# Patient Record
Sex: MALE | Race: WHITE | NOT HISPANIC OR LATINO | Employment: PART TIME | URBAN - METROPOLITAN AREA
[De-identification: names, ages, dates, MRNs, and addresses within clinical notes are randomized per-mention and may not be internally consistent; named-entity substitution may affect disease eponyms.]

---

## 2020-11-05 ENCOUNTER — HOSPITAL ENCOUNTER (OUTPATIENT)
Dept: LAB | Facility: MEDICAL CENTER | Age: 70
End: 2020-11-05
Attending: PSYCHIATRY & NEUROLOGY
Payer: MEDICARE

## 2020-11-05 PROCEDURE — C9803 HOPD COVID-19 SPEC COLLECT: HCPCS

## 2020-11-05 PROCEDURE — U0003 INFECTIOUS AGENT DETECTION BY NUCLEIC ACID (DNA OR RNA); SEVERE ACUTE RESPIRATORY SYNDROME CORONAVIRUS 2 (SARS-COV-2) (CORONAVIRUS DISEASE [COVID-19]), AMPLIFIED PROBE TECHNIQUE, MAKING USE OF HIGH THROUGHPUT TECHNOLOGIES AS DESCRIBED BY CMS-2020-01-R: HCPCS

## 2020-11-06 LAB — COVID ORDER STATUS COVID19: NORMAL

## 2020-11-07 LAB
SARS-COV-2 RNA RESP QL NAA+PROBE: NOTDETECTED
SPECIMEN SOURCE: NORMAL

## 2020-12-23 ENCOUNTER — HOSPITAL ENCOUNTER (OUTPATIENT)
Dept: LAB | Facility: MEDICAL CENTER | Age: 70
End: 2020-12-23
Attending: PSYCHIATRY & NEUROLOGY
Payer: MEDICARE

## 2020-12-23 PROCEDURE — C9803 HOPD COVID-19 SPEC COLLECT: HCPCS

## 2020-12-23 PROCEDURE — U0003 INFECTIOUS AGENT DETECTION BY NUCLEIC ACID (DNA OR RNA); SEVERE ACUTE RESPIRATORY SYNDROME CORONAVIRUS 2 (SARS-COV-2) (CORONAVIRUS DISEASE [COVID-19]), AMPLIFIED PROBE TECHNIQUE, MAKING USE OF HIGH THROUGHPUT TECHNOLOGIES AS DESCRIBED BY CMS-2020-01-R: HCPCS

## 2020-12-24 LAB — COVID ORDER STATUS COVID19: NORMAL

## 2020-12-25 LAB
SARS-COV-2 RNA RESP QL NAA+PROBE: NOTDETECTED
SPECIMEN SOURCE: NORMAL

## 2021-03-03 DIAGNOSIS — Z23 NEED FOR VACCINATION: ICD-10-CM

## 2021-04-01 ENCOUNTER — OFFICE VISIT (OUTPATIENT)
Dept: MEDICAL GROUP | Facility: MEDICAL CENTER | Age: 71
End: 2021-04-01
Payer: MEDICARE

## 2021-04-01 VITALS
RESPIRATION RATE: 16 BRPM | DIASTOLIC BLOOD PRESSURE: 78 MMHG | OXYGEN SATURATION: 95 % | WEIGHT: 180.78 LBS | HEART RATE: 84 BPM | HEIGHT: 68 IN | SYSTOLIC BLOOD PRESSURE: 138 MMHG | BODY MASS INDEX: 27.4 KG/M2 | TEMPERATURE: 97.7 F

## 2021-04-01 DIAGNOSIS — F33.42 RECURRENT MAJOR DEPRESSIVE DISORDER, IN FULL REMISSION (HCC): ICD-10-CM

## 2021-04-01 DIAGNOSIS — E03.9 HYPOTHYROIDISM, UNSPECIFIED TYPE: ICD-10-CM

## 2021-04-01 DIAGNOSIS — Z00.00 ENCOUNTER FOR PREVENTIVE CARE: ICD-10-CM

## 2021-04-01 DIAGNOSIS — J30.81 ALLERGIC RHINITIS DUE TO ANIMAL HAIR AND DANDER: ICD-10-CM

## 2021-04-01 DIAGNOSIS — T78.40XA ALLERGY, INITIAL ENCOUNTER: ICD-10-CM

## 2021-04-01 DIAGNOSIS — R22.1 LUMP IN NECK: ICD-10-CM

## 2021-04-01 DIAGNOSIS — E78.5 DYSLIPIDEMIA: ICD-10-CM

## 2021-04-01 DIAGNOSIS — E29.1 HYPOGONADISM IN MALE: ICD-10-CM

## 2021-04-01 DIAGNOSIS — A60.00 GENITAL HERPES SIMPLEX, UNSPECIFIED SITE: ICD-10-CM

## 2021-04-01 DIAGNOSIS — Z12.5 PROSTATE CANCER SCREENING: ICD-10-CM

## 2021-04-01 PROCEDURE — 99204 OFFICE O/P NEW MOD 45 MIN: CPT | Performed by: INTERNAL MEDICINE

## 2021-04-01 RX ORDER — DIMENHYDRINATE 50 MG
1 TABLET ORAL DAILY
COMMUNITY
End: 2021-08-09

## 2021-04-01 RX ORDER — MONTELUKAST SODIUM 10 MG/1
10 TABLET ORAL DAILY
Qty: 90 TABLET | Refills: 1 | Status: SHIPPED | OUTPATIENT
Start: 2021-04-01 | End: 2021-05-14 | Stop reason: SDUPTHER

## 2021-04-01 RX ORDER — VALACYCLOVIR HYDROCHLORIDE 500 MG/1
500 TABLET, FILM COATED ORAL 2 TIMES DAILY
Qty: 200 TABLET | Refills: 3 | Status: SHIPPED | OUTPATIENT
Start: 2021-04-01 | End: 2021-07-10

## 2021-04-01 RX ORDER — BUPROPION HYDROCHLORIDE 300 MG/1
300 TABLET ORAL EVERY MORNING
COMMUNITY
End: 2021-04-01

## 2021-04-01 RX ORDER — LEVOTHYROXINE SODIUM 0.1 MG/1
100 TABLET ORAL
COMMUNITY
End: 2021-04-01 | Stop reason: SDUPTHER

## 2021-04-01 RX ORDER — FLUOXETINE HYDROCHLORIDE 20 MG/1
20 CAPSULE ORAL DAILY
Qty: 90 CAPSULE | Refills: 1 | Status: SHIPPED | OUTPATIENT
Start: 2021-04-01 | End: 2021-05-14 | Stop reason: SDUPTHER

## 2021-04-01 RX ORDER — CHLORAL HYDRATE 500 MG
1000 CAPSULE ORAL 2 TIMES DAILY
COMMUNITY
End: 2023-03-02

## 2021-04-01 RX ORDER — LEVOTHYROXINE SODIUM 0.1 MG/1
100 TABLET ORAL
Qty: 100 TABLET | Refills: 3 | Status: SHIPPED | OUTPATIENT
Start: 2021-04-01 | End: 2022-06-14 | Stop reason: SDUPTHER

## 2021-04-01 RX ORDER — VITAMIN B COMPLEX
8000 TABLET ORAL DAILY
COMMUNITY
End: 2021-08-09

## 2021-04-01 RX ORDER — SIMVASTATIN 20 MG
20 TABLET ORAL NIGHTLY
COMMUNITY
End: 2021-04-01 | Stop reason: SDUPTHER

## 2021-04-01 RX ORDER — VALACYCLOVIR HYDROCHLORIDE 500 MG/1
500 TABLET, FILM COATED ORAL 2 TIMES DAILY
COMMUNITY
End: 2021-04-01 | Stop reason: SDUPTHER

## 2021-04-01 RX ORDER — SIMVASTATIN 20 MG
20 TABLET ORAL EVERY EVENING
Qty: 100 TABLET | Refills: 3 | Status: SHIPPED | OUTPATIENT
Start: 2021-04-01 | End: 2022-05-13 | Stop reason: SDUPTHER

## 2021-04-01 ASSESSMENT — PATIENT HEALTH QUESTIONNAIRE - PHQ9: CLINICAL INTERPRETATION OF PHQ2 SCORE: 0

## 2021-04-01 NOTE — ASSESSMENT & PLAN NOTE
He would like to try prozac  Benefits, risks, and adverse reactions of medication discussed. Patient is agreeable with initiating the medication.    wellbutrin XL did not help much

## 2021-04-01 NOTE — PROGRESS NOTES
New Patient to Establish    Reason to establish: New patient to establish    Gary Hedrick is a 70 y.o. male who presents today with the following:    CC:   Chief Complaint   Patient presents with   • Establish Care   • Seasonal Allergies       HPI:     Allergies  Allergic to mainly Cats  Allergy test positive for eggs, almonds, mati.     He has cats at home        Hypogonadism in male  Hx of hypogonadism  Using testosterone injection       Allergic rhinitis due to animal hair and dander  Chronic allergic rhinitis  He does sinus washes, rotates antihistamines, flonase  Used to see ENT  Trial of montelukast  Benefits, risks, and adverse reactions of medication discussed. Patient is agreeable with initiating the medication.        Recurrent major depressive disorder, in full remission (HCC)  He would like to try prozac  Benefits, risks, and adverse reactions of medication discussed. Patient is agreeable with initiating the medication.    wellbutrin XL did not help much      Lump in neck  Small hard anterior neck lump, non-tender      Hypothyroidism  Hx of hypothyroidism   On levothyroxine          Current Outpatient Medications:   •  vitamin D (CHOLECALCIFEROL) 1000 Unit (25 mcg) Tab, Take 8,000 Units by mouth every day., Disp: , Rfl:   •  Omega-3 Fatty Acids (FISH OIL) 1000 MG Cap capsule, Take 200 mg by mouth 2 Times a Day., Disp: , Rfl:   •  Coenzyme Q10 (CO Q-10) 100 MG Cap, Take 1 capsule by mouth every day., Disp: , Rfl:   •  montelukast (SINGULAIR) 10 MG Tab, Take 1 tablet by mouth every day., Disp: 90 tablet, Rfl: 1  •  FLUoxetine (PROZAC) 20 MG Cap, Take 1 capsule by mouth every day., Disp: 90 capsule, Rfl: 1  •  levothyroxine (SYNTHROID) 100 MCG Tab, Take 1 tablet by mouth Every morning on an empty stomach., Disp: 100 tablet, Rfl: 3  •  simvastatin (ZOCOR) 20 MG Tab, Take 1 tablet by mouth every evening., Disp: 100 tablet, Rfl: 3  •  valACYclovir (VALTREX) 500 MG Tab, Take 1 tablet by mouth 2 times a day for  "100 days., Disp: 200 tablet, Rfl: 3    Allergies, past medical history, past surgical history, medications, family history, social history reviewed and updated.    ROS     Constitutional: Denies fevers or chills  Eyes: Denies changes in vision  Ears/Nose/Throat/Mouth: Denies nasal congestion or sore throat   Cardiovascular: Denies chest pain or palpitations   Respiratory: Denies shortness of breath , Denies cough  Gastrointestinal/Hepatic: Denies abd pain, nausea, vomiting   Genitourinary: Denies dysuria or frequency  Musculoskeletal/Rheum: Denies joint pain and swelling   Neurological: Denies headache  Psychiatric: mood stable  Endocrine: hx of hypogonadism, hypothyroidism Denies hx of diabetes   Heme/Oncology/Lymph Nodes: Denies weight changes or enlarged LNs.    Physical Exam  /78 (BP Location: Left arm, Patient Position: Sitting, BP Cuff Size: Adult)   Pulse 84   Temp 36.5 °C (97.7 °F) (Temporal)   Resp 16   Ht 1.727 m (5' 8\")   Wt 82 kg (180 lb 12.4 oz)   SpO2 95%   BMI 27.49 kg/m²   General: Normal appearance.  Well developed, well nourished, no acute distress.  HEENT: Normocephalic.  Extraocular motion intact. Pupils are equally round, reactive to light and accommodation, conjunctiva clear, no scleral icterus.  Ears: normal shape and contour, ear canals clear, tympanic membranes intact. Hearing intact. Wearing a mask. Oropharynx clear, no erythema, edema or exudate noted.  NECK: Thyroid is not enlarged. No JVD.  No carotid bruits. Probable anterior small lump, hard, non-tender  Cardiovascular: Regular rhythm and rate. No murmur/rubs/gallops.   Respiratory: Normal respiratory effort, clear to auscultation bilaterally. No wheezing/rales/rhonchi.    Abdomen: Bowel sounds present, soft, nontender, nondistended, no rebound, no guarding.   : No suprapubic tenderness. No CVA tenderness.   EXT: no LE edema b/l. No cyanosis.  No clubbing.  Lymph: No cervical, supraclavicular or axillary lymph nodes are " palpable  Skin: Warm and dry.  .   Psych: AAOx3,  Normal mood and affect, normal judgment and insight, memory within normal limits      Assessment and Plan    1. Allergy, initial encounter  - REFERRAL TO ENT  - CBC WITH DIFFERENTIAL; Future    2. Allergic rhinitis due to animal hair and dander  - montelukast (SINGULAIR) 10 MG Tab; Take 1 tablet by mouth every day.  Dispense: 90 tablet; Refill: 1  Sinus wash, flonase prn, OTC Zyrtec or claritin    3. Hypogonadism in male  - TESTOSTERONE SERUM; Future  - REFERRAL TO ENDOCRINOLOGY    4. Recurrent major depressive disorder, in full remission (HCC)  - FLUoxetine (PROZAC) 20 MG Cap; Take 1 capsule by mouth every day.  Dispense: 90 capsule; Refill: 1  - TSH WITH REFLEX TO FT4; Future    5. Hypothyroidism, unspecified type  - CBC WITH DIFFERENTIAL; Future  - Comp Metabolic Panel; Future  - TSH WITH REFLEX TO FT4; Future  - levothyroxine (SYNTHROID) 100 MCG Tab; Take 1 tablet by mouth Every morning on an empty stomach.  Dispense: 100 tablet; Refill: 3  - US-THYROID; Future    6. Dyslipidemia  - Lipid Profile; Future  - TSH WITH REFLEX TO FT4; Future  - simvastatin (ZOCOR) 20 MG Tab; Take 1 tablet by mouth every evening.  Dispense: 100 tablet; Refill: 3    7. Encounter for preventive care  - VITAMIN D,25 HYDROXY; Future    8. Genital herpes simplex, unspecified site  - valACYclovir (VALTREX) 500 MG Tab; Take 1 tablet by mouth 2 times a day for 100 days.  Dispense: 200 tablet; Refill: 3    9. Prostate cancer screening  - PROSTATE SPECIFIC AG SCREENING; Future    10. Lump in neck  - US-SOFT TISSUES OF HEAD - NECK; Future        Follow-up:Return in about 1 month (around 5/1/2021), or if symptoms worsen or fail to improve.    This note was created using voice recognition software. There may be unintended errors in spelling, grammar or content.

## 2021-04-01 NOTE — ASSESSMENT & PLAN NOTE
Chronic allergic rhinitis  He does sinus washes, rotates antihistamines, flonase  Used to see ENT  Trial of montelukast  Benefits, risks, and adverse reactions of medication discussed. Patient is agreeable with initiating the medication.

## 2021-04-01 NOTE — ASSESSMENT & PLAN NOTE
Allergic to mainly Cats  Allergy test positive for eggs, almonds, mati.     He has cats at home

## 2021-04-12 ENCOUNTER — HOSPITAL ENCOUNTER (OUTPATIENT)
Dept: LAB | Facility: MEDICAL CENTER | Age: 71
End: 2021-04-12
Attending: FAMILY MEDICINE
Payer: MEDICARE

## 2021-04-12 ENCOUNTER — HOSPITAL ENCOUNTER (OUTPATIENT)
Facility: MEDICAL CENTER | Age: 71
End: 2021-04-12
Attending: INTERNAL MEDICINE
Payer: MEDICARE

## 2021-04-12 ENCOUNTER — HOSPITAL ENCOUNTER (OUTPATIENT)
Dept: LAB | Facility: MEDICAL CENTER | Age: 71
End: 2021-04-12
Attending: INTERNAL MEDICINE
Payer: MEDICARE

## 2021-04-12 DIAGNOSIS — Z00.00 ENCOUNTER FOR PREVENTIVE CARE: ICD-10-CM

## 2021-04-12 DIAGNOSIS — D75.89 MACROCYTOSIS WITHOUT ANEMIA: ICD-10-CM

## 2021-04-12 DIAGNOSIS — E29.1 HYPOGONADISM IN MALE: ICD-10-CM

## 2021-04-12 DIAGNOSIS — R97.20 ELEVATED PSA: ICD-10-CM

## 2021-04-12 DIAGNOSIS — E78.5 DYSLIPIDEMIA: ICD-10-CM

## 2021-04-12 DIAGNOSIS — F33.42 RECURRENT MAJOR DEPRESSIVE DISORDER, IN FULL REMISSION (HCC): ICD-10-CM

## 2021-04-12 DIAGNOSIS — E03.9 HYPOTHYROIDISM, UNSPECIFIED TYPE: ICD-10-CM

## 2021-04-12 DIAGNOSIS — T78.40XA ALLERGY, INITIAL ENCOUNTER: ICD-10-CM

## 2021-04-12 DIAGNOSIS — Z12.5 PROSTATE CANCER SCREENING: ICD-10-CM

## 2021-04-12 LAB
ALBUMIN SERPL BCP-MCNC: 4.4 G/DL (ref 3.2–4.9)
ALBUMIN/GLOB SERPL: 2.3 G/DL
ALP SERPL-CCNC: 63 U/L (ref 30–99)
ALT SERPL-CCNC: 34 U/L (ref 2–50)
ANION GAP SERPL CALC-SCNC: 11 MMOL/L (ref 7–16)
AST SERPL-CCNC: 26 U/L (ref 12–45)
BASOPHILS # BLD AUTO: 0.6 % (ref 0–1.8)
BASOPHILS # BLD: 0.03 K/UL (ref 0–0.12)
BILIRUB SERPL-MCNC: 0.4 MG/DL (ref 0.1–1.5)
BUN SERPL-MCNC: 15 MG/DL (ref 8–22)
CALCIUM SERPL-MCNC: 8.7 MG/DL (ref 8.5–10.5)
CHLORIDE SERPL-SCNC: 103 MMOL/L (ref 96–112)
CHOLEST SERPL-MCNC: 144 MG/DL (ref 100–199)
CO2 SERPL-SCNC: 27 MMOL/L (ref 20–33)
CREAT SERPL-MCNC: 1.1 MG/DL (ref 0.5–1.4)
EOSINOPHIL # BLD AUTO: 0.05 K/UL (ref 0–0.51)
EOSINOPHIL NFR BLD: 0.9 % (ref 0–6.9)
ERYTHROCYTE [DISTWIDTH] IN BLOOD BY AUTOMATED COUNT: 48.1 FL (ref 35.9–50)
FASTING STATUS PATIENT QL REPORTED: NORMAL
FOLATE SERPL-MCNC: 16 NG/ML
GLOBULIN SER CALC-MCNC: 1.9 G/DL (ref 1.9–3.5)
GLUCOSE SERPL-MCNC: 101 MG/DL (ref 65–99)
HCT VFR BLD AUTO: 41.5 % (ref 42–52)
HDLC SERPL-MCNC: 38 MG/DL
HGB BLD-MCNC: 14.4 G/DL (ref 14–18)
IMM GRANULOCYTES # BLD AUTO: 0.01 K/UL (ref 0–0.11)
IMM GRANULOCYTES NFR BLD AUTO: 0.2 % (ref 0–0.9)
LDLC SERPL CALC-MCNC: 74 MG/DL
LH SERPL-ACNC: 0.6 IU/L (ref 1.7–8.6)
LYMPHOCYTES # BLD AUTO: 1.54 K/UL (ref 1–4.8)
LYMPHOCYTES NFR BLD: 28.8 % (ref 22–41)
MCH RBC QN AUTO: 36.3 PG (ref 27–33)
MCHC RBC AUTO-ENTMCNC: 34.7 G/DL (ref 33.7–35.3)
MCV RBC AUTO: 104.5 FL (ref 81.4–97.8)
MONOCYTES # BLD AUTO: 0.63 K/UL (ref 0–0.85)
MONOCYTES NFR BLD AUTO: 11.8 % (ref 0–13.4)
NEUTROPHILS # BLD AUTO: 3.08 K/UL (ref 1.82–7.42)
NEUTROPHILS NFR BLD: 57.7 % (ref 44–72)
NRBC # BLD AUTO: 0 K/UL
NRBC BLD-RTO: 0 /100 WBC
PLATELET # BLD AUTO: 248 K/UL (ref 164–446)
PMV BLD AUTO: 9.8 FL (ref 9–12.9)
POTASSIUM SERPL-SCNC: 4.2 MMOL/L (ref 3.6–5.5)
PROGEST SERPL-MCNC: <0.05 NG/ML
PROT SERPL-MCNC: 6.3 G/DL (ref 6–8.2)
PSA SERPL-MCNC: 4.1 NG/ML (ref 0–4)
RBC # BLD AUTO: 3.97 M/UL (ref 4.7–6.1)
SODIUM SERPL-SCNC: 141 MMOL/L (ref 135–145)
TRIGL SERPL-MCNC: 162 MG/DL (ref 0–149)
TSH SERPL DL<=0.005 MIU/L-ACNC: 2.54 UIU/ML (ref 0.38–5.33)
VIT B12 SERPL-MCNC: 669 PG/ML (ref 211–911)
WBC # BLD AUTO: 5.3 K/UL (ref 4.8–10.8)

## 2021-04-12 PROCEDURE — 80061 LIPID PANEL: CPT

## 2021-04-12 PROCEDURE — 84403 ASSAY OF TOTAL TESTOSTERONE: CPT | Mod: 91

## 2021-04-12 PROCEDURE — 84153 ASSAY OF PSA TOTAL: CPT

## 2021-04-12 PROCEDURE — 80053 COMPREHEN METABOLIC PANEL: CPT

## 2021-04-12 PROCEDURE — 82306 VITAMIN D 25 HYDROXY: CPT

## 2021-04-12 PROCEDURE — 82746 ASSAY OF FOLIC ACID SERUM: CPT

## 2021-04-12 PROCEDURE — 82607 VITAMIN B-12: CPT

## 2021-04-12 PROCEDURE — 85025 COMPLETE CBC W/AUTO DIFF WBC: CPT

## 2021-04-12 PROCEDURE — 36415 COLL VENOUS BLD VENIPUNCTURE: CPT

## 2021-04-12 PROCEDURE — 84144 ASSAY OF PROGESTERONE: CPT

## 2021-04-12 PROCEDURE — 82670 ASSAY OF TOTAL ESTRADIOL: CPT

## 2021-04-12 PROCEDURE — 84153 ASSAY OF PSA TOTAL: CPT | Mod: 91

## 2021-04-12 PROCEDURE — 84443 ASSAY THYROID STIM HORMONE: CPT

## 2021-04-12 PROCEDURE — 84403 ASSAY OF TOTAL TESTOSTERONE: CPT

## 2021-04-12 PROCEDURE — 83002 ASSAY OF GONADOTROPIN (LH): CPT

## 2021-04-13 LAB
PSA SERPL-MCNC: 4.1 NG/ML (ref 0–4)
TESTOST SERPL-MCNC: 608 NG/DL (ref 300–720)
TESTOST SERPL-MCNC: 636 NG/DL (ref 300–720)

## 2021-04-13 NOTE — PROGRESS NOTES
Macrocytosis without anemia  -     VITAMIN B12; Future  -     FOLATE; Future  -     METHYLMALONIC ACID, SERUM; Future

## 2021-04-13 NOTE — PROGRESS NOTES
Ref. Range 4/12/2021 08:01   Prostatic Specific Antigen Tot Latest Ref Range: 0.00 - 4.00 ng/mL 4.10 (H)   Close monitor PSA    Referral to urology

## 2021-04-14 LAB — 25(OH)D3 SERPL-MCNC: 50 NG/ML (ref 30–80)

## 2021-04-17 ENCOUNTER — HOSPITAL ENCOUNTER (OUTPATIENT)
Dept: RADIOLOGY | Facility: MEDICAL CENTER | Age: 71
End: 2021-04-17
Attending: INTERNAL MEDICINE
Payer: MEDICARE

## 2021-04-17 DIAGNOSIS — E03.9 HYPOTHYROIDISM, UNSPECIFIED TYPE: ICD-10-CM

## 2021-04-17 PROCEDURE — 76536 US EXAM OF HEAD AND NECK: CPT

## 2021-04-24 LAB — ESTRADIOL SERPL HS-MCNC: 29.3 PG/ML (ref 10–42)

## 2021-05-12 ENCOUNTER — PATIENT MESSAGE (OUTPATIENT)
Dept: HEALTH INFORMATION MANAGEMENT | Facility: OTHER | Age: 71
End: 2021-05-12

## 2021-05-13 ENCOUNTER — HOSPITAL ENCOUNTER (OUTPATIENT)
Facility: MEDICAL CENTER | Age: 71
End: 2021-05-13
Attending: UROLOGY
Payer: MEDICARE

## 2021-05-13 LAB
BUN SERPL-MCNC: 20 MG/DL (ref 8–22)
CREAT SERPL-MCNC: 1.09 MG/DL (ref 0.5–1.4)

## 2021-05-13 PROCEDURE — 84520 ASSAY OF UREA NITROGEN: CPT

## 2021-05-13 PROCEDURE — 82565 ASSAY OF CREATININE: CPT

## 2021-05-14 ENCOUNTER — OFFICE VISIT (OUTPATIENT)
Dept: MEDICAL GROUP | Facility: MEDICAL CENTER | Age: 71
End: 2021-05-14
Payer: MEDICARE

## 2021-05-14 VITALS
WEIGHT: 174.16 LBS | DIASTOLIC BLOOD PRESSURE: 68 MMHG | SYSTOLIC BLOOD PRESSURE: 104 MMHG | TEMPERATURE: 98.1 F | RESPIRATION RATE: 16 BRPM | BODY MASS INDEX: 26.4 KG/M2 | HEART RATE: 85 BPM | OXYGEN SATURATION: 95 % | HEIGHT: 68 IN

## 2021-05-14 DIAGNOSIS — E78.5 DYSLIPIDEMIA: ICD-10-CM

## 2021-05-14 DIAGNOSIS — D75.89 MACROCYTOSIS WITHOUT ANEMIA: ICD-10-CM

## 2021-05-14 DIAGNOSIS — R73.01 IMPAIRED FASTING GLUCOSE: ICD-10-CM

## 2021-05-14 DIAGNOSIS — E03.9 HYPOTHYROIDISM, UNSPECIFIED TYPE: ICD-10-CM

## 2021-05-14 DIAGNOSIS — J32.4 CHRONIC PANSINUSITIS: ICD-10-CM

## 2021-05-14 DIAGNOSIS — R97.20 ELEVATED PSA: ICD-10-CM

## 2021-05-14 DIAGNOSIS — F33.42 RECURRENT MAJOR DEPRESSIVE DISORDER, IN FULL REMISSION (HCC): ICD-10-CM

## 2021-05-14 DIAGNOSIS — Z12.11 SCREENING FOR COLON CANCER: ICD-10-CM

## 2021-05-14 DIAGNOSIS — Z86.010 HX OF COLONIC POLYPS: ICD-10-CM

## 2021-05-14 DIAGNOSIS — J30.81 ALLERGIC RHINITIS DUE TO ANIMAL HAIR AND DANDER: ICD-10-CM

## 2021-05-14 PROBLEM — R22.1 LUMP IN NECK: Status: RESOLVED | Noted: 2021-04-01 | Resolved: 2021-05-14

## 2021-05-14 PROCEDURE — 99214 OFFICE O/P EST MOD 30 MIN: CPT | Performed by: INTERNAL MEDICINE

## 2021-05-14 RX ORDER — MONTELUKAST SODIUM 10 MG/1
10 TABLET ORAL DAILY
Qty: 90 TABLET | Refills: 3 | Status: SHIPPED | OUTPATIENT
Start: 2021-05-14 | End: 2021-08-09

## 2021-05-14 RX ORDER — FLUOXETINE HYDROCHLORIDE 20 MG/1
20 CAPSULE ORAL DAILY
Qty: 90 CAPSULE | Refills: 4 | Status: SHIPPED | OUTPATIENT
Start: 2021-05-14 | End: 2022-05-13 | Stop reason: SDUPTHER

## 2021-05-14 ASSESSMENT — FIBROSIS 4 INDEX: FIB4 SCORE: 1.22

## 2021-05-14 NOTE — PROGRESS NOTES
Established Patient    Gary Hedrick is a 70 y.o. male who presents today with the following:    CC:   Chief Complaint   Patient presents with   • Follow-Up   • Lab Results       HPI:     Chronic pansinusitis  On Cefdinir following with ENT Dr. Martinez      Macrocytosis without anemia  B12, folate, TSH normal  Not drinking ETOH   Ref. Range 4/12/2021 08:01   WBC Latest Ref Range: 4.8 - 10.8 K/uL 5.3   RBC Latest Ref Range: 4.70 - 6.10 M/uL 3.97 (L)   Hemoglobin Latest Ref Range: 14.0 - 18.0 g/dL 14.4   Hematocrit Latest Ref Range: 42.0 - 52.0 % 41.5 (L)   MCV Latest Ref Range: 81.4 - 97.8 fL 104.5 (H)   MCH Latest Ref Range: 27.0 - 33.0 pg 36.3 (H)   MCHC Latest Ref Range: 33.7 - 35.3 g/dL 34.7   RDW Latest Ref Range: 35.9 - 50.0 fL 48.1   Platelet Count Latest Ref Range: 164 - 446 K/uL 248   MPV Latest Ref Range: 9.0 - 12.9 fL 9.8   Neutrophils-Polys Latest Ref Range: 44.00 - 72.00 % 57.70   Neutrophils (Absolute) Latest Ref Range: 1.82 - 7.42 K/uL 3.08   Lymphocytes Latest Ref Range: 22.00 - 41.00 % 28.80   Lymphs (Absolute) Latest Ref Range: 1.00 - 4.80 K/uL 1.54   Monocytes Latest Ref Range: 0.00 - 13.40 % 11.80   Monos (Absolute) Latest Ref Range: 0.00 - 0.85 K/uL 0.63   Eosinophils Latest Ref Range: 0.00 - 6.90 % 0.90   Eos (Absolute) Latest Ref Range: 0.00 - 0.51 K/uL 0.05   Basophils Latest Ref Range: 0.00 - 1.80 % 0.60   Baso (Absolute) Latest Ref Range: 0.00 - 0.12 K/uL 0.03   Immature Granulocytes Latest Ref Range: 0.00 - 0.90 % 0.20   Immature Granulocytes (abs) Latest Ref Range: 0.00 - 0.11 K/uL 0.01   Nucleated RBC Latest Units: /100 WBC 0.00   NRBC (Absolute) Latest Units: K/uL 0.00         Hypothyroidism  Hx of hypothyroidism   On levothyroxine  US thyroid normal      Recurrent major depressive disorder, in full remission (HCC)  Stable on Prozac  Denies SI/HI  wellbutrin XL did not help much      Elevated PSA     Ref. Range 4/12/2021 08:01   Prostatic Specific Antigen Tot Latest Ref Range: 0.00 - 4.00  "ng/mL 4.10 (H)     Following with urology [facility]      Dyslipidemia  Stable on statin        Current Outpatient Medications   Medication Sig Dispense Refill   • montelukast (SINGULAIR) 10 MG Tab Take 1 tablet by mouth every day. 90 tablet 3   • FLUoxetine (PROZAC) 20 MG Cap Take 1 capsule by mouth every day. 90 capsule 4   • vitamin D (CHOLECALCIFEROL) 1000 Unit (25 mcg) Tab Take 8,000 Units by mouth every day.     • Omega-3 Fatty Acids (FISH OIL) 1000 MG Cap capsule Take 200 mg by mouth 2 Times a Day.     • Coenzyme Q10 (CO Q-10) 100 MG Cap Take 1 capsule by mouth every day.     • levothyroxine (SYNTHROID) 100 MCG Tab Take 1 tablet by mouth Every morning on an empty stomach. 100 tablet 3   • simvastatin (ZOCOR) 20 MG Tab Take 1 tablet by mouth every evening. 100 tablet 3   • valACYclovir (VALTREX) 500 MG Tab Take 1 tablet by mouth 2 times a day for 100 days. 200 tablet 3     No current facility-administered medications for this visit.       Allergies, past medical history, past surgical history, medications, family history, social history reviewed and updated.    ROS   Constitutional: Denies fevers or chills  Eyes: Denies changes in vision  Ears/Nose/Throat/Mouth: Denies nasal congestion or sore throat   Cardiovascular: Denies chest pain or palpitations   Respiratory: Denies shortness of breath , Denies cough  Gastrointestinal/Hepatic: Denies abd pain, nausea, vomiting   Genitourinary: Denies dysuria or frequency  Musculoskeletal/Rheum: Denies joint pain and swelling   Neurological: Denies headache  Psychiatric: mood stable  Endocrine: hypothyroidism Denies hx of diabetes   Heme/Oncology/Lymph Nodes: Denies weight changes or enlarged LNs.    Physical Exam  Vitals: /68 (BP Location: Left arm, Patient Position: Sitting, BP Cuff Size: Adult)   Pulse 85   Temp 36.7 °C (98.1 °F) (Temporal)   Resp 16   Ht 1.727 m (5' 8\")   Wt 79 kg (174 lb 2.6 oz)   SpO2 95%   BMI 26.48 kg/m²   General: Alert, pleasant, " NAD  HEENT: Normocephalic.  EOMI, no icterus or pallor.  Conjunctivae and lids normal. External ears normal. Wearing a mask. Oropharynx non-erythematous, mucous membranes moist.  Neck supple.  No thyromegaly or masses palpated.   Lymph: No cervical or supraclavicular lymphadenopathy.  Cardiovascular: Regular rate and rhythm.    Respiratory: Normal respiratory effort.  Clear to auscultation bilaterally.  Abdomen: Non-distended, soft, non-tender  Skin: Warm, dry  Musculoskeletal: Gait is normal.  Moves all extremities well.  Extremities: No leg edema.    Psych:  Affect/mood is normal, judgement is good, memory is intact, grooming is appropriate.      Labs (4/12/21) were reviewed and discussed with patients.  All questions were answered.      Assessment and Plan    1. Macrocytosis without anemia  - REFERRAL TO HEMATOLOGY ONCOLOGY    2. Impaired fasting glucose  Healthful lifestyle measures    3. Chronic pansinusitis  On Cefdinir  Following with ENT    4. Screening for colon cancer  - REFERRAL TO GI FOR COLONOSCOPY    5. Hx of colonic polyps  - REFERRAL TO GI FOR COLONOSCOPY    6. Hypothyroidism, unspecified type  Levothyroxine    7. Allergic rhinitis due to animal hair and dander  - montelukast (SINGULAIR) 10 MG Tab; Take 1 tablet by mouth every day.  Dispense: 90 tablet; Refill: 3    8. Recurrent major depressive disorder, in full remission (HCC)  Stable on prozac  -     FLUoxetine (PROZAC) 20 MG Cap; Take 1 capsule by mouth every day.    9. Elevated PSA  Follow with urology    10. Dyslipidemia  Stable on simvastatin        Follow-up:Return in about 1 year (around 5/14/2022), or if symptoms worsen or fail to improve.    This note was created using voice recognition software. There may be unintended errors in spelling, grammar or content.

## 2021-05-14 NOTE — LETTER
[facility]  Elda Box M.D.  [address]  [place] [address]  Fax: [phone]   Authorization for Release/Disclosure of   Protected Health Information   Name: JAMES GRANT : 1950 SSN: [id number]   Address: [facility] [place] [address] Phone:    [phone] (home)    I authorize the entity listed below to release/disclose the PHI below to:   [facility]/Elda Box M.D. and Elda Box M.D.   Provider or Entity Name:     Address   City, State, Zip   Phone:      Fax:     Reason for request: continuity of care   Information to be released:    [  ] LAST COLONOSCOPY,  including any PATH REPORT and follow-up  [  ] LAST FIT/COLOGUARD RESULT [  ] LAST DEXA  [  ] LAST MAMMOGRAM  [  ] LAST PAP  [  ] LAST LABS [  ] RETINA EXAM REPORT  [  ] IMMUNIZATION RECORDS  [  ] Release all info      [  ] Check here and initial the line next to each item to release ALL health information INCLUDING  _____ Care and treatment for drug and / or alcohol abuse  _____ HIV testing, infection status, or AIDS  _____ Genetic Testing    DATES OF SERVICE OR TIME PERIOD TO BE DISCLOSED: _____________  I understand and acknowledge that:  * This Authorization may be revoked at any time by you in writing, except if your health information has already been used or disclosed.  * Your health information that will be used or disclosed as a result of you signing this authorization could be re-disclosed by the recipient. If this occurs, your re-disclosed health information may no longer be protected by State or Federal laws.  * You may refuse to sign this Authorization. Your refusal will not affect your ability to obtain treatment.  * This Authorization becomes effective upon signing and will  on (date) __________.      If no date is indicated, this Authorization will  one (1) year from the signature date.    Name: James Grant    Signature:   Date:     2021       PLEASE  FAX REQUESTED RECORDS BACK TO: [phone]

## 2021-05-14 NOTE — ASSESSMENT & PLAN NOTE
B12, folate, TSH normal  Not drinking ETOH   Ref. Range 4/12/2021 08:01   WBC Latest Ref Range: 4.8 - 10.8 K/uL 5.3   RBC Latest Ref Range: 4.70 - 6.10 M/uL 3.97 (L)   Hemoglobin Latest Ref Range: 14.0 - 18.0 g/dL 14.4   Hematocrit Latest Ref Range: 42.0 - 52.0 % 41.5 (L)   MCV Latest Ref Range: 81.4 - 97.8 fL 104.5 (H)   MCH Latest Ref Range: 27.0 - 33.0 pg 36.3 (H)   MCHC Latest Ref Range: 33.7 - 35.3 g/dL 34.7   RDW Latest Ref Range: 35.9 - 50.0 fL 48.1   Platelet Count Latest Ref Range: 164 - 446 K/uL 248   MPV Latest Ref Range: 9.0 - 12.9 fL 9.8   Neutrophils-Polys Latest Ref Range: 44.00 - 72.00 % 57.70   Neutrophils (Absolute) Latest Ref Range: 1.82 - 7.42 K/uL 3.08   Lymphocytes Latest Ref Range: 22.00 - 41.00 % 28.80   Lymphs (Absolute) Latest Ref Range: 1.00 - 4.80 K/uL 1.54   Monocytes Latest Ref Range: 0.00 - 13.40 % 11.80   Monos (Absolute) Latest Ref Range: 0.00 - 0.85 K/uL 0.63   Eosinophils Latest Ref Range: 0.00 - 6.90 % 0.90   Eos (Absolute) Latest Ref Range: 0.00 - 0.51 K/uL 0.05   Basophils Latest Ref Range: 0.00 - 1.80 % 0.60   Baso (Absolute) Latest Ref Range: 0.00 - 0.12 K/uL 0.03   Immature Granulocytes Latest Ref Range: 0.00 - 0.90 % 0.20   Immature Granulocytes (abs) Latest Ref Range: 0.00 - 0.11 K/uL 0.01   Nucleated RBC Latest Units: /100 WBC 0.00   NRBC (Absolute) Latest Units: K/uL 0.00

## 2021-05-14 NOTE — ASSESSMENT & PLAN NOTE
Ref. Range 4/12/2021 08:01   Prostatic Specific Antigen Tot Latest Ref Range: 0.00 - 4.00 ng/mL 4.10 (H)     Following with urology [place]

## 2021-06-02 NOTE — PROGRESS NOTES
06/04/21    Subjective    Chief Complaint:  Macrocytosis    HPI:  71 male with macrocytosis w/o anemia. B12, folate, TSH normal. Mental health therapist. Non smoker. Non drinker. Prior labs have been at [facility] and he has requested they be sent to Dr. Box. Feels well. Exercises. No c/o's other than sinus issues and enlarged prostate being w/u'd by Dr. Lazar, urologist.    ROS:    Constitutional: No weight loss  Skin: No rash or jaundice  HENT: No change in eyesight or hearing  Cardiovascular:No chest pain or arrythmia  Respiratory:No cough or SOB  GI:No nausea, vomiting, diarrhea, constipation  :No dysuria or frequency. BPH sx's. Nocturia x 1.   Musculoskeletal:No bone or joint pain  Neuro:No sx's of neuropathy  Psych: No complaints    PMH:      Allergies   Allergen Reactions   • Grass Pollen(K-O-R-T-Swt Rodrigo) Shortness of Breath   • Pet Dander [Cat Hair Extract] Shortness of Breath and Unspecified   • Chicken-Derived Products Unspecified   • Milk [Dairy Food Allergy] Unspecified   • Sagebrush Unspecified   • Delta Oil Shortness of Breath       History reviewed. No pertinent past medical history.     History reviewed. No pertinent surgical history.     Medications:    Current Outpatient Medications on File Prior to Visit   Medication Sig Dispense Refill   • VITAMIN K PO Take  by mouth.     • montelukast (SINGULAIR) 10 MG Tab Take 1 tablet by mouth every day. 90 tablet 3   • FLUoxetine (PROZAC) 20 MG Cap Take 1 capsule by mouth every day. 90 capsule 4   • vitamin D (CHOLECALCIFEROL) 1000 Unit (25 mcg) Tab Take 8,000 Units by mouth every day.     • Omega-3 Fatty Acids (FISH OIL) 1000 MG Cap capsule Take 200 mg by mouth 2 Times a Day.     • Coenzyme Q10 (CO Q-10) 100 MG Cap Take 1 capsule by mouth every day.     • levothyroxine (SYNTHROID) 100 MCG Tab Take 1 tablet by mouth Every morning on an empty stomach. 100 tablet 3   • simvastatin (ZOCOR) 20 MG Tab Take 1 tablet by mouth every evening. 100 tablet 3   •  "valACYclovir (VALTREX) 500 MG Tab Take 1 tablet by mouth 2 times a day for 100 days. 200 tablet 3     No current facility-administered medications on file prior to visit.       Social History     Tobacco Use   • Smoking status: Never Smoker   • Smokeless tobacco: Never Used   Substance Use Topics   • Alcohol use: Never        History reviewed. No pertinent family history.     Objective    Vitals:    /70   Pulse 90   Temp 36.8 °C (98.2 °F)   Resp 16   Ht 1.74 m (5' 8.5\")   Wt 80.3 kg (176 lb 14.7 oz)   SpO2 94%   BMI 26.51 kg/m²     Physical Exam:    Appears well-developed and well-nourished. No distress.    Head -  Normocephalic .   Eyes - Pupils are equal.. Conjunctivae and normal. No scleral icterus.   Ears - normal hearing  Neck - Normal range of motion. Neck supple.   Cardiovascular - Normal rate, regular rhythm, normal heart sounds and intact distal pulses. No  gallop, murmur or rub  Pulmonary - Normal breath sounds.  No wheeze, rales or rhonci  Abdominal -Soft. No distension, tenderness, organomegaly or mass  Extremities-  No edema or tenderness.    Nodes - No submental, submandibular, preauricular, cervical, axillary or inguinal adenopathy.    Neurological -   Alert and oriented  Skin - Skin is warm and dry. No rash noted. Not diaphoretic. No erythema. No pallor. No jaundice   Psychiatric -  Normal mood and affect.    Labs:      AssessmentResults for JAMES GRANT (MRN [id number])    Ref. Range 4/12/2021 08:01   WBC Latest Ref Range: 4.8 - 10.8 K/uL 5.3   RBC Latest Ref Range: 4.70 - 6.10 M/uL 3.97 (L)   Hemoglobin Latest Ref Range: 14.0 - 18.0 g/dL 14.4   Hematocrit Latest Ref Range: 42.0 - 52.0 % 41.5 (L)   MCV Latest Ref Range: 81.4 - 97.8 fL 104.5 (H)   MCH Latest Ref Range: 27.0 - 33.0 pg 36.3 (H)   MCHC Latest Ref Range: 33.7 - 35.3 g/dL 34.7   RDW Latest Ref Range: 35.9 - 50.0 fL 48.1   Platelet Count Latest Ref Range: 164 - 446 K/uL 248   MPV Latest Ref Range: 9.0 - 12.9 fL 9.8 "   Neutrophils-Polys Latest Ref Range: 44.00 - 72.00 % 57.70   Neutrophils (Absolute) Latest Ref Range: 1.82 - 7.42 K/uL 3.08   Lymphocytes Latest Ref Range: 22.00 - 41.00 % 28.80   Lymphs (Absolute) Latest Ref Range: 1.00 - 4.80 K/uL 1.54   Monocytes Latest Ref Range: 0.00 - 13.40 % 11.80   Monos (Absolute) Latest Ref Range: 0.00 - 0.85 K/uL 0.63   Results for JAMES GRANT (MRN [id number])    Ref. Range 4/12/2021 08:01   Sodium Latest Ref Range: 135 - 145 mmol/L 141   Potassium Latest Ref Range: 3.6 - 5.5 mmol/L 4.2   Chloride Latest Ref Range: 96 - 112 mmol/L 103   Co2 Latest Ref Range: 20 - 33 mmol/L 27   Anion Gap Latest Ref Range: 7.0 - 16.0  11.0   Glucose Latest Ref Range: 65 - 99 mg/dL 101 (H)   Bun Latest Ref Range: 8 - 22 mg/dL 15   Creatinine Latest Ref Range: 0.50 - 1.40 mg/dL 1.10   GFR If  Latest Ref Range: >60 mL/min/1.73 m 2 >60   GFR If Non  Latest Ref Range: >60 mL/min/1.73 m 2 >60   Calcium Latest Ref Range: 8.5 - 10.5 mg/dL 8.7   AST(SGOT) Latest Ref Range: 12 - 45 U/L 26   ALT(SGPT) Latest Ref Range: 2 - 50 U/L 34   Alkaline Phosphatase Latest Ref Range: 30 - 99 U/L 63   Total Bilirubin Latest Ref Range: 0.1 - 1.5 mg/dL 0.4   Albumin Latest Ref Range: 3.2 - 4.9 g/dL 4.4   Total Protein Latest Ref Range: 6.0 - 8.2 g/dL 6.3   Globulin Latest Ref Range: 1.9 - 3.5 g/dL 1.9   A-G Ratio Latest Units: g/dL 2.3   Results for JAMES GRANT (MRN [id number])    Ref. Range 4/12/2021 17:24   Folate -Folic Acid Latest Ref Range: >4.0 ng/mL 16.0   Vitamin B12 -True Cobalamin Latest Ref Range: 211 - 911 pg/mL 669       Imp:    Visit Diagnosis:    1. Macrocytosis without anemia  LDH    RETICULOCYTES COUNT    Monoclonal Protein and FLC, Serum         Plan:  Above lab -> ov  Obtain old CBCs if possible    James Aaron M.D.

## 2021-06-04 ENCOUNTER — HOSPITAL ENCOUNTER (OUTPATIENT)
Dept: LAB | Facility: MEDICAL CENTER | Age: 71
End: 2021-06-04
Attending: INTERNAL MEDICINE
Payer: MEDICARE

## 2021-06-04 ENCOUNTER — OFFICE VISIT (OUTPATIENT)
Dept: HEMATOLOGY ONCOLOGY | Facility: MEDICAL CENTER | Age: 71
End: 2021-06-04
Payer: MEDICARE

## 2021-06-04 VITALS
HEART RATE: 90 BPM | RESPIRATION RATE: 16 BRPM | OXYGEN SATURATION: 94 % | BODY MASS INDEX: 26.2 KG/M2 | HEIGHT: 69 IN | SYSTOLIC BLOOD PRESSURE: 134 MMHG | DIASTOLIC BLOOD PRESSURE: 70 MMHG | TEMPERATURE: 98.2 F | WEIGHT: 176.92 LBS

## 2021-06-04 DIAGNOSIS — D75.89 MACROCYTOSIS WITHOUT ANEMIA: ICD-10-CM

## 2021-06-04 LAB
HGB RETIC QN AUTO: 42 PG/CELL (ref 29–35)
IMM RETICS NFR: 7.9 % (ref 9.3–17.4)
LDH SERPL L TO P-CCNC: 211 U/L (ref 107–266)
RETICS # AUTO: 0.04 M/UL (ref 0.04–0.06)
RETICS/RBC NFR: 1 % (ref 0.8–2.1)

## 2021-06-04 PROCEDURE — 82784 ASSAY IGA/IGD/IGG/IGM EACH: CPT

## 2021-06-04 PROCEDURE — 83615 LACTATE (LD) (LDH) ENZYME: CPT

## 2021-06-04 PROCEDURE — 85046 RETICYTE/HGB CONCENTRATE: CPT

## 2021-06-04 PROCEDURE — 99204 OFFICE O/P NEW MOD 45 MIN: CPT | Performed by: INTERNAL MEDICINE

## 2021-06-04 PROCEDURE — 36415 COLL VENOUS BLD VENIPUNCTURE: CPT

## 2021-06-04 PROCEDURE — 86334 IMMUNOFIX E-PHORESIS SERUM: CPT

## 2021-06-04 PROCEDURE — 83520 IMMUNOASSAY QUANT NOS NONAB: CPT

## 2021-06-04 PROCEDURE — 84165 PROTEIN E-PHORESIS SERUM: CPT

## 2021-06-04 PROCEDURE — 84155 ASSAY OF PROTEIN SERUM: CPT

## 2021-06-04 ASSESSMENT — FIBROSIS 4 INDEX: FIB4 SCORE: 1.24

## 2021-06-07 LAB
ALBUMIN SERPL ELPH-MCNC: 4.03 G/DL (ref 3.75–5.01)
ALPHA1 GLOB SERPL ELPH-MCNC: 0.23 G/DL (ref 0.19–0.46)
ALPHA2 GLOB SERPL ELPH-MCNC: 0.73 G/DL (ref 0.48–1.05)
B-GLOBULIN SERPL ELPH-MCNC: 0.7 G/DL (ref 0.48–1.1)
EER MONOCLONAL PROTEIN AND FLC, SERUM Q5224: ABNORMAL
GAMMA GLOB SERPL ELPH-MCNC: 0.32 G/DL (ref 0.62–1.51)
IGA SERPL-MCNC: 18 MG/DL (ref 68–408)
IGG SERPL-MCNC: 340 MG/DL (ref 768–1632)
IGM SERPL-MCNC: <10 MG/DL (ref 35–263)
INTERPRETATION SERPL IFE-IMP: ABNORMAL
INTERPRETATION SERPL IFE-IMP: ABNORMAL
KAPPA LC FREE SER-MCNC: 3.09 MG/L (ref 3.3–19.4)
KAPPA LC FREE/LAMBDA FREE SER NEPH: <0.01 {RATIO} (ref 0.26–1.65)
LAMBDA LC FREE SERPL-MCNC: 795.4 MG/L (ref 5.71–26.3)
PROT SERPL-MCNC: 6 G/DL (ref 6.3–8.2)

## 2021-06-16 ENCOUNTER — PATIENT MESSAGE (OUTPATIENT)
Dept: HEALTH INFORMATION MANAGEMENT | Facility: OTHER | Age: 71
End: 2021-06-16

## 2021-06-22 PROBLEM — D80.1 HYPOGAMMAGLOBULINEMIA (HCC): Status: ACTIVE | Noted: 2021-06-22

## 2021-06-22 NOTE — PROGRESS NOTES
06/25/21    Subjective    Chief Complaint:  Follow up from consult 6/04/21 for macrocytosis without anemia.    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 Prior B12, folate and TSH were normal. 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 No bone pains. No frequent infections.     ROS:    Constitutional: No weight loss  Skin: No rash or jaundice  HENT: No change in eyesight or hearing  Cardiovascular:No chest pain or arrythmia  Respiratory:No cough or SOB  GI:No nausea, vomiting, diarrhea, constipation  :No dysuria or frequency  Musculoskeletal:No bone or joint pain  Neuro:No sx's of neuropathy  Psych: No complaints    PMH:      Allergies   Allergen Reactions   • Grass Pollen(K-O-R-T-Swt Rodrigo) Shortness of Breath   • Pet Dander [Cat Hair Extract] Shortness of Breath and Unspecified   • Chicken-Derived Products Unspecified   • Milk [Dairy Food Allergy] Unspecified   • Sagebrush Unspecified   • [place] Oil Shortness of Breath       No past medical history on file.     No past surgical history on file.     Medications:    Current Outpatient Medications on File Prior to Visit   Medication Sig Dispense Refill   • VITAMIN K PO Take  by mouth.     • montelukast (SINGULAIR) 10 MG Tab Take 1 tablet by mouth every day. 90 tablet 3   • FLUoxetine (PROZAC) 20 MG Cap Take 1 capsule by mouth every day. 90 capsule 4   • vitamin D (CHOLECALCIFEROL) 1000 Unit (25 mcg) Tab Take 8,000 Units by mouth every day.     • Omega-3 Fatty Acids (FISH OIL) 1000 MG Cap capsule Take 200 mg by mouth 2 Times a Day.     • Coenzyme Q10 (CO Q-10) 100 MG Cap Take 1 capsule by mouth every day.     • levothyroxine (SYNTHROID) 100 MCG Tab Take 1 tablet by mouth Every morning on an empty stomach. 100 tablet 3   • simvastatin (ZOCOR) 20 MG Tab Take 1 tablet by mouth every evening. 100 tablet 3   • valACYclovir (VALTREX) 500 MG Tab Take 1 tablet by mouth 2 times a day for 100  days. 200 tablet 3     No current facility-administered medications on file prior to visit.       Social History     Tobacco Use   • Smoking status: Never Smoker   • Smokeless tobacco: Never Used   Substance Use Topics   • Alcohol use: Never        No family history on file.     Objective    Vitals:    There were no vitals taken for this visit.    Physical Exam:    Appears well-developed and well-nourished. No distress.    Head -  Normocephalic .   Eyes - Pupils are equal.. Conjunctivae normal. No scleral icterus.   Ears - normal hearing  Neurological -   Alert and oriented  Skin - . No rash noted. Not diaphoretic. No erythema. No pallor. No jaundice   Psychiatric -  Normal mood and affect.    Labs:      AssessmentResults for AJMES GRANT (MRN [id number])    Ref. Range 6/4/2021 14:51   Immunoglobulin A Latest Ref Range: 68.0 - 408.0 mg/dL 18.0 (L)   Immunoglobulin G Latest Ref Range: 768.0 - 1632.0 mg/dL 340.0 (L)   Immunoglobulin M Latest Ref Range: 35.00 - 263.00 mg/dL <10.00 (L)   Results for JAMES GRANT (MRN [id number])    Ref. Range 6/4/2021 14:51   Free Kappa Light Chains Latest Ref Range: 3.30 - 19.40 mg/L 3.09 (L)   Free Lambda Light Chains Latest Ref Range: 5.71 - 26.30 mg/L 795.40 (H)   Kappa-Lambda Ratio Latest Ref Range: 0.26 - 1.65  <0.01 (L)       Imp:    Visit Diagnosis:    1. Macrocytosis without anemia     2. Hypogammaglobulinemia (HCC)           Plan:  BMX - OV when results available'\  Discussed possible light chain myeloma      James Aaron M.D.

## 2021-06-24 ENCOUNTER — HOSPITAL ENCOUNTER (OUTPATIENT)
Dept: RADIOLOGY | Facility: MEDICAL CENTER | Age: 71
End: 2021-06-24
Attending: UROLOGY
Payer: MEDICARE

## 2021-06-24 DIAGNOSIS — R97.20 ELEVATED PROSTATE SPECIFIC ANTIGEN (PSA): ICD-10-CM

## 2021-06-24 PROCEDURE — A9576 INJ PROHANCE MULTIPACK: HCPCS | Performed by: UROLOGY

## 2021-06-24 PROCEDURE — 700117 HCHG RX CONTRAST REV CODE 255: Performed by: UROLOGY

## 2021-06-24 PROCEDURE — 700111 HCHG RX REV CODE 636 W/ 250 OVERRIDE (IP): Mod: JG | Performed by: RADIOLOGY

## 2021-06-24 PROCEDURE — 72197 MRI PELVIS W/O & W/DYE: CPT | Mod: MH

## 2021-06-24 RX ADMIN — GADOTERIDOL 20 ML: 279.3 INJECTION, SOLUTION INTRAVENOUS at 11:03

## 2021-06-24 RX ADMIN — GLUCAGON 1 MG: 1 INJECTION, POWDER, LYOPHILIZED, FOR SOLUTION INTRAMUSCULAR; INTRAVENOUS at 10:10

## 2021-06-25 ENCOUNTER — OFFICE VISIT (OUTPATIENT)
Dept: HEMATOLOGY ONCOLOGY | Facility: MEDICAL CENTER | Age: 71
End: 2021-06-25
Payer: MEDICARE

## 2021-06-25 VITALS
RESPIRATION RATE: 16 BRPM | TEMPERATURE: 98.5 F | DIASTOLIC BLOOD PRESSURE: 80 MMHG | BODY MASS INDEX: 26.11 KG/M2 | HEART RATE: 88 BPM | OXYGEN SATURATION: 94 % | WEIGHT: 176.26 LBS | HEIGHT: 69 IN | SYSTOLIC BLOOD PRESSURE: 138 MMHG

## 2021-06-25 DIAGNOSIS — D75.89 MACROCYTOSIS WITHOUT ANEMIA: ICD-10-CM

## 2021-06-25 DIAGNOSIS — D80.1 HYPOGAMMAGLOBULINEMIA (HCC): ICD-10-CM

## 2021-06-25 PROCEDURE — 99213 OFFICE O/P EST LOW 20 MIN: CPT | Performed by: INTERNAL MEDICINE

## 2021-06-25 ASSESSMENT — PAIN SCALES - GENERAL: PAINLEVEL: NO PAIN

## 2021-06-25 ASSESSMENT — FIBROSIS 4 INDEX: FIB4 SCORE: 1.24

## 2021-07-02 ENCOUNTER — HOSPITAL ENCOUNTER (OUTPATIENT)
Facility: MEDICAL CENTER | Age: 71
End: 2021-07-02
Attending: OTOLARYNGOLOGY
Payer: MEDICARE

## 2021-07-02 LAB — PATHOLOGY CONSULT NOTE: NORMAL

## 2021-07-02 PROCEDURE — 88305 TISSUE EXAM BY PATHOLOGIST: CPT

## 2021-07-12 ENCOUNTER — PRE-ADMISSION TESTING (OUTPATIENT)
Dept: ADMISSIONS | Facility: MEDICAL CENTER | Age: 71
End: 2021-07-12
Attending: INTERNAL MEDICINE
Payer: MEDICARE

## 2021-07-12 DIAGNOSIS — Z01.812 PRE-OPERATIVE LABORATORY EXAMINATION: ICD-10-CM

## 2021-07-12 ASSESSMENT — FIBROSIS 4 INDEX: FIB4 SCORE: 1.24

## 2021-07-23 ENCOUNTER — HOSPITAL ENCOUNTER (OUTPATIENT)
Facility: MEDICAL CENTER | Age: 71
End: 2021-07-23
Attending: INTERNAL MEDICINE | Admitting: INTERNAL MEDICINE
Payer: MEDICARE

## 2021-07-23 VITALS
DIASTOLIC BLOOD PRESSURE: 59 MMHG | HEIGHT: 68 IN | WEIGHT: 171.96 LBS | SYSTOLIC BLOOD PRESSURE: 130 MMHG | BODY MASS INDEX: 26.06 KG/M2 | TEMPERATURE: 97 F | OXYGEN SATURATION: 92 % | RESPIRATION RATE: 16 BRPM | HEART RATE: 68 BPM

## 2021-07-23 DIAGNOSIS — D75.89 MACROCYTOSIS WITHOUT ANEMIA: ICD-10-CM

## 2021-07-23 DIAGNOSIS — D80.1 HYPOGAMMAGLOBULINEMIA (HCC): ICD-10-CM

## 2021-07-23 LAB
BASOPHILS # BLD AUTO: 1.1 % (ref 0–1.8)
BASOPHILS # BLD: 0.06 K/UL (ref 0–0.12)
EOSINOPHIL # BLD AUTO: 0.16 K/UL (ref 0–0.51)
EOSINOPHIL NFR BLD: 2.9 % (ref 0–6.9)
ERYTHROCYTE [DISTWIDTH] IN BLOOD BY AUTOMATED COUNT: 53.9 FL (ref 35.9–50)
HCT VFR BLD AUTO: 43.6 % (ref 42–52)
HGB BLD-MCNC: 15.1 G/DL (ref 14–18)
HGB RETIC QN AUTO: 44.7 PG/CELL (ref 29–35)
IMM GRANULOCYTES # BLD AUTO: 0.02 K/UL (ref 0–0.11)
IMM GRANULOCYTES NFR BLD AUTO: 0.4 % (ref 0–0.9)
IMM RETICS NFR: 12.9 % (ref 9.3–17.4)
LYMPHOCYTES # BLD AUTO: 1.71 K/UL (ref 1–4.8)
LYMPHOCYTES NFR BLD: 30.9 % (ref 22–41)
MCH RBC QN AUTO: 36.2 PG (ref 27–33)
MCHC RBC AUTO-ENTMCNC: 34.6 G/DL (ref 33.7–35.3)
MCV RBC AUTO: 104.6 FL (ref 81.4–97.8)
MONOCYTES # BLD AUTO: 0.6 K/UL (ref 0–0.85)
MONOCYTES NFR BLD AUTO: 10.8 % (ref 0–13.4)
NEUTROPHILS # BLD AUTO: 2.99 K/UL (ref 1.82–7.42)
NEUTROPHILS NFR BLD: 53.9 % (ref 44–72)
NRBC # BLD AUTO: 0 K/UL
NRBC BLD-RTO: 0 /100 WBC
PATHOLOGY CONSULT NOTE: NORMAL
PLATELET # BLD AUTO: 237 K/UL (ref 164–446)
PMV BLD AUTO: 9.5 FL (ref 9–12.9)
RBC # BLD AUTO: 4.17 M/UL (ref 4.7–6.1)
RETICS # AUTO: 0.05 M/UL (ref 0.04–0.06)
RETICS/RBC NFR: 1.1 % (ref 0.8–2.1)
WBC # BLD AUTO: 5.5 K/UL (ref 4.8–10.8)

## 2021-07-23 PROCEDURE — 88374 M/PHMTRC ALYS ISHQUANT/SEMIQ: CPT | Mod: 91

## 2021-07-23 PROCEDURE — 160046 HCHG PACU - 1ST 60 MINS PHASE II: Performed by: HOSPITALIST

## 2021-07-23 PROCEDURE — 88313 SPECIAL STAINS GROUP 2: CPT

## 2021-07-23 PROCEDURE — 99152 MOD SED SAME PHYS/QHP 5/>YRS: CPT | Performed by: HOSPITALIST

## 2021-07-23 PROCEDURE — 88184 FLOWCYTOMETRY/ TC 1 MARKER: CPT

## 2021-07-23 PROCEDURE — 700105 HCHG RX REV CODE 258: Performed by: INTERNAL MEDICINE

## 2021-07-23 PROCEDURE — 88311 DECALCIFY TISSUE: CPT

## 2021-07-23 PROCEDURE — 88237 TISSUE CULTURE BONE MARROW: CPT

## 2021-07-23 PROCEDURE — 160025 RECOVERY II MINUTES (STATS): Performed by: HOSPITALIST

## 2021-07-23 PROCEDURE — 38222 DX BONE MARROW BX & ASPIR: CPT | Performed by: HOSPITALIST

## 2021-07-23 PROCEDURE — 700101 HCHG RX REV CODE 250: Performed by: INTERNAL MEDICINE

## 2021-07-23 PROCEDURE — 88264 CHROMOSOME ANALYSIS 20-25: CPT

## 2021-07-23 PROCEDURE — 85046 RETICYTE/HGB CONCENTRATE: CPT

## 2021-07-23 PROCEDURE — 88305 TISSUE EXAM BY PATHOLOGIST: CPT

## 2021-07-23 PROCEDURE — 160048 HCHG OR STATISTICAL LEVEL 1-5: Performed by: HOSPITALIST

## 2021-07-23 PROCEDURE — 88185 FLOWCYTOMETRY/TC ADD-ON: CPT | Mod: 91

## 2021-07-23 PROCEDURE — 700111 HCHG RX REV CODE 636 W/ 250 OVERRIDE (IP)

## 2021-07-23 PROCEDURE — 88342 IMHCHEM/IMCYTCHM 1ST ANTB: CPT

## 2021-07-23 PROCEDURE — 160027 HCHG SURGERY MINUTES - 1ST 30 MINS LEVEL 2: Performed by: HOSPITALIST

## 2021-07-23 PROCEDURE — 88341 IMHCHEM/IMCYTCHM EA ADD ANTB: CPT | Mod: XU

## 2021-07-23 PROCEDURE — 85025 COMPLETE CBC W/AUTO DIFF WBC: CPT

## 2021-07-23 PROCEDURE — 88360 TUMOR IMMUNOHISTOCHEM/MANUAL: CPT

## 2021-07-23 PROCEDURE — 700111 HCHG RX REV CODE 636 W/ 250 OVERRIDE (IP): Performed by: HOSPITALIST

## 2021-07-23 RX ORDER — MIDAZOLAM HYDROCHLORIDE 1 MG/ML
.5-2 INJECTION INTRAMUSCULAR; INTRAVENOUS PRN
Status: DISCONTINUED | OUTPATIENT
Start: 2021-07-23 | End: 2021-07-23 | Stop reason: HOSPADM

## 2021-07-23 RX ORDER — MIDAZOLAM HYDROCHLORIDE 1 MG/ML
INJECTION INTRAMUSCULAR; INTRAVENOUS
Status: COMPLETED
Start: 2021-07-23 | End: 2021-07-23

## 2021-07-23 RX ORDER — SODIUM CHLORIDE 9 MG/ML
500 INJECTION, SOLUTION INTRAVENOUS
Status: DISCONTINUED | OUTPATIENT
Start: 2021-07-23 | End: 2021-07-23 | Stop reason: HOSPADM

## 2021-07-23 RX ORDER — SODIUM CHLORIDE, SODIUM LACTATE, POTASSIUM CHLORIDE, CALCIUM CHLORIDE 600; 310; 30; 20 MG/100ML; MG/100ML; MG/100ML; MG/100ML
INJECTION, SOLUTION INTRAVENOUS CONTINUOUS
Status: DISCONTINUED | OUTPATIENT
Start: 2021-07-23 | End: 2021-07-23 | Stop reason: HOSPADM

## 2021-07-23 RX ADMIN — FENTANYL CITRATE 50 MCG: 50 INJECTION, SOLUTION INTRAMUSCULAR; INTRAVENOUS at 10:34

## 2021-07-23 RX ADMIN — FENTANYL CITRATE 50 MCG: 50 INJECTION, SOLUTION INTRAMUSCULAR; INTRAVENOUS at 10:37

## 2021-07-23 RX ADMIN — MIDAZOLAM HYDROCHLORIDE 1 MG: 1 INJECTION, SOLUTION INTRAMUSCULAR; INTRAVENOUS at 10:37

## 2021-07-23 RX ADMIN — MIDAZOLAM HYDROCHLORIDE 2 MG: 1 INJECTION INTRAMUSCULAR; INTRAVENOUS at 10:34

## 2021-07-23 RX ADMIN — MIDAZOLAM HYDROCHLORIDE 1 MG: 1 INJECTION INTRAMUSCULAR; INTRAVENOUS at 10:37

## 2021-07-23 RX ADMIN — SODIUM CHLORIDE, POTASSIUM CHLORIDE, SODIUM LACTATE AND CALCIUM CHLORIDE: 600; 310; 30; 20 INJECTION, SOLUTION INTRAVENOUS at 08:23

## 2021-07-23 RX ADMIN — MIDAZOLAM HYDROCHLORIDE 2 MG: 1 INJECTION, SOLUTION INTRAMUSCULAR; INTRAVENOUS at 10:34

## 2021-07-23 ASSESSMENT — FIBROSIS 4 INDEX: FIB4 SCORE: 1.24

## 2021-07-23 NOTE — OR NURSING
1054  Patient transferred to PACU, report from RN received.  Patient on RA, bandage to L flank CDI.  Phase 2 criteria met upon arrival.     1105  Patient tolerating water    1120 IV d/c with tip intact.      1140 Discharge instructions reviewed with wife.  All questions answered and understanding verbalized.     1142 Patient ambulated for discharge.

## 2021-07-23 NOTE — PROCEDURES
Bone Marrow Biopsy/Aspiration    Date/Time: 7/23/2021 11:21 AM  Performed by: Hair Okeefe M.D.  Authorized by: Hair Okeefe M.D.     Consent:     Consent obtained:  Verbal    Consent given by:  Patient    Risks discussed:  Bleeding, infection, pain and repeat procedure    Alternatives discussed:  No treatment, delayed treatment and alternative treatment  Pre-procedure details:     Procedure type:  Aspiration and biopsy    Requesting physician:  Galen    Indications:  Macrocytosis, panhypogammagobulinemia    Position:  Prone    Buttock laterality:  Left    Local anesthetic:  1% Lidocaine    Subcutaneous volume:  1 mL    Periosteum anesthetic volume:  4 mL    Preparation: Patient was prepped and draped in usual sterile fashion    Sedation:     Patient Sedated: Yes      Sedation type: moderate (conscious) sedation      Sedation:  Midazolam    Analgesia:  Fentanyl    Sedation length:  15 minutes  Procedure details:     Aspirate obtained:  5 mL followed by 5 mL    Biopsy performed:  2 cores    Number of attempts:  3    Estimated blood loss (mL):  2  Post-procedure:     Puncture site:  Adhesive bandage applied    Patient tolerance of procedure:  Tolerated well, no immediate complications  Comments:      3mg of versed and 100mcg of fentanyl used for sedation and analgesia

## 2021-07-27 NOTE — PROGRESS NOTES
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 Work-up included immunoelectrophoresis which showed panhypogammaglobulinemia with markedly elevated lambda light chains. BMX done 7/23/21 shows 60-70% involvement with clonal plasma cells. Cytogenetics are all negative.     ROS:    Constitutional: No weight loss  Skin: No rash or jaundice  HENT: No change in eyesight or hearing  Cardiovascular:No chest pain or arrythmia  Respiratory:No cough or SOB  GI:No nausea, vomiting, diarrhea, constipation  :No dysuria or frequency  Musculoskeletal:No bone or joint pain  Neuro:No sx's of neuropathy  Psych: No complaints    PMH:      Allergies   Allergen Reactions   • Grass Pollen(K-O-R-T-Swt Rodrigo) Shortness of Breath   • Pet Dander [Cat Hair Extract] Shortness of Breath and Unspecified   • Milk [Dairy Food Allergy] Unspecified   • Sagebrush Unspecified   • [place] Oil Shortness of Breath       Past Medical History:   Diagnosis Date   • Disorder of thyroid     hypothyroid   • High cholesterol    • Psychiatric problem     depression        Past Surgical History:   Procedure Laterality Date   • [place] DX BONE MARROW ASPIRATIONS Left 7/23/2021    Procedure: ASPIRATION, BONE MARROW - DURANT;  Surgeon: Hair Okeefe M.D.;  Location: [facility];  Service: Orthopedics   • [place] DX BONE MARROW BIOPSIES Left 7/23/2021    Procedure: BIOPSY, BONE MARROW, USING NEEDLE OR TROCAR;  Surgeon: Hair Okeefe M.D.;  Location: [facility];  Service: Orthopedics        Medications:    Current Outpatient Medications on File Prior to Visit   Medication Sig Dispense Refill   • VITAMIN K PO Take  by mouth.     • montelukast (SINGULAIR) 10 MG Tab Take 1 tablet by mouth every day. 90 tablet 3   • FLUoxetine (PROZAC) 20 MG Cap Take 1 capsule by mouth every day. 90 capsule 4   • vitamin D (CHOLECALCIFEROL) 1000 Unit (25 mcg)  "Tab Take 8,000 Units by mouth every day.     • Omega-3 Fatty Acids (FISH OIL) 1000 MG Cap capsule Take 200 mg by mouth 2 Times a Day.     • Coenzyme Q10 (CO Q-10) 100 MG Cap Take 1 capsule by mouth every day.     • levothyroxine (SYNTHROID) 100 MCG Tab Take 1 tablet by mouth Every morning on an empty stomach. 100 tablet 3   • simvastatin (ZOCOR) 20 MG Tab Take 1 tablet by mouth every evening. 100 tablet 3     No current facility-administered medications on file prior to visit.       Social History     Tobacco Use   • Smoking status: Former Smoker     Years: 20.00     Quit date:      Years since quittin.6   • Smokeless tobacco: Never Used   Substance Use Topics   • Alcohol use: Never        History reviewed. No pertinent family history.     Objective    Vitals:    /84 (BP Location: Right arm, Patient Position: Sitting, BP Cuff Size: Adult)   Pulse 90   Temp 37.2 °C (99 °F) (Temporal)   Resp 16   Ht 1.727 m (5' 7.99\")   Wt 80.3 kg (177 lb 2.2 oz)   SpO2 93%   BMI 26.94 kg/m²     Physical Exam:    Appears well-developed and well-nourished. No distress.    Head -  Normocephalic .   Eyes - Pupils are equal. Conjunctivae  normal. No scleral icterus.   Ears - normal hearing  Neurological -   Alert and oriented to person, place, and time. No focal findings  Skin - Skin is warm and dry. No rash noted. Not diaphoretic. No erythema. No pallor. No jaundice   Psychiatric -  Normal mood and affect.    Labs:  Results for JAMES GRANT (MRN [id number])    Ref. Range 2021 14:51   Free Kappa Light Chains Latest Ref Range: 3.30 - 19.40 mg/L 3.09 (L)   Free Lambda Light Chains Latest Ref Range: 5.71 - 26.30 mg/L 795.40 (H)   Kappa-Lambda Ratio Latest Ref Range: 0.26 - 1.65  <0.01 (L)   Results for JAMES GRANT (MRN [id number])    Ref. Range 2021 14:51   Immunoglobulin A Latest Ref Range: 68.0 - 408.0 mg/dL 18.0 (L)   Immunoglobulin G Latest Ref Range: 768.0 - 1632.0 mg/dL 340.0 (L) "   Immunoglobulin M Latest Ref Range: 35.00 - 263.00 mg/dL <10.00 (L)   Results for JAMES GRANT (MRN [id number])    Ref. Range 7/23/2021 08:17   WBC Latest Ref Range: 4.8 - 10.8 K/uL 5.5   RBC Latest Ref Range: 4.70 - 6.10 M/uL 4.17 (L)   Hemoglobin Latest Ref Range: 14.0 - 18.0 g/dL 15.1   Hematocrit Latest Ref Range: 42.0 - 52.0 % 43.6   MCV Latest Ref Range: 81.4 - 97.8 fL 104.6 (H)   MCH Latest Ref Range: 27.0 - 33.0 pg 36.2 (H)   MCHC Latest Ref Range: 33.7 - 35.3 g/dL 34.6   RDW Latest Ref Range: 35.9 - 50.0 fL 53.9 (H)   Platelet Count Latest Ref Range: 164 - 446 K/uL 237   Results for JAMES GRANT (MRN [id number])    Ref. Range 7/23/2021 08:17   WBC Latest Ref Range: 4.8 - 10.8 K/uL 5.5   RBC Latest Ref Range: 4.70 - 6.10 M/uL 4.17 (L)   Hemoglobin Latest Ref Range: 14.0 - 18.0 g/dL 15.1   Hematocrit Latest Ref Range: 42.0 - 52.0 % 43.6   MCV Latest Ref Range: 81.4 - 97.8 fL 104.6 (H)   MCH Latest Ref Range: 27.0 - 33.0 pg 36.2 (H)   MCHC Latest Ref Range: 33.7 - 35.3 g/dL 34.6   RDW Latest Ref Range: 35.9 - 50.0 fL 53.9 (H)   Platelet Count Latest Ref Range: 164 - 446 K/uL 237       Assessment  Lambda liught chain myeloma  Imp:    Visit Diagnosis:    1. Multiple myeloma not having achieved remission (HCC)  [id number] BASE TO MID-THIGH    BETA-2-MICROGLOBULIN   2. Hypogammaglobulinemia (HCC)           Plan:  PET CT, chemo education, Beta 2 microglobulin followed by RVD.  Discussed dx and prognosis and possible stem cell transplant in the future. Discussed some of the toxicities of chemotherapy but he will have a formal hour long education visit as well. He had questions about what if he were to do nothing, which I strongly discouraged.   RVD plan built  Will see back before initiating any treatment.     James Aaron M.D.

## 2021-08-02 ENCOUNTER — HOSPITAL ENCOUNTER (OUTPATIENT)
Dept: LAB | Facility: MEDICAL CENTER | Age: 71
End: 2021-08-02
Attending: INTERNAL MEDICINE
Payer: MEDICARE

## 2021-08-02 ENCOUNTER — OFFICE VISIT (OUTPATIENT)
Dept: HEMATOLOGY ONCOLOGY | Facility: MEDICAL CENTER | Age: 71
End: 2021-08-02
Payer: MEDICARE

## 2021-08-02 VITALS
WEIGHT: 177.14 LBS | SYSTOLIC BLOOD PRESSURE: 130 MMHG | DIASTOLIC BLOOD PRESSURE: 84 MMHG | HEIGHT: 68 IN | RESPIRATION RATE: 16 BRPM | TEMPERATURE: 99 F | HEART RATE: 90 BPM | BODY MASS INDEX: 26.85 KG/M2 | OXYGEN SATURATION: 93 %

## 2021-08-02 DIAGNOSIS — C90.00 MULTIPLE MYELOMA NOT HAVING ACHIEVED REMISSION (HCC): ICD-10-CM

## 2021-08-02 DIAGNOSIS — D80.1 HYPOGAMMAGLOBULINEMIA (HCC): ICD-10-CM

## 2021-08-02 PROCEDURE — 36415 COLL VENOUS BLD VENIPUNCTURE: CPT

## 2021-08-02 PROCEDURE — 99214 OFFICE O/P EST MOD 30 MIN: CPT | Performed by: INTERNAL MEDICINE

## 2021-08-02 PROCEDURE — 82232 ASSAY OF BETA-2 PROTEIN: CPT

## 2021-08-02 RX ORDER — 0.9 % SODIUM CHLORIDE 0.9 %
10 VIAL (ML) INJECTION PRN
Status: CANCELLED | OUTPATIENT
Start: 2021-09-03

## 2021-08-02 RX ORDER — 0.9 % SODIUM CHLORIDE 0.9 %
10 VIAL (ML) INJECTION PRN
Status: CANCELLED | OUTPATIENT
Start: 2021-08-15

## 2021-08-02 RX ORDER — HEPARIN SODIUM (PORCINE) LOCK FLUSH IV SOLN 100 UNIT/ML 100 UNIT/ML
500 SOLUTION INTRAVENOUS PRN
Status: CANCELLED | OUTPATIENT
Start: 2021-09-16

## 2021-08-02 RX ORDER — 0.9 % SODIUM CHLORIDE 0.9 %
10 VIAL (ML) INJECTION PRN
Status: CANCELLED | OUTPATIENT
Start: 2021-08-16

## 2021-08-02 RX ORDER — SODIUM CHLORIDE 9 MG/ML
INJECTION, SOLUTION INTRAVENOUS CONTINUOUS
Status: CANCELLED | OUTPATIENT
Start: 2021-09-03

## 2021-08-02 RX ORDER — 0.9 % SODIUM CHLORIDE 0.9 %
10 VIAL (ML) INJECTION PRN
Status: CANCELLED | OUTPATIENT
Start: 2021-09-16

## 2021-08-02 RX ORDER — PROCHLORPERAZINE MALEATE 10 MG
10 TABLET ORAL EVERY 6 HOURS PRN
Status: CANCELLED | OUTPATIENT
Start: 2021-08-16

## 2021-08-02 RX ORDER — 0.9 % SODIUM CHLORIDE 0.9 %
3 VIAL (ML) INJECTION PRN
Status: CANCELLED | OUTPATIENT
Start: 2021-08-16

## 2021-08-02 RX ORDER — 0.9 % SODIUM CHLORIDE 0.9 %
VIAL (ML) INJECTION PRN
Status: CANCELLED | OUTPATIENT
Start: 2021-09-16

## 2021-08-02 RX ORDER — 0.9 % SODIUM CHLORIDE 0.9 %
VIAL (ML) INJECTION PRN
Status: CANCELLED | OUTPATIENT
Start: 2021-08-16

## 2021-08-02 RX ORDER — SODIUM CHLORIDE 9 MG/ML
INJECTION, SOLUTION INTRAVENOUS CONTINUOUS
Status: CANCELLED | OUTPATIENT
Start: 2021-09-16

## 2021-08-02 RX ORDER — PROCHLORPERAZINE MALEATE 10 MG
10 TABLET ORAL EVERY 6 HOURS PRN
Status: CANCELLED | OUTPATIENT
Start: 2021-09-16

## 2021-08-02 RX ORDER — HEPARIN SODIUM (PORCINE) LOCK FLUSH IV SOLN 100 UNIT/ML 100 UNIT/ML
500 SOLUTION INTRAVENOUS PRN
Status: CANCELLED | OUTPATIENT
Start: 2021-08-15

## 2021-08-02 RX ORDER — ONDANSETRON 2 MG/ML
4 INJECTION INTRAMUSCULAR; INTRAVENOUS PRN
Status: CANCELLED | OUTPATIENT
Start: 2021-09-03

## 2021-08-02 RX ORDER — ONDANSETRON 8 MG/1
8 TABLET, ORALLY DISINTEGRATING ORAL PRN
Status: CANCELLED | OUTPATIENT
Start: 2021-09-03

## 2021-08-02 RX ORDER — 0.9 % SODIUM CHLORIDE 0.9 %
VIAL (ML) INJECTION PRN
Status: CANCELLED | OUTPATIENT
Start: 2021-09-03

## 2021-08-02 RX ORDER — HEPARIN SODIUM (PORCINE) LOCK FLUSH IV SOLN 100 UNIT/ML 100 UNIT/ML
500 SOLUTION INTRAVENOUS PRN
Status: CANCELLED | OUTPATIENT
Start: 2021-08-16

## 2021-08-02 RX ORDER — ONDANSETRON 8 MG/1
8 TABLET, ORALLY DISINTEGRATING ORAL PRN
Status: CANCELLED | OUTPATIENT
Start: 2021-08-16

## 2021-08-02 RX ORDER — 0.9 % SODIUM CHLORIDE 0.9 %
3 VIAL (ML) INJECTION PRN
Status: CANCELLED | OUTPATIENT
Start: 2021-08-15

## 2021-08-02 RX ORDER — HEPARIN SODIUM (PORCINE) LOCK FLUSH IV SOLN 100 UNIT/ML 100 UNIT/ML
500 SOLUTION INTRAVENOUS PRN
Status: CANCELLED | OUTPATIENT
Start: 2021-09-03

## 2021-08-02 RX ORDER — ONDANSETRON 2 MG/ML
4 INJECTION INTRAMUSCULAR; INTRAVENOUS PRN
Status: CANCELLED | OUTPATIENT
Start: 2021-08-16

## 2021-08-02 RX ORDER — PROCHLORPERAZINE MALEATE 10 MG
10 TABLET ORAL EVERY 6 HOURS PRN
Status: CANCELLED | OUTPATIENT
Start: 2021-09-03

## 2021-08-02 RX ORDER — 0.9 % SODIUM CHLORIDE 0.9 %
3 VIAL (ML) INJECTION PRN
Status: CANCELLED | OUTPATIENT
Start: 2021-09-03

## 2021-08-02 RX ORDER — SODIUM CHLORIDE 9 MG/ML
INJECTION, SOLUTION INTRAVENOUS CONTINUOUS
Status: CANCELLED | OUTPATIENT
Start: 2021-08-16

## 2021-08-02 RX ORDER — ONDANSETRON 8 MG/1
8 TABLET, ORALLY DISINTEGRATING ORAL PRN
Status: CANCELLED | OUTPATIENT
Start: 2021-09-16

## 2021-08-02 RX ORDER — 0.9 % SODIUM CHLORIDE 0.9 %
3 VIAL (ML) INJECTION PRN
Status: CANCELLED | OUTPATIENT
Start: 2021-09-16

## 2021-08-02 RX ORDER — 0.9 % SODIUM CHLORIDE 0.9 %
VIAL (ML) INJECTION PRN
Status: CANCELLED | OUTPATIENT
Start: 2021-08-15

## 2021-08-02 RX ORDER — ONDANSETRON 2 MG/ML
4 INJECTION INTRAMUSCULAR; INTRAVENOUS PRN
Status: CANCELLED | OUTPATIENT
Start: 2021-09-16

## 2021-08-02 ASSESSMENT — FIBROSIS 4 INDEX: FIB4 SCORE: 1.3

## 2021-08-02 ASSESSMENT — PAIN SCALES - GENERAL: PAINLEVEL: NO PAIN

## 2021-08-04 LAB — B2 MICROGLOB SERPL-MCNC: 1.7 MG/L (ref 1.1–2.4)

## 2021-08-06 NOTE — PROGRESS NOTES
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 No high risk cytogenetics.  Plan is to start with RVD. Beta 2 microglobulin is normal. PET CT done yesterday He was hospitalized for one day earlier this month with severe epistaxis requiring a rhinorocket. He did drop his Hgb from 15.7 to 12.4 with that episode. PET CT done yesterday is negative. He has prostate cancer ? [place] and has been on testosterone injections. Has not had definitive therapy for the prostate as yet.     ROS:    Constitutional: No weight loss  Skin: No rash or jaundice  HENT: No change in eyesight or hearing  Cardiovascular:No chest pain or arrythmia  Respiratory:No cough or SOB  GI:No nausea, vomiting, diarrhea, constipation  :No dysuria or frequency  Musculoskeletal:No bone or joint pain  Neuro:No sx's of neuropathy  Psych: No complaints    PMH:      Allergies   Allergen Reactions   • Grass Pollen(K-O-R-T-Swt Rodrigo) Shortness of Breath   • Pet Dander [Cat Hair Extract] Shortness of Breath and Unspecified   • Milk [Dairy Food Allergy] Unspecified   • S[place]brush Unspecified   • [place] Oil Shortness of Breath       Past Medical History:   Diagnosis Date   • Disorder of thyroid     hypothyroid   • High cholesterol    • Psychiatric problem     depression        Past Surgical History:   Procedure Laterality Date   • [place] DX BONE MARROW ASPIRATIONS Left 7/23/2021    Procedure: ASPIRATION, BONE MARROW - DURANT;  Surgeon: Hair Okeefe M.D.;  Location: [facility];  Service: Orthopedics   • [place] DX BONE MARROW BIOPSIES Left 7/23/2021    Procedure: BIOPSY, BONE MARROW, USING NEEDLE OR TROCAR;  Surgeon: Hair Okeefe M.D.;  Location: [facility];  Service: Orthopedics        Medications:    Current Outpatient Medications on File Prior to Visit   Medication Sig Dispense Refill   • testosterone cypionate (DEPO-TESTOSTERONE) 200 MG/ML  "Solution injection Inject 100 mg into the shoulder, thigh, or buttocks every 10 days. 0.5 tablet = 100 mg     • valACYclovir (VALTREX) 500 MG Tab Take 500 mg by mouth 2 times a day. No known stop date, TWICE DAILY     • FLUoxetine (PROZAC) 20 MG Cap Take 1 capsule by mouth every day. 90 capsule 4   • Omega-3 Fatty Acids (FISH OIL) 1000 MG Cap capsule Take 1,000 mg by mouth 2 times a day.     • levothyroxine (SYNTHROID) 100 MCG Tab Take 1 tablet by mouth Every morning on an empty stomach. 100 tablet 3   • simvastatin (ZOCOR) 20 MG Tab Take 1 tablet by mouth every evening. 100 tablet 3   • lenalidomide 25 MG Cap Take 25 mg (1 cap) by mouth on Days 1 - 14 of each 21-day cycle (2 weeks ON & 1 week OFF) (Patient not taking: Reported on 2021) 14 Capsule 0   • dexamethasone (DECADRON) 4 MG Tab Take 10 pills once a week (Patient not taking: Reported on 2021) 40 tablet 6     No current facility-administered medications on file prior to visit.       Social History     Tobacco Use   • Smoking status: Former Smoker     Years: 20.00     Quit date:      Years since quittin.6   • Smokeless tobacco: Never Used   Substance Use Topics   • Alcohol use: Never        History reviewed. No pertinent family history.     Objective    Vitals:    /68 (BP Location: Right arm, Patient Position: Sitting, BP Cuff Size: Adult)   Pulse 98   Temp 36.7 °C (98.1 °F) (Temporal)   Resp 16   Ht 1.829 m (6' 0.01\")   Wt 77.5 kg (170 lb 13.7 oz)   SpO2 96%   BMI 23.17 kg/m²     Physical Exam:    Appears well-developed and well-nourished. No distress.    Head -  Normocephalic .   Eyes - Pupils are equal. Conjunctivae normal. No scleral icterus.   Ears - normal hearing   Neurological -   Alert and oriented.  Skin -  No rash noted. Not diaphoretic. No erythema. No pallor. No jaundice   Psychiatric -  Normal mood and affect.    Labs:   Results for JAMES GRANT (MRN [id number])    Ref. Range 2021 08:17 2021 14:00 " 8/9/2021 16:00 8/10/2021 05:15   WBC Latest Ref Range: 4.8 - 10.8 K/uL 5.5 9.1 12.2 (H) 8.5   RBC Latest Ref Range: 4.70 - 6.10 M/uL 4.17 (L) 4.30 (L) 3.56 (L) 3.38 (L)   Hemoglobin Latest Ref Range: 14.0 - 18.0 g/dL 15.1 15.7 13.1 (L) 12.4 (L)   Hematocrit Latest Ref Range: 42.0 - 52.0 % 43.6 45.2 38.0 (L) 36.8 (L)   MCV Latest Ref Range: 81.4 - 97.8 fL 104.6 (H) 105.1 (H) 106.7 (H) 108.9 (H)   MCH Latest Ref Range: 27.0 - 33.0 pg 36.2 (H) 36.5 (H) 36.8 (H) 36.7 (H)   MCHC Latest Ref Range: 33.7 - 35.3 g/dL 34.6 34.7 34.5 33.7   RDW Latest Ref Range: 35.9 - 50.0 fL 53.9 (H) 53.4 (H) 54.8 (H) 57.0 (H)   Platelet Count Latest Ref Range: 164 - 446 K/uL 237 267 219 218       Beta 2 Microglobulin Latest Ref Range: 1.1 - 2.4 mg/L 1.7   Results for JAMES GRANT (MRN [id number])    Ref. Range 6/4/2021 14:51   Free Kappa Light Chains Latest Ref Range: 3.30 - 19.40 mg/L 3.09 (L)   Free Lambda Light Chains Latest Ref Range: 5.71 - 26.30 mg/L 795.40 (H)   Kappa-Lambda Ratio Latest Ref Range: 0.26 - 1.65  <0.01 (L)       Assessment  Low risk Myeloma  Imp:    1. Multiple myeloma not having achieved remission (HCC)  zolpidem (AMBIEN) 5 MG Tab    Monoclonal Protein and FLC, Serum             Plan:    Long discussion about the goals of therapy, the possibility of a stem cell transplant, the toxicities of therapy including low counts and risk of infection. Per his request Ambien was prescribed for the side effects of decadron. Recommended booster COVID vaccine.   Standing orders for monthly light chains placed.     James Aaron M.D.

## 2021-08-09 ENCOUNTER — HOSPITAL ENCOUNTER (EMERGENCY)
Facility: MEDICAL CENTER | Age: 71
End: 2021-08-09
Attending: EMERGENCY MEDICINE
Payer: MEDICARE

## 2021-08-09 ENCOUNTER — HOSPITAL ENCOUNTER (OUTPATIENT)
Facility: MEDICAL CENTER | Age: 71
End: 2021-08-10
Attending: EMERGENCY MEDICINE | Admitting: STUDENT IN AN ORGANIZED HEALTH CARE EDUCATION/TRAINING PROGRAM
Payer: MEDICARE

## 2021-08-09 VITALS
RESPIRATION RATE: 18 BRPM | HEART RATE: 79 BPM | BODY MASS INDEX: 23.98 KG/M2 | OXYGEN SATURATION: 95 % | WEIGHT: 177.03 LBS | SYSTOLIC BLOOD PRESSURE: 165 MMHG | DIASTOLIC BLOOD PRESSURE: 64 MMHG | TEMPERATURE: 97.8 F | HEIGHT: 72 IN

## 2021-08-09 DIAGNOSIS — C90.00 MULTIPLE MYELOMA NOT HAVING ACHIEVED REMISSION (HCC): ICD-10-CM

## 2021-08-09 DIAGNOSIS — R04.0 EPISTAXIS: ICD-10-CM

## 2021-08-09 DIAGNOSIS — R55 NEAR SYNCOPE: ICD-10-CM

## 2021-08-09 DIAGNOSIS — D64.9 ANEMIA, UNSPECIFIED TYPE: ICD-10-CM

## 2021-08-09 DIAGNOSIS — J34.89 NASAL PAIN: ICD-10-CM

## 2021-08-09 PROBLEM — I15.8 OTHER SECONDARY HYPERTENSION: Status: ACTIVE | Noted: 2021-08-09

## 2021-08-09 PROBLEM — D50.0 BLOOD LOSS ANEMIA: Status: ACTIVE | Noted: 2021-08-09

## 2021-08-09 LAB
ABO + RH BLD: NORMAL
ABO GROUP BLD: NORMAL
ALBUMIN SERPL BCP-MCNC: 4.5 G/DL (ref 3.2–4.9)
ALBUMIN/GLOB SERPL: 2 G/DL
ALP SERPL-CCNC: 64 U/L (ref 30–99)
ALT SERPL-CCNC: 22 U/L (ref 2–50)
ANION GAP SERPL CALC-SCNC: 14 MMOL/L (ref 7–16)
APTT PPP: 20.3 SEC (ref 24.7–36)
AST SERPL-CCNC: 25 U/L (ref 12–45)
BARCODED ABORH UBTYP: 5100
BARCODED PRD CODE UBPRD: NORMAL
BARCODED UNIT NUM UBUNT: NORMAL
BASOPHILS # BLD AUTO: 0.2 % (ref 0–1.8)
BASOPHILS # BLD AUTO: 0.3 % (ref 0–1.8)
BASOPHILS # BLD: 0.03 K/UL (ref 0–0.12)
BASOPHILS # BLD: 0.03 K/UL (ref 0–0.12)
BILIRUB SERPL-MCNC: 0.4 MG/DL (ref 0.1–1.5)
BLD GP AB SCN SERPL QL: NORMAL
BUN SERPL-MCNC: 20 MG/DL (ref 8–22)
CALCIUM SERPL-MCNC: 9.6 MG/DL (ref 8.4–10.2)
CHLORIDE SERPL-SCNC: 106 MMOL/L (ref 96–112)
CO2 SERPL-SCNC: 21 MMOL/L (ref 20–33)
COMPONENT R 8504R: NORMAL
CREAT SERPL-MCNC: 1.1 MG/DL (ref 0.5–1.4)
EKG IMPRESSION: NORMAL
EOSINOPHIL # BLD AUTO: 0.01 K/UL (ref 0–0.51)
EOSINOPHIL # BLD AUTO: 0.08 K/UL (ref 0–0.51)
EOSINOPHIL NFR BLD: 0.1 % (ref 0–6.9)
EOSINOPHIL NFR BLD: 0.9 % (ref 0–6.9)
ERYTHROCYTE [DISTWIDTH] IN BLOOD BY AUTOMATED COUNT: 53.4 FL (ref 35.9–50)
ERYTHROCYTE [DISTWIDTH] IN BLOOD BY AUTOMATED COUNT: 54.8 FL (ref 35.9–50)
GLOBULIN SER CALC-MCNC: 2.3 G/DL (ref 1.9–3.5)
GLUCOSE SERPL-MCNC: 140 MG/DL (ref 65–99)
HCT VFR BLD AUTO: 38 % (ref 42–52)
HCT VFR BLD AUTO: 45.2 % (ref 42–52)
HGB BLD-MCNC: 13.1 G/DL (ref 14–18)
HGB BLD-MCNC: 15.7 G/DL (ref 14–18)
IMM GRANULOCYTES # BLD AUTO: 0.02 K/UL (ref 0–0.11)
IMM GRANULOCYTES # BLD AUTO: 0.07 K/UL (ref 0–0.11)
IMM GRANULOCYTES NFR BLD AUTO: 0.2 % (ref 0–0.9)
IMM GRANULOCYTES NFR BLD AUTO: 0.6 % (ref 0–0.9)
INR PPP: 1.03 (ref 0.87–1.13)
LYMPHOCYTES # BLD AUTO: 1.23 K/UL (ref 1–4.8)
LYMPHOCYTES # BLD AUTO: 2.66 K/UL (ref 1–4.8)
LYMPHOCYTES NFR BLD: 10.1 % (ref 22–41)
LYMPHOCYTES NFR BLD: 29.4 % (ref 22–41)
MCH RBC QN AUTO: 36.5 PG (ref 27–33)
MCH RBC QN AUTO: 36.8 PG (ref 27–33)
MCHC RBC AUTO-ENTMCNC: 34.5 G/DL (ref 33.7–35.3)
MCHC RBC AUTO-ENTMCNC: 34.7 G/DL (ref 33.7–35.3)
MCV RBC AUTO: 105.1 FL (ref 81.4–97.8)
MCV RBC AUTO: 106.7 FL (ref 81.4–97.8)
MONOCYTES # BLD AUTO: 0.77 K/UL (ref 0–0.85)
MONOCYTES # BLD AUTO: 0.84 K/UL (ref 0–0.85)
MONOCYTES NFR BLD AUTO: 6.3 % (ref 0–13.4)
MONOCYTES NFR BLD AUTO: 9.3 % (ref 0–13.4)
NEUTROPHILS # BLD AUTO: 10.05 K/UL (ref 1.82–7.42)
NEUTROPHILS # BLD AUTO: 5.43 K/UL (ref 1.82–7.42)
NEUTROPHILS NFR BLD: 59.9 % (ref 44–72)
NEUTROPHILS NFR BLD: 82.7 % (ref 44–72)
NRBC # BLD AUTO: 0 K/UL
NRBC # BLD AUTO: 0 K/UL
NRBC BLD-RTO: 0 /100 WBC
NRBC BLD-RTO: 0 /100 WBC
PLATELET # BLD AUTO: 219 K/UL (ref 164–446)
PLATELET # BLD AUTO: 267 K/UL (ref 164–446)
PMV BLD AUTO: 9.2 FL (ref 9–12.9)
PMV BLD AUTO: 9.4 FL (ref 9–12.9)
POTASSIUM SERPL-SCNC: 4.4 MMOL/L (ref 3.6–5.5)
PRODUCT TYPE UPROD: NORMAL
PROT SERPL-MCNC: 6.8 G/DL (ref 6–8.2)
PROTHROMBIN TIME: 12.7 SEC (ref 12–14.6)
RBC # BLD AUTO: 3.56 M/UL (ref 4.7–6.1)
RBC # BLD AUTO: 4.3 M/UL (ref 4.7–6.1)
RH BLD: NORMAL
SODIUM SERPL-SCNC: 141 MMOL/L (ref 135–145)
TROPONIN T SERPL-MCNC: 7 NG/L (ref 6–19)
UNIT STATUS USTAT: NORMAL
WBC # BLD AUTO: 12.2 K/UL (ref 4.8–10.8)
WBC # BLD AUTO: 9.1 K/UL (ref 4.8–10.8)

## 2021-08-09 PROCEDURE — 85610 PROTHROMBIN TIME: CPT

## 2021-08-09 PROCEDURE — 700102 HCHG RX REV CODE 250 W/ 637 OVERRIDE(OP)

## 2021-08-09 PROCEDURE — A9270 NON-COVERED ITEM OR SERVICE: HCPCS | Performed by: EMERGENCY MEDICINE

## 2021-08-09 PROCEDURE — 80053 COMPREHEN METABOLIC PANEL: CPT

## 2021-08-09 PROCEDURE — 700105 HCHG RX REV CODE 258: Performed by: EMERGENCY MEDICINE

## 2021-08-09 PROCEDURE — 700102 HCHG RX REV CODE 250 W/ 637 OVERRIDE(OP): Performed by: EMERGENCY MEDICINE

## 2021-08-09 PROCEDURE — 86850 RBC ANTIBODY SCREEN: CPT

## 2021-08-09 PROCEDURE — 86923 COMPATIBILITY TEST ELECTRIC: CPT

## 2021-08-09 PROCEDURE — 96374 THER/PROPH/DIAG INJ IV PUSH: CPT

## 2021-08-09 PROCEDURE — 96375 TX/PRO/DX INJ NEW DRUG ADDON: CPT

## 2021-08-09 PROCEDURE — G0378 HOSPITAL OBSERVATION PER HR: HCPCS

## 2021-08-09 PROCEDURE — A9270 NON-COVERED ITEM OR SERVICE: HCPCS

## 2021-08-09 PROCEDURE — 93005 ELECTROCARDIOGRAM TRACING: CPT | Performed by: EMERGENCY MEDICINE

## 2021-08-09 PROCEDURE — 700101 HCHG RX REV CODE 250: Performed by: EMERGENCY MEDICINE

## 2021-08-09 PROCEDURE — 99285 EMERGENCY DEPT VISIT HI MDM: CPT

## 2021-08-09 PROCEDURE — 99219 PR INITIAL OBSERVATION CARE,LEVL II: CPT | Performed by: STUDENT IN AN ORGANIZED HEALTH CARE EDUCATION/TRAINING PROGRAM

## 2021-08-09 PROCEDURE — 700105 HCHG RX REV CODE 258: Performed by: STUDENT IN AN ORGANIZED HEALTH CARE EDUCATION/TRAINING PROGRAM

## 2021-08-09 PROCEDURE — 36415 COLL VENOUS BLD VENIPUNCTURE: CPT

## 2021-08-09 PROCEDURE — 700111 HCHG RX REV CODE 636 W/ 250 OVERRIDE (IP): Performed by: EMERGENCY MEDICINE

## 2021-08-09 PROCEDURE — 96365 THER/PROPH/DIAG IV INF INIT: CPT

## 2021-08-09 PROCEDURE — 86900 BLOOD TYPING SEROLOGIC ABO: CPT

## 2021-08-09 PROCEDURE — 86901 BLOOD TYPING SEROLOGIC RH(D): CPT

## 2021-08-09 PROCEDURE — 96376 TX/PRO/DX INJ SAME DRUG ADON: CPT

## 2021-08-09 PROCEDURE — 85730 THROMBOPLASTIN TIME PARTIAL: CPT

## 2021-08-09 PROCEDURE — 85025 COMPLETE CBC W/AUTO DIFF WBC: CPT

## 2021-08-09 PROCEDURE — 84484 ASSAY OF TROPONIN QUANT: CPT

## 2021-08-09 PROCEDURE — 303620 HCHG EPISTAXIS CONTROL

## 2021-08-09 PROCEDURE — 700111 HCHG RX REV CODE 636 W/ 250 OVERRIDE (IP): Performed by: STUDENT IN AN ORGANIZED HEALTH CARE EDUCATION/TRAINING PROGRAM

## 2021-08-09 PROCEDURE — 94760 N-INVAS EAR/PLS OXIMETRY 1: CPT

## 2021-08-09 RX ORDER — HYDROCODONE BITARTRATE AND ACETAMINOPHEN 5; 325 MG/1; MG/1
TABLET ORAL EVERY 6 HOURS PRN
Status: SHIPPED | COMMUNITY
End: 2021-08-09

## 2021-08-09 RX ORDER — LIDOCAINE HYDROCHLORIDE 20 MG/ML
JELLY TOPICAL ONCE
Status: COMPLETED | OUTPATIENT
Start: 2021-08-09 | End: 2021-08-09

## 2021-08-09 RX ORDER — LEVOTHYROXINE SODIUM 0.1 MG/1
100 TABLET ORAL
Status: DISCONTINUED | OUTPATIENT
Start: 2021-08-10 | End: 2021-08-10 | Stop reason: HOSPADM

## 2021-08-09 RX ORDER — TESTOSTERONE CYPIONATE 200 MG/ML
100 INJECTION, SOLUTION INTRAMUSCULAR
Status: SHIPPED | COMMUNITY
End: 2021-09-02

## 2021-08-09 RX ORDER — SODIUM CHLORIDE, SODIUM LACTATE, POTASSIUM CHLORIDE, CALCIUM CHLORIDE 600; 310; 30; 20 MG/100ML; MG/100ML; MG/100ML; MG/100ML
1000 INJECTION, SOLUTION INTRAVENOUS ONCE
Status: COMPLETED | OUTPATIENT
Start: 2021-08-09 | End: 2021-08-09

## 2021-08-09 RX ORDER — MORPHINE SULFATE 4 MG/ML
2 INJECTION, SOLUTION INTRAMUSCULAR; INTRAVENOUS
Status: DISCONTINUED | OUTPATIENT
Start: 2021-08-09 | End: 2021-08-10 | Stop reason: HOSPADM

## 2021-08-09 RX ORDER — HYDROCODONE BITARTRATE AND ACETAMINOPHEN 5; 325 MG/1; MG/1
1 TABLET ORAL ONCE
Status: COMPLETED | OUTPATIENT
Start: 2021-08-09 | End: 2021-08-09

## 2021-08-09 RX ORDER — ENALAPRILAT 1.25 MG/ML
1.25 INJECTION INTRAVENOUS EVERY 6 HOURS PRN
Status: DISCONTINUED | OUTPATIENT
Start: 2021-08-09 | End: 2021-08-10 | Stop reason: HOSPADM

## 2021-08-09 RX ORDER — BISACODYL 10 MG
10 SUPPOSITORY, RECTAL RECTAL
Status: DISCONTINUED | OUTPATIENT
Start: 2021-08-09 | End: 2021-08-10 | Stop reason: HOSPADM

## 2021-08-09 RX ORDER — ACYCLOVIR 400 MG/1
400 TABLET ORAL 2 TIMES DAILY
Qty: 42 TABLET | Refills: 6 | Status: SHIPPED | OUTPATIENT
Start: 2021-08-09 | End: 2021-08-09

## 2021-08-09 RX ORDER — ONDANSETRON 2 MG/ML
4 INJECTION INTRAMUSCULAR; INTRAVENOUS ONCE
Status: COMPLETED | OUTPATIENT
Start: 2021-08-09 | End: 2021-08-09

## 2021-08-09 RX ORDER — ACETAMINOPHEN 325 MG/1
650 TABLET ORAL EVERY 6 HOURS PRN
Status: DISCONTINUED | OUTPATIENT
Start: 2021-08-09 | End: 2021-08-10 | Stop reason: HOSPADM

## 2021-08-09 RX ORDER — ONDANSETRON 2 MG/ML
4 INJECTION INTRAMUSCULAR; INTRAVENOUS ONCE
Status: DISCONTINUED | OUTPATIENT
Start: 2021-08-09 | End: 2021-08-09 | Stop reason: HOSPADM

## 2021-08-09 RX ORDER — BUPROPION HYDROCHLORIDE 300 MG/1
TABLET ORAL
Status: SHIPPED | COMMUNITY
End: 2021-08-09

## 2021-08-09 RX ORDER — DEXAMETHASONE 4 MG/1
TABLET ORAL
Qty: 40 TABLET | Refills: 6 | Status: SHIPPED | OUTPATIENT
Start: 2021-08-09 | End: 2022-02-15

## 2021-08-09 RX ORDER — MORPHINE SULFATE 4 MG/ML
4 INJECTION, SOLUTION INTRAMUSCULAR; INTRAVENOUS ONCE
Status: COMPLETED | OUTPATIENT
Start: 2021-08-09 | End: 2021-08-09

## 2021-08-09 RX ORDER — FLUOXETINE HYDROCHLORIDE 20 MG/1
20 CAPSULE ORAL DAILY
Status: DISCONTINUED | OUTPATIENT
Start: 2021-08-10 | End: 2021-08-10 | Stop reason: HOSPADM

## 2021-08-09 RX ORDER — VALACYCLOVIR HYDROCHLORIDE 500 MG/1
500 TABLET, FILM COATED ORAL 2 TIMES DAILY
Status: SHIPPED | COMMUNITY
Start: 2021-07-22 | End: 2022-04-19

## 2021-08-09 RX ORDER — ONDANSETRON 4 MG/1
4 TABLET, FILM COATED ORAL EVERY 4 HOURS PRN
Qty: 30 TABLET | Refills: 6 | Status: SHIPPED | OUTPATIENT
Start: 2021-08-09 | End: 2021-08-09

## 2021-08-09 RX ORDER — AMOXICILLIN 250 MG
2 CAPSULE ORAL 2 TIMES DAILY
Status: DISCONTINUED | OUTPATIENT
Start: 2021-08-09 | End: 2021-08-10 | Stop reason: HOSPADM

## 2021-08-09 RX ORDER — PROCHLORPERAZINE MALEATE 10 MG
10 TABLET ORAL EVERY 6 HOURS PRN
Qty: 30 TABLET | Refills: 6 | Status: SHIPPED | OUTPATIENT
Start: 2021-08-09 | End: 2021-08-09

## 2021-08-09 RX ORDER — CHLORAL HYDRATE 500 MG
1000 CAPSULE ORAL 2 TIMES DAILY
Status: DISCONTINUED | OUTPATIENT
Start: 2021-08-10 | End: 2021-08-10 | Stop reason: HOSPADM

## 2021-08-09 RX ORDER — SIMVASTATIN 40 MG
20 TABLET ORAL EVERY EVENING
Status: DISCONTINUED | OUTPATIENT
Start: 2021-08-10 | End: 2021-08-10 | Stop reason: HOSPADM

## 2021-08-09 RX ORDER — LABETALOL HYDROCHLORIDE 5 MG/ML
10 INJECTION, SOLUTION INTRAVENOUS ONCE
Status: COMPLETED | OUTPATIENT
Start: 2021-08-09 | End: 2021-08-09

## 2021-08-09 RX ORDER — LABETALOL HYDROCHLORIDE 5 MG/ML
10 INJECTION, SOLUTION INTRAVENOUS EVERY 4 HOURS PRN
Status: DISCONTINUED | OUTPATIENT
Start: 2021-08-09 | End: 2021-08-10 | Stop reason: HOSPADM

## 2021-08-09 RX ORDER — POLYETHYLENE GLYCOL 3350 17 G/17G
1 POWDER, FOR SOLUTION ORAL
Status: DISCONTINUED | OUTPATIENT
Start: 2021-08-09 | End: 2021-08-10 | Stop reason: HOSPADM

## 2021-08-09 RX ADMIN — MORPHINE SULFATE 4 MG: 4 INJECTION INTRAVENOUS at 21:43

## 2021-08-09 RX ADMIN — LIDOCAINE HYDROCHLORIDE 1 ML: 20 JELLY TOPICAL at 15:15

## 2021-08-09 RX ADMIN — SODIUM CHLORIDE 3 G: 900 INJECTION INTRAVENOUS at 22:43

## 2021-08-09 RX ADMIN — HYDROCODONE BITARTRATE AND ACETAMINOPHEN 1 TABLET: 5; 325 TABLET ORAL at 15:15

## 2021-08-09 RX ADMIN — ONDANSETRON 4 MG: 2 INJECTION INTRAMUSCULAR; INTRAVENOUS at 14:07

## 2021-08-09 RX ADMIN — MORPHINE SULFATE 4 MG: 4 INJECTION INTRAVENOUS at 15:06

## 2021-08-09 RX ADMIN — SODIUM CHLORIDE, POTASSIUM CHLORIDE, SODIUM LACTATE AND CALCIUM CHLORIDE 1000 ML: 600; 310; 30; 20 INJECTION, SOLUTION INTRAVENOUS at 14:14

## 2021-08-09 RX ADMIN — LABETALOL HYDROCHLORIDE 10 MG: 5 INJECTION, SOLUTION INTRAVENOUS at 21:43

## 2021-08-09 RX ADMIN — ONDANSETRON 4 MG: 2 INJECTION INTRAMUSCULAR; INTRAVENOUS at 16:36

## 2021-08-09 ASSESSMENT — ENCOUNTER SYMPTOMS
CHILLS: 0
NAUSEA: 0
MYALGIAS: 0
EYE PAIN: 0
BLOOD IN STOOL: 0
PALPITATIONS: 0
WEIGHT LOSS: 0
COUGH: 0
WEAKNESS: 0
DIARRHEA: 0
DIZZINESS: 0
ABDOMINAL PAIN: 0
SHORTNESS OF BREATH: 0
FEVER: 0
CONSTIPATION: 0
SORE THROAT: 0
WHEEZING: 0
ORTHOPNEA: 0
HEMOPTYSIS: 0
BLURRED VISION: 0
LOSS OF CONSCIOUSNESS: 0
VOMITING: 0

## 2021-08-09 ASSESSMENT — FIBROSIS 4 INDEX: FIB4 SCORE: 1.3

## 2021-08-09 ASSESSMENT — PAIN DESCRIPTION - PAIN TYPE
TYPE: ACUTE PAIN
TYPE: ACUTE PAIN

## 2021-08-09 NOTE — ED NOTES
"Patient reports nausea orders received.  Patient reports he \" I feel so weak. \"  Attempting to use urinal   "

## 2021-08-09 NOTE — ED NOTES
1400 patient had syncopal episode in triage back to room 4, Dr. Stern  TO BEDSIDE, large amount of bleeding from nose and bloody emesis.   @ iv's started, EKG, monitor and suction

## 2021-08-09 NOTE — ED PROVIDER NOTES
ED Provider Note    CHIEF COMPLAINT  Chief Complaint   Patient presents with   • Epistaxis     epistaxis started one hour ago. Sinus surgery 3 weeks ago       HPI  Gary Hedrick is a 71 y.o. male who presents complaining of nosebleed from both of his nostrils that started this morning.  He states it started in the left nostril and then went to the right nostril.  He is not on any blood thinning medications.  He was waiting in the waiting room to be seen for this problem when he had a near syncopal episode and was brought straight back.  When I am seeing him he is laying in the bed very diaphoretic and near syncopal.  He is complaining of nausea.  He then proceeded to vomit up about 2 cups of blood.  Patient is complaining of dizziness and generalized weakness.  His wife states that Dr. Robledo performed surgery on the patient's nose and sinuses last month.  He has not had any bleeding up until today.  He denies any pain in his chest or shortness of breath.  He does feel very dizzy.  He has not had any other nasal trauma.  He does not take any blood thinning medications.    REVIEW OF SYSTEMS  HEENT:  No ear pain, congestion or sore throat positive nasal bleeding  EYES: no discharge redness or vision changes  CARDIAC: no chest pain, palpitations    PULMONARY: no dyspnea, cough or congestion   GI: no vomiting diarrhea or abdominal pain   : no dysuria, back pain or hematuria   Neuro: Positive generalized weakness, no numbness aphasia or headache  Musculoskeletal: no swelling deformity or pain no joint swelling  Endocrine: no fevers, sweating, weight loss   SKIN: no rash, erythema or contusions positive diaphoresis    See history of present illness all other systems are negative    PAST MEDICAL HISTORY  Past Medical History:   Diagnosis Date   • Disorder of thyroid     hypothyroid   • High cholesterol    • Psychiatric problem     depression       FAMILY HISTORY  History reviewed. No pertinent family  history.    SOCIAL HISTORY  Social History     Socioeconomic History   • Marital status:      Spouse name: Not on file   • Number of children: Not on file   • Years of education: Not on file   • Highest education level: Not on file   Occupational History   • Not on file   Tobacco Use   • Smoking status: Former Smoker     Years: 20.00     Quit date:      Years since quittin.6   • Smokeless tobacco: Never Used   Vaping Use   • Vaping Use: Never used   Substance and Sexual Activity   • Alcohol use: Never   • Drug use: Never   • Sexual activity: Not on file   Other Topics Concern   • Not on file   Social History Narrative   • Not on file     Social Determinants of Health     Financial Resource Strain:    • Difficulty of Paying Living Expenses:    Food Insecurity:    • Worried About Running Out of Food in the Last Year:    • Ran Out of Food in the Last Year:    Transportation Needs:    • Lack of Transportation (Medical):    • Lack of Transportation (Non-Medical):    Physical Activity:    • Days of Exercise per Week:    • Minutes of Exercise per Session:    Stress:    • Feeling of Stress :    Social Connections:    • Frequency of Communication with Friends and Family:    • Frequency of Social Gatherings with Friends and Family:    • Attends Orthodoxy Services:    • Active Member of Clubs or Organizations:    • Attends Club or Organization Meetings:    • Marital Status:    Intimate Partner Violence:    • Fear of Current or Ex-Partner:    • Emotionally Abused:    • Physically Abused:    • Sexually Abused:        SURGICAL HISTORY  Past Surgical History:   Procedure Laterality Date   • MA DX BONE MARROW ASPIRATIONS Left 2021    Procedure: ASPIRATION, BONE MARROW - DURANT;  Surgeon: Hair Okeefe M.D.;  [facility];  Service: Orthopedics   • MA DX BONE MARROW BIOPSIES Left 2021    Procedure: BIOPSY, BONE MARROW, USING NEEDLE OR TROCAR;  Surgeon: Hair Okeefe M.D.;  Location:  ENDOSCOPY [facility];  Service: Orthopedics       CURRENT MEDICATIONS  Home Medications     Reviewed by Marry Granados (Pharmacy Tech) on 08/09/21 at 1446  Med List Status: Complete   Medication Last Dose Status   dexamethasone (DECADRON) 4 MG Tab not started Active   FLUoxetine (PROZAC) 20 MG Cap 8/9/2021 Active   levothyroxine (SYNTHROID) 100 MCG Tab 8/9/2021 Active   Omega-3 Fatty Acids (FISH OIL) 1000 MG Cap capsule 8/9/2021 Active   simvastatin (ZOCOR) 20 MG Tab 8/8/2021 Active   testosterone cypionate (DEPO-TESTOSTERONE) 200 MG/ML Solution injection 8/4/2021 Active   valACYclovir (VALTREX) 500 MG Tab 8/9/2021 Active                ALLERGIES  Allergies   Allergen Reactions   • Grass Pollen(K-O-R-T-Swt Rodrigo) Shortness of Breath   • Pet Dander [Cat Hair Extract] Shortness of Breath and Unspecified   • Milk [Dairy Food Allergy] Unspecified   • S[place]brush Unspecified   • [place] Oil Shortness of Breath       PHYSICAL EXAM  VITAL SIGNS: /75   Pulse 87   Temp 36.6 °C (97.8 °F) (Temporal)   Resp 20   Ht 1.829 m (6')   Wt 80.3 kg (177 lb 0.5 oz)   SpO2 95%   BMI 24.01 kg/m²   Constitutional: Well developed, Well nourished, No acute distress, Non-toxic appearance.   HEENT: Normocephalic, Atraumatic,  external ears normal, pharynx pink,  Mucous  Membranes moist, No rhinorrhea or mucosal edema  Eyes: PERRL, EOMI, Conjunctiva normal, No discharge.   Neck: Normal range of motion, No tenderness, Supple, No stridor.   Lymphatic: No lymphadenopathy    Cardiovascular: Cardiac regular Rate and Rhythm, No murmurs,  rubs, or gallops.   Thorax & Lungs: Lungs clear to auscultation bilaterally, No respiratory distress, No wheezes, rhales or rhonchi, No chest wall tenderness.   Abdomen: Bowel sounds normal, Soft, non tender, non distended,  No pulsatile masses., no rebound guarding or peritoneal signs.   Skin: Warm, diaphoretic pale  Back:  No CVA tenderness,  No spinal tenderness, bony crepitance step offs or  instability.   Extremities: Equal, intact distal pulses, No cyanosis, clubbing or edema,  No tenderness.   Musculoskeletal: Good range of motion in all major joints. No tenderness to palpation or major deformities noted.   Neurologic: Alert & oriented x 3, Cranial nerves II-XII intact, Equal strength and sensation upper and lower extremities bilaterally,  No focal deficits noted.   Psychiatric: Affect normal, Judgment normal, Mood normal      URSE & MEDICALITIN DECISION MAKING  Pertinent Labs & Imaging studies reviewed. (See chart for details)  Differential diagnosis: Vasovagal syncope, anemia, hemorrhagic shock, cardiac ischemia    Results for orders placed or performed during the hospital encounter of 08/09/21   CBC WITH DIFFERENTIAL   Result Value Ref Range    WBC 9.1 4.8 - 10.8 K/uL    RBC 4.30 (L) 4.70 - 6.10 M/uL    Hemoglobin 15.7 14.0 - 18.0 g/dL    Hematocrit 45.2 42.0 - 52.0 %    .1 (H) 81.4 - 97.8 fL    MCH 36.5 (H) 27.0 - 33.0 pg    MCHC 34.7 33.7 - 35.3 g/dL    RDW 53.4 (H) 35.9 - 50.0 fL    Platelet Count 267 164 - 446 K/uL    MPV 9.2 9.0 - 12.9 fL    Neutrophils-Polys 59.90 44.00 - 72.00 %    Lymphocytes 29.40 22.00 - 41.00 %    Monocytes 9.30 0.00 - 13.40 %    Eosinophils 0.90 0.00 - 6.90 %    Basophils 0.30 0.00 - 1.80 %    Immature Granulocytes 0.20 0.00 - 0.90 %    Nucleated RBC 0.00 /100 WBC    Neutrophils (Absolute) 5.43 1.82 - 7.42 K/uL    Lymphs (Absolute) 2.66 1.00 - 4.80 K/uL    Monos (Absolute) 0.84 0.00 - 0.85 K/uL    Eos (Absolute) 0.08 0.00 - 0.51 K/uL    Baso (Absolute) 0.03 0.00 - 0.12 K/uL    Immature Granulocytes (abs) 0.02 0.00 - 0.11 K/uL    NRBC (Absolute) 0.00 K/uL   COMP METABOLIC PANEL   Result Value Ref Range    Sodium 141 135 - 145 mmol/L    Potassium 4.4 3.6 - 5.5 mmol/L    Chloride 106 96 - 112 mmol/L    Co2 21 20 - 33 mmol/L    Anion Gap 14.0 7.0 - 16.0    Glucose 140 (H) 65 - 99 mg/dL    Bun 20 8 - 22 mg/dL    Creatinine 1.10 0.50 - 1.40 mg/dL    Calcium 9.6 8.4 -  10.2 mg/dL    AST(SGOT) 25 12 - 45 U/L    ALT(SGPT) 22 2 - 50 U/L    Alkaline Phosphatase 64 30 - 99 U/L    Total Bilirubin 0.4 0.1 - 1.5 mg/dL    Albumin 4.5 3.2 - 4.9 g/dL    Total Protein 6.8 6.0 - 8.2 g/dL    Globulin 2.3 1.9 - 3.5 g/dL    A-G Ratio 2.0 g/dL   PROTHROMBIN TIME   Result Value Ref Range    PT 12.7 12.0 - 14.6 sec    INR 1.03 0.87 - 1.13   APTT   Result Value Ref Range    APTT 20.3 (L) 24.7 - 36.0 sec   COD - Adult (Type and Screen)   Result Value Ref Range    ABO Grouping Only O     Rh Grouping Only POS     Antibody Screen-Cod NEG     Component R       R3.                 Red Blood Cells     [id number]   selected     08/09/21   14:59      Product Type Red Blood Cells  LR Pheresis     Dispense Status selected     Unit Number (Barcoded) [id number]     Product Code (Barcoded) [id number]     Blood Type (Barcoded) 5100    TROPONIN   Result Value Ref Range    Troponin T 7 6 - 19 ng/L   ABO Rh Confirm   Result Value Ref Range    ABO Rh Confirm O POS    ESTIMATED GFR   Result Value Ref Range    GFR If African American >60 >60 mL/min/1.73 m 2    GFR If Non African American >60 >60 mL/min/1.73 m 2   CBC WITH DIFFERENTIAL   Result Value Ref Range    WBC 12.2 (H) 4.8 - 10.8 K/uL    RBC 3.56 (L) 4.70 - 6.10 M/uL    Hemoglobin 13.1 (L) 14.0 - 18.0 g/dL    Hematocrit 38.0 (L) 42.0 - 52.0 %    .7 (H) 81.4 - 97.8 fL    MCH 36.8 (H) 27.0 - 33.0 pg    MCHC 34.5 33.7 - 35.3 g/dL    RDW 54.8 (H) 35.9 - 50.0 fL    Platelet Count 219 164 - 446 K/uL    MPV 9.4 9.0 - 12.9 fL    Neutrophils-Polys 82.70 (H) 44.00 - 72.00 %    Lymphocytes 10.10 (L) 22.00 - 41.00 %    Monocytes 6.30 0.00 - 13.40 %    Eosinophils 0.10 0.00 - 6.90 %    Basophils 0.20 0.00 - 1.80 %    Immature Granulocytes 0.60 0.00 - 0.90 %    Nucleated RBC 0.00 /100 WBC    Neutrophils (Absolute) 10.05 (H) 1.82 - 7.42 K/uL    Lymphs (Absolute) 1.23 1.00 - 4.80 K/uL    Monos (Absolute) 0.77 0.00 - 0.85 K/uL    Eos (Absolute) 0.01 0.00 - 0.51 K/uL     Baso (Absolute) 0.03 0.00 - 0.12 K/uL    Immature Granulocytes (abs) 0.07 0.00 - 0.11 K/uL    NRBC (Absolute) 0.00 K/uL   EKG (NOW)   Result Value Ref Range    Report       [facility] Emergency Dept.    Test Date:  2021  Pt Name:    JAMES GRANT                Department: [facility]  MRN:        [id number]                      [facility]  Gender:     Male                         Technician: FILEMON  :        1950                   Requested By:ILIANA LAIRD  Order #:    [id number]                    Reading MD: ILIANA LAIRD MD    Measurements  Intervals                                Axis  Rate:       84                           P:          74  WY:         148                          QRS:        55  QRSD:       84                           T:          45  QT:         376  QTc:        445    Interpretive Statements  SINUS RHYTHM  No previous ECG available for comparison  Electronically Signed On 2021 18:09:29 PDT by ILIANA LAIRD MD        HYDRATION: Based on the patient's presentation of Other Near syncope blood loss the patient was given IV fluids. IV Hydration was used because oral hydration was not adequate alone. Upon recheck following hydration, the patient was improved.      I typed and screened the patient for blood but his first hemoglobin came back at 15 and his blood pressure is now stable.  His EKG does not show any sort of myocardial ischemia.  I gave the patient IV fluid when he first arrived along with Zofran.  The patient became very nauseous and then proceeded to vomit up to cups of dark blood.      After he was stabilized I removed the blood clots from both nares and placed cotton balls soaked in Bertin-Synephrine and lidocaine with epinephrine in his nostrils.  Pressure was held for 15 minutes and his bleeding improved.  I suctioned the rest of the blood out of his nostrils but could not locate the bleeding source.  I then placed a rapid Rhino in his left  nostril anterior posterior and an anterior posterior mercel packing in the right nostril.  I gave the patient some morphine for pain initially and Norco for pain later.  He tolerated the procedure well.    Upon recheck he has no active bleeding just some small amount of oozing from both packings.  There is no blood going down his throat.  I am waiting for a second H&H on the patient.    6:22 PM upon recheck the patient feels improved however he still oozing from his left nostril and feels some blood going down the back of his throat.  We have no ENT available at [facility].  I will transfer him to [facility] for ENT evaluation.  I did speak to Dr. Honeycutt who accepts the patient in transfer.    Critical Care  Due to the real possibility of a deterioration of this patient's condition required the highest level of my preparedness for sudden emergent intervention. I provided critical care services which included medication orders, frequent reevaluations of the patient's condition and response to treatment, ordering and reviewing test results and discussing the case with various consultants. The critical care time associated with the care of the patient was 45 minutes. Review chart for interventions. This time is exclusive of any other billable procedures.     FINAL IMPRESSION  1. Epistaxis    2. Near syncope           PLAN/DISPOSITION  Transferred to [facility] for ENT evaluation          Electronically signed by: Deepali Stern M.D., 8/9/2021 2:07 PM

## 2021-08-10 ENCOUNTER — TELEPHONE (OUTPATIENT)
Dept: HEMATOLOGY ONCOLOGY | Facility: MEDICAL CENTER | Age: 71
End: 2021-08-10

## 2021-08-10 VITALS
TEMPERATURE: 98.1 F | DIASTOLIC BLOOD PRESSURE: 73 MMHG | SYSTOLIC BLOOD PRESSURE: 157 MMHG | RESPIRATION RATE: 18 BRPM | HEART RATE: 76 BPM | OXYGEN SATURATION: 96 %

## 2021-08-10 DIAGNOSIS — C90.00 MULTIPLE MYELOMA NOT HAVING ACHIEVED REMISSION (HCC): Primary | ICD-10-CM

## 2021-08-10 DIAGNOSIS — D80.1 HYPOGAMMAGLOBULINEMIA (HCC): ICD-10-CM

## 2021-08-10 PROBLEM — R04.0 EPISTAXIS: Status: RESOLVED | Noted: 2021-08-09 | Resolved: 2021-08-10

## 2021-08-10 LAB
ANION GAP SERPL CALC-SCNC: 9 MMOL/L (ref 7–16)
BASOPHILS # BLD AUTO: 0.4 % (ref 0–1.8)
BASOPHILS # BLD: 0.03 K/UL (ref 0–0.12)
BUN SERPL-MCNC: 16 MG/DL (ref 8–22)
CALCIUM SERPL-MCNC: 8.5 MG/DL (ref 8.5–10.5)
CHLORIDE SERPL-SCNC: 106 MMOL/L (ref 96–112)
CO2 SERPL-SCNC: 25 MMOL/L (ref 20–33)
CREAT SERPL-MCNC: 0.85 MG/DL (ref 0.5–1.4)
EOSINOPHIL # BLD AUTO: 0 K/UL (ref 0–0.51)
EOSINOPHIL NFR BLD: 0 % (ref 0–6.9)
ERYTHROCYTE [DISTWIDTH] IN BLOOD BY AUTOMATED COUNT: 57 FL (ref 35.9–50)
GLUCOSE SERPL-MCNC: 129 MG/DL (ref 65–99)
HCT VFR BLD AUTO: 36.8 % (ref 42–52)
HGB BLD-MCNC: 12.4 G/DL (ref 14–18)
IMM GRANULOCYTES # BLD AUTO: 0.03 K/UL (ref 0–0.11)
IMM GRANULOCYTES NFR BLD AUTO: 0.4 % (ref 0–0.9)
LYMPHOCYTES # BLD AUTO: 1.33 K/UL (ref 1–4.8)
LYMPHOCYTES NFR BLD: 15.7 % (ref 22–41)
MAGNESIUM SERPL-MCNC: 2 MG/DL (ref 1.5–2.5)
MCH RBC QN AUTO: 36.7 PG (ref 27–33)
MCHC RBC AUTO-ENTMCNC: 33.7 G/DL (ref 33.7–35.3)
MCV RBC AUTO: 108.9 FL (ref 81.4–97.8)
MONOCYTES # BLD AUTO: 0.83 K/UL (ref 0–0.85)
MONOCYTES NFR BLD AUTO: 9.8 % (ref 0–13.4)
NEUTROPHILS # BLD AUTO: 6.26 K/UL (ref 1.82–7.42)
NEUTROPHILS NFR BLD: 73.7 % (ref 44–72)
NRBC # BLD AUTO: 0 K/UL
NRBC BLD-RTO: 0 /100 WBC
PLATELET # BLD AUTO: 218 K/UL (ref 164–446)
PMV BLD AUTO: 9.5 FL (ref 9–12.9)
POTASSIUM SERPL-SCNC: 4.3 MMOL/L (ref 3.6–5.5)
RBC # BLD AUTO: 3.38 M/UL (ref 4.7–6.1)
SODIUM SERPL-SCNC: 140 MMOL/L (ref 135–145)
WBC # BLD AUTO: 8.5 K/UL (ref 4.8–10.8)

## 2021-08-10 PROCEDURE — 85025 COMPLETE CBC W/AUTO DIFF WBC: CPT

## 2021-08-10 PROCEDURE — 99217 PR OBSERVATION CARE DISCHARGE: CPT | Performed by: INTERNAL MEDICINE

## 2021-08-10 PROCEDURE — 700111 HCHG RX REV CODE 636 W/ 250 OVERRIDE (IP): Performed by: STUDENT IN AN ORGANIZED HEALTH CARE EDUCATION/TRAINING PROGRAM

## 2021-08-10 PROCEDURE — 83735 ASSAY OF MAGNESIUM: CPT

## 2021-08-10 PROCEDURE — 700105 HCHG RX REV CODE 258: Performed by: STUDENT IN AN ORGANIZED HEALTH CARE EDUCATION/TRAINING PROGRAM

## 2021-08-10 PROCEDURE — 80048 BASIC METABOLIC PNL TOTAL CA: CPT

## 2021-08-10 PROCEDURE — 96375 TX/PRO/DX INJ NEW DRUG ADDON: CPT

## 2021-08-10 PROCEDURE — G0378 HOSPITAL OBSERVATION PER HR: HCPCS

## 2021-08-10 PROCEDURE — 96376 TX/PRO/DX INJ SAME DRUG ADON: CPT

## 2021-08-10 PROCEDURE — 700102 HCHG RX REV CODE 250 W/ 637 OVERRIDE(OP): Performed by: STUDENT IN AN ORGANIZED HEALTH CARE EDUCATION/TRAINING PROGRAM

## 2021-08-10 PROCEDURE — A9270 NON-COVERED ITEM OR SERVICE: HCPCS | Performed by: STUDENT IN AN ORGANIZED HEALTH CARE EDUCATION/TRAINING PROGRAM

## 2021-08-10 RX ORDER — AMOXICILLIN AND CLAVULANATE POTASSIUM 875; 125 MG/1; MG/1
1 TABLET, FILM COATED ORAL 2 TIMES DAILY
Qty: 14 TABLET | Refills: 0 | Status: SHIPPED | OUTPATIENT
Start: 2021-08-10 | End: 2021-08-17

## 2021-08-10 RX ORDER — OXYCODONE HYDROCHLORIDE AND ACETAMINOPHEN 5; 325 MG/1; MG/1
1 TABLET ORAL EVERY 4 HOURS PRN
Qty: 18 TABLET | Refills: 0 | Status: SHIPPED | OUTPATIENT
Start: 2021-08-10 | End: 2021-08-13

## 2021-08-10 RX ORDER — LENALIDOMIDE 25 MG/1
CAPSULE ORAL
Qty: 14 CAPSULE | Refills: 0 | Status: SHIPPED | OUTPATIENT
Start: 2021-08-10 | End: 2021-08-13

## 2021-08-10 RX ORDER — ONDANSETRON 2 MG/ML
4 INJECTION INTRAMUSCULAR; INTRAVENOUS EVERY 4 HOURS PRN
Status: DISCONTINUED | OUTPATIENT
Start: 2021-08-10 | End: 2021-08-10 | Stop reason: HOSPADM

## 2021-08-10 RX ORDER — OXYCODONE HYDROCHLORIDE AND ACETAMINOPHEN 5; 325 MG/1; MG/1
1 TABLET ORAL EVERY 4 HOURS PRN
Qty: 18 TABLET | Refills: 0 | Status: SHIPPED | OUTPATIENT
Start: 2021-08-10 | End: 2021-08-10 | Stop reason: SDUPTHER

## 2021-08-10 RX ADMIN — FLUOXETINE 20 MG: 20 CAPSULE ORAL at 05:45

## 2021-08-10 RX ADMIN — MORPHINE SULFATE 2 MG: 4 INJECTION INTRAVENOUS at 04:25

## 2021-08-10 RX ADMIN — ONDANSETRON 4 MG: 2 INJECTION INTRAMUSCULAR; INTRAVENOUS at 00:44

## 2021-08-10 RX ADMIN — ONDANSETRON 4 MG: 2 INJECTION INTRAMUSCULAR; INTRAVENOUS at 04:22

## 2021-08-10 RX ADMIN — LEVOTHYROXINE SODIUM 100 MCG: 0.1 TABLET ORAL at 05:44

## 2021-08-10 RX ADMIN — SODIUM CHLORIDE 3 G: 900 INJECTION INTRAVENOUS at 05:45

## 2021-08-10 ASSESSMENT — LIFESTYLE VARIABLES
ON A TYPICAL DAY WHEN YOU DRINK ALCOHOL HOW MANY DRINKS DO YOU HAVE: 0
HAVE YOU EVER FELT YOU SHOULD CUT DOWN ON YOUR DRINKING: NO
HAVE PEOPLE ANNOYED YOU BY CRITICIZING YOUR DRINKING: NO
CONSUMPTION TOTAL: NEGATIVE
EVER FELT BAD OR GUILTY ABOUT YOUR DRINKING: NO
HOW MANY TIMES IN THE PAST YEAR HAVE YOU HAD 5 OR MORE DRINKS IN A DAY: 0
AVERAGE NUMBER OF DAYS PER WEEK YOU HAVE A DRINK CONTAINING ALCOHOL: 0
DOES PATIENT WANT TO STOP DRINKING: NO
TOTAL SCORE: 0
EVER HAD A DRINK FIRST THING IN THE MORNING TO STEADY YOUR NERVES TO GET RID OF A HANGOVER: NO
ALCOHOL_USE: NO

## 2021-08-10 ASSESSMENT — PATIENT HEALTH QUESTIONNAIRE - PHQ9
1. LITTLE INTEREST OR PLEASURE IN DOING THINGS: NOT AT ALL
SUM OF ALL RESPONSES TO PHQ9 QUESTIONS 1 AND 2: 0
2. FEELING DOWN, DEPRESSED, IRRITABLE, OR HOPELESS: NOT AT ALL

## 2021-08-10 NOTE — ED PROVIDER NOTES
ED Provider Note    Scribed for Bunny Almonte M.D. by Adan Thomas. 2021  8:03 PM    Primary care provider: Elda Box M.D.  Means of arrival: EMS  History obtained from: Patient  History limited by: None    CHIEF COMPLAINT  Epistaxis    HPI  Gary Hedrick is a 71 y.o. male who presents to the Emergency Department via EMS as a transfer from [facility] for ENT consult. He states that he developed an epistaxis around 3 am, and it resolved after several hours. Then it began bleeding around 1 pm this afternoon, and he had his nose packed there. Since then it has been persistently bleeding, with blood draining down the back of this throat. While waiting at [facility] he did have a near syncopal episode. No chest pain or shortness of breath. He had sinus surgery on 21 with Dr. Martinez ago without any complications. Prior to today he had noticed blood clots in his nose but no active epistaxis. He takes fish oil daily but no blood thinners or aspirin. He does not smoke.     REVIEW OF SYSTEMS  Pertinent positives include: epistaxis and near syncopal. Pertinent negatives include: no chest pain or shortness of breath. See history of present illness. All other systems are negative.     PAST MEDICAL HISTORY   has a past medical history of Disorder of thyroid, High cholesterol, and Psychiatric problem.    SURGICAL HISTORY   has a past surgical history that includes dx bone marrow aspirations (Left, 2021) and dx bone marrow biopsies (Left, 2021).    SOCIAL HISTORY  Social History     Tobacco Use   • Smoking status: Former Smoker     Years: 20.00     Quit date:      Years since quittin.6   • Smokeless tobacco: Never Used   Vaping Use   • Vaping Use: Never used   Substance Use Topics   • Alcohol use: Never   • Drug use: Never      Social History     Substance and Sexual Activity   Drug Use Never       FAMILY HISTORY  No family history pertinent.    CURRENT MEDICATIONS  Current  Outpatient Medications   Medication Instructions   • dexamethasone (DECADRON) 4 MG Tab Take 10 pills once a week   • fish oil 1,000 mg, Oral, 2 TIMES DAILY   • FLUoxetine (PROZAC) 20 mg, Oral, DAILY   • levothyroxine (SYNTHROID) 100 mcg, Oral, EACH MORNING ON EMPTY STOMACH   • simvastatin (ZOCOR) 20 mg, Oral, EVERY EVENING   • testosterone cypionate (DEPO-TESTOSTERONE) 100 mg, Intramuscular, EVERY 10 DAYS, 0.5 tablet = 100 mg   • valACYclovir (VALTREX) 500 mg, Oral, 2 TIMES DAILY, No known stop date, TWICE DAILY       ALLERGIES  Allergies   Allergen Reactions   • Grass Pollen(K-O-R-T-Swt Rodrigo) Shortness of Breath   • Pet Dander [Cat Hair Extract] Shortness of Breath and Unspecified   • Milk [Dairy Food Allergy] Unspecified   • [place] Unspecified   • [place] Oil Shortness of Breath       PHYSICAL EXAM  VITAL SIGNS: BP (!) 177/78   Pulse 92   Resp (!) 22   SpO2 94%     Constitutional: Alert in no apparent distress.  HENT: No signs of trauma, Bilateral external ears normal. Uvula midline. Blood in posterior oropharynx, Packing in bilateral nares  Eyes: Pupils are equal and reactive, Conjunctiva normal, Non-icteric.   Neck: Normal range of motion, No tenderness, Supple, No stridor.   Lymphatic: No lymphadenopathy noted.   Cardiovascular: Regular rate and rhythm, no murmurs.   Thorax & Lungs: Normal breath sounds, No respiratory distress, No wheezing, No chest tenderness.   Abdomen:  Soft, No tenderness, No peritoneal signs, No masses, No pulsatile masses.   Skin: Warm, Dry, No erythema, No rash.   Back: No bony tenderness, No CVA tenderness.   Extremities: Intact distal pulses, No edema, No tenderness, No cyanosis.  Musculoskeletal: Good range of motion in all major joints. No tenderness to palpation or major deformities noted.   Neurologic: Alert , Normal motor function, Normal sensory function, No focal deficits noted.   Psychiatric: Affect normal, Judgment normal, Mood normal.     DIAGNOSTIC STUDIES /  PROCEDURES    Reviewed labs from [facility]:  Labs checked twice, Hgb at 2 pm: 15.7. Hgb at 4 pm 13.1, platelets normal    COURSE & MEDICAL DECISION MAKING  Nursing notes, VS, PMSFHx reviewed in chart.    71 y.o. male p/w chief complaint of epistaxis onset around 1 pm.    8:03 PM Patient seen and examined at bedside.      I verified that the patient was wearing a mask and I was wearing appropriate PPE every time I entered the room. The patient's mask was on the patient at all times during my encounter except for a brief view of the oropharynx.     The differential diagnoses include but are not limited to:     #Epistaxis  Pt transferred from [facility] for ENT consult  Labs checked twice, Hgb at 2 pm: 15.7. Hgb at 4 pm 13.1, platelets normal  No history of anticoagulation  Differential diagnosis includes but is not limited to: nasal mucosal drying, no obvious large nasal polyp, no evidence of foreign body, no reported trauma, no known history of nasopharyngeal carcinoma  Hemostasis not obtained after packing at outside facility, sent here for ENT consult    8:20 PM Paged ENT.    9:17 PM I discussed the patient's case and the above findings with Dr. Guzman (ENT) who agrees to come evaluate the patient. Advised lowering blood pressure by at least 25 points, and do not touch packing.    9:36 PM Patient was reevaluated at bedside. Treated with labetalol and morphine.     9:51 PM Spoke with Dr. Guzman (ENT), who informed me that patient is no longer bleeding. She requested that he be admitted to the hospitalist for observation. Control blood pressure, treat with antibiotics for packing and he can follow up in Dr. Martinez's office for removal later in the week if Hb stable during admission. Paged Hospitalist. Patient treated with Unasyn.    10:09 PM I discussed the patient's case and the above findings with Dr. Blanco (Hospitalist) who agrees to evaluate the patient for hospitalization.    DISPOSITION:  Patient  will be hospitalized by Dr. Blanco in guarded condition.    FINAL IMPRESSION  1. Anemia, unspecified type    2. Epistaxis          I, Adan Thomas (Scribe), am scribing for, and in the presence of, Bunny Almonte M.D..    Electronically signed by: Adan Thomas (Scribe), 8/9/2021    IBunny M.D. personally performed the services described in this documentation, as scribed by Adan Thomas in my presence, and it is both accurate and complete.    The note accurately reflects work and decisions made by me.  Bunny Almonte M.D.  8/10/2021  6:16 AM

## 2021-08-10 NOTE — H&P
Hospital Medicine History & Physical Note    Date of Service  8/9/2021    Primary Care Physician  Elda Box M.D.    Consultants  ENT    Specialist Names: Dr. Guzman    Code Status  Full Code    Chief Complaint  Epistaxis    History of Presenting Illness  Gary Hedrick is a 71 y.o. male with PMH of multiple myeloma, and recent sinus surgery for infection on 7/2 with Dr. Martinez.  Who presented 8/9/2021 as transfer from [facility] for ENT evaluation for epistaxis.  Patient reports that he woke up this morning around 3 AM and felt like his nose was bleeding.  He inserted tissue paper into his nose and went back to sleep.  However, on waking up in the morning he noticed that his nose was still bleeding.  He decided to take a shower and try to wash away the blood to see if it would help, but his nose continued to bleed profusely.  He reports those extremely large amount and was nonstop.  His bleeding finally stopped after multiple hours, but then returned around 1 PM.  Additionally he felt like he may pass out so he decided to come into the ED.  Of note, patient reports he had sinus surgery on 7/2/2021 with Dr. Martinez for infection, he denied any obvious postop complications at that time.  Denies any fevers, chills, chest pain, shortness of breath, nausea, vomiting, diarrhea.    ED: RR 20-22, /78.  WBC 12.2, Hb 15.7-> 13.1, CMP unremarkable, troponin VII, INR 1.03.  EKG unremarkable.  Patient was evaluated by ENT and Rhino Rocket and packing was placed.  Patient was also given Unasyn, labetalol 10 mg IV, morphine 4 mg.  He had already received Norco, LR 1 L, morphine 4 mg, Zofran 4 mg at [facility].    I discussed the plan of care with patient.    Review of Systems  Review of Systems   Constitutional: Negative for chills, fever and weight loss.   HENT: Positive for nosebleeds. Negative for hearing loss and sore throat.         Left sinus pain   Eyes: Negative for blurred  vision and pain.   Respiratory: Negative for cough, hemoptysis, shortness of breath and wheezing.    Cardiovascular: Negative for chest pain, palpitations, orthopnea and leg swelling.   Gastrointestinal: Negative for abdominal pain, blood in stool, constipation, diarrhea, nausea and vomiting.   Genitourinary: Negative for dysuria and hematuria.   Musculoskeletal: Negative for joint pain and myalgias.   Skin: Negative for rash.   Neurological: Negative for dizziness, loss of consciousness and weakness.       Past Medical History   has a past medical history of Disorder of thyroid, High cholesterol, and Psychiatric problem.    Surgical History   has a past surgical history that includes pr dx bone marrow aspirations (Left, 7/23/2021) and pr dx bone marrow biopsies (Left, 7/23/2021).     Family History  No family history of diabetes, heart disease, cancer  Family history reviewed with patient. There is no family history that is pertinent to the chief complaint.     Social History   reports that he quit smoking about 34 years ago. He quit after 20.00 years of use. He has never used smokeless tobacco. He reports that he does not drink alcohol and does not use drugs.    Allergies  Allergies   Allergen Reactions   • Grass Pollen(K-O-R-T-Swt Rodrigo) Shortness of Breath   • Pet Dander [Cat Hair Extract] Shortness of Breath and Unspecified   • Milk [Dairy Food Allergy] Unspecified   • Sagebrush Unspecified   • [place] Oil Shortness of Breath       Medications  Prior to Admission Medications   Prescriptions Last Dose Informant Patient Reported? Taking?   FLUoxetine (PROZAC) 20 MG Cap  Patient No No   Sig: Take 1 capsule by mouth every day.   Omega-3 Fatty Acids (FISH OIL) 1000 MG Cap capsule  Patient Yes No   Sig: Take 1,000 mg by mouth 2 times a day.   dexamethasone (DECADRON) 4 MG Tab  Patient No No   Sig: Take 10 pills once a week   levothyroxine (SYNTHROID) 100 MCG Tab  Patient No No   Sig: Take 1 tablet by mouth Every  morning on an empty stomach.   simvastatin (ZOCOR) 20 MG Tab  Patient No No   Sig: Take 1 tablet by mouth every evening.   testosterone cypionate (DEPO-TESTOSTERONE) 200 MG/ML Solution injection  Patient Yes No   Sig: Inject 100 mg into the shoulder, thigh, or buttocks every 10 days. 0.5 tablet = 100 mg   valACYclovir (VALTREX) 500 MG Tab  Patient Yes No   Sig: Take 500 mg by mouth 2 times a day. No known stop date, TWICE DAILY      Facility-Administered Medications: None       Physical Exam  Temp:  [36.6 °C (97.8 °F)] 36.6 °C (97.8 °F)  Pulse:  [] 92  Resp:  [14-24] 22  BP: (116-177)/(49-78) 177/78  SpO2:  [92 %-100 %] 94 %    Physical Exam  Vitals and nursing note reviewed.   Constitutional:       General: He is not in acute distress.     Appearance: Normal appearance.   HENT:      Nose:      Comments: Dry blood appreciated around nasal passageways.  Rhino Rocket left nostril and packing in right nostril     Mouth/Throat:      Mouth: Mucous membranes are moist.   Eyes:      Extraocular Movements: Extraocular movements intact.   Cardiovascular:      Rate and Rhythm: Normal rate and regular rhythm.      Heart sounds: No murmur heard.   No gallop.    Pulmonary:      Effort: Pulmonary effort is normal.      Breath sounds: Normal breath sounds. No wheezing, rhonchi or rales.   Abdominal:      General: Abdomen is flat. Bowel sounds are normal. There is no distension.      Palpations: Abdomen is soft.      Tenderness: There is no abdominal tenderness.   Musculoskeletal:         General: No deformity. Normal range of motion.      Right lower leg: No edema.      Left lower leg: No edema.   Neurological:      General: No focal deficit present.      Mental Status: He is alert and oriented to person, place, and time.         Laboratory:  Recent Labs     08/09/21  1400 08/09/21  1600   WBC 9.1 12.2*   RBC 4.30* 3.56*   HEMOGLOBIN 15.7 13.1*   HEMATOCRIT 45.2 38.0*   .1* 106.7*   MCH 36.5* 36.8*   MCHC 34.7 34.5    RDW 53.4* 54.8*   PLATELETCT 267 219   MPV 9.2 9.4     Recent Labs     08/09/21  1400   SODIUM 141   POTASSIUM 4.4   CHLORIDE 106   CO2 21   GLUCOSE 140*   BUN 20   CREATININE 1.10   CALCIUM 9.6     Recent Labs     08/09/21  1400   ALTSGPT 22   ASTSGOT 25   ALKPHOSPHAT 64   TBILIRUBIN 0.4   GLUCOSE 140*     Recent Labs     08/09/21  1400   APTT 20.3*   INR 1.03     No results for input(s): NTPROBNP in the last 72 hours.      Recent Labs     08/09/21  1400   TROPONINT 7       Imaging:  No orders to display       EKG:  I have personally reviewed the images and compared with prior images.    Assessment/Plan:  I anticipate this patient is appropriate for observation status at this time.    * Epistaxis- (present on admission)  Assessment & Plan  Plan as below    Blood loss anemia- (present on admission)  Assessment & Plan  Hb 15.7->13.1  Severe epistaxis for multiple hours  Now with rhino rockt and packing by ENT  - trend h/h  - holding chemical VTE prophylaxis  - TEG  - Unasyn per ENT rec  - ENT (Dr. Hernandez) consulted by ENT - rec abx and continue to trend h/h    Other secondary hypertension- (present on admission)  Assessment & Plan  Not on HTN medications regularly  Usually BP well controlled  - IV antihypertensives and analgesics for now  - Cosnider Po if remains elevated    Multiple myeloma not having achieved remission (HCC)- (present on admission)  Assessment & Plan  Recent diagnosis of MM  Wife is not aware of diagnosis, please do not discuss in her presence    Dyslipidemia- (present on admission)  Assessment & Plan  Simvastatin 20mg      VTE prophylaxis: SCDs/TEDs

## 2021-08-10 NOTE — DISCHARGE INSTRUCTIONS
Discharge Instructions    Discharged to home by car with relative. Discharged via wheelchair, hospital escort: Yes.  Special equipment needed: Not Applicable    Be sure to schedule a follow-up appointment with your primary care doctor or any specialists as instructed.     Discharge Plan:   Diet Plan: Discussed  Activity Level: Discussed  Confirmed Follow up Appointment: Patient to Call and Schedule Appointment  Confirmed Symptoms Management: Discussed  Medication Reconciliation Updated: Yes    I understand that a diet low in cholesterol, fat, and sodium is recommended for good health. Unless I have been given specific instructions below for another diet, I accept this instruction as my diet prescription.   Other diet: regular    Special Instructions: None    · Is patient discharged on Warfarin / Coumadin?   No     Depression / Suicide Risk    As you are discharged from this [facility] facility, it is important to learn how to keep safe from harming yourself.    Recognize the warning signs:  · Abrupt changes in personality, positive or negative- including increase in energy   · Giving away possessions  · Change in eating patterns- significant weight changes-  positive or negative  · Change in sleeping patterns- unable to sleep or sleeping all the time   · Unwillingness or inability to communicate  · Depression  · Unusual sadness, discouragement and loneliness  · Talk of wanting to die  · Neglect of personal appearance   · Rebelliousness- reckless behavior  · Withdrawal from people/activities they love  · Confusion- inability to concentrate     If you or a loved one observes any of these behaviors or has concerns about self-harm, here's what you can do:  · Talk about it- your feelings and reasons for harming yourself  · Remove any means that you might use to hurt yourself (examples: pills, rope, extension cords, firearm)  · Get professional help from the community (Mental Health, Substance Abuse, psychological  counseling)  · Do not be alone:Call your Safe Contact- someone whom you trust who will be there for you.  · Call your local CRISIS HOTLINE 591-3628 or 372-701-0339  · Call your local Children's Mobile Crisis Response Team [place] (583) 221-7662 or www.EdPuzzle  · Call the toll free National Suicide Prevention Hotlines   · National Suicide Prevention Lifeline 476-608-RLBS (3044)  · National Hope Line Network 800-SUICIDE (388-7937)

## 2021-08-10 NOTE — PROGRESS NOTES
HD #2 admitted for epistaxis and hypertension    No bright red blood overnight, some darker blood he spit out    Alert, NAD  No anterior bleeding  OP with dark old blood    A/P: Epistaxis in setting of hypertension, hemostatic with bilateral nasal packing in place.  DC home on PO abx, call Dr. Martinez for appt for packing removal.  Dr. Nation is aware of this patient.

## 2021-08-10 NOTE — ED NOTES
Patient to [facility] ER via [facility] patient stable continues to have a trickle of blood

## 2021-08-10 NOTE — H&P
Surgery Otolaryngology History & Physical Note    Date  8/10/2021    Primary Care Physician  Elda Box M.D.    CC  Epistaxis    HPI  This is a 71 y.o. male who presented with epistaxis starting at 1100, procedure with Dr. Martinez 3 weeks ago, diagnosed with multiple myeloma recently.  High volume epistaxis and syncopal episode.  Bilateral packing placed at [facility] and transferred here for ENT eval.  No active bleeding noted on arrival.      Past Medical History:   Diagnosis Date   • Disorder of thyroid     hypothyroid   • High cholesterol    • Psychiatric problem     depression       Past Surgical History:   Procedure Laterality Date   • NE DX BONE MARROW ASPIRATIONS Left 7/23/2021    Procedure: ASPIRATION, BONE MARROW - DURANT;  Surgeon: Hair Okeefe M.D.;  Location: ENDOSCOPY [facility];  Service: Orthopedics   • NE DX BONE MARROW BIOPSIES Left 7/23/2021    Procedure: BIOPSY, BONE MARROW, USING NEEDLE OR TROCAR;  Surgeon: Hair Okeefe M.D.;  Location: ENDOSCOPY [facility];  Service: Orthopedics       Current Facility-Administered Medications   Medication Dose Route Frequency Provider Last Rate Last Admin   • ondansetron (ZOFRAN) syringe/vial injection 4 mg  4 mg Intravenous Q4HRS PRN Chris D Ip, M.D.   4 mg at 08/10/21 0422   • FLUoxetine (PROZAC) capsule 20 mg  20 mg Oral DAILY Chris D Ip, M.D.   20 mg at 08/10/21 0545   • levothyroxine (SYNTHROID) tablet 100 mcg  100 mcg Oral AM ES Chris D Ip, M.D.   100 mcg at 08/10/21 0544   • fish oil capsule 1,000 mg  1,000 mg Oral BID Chris D Ip, M.D.       • simvastatin (ZOCOR) tablet 20 mg  20 mg Oral Q EVENING Chris D Ip, M.D.       • senna-docusate (PERICOLACE or SENOKOT S) 8.6-50 MG per tablet 2 tablet  2 tablet Oral BID Chris D Ip, M.D.        And   • polyethylene glycol/lytes (MIRALAX) PACKET 1 Packet  1 Packet Oral QDAY PRN Chris D Ip, M.D.        And   • magnesium hydroxide (MILK OF MAGNESIA) suspension 30 mL  30 mL Oral QDAY PRN Chris D Ip, M.D.         And   • bisacodyl (DULCOLAX) suppository 10 mg  10 mg Rectal QDAY PRN Chris Blanco M.D.       • acetaminophen (Tylenol) tablet 650 mg  650 mg Oral Q6HRS PRN Chris Blanco M.D.       • morphine (pf) 4 mg/mL injection 2 mg  2 mg Intravenous Q3HRS PRN Chris Blanco MJean PaulDJean Paul   2 mg at 08/10/21 0425   • ampicillin/sulbactam (UNASYN) 3 g in  mL IVPB  3 g Intravenous Q6HRS JUAN M Delarosa.D.   Stopped at 08/10/21 0615   • labetalol (NORMODYNE/TRANDATE) injection 10 mg  10 mg Intravenous Q4HRS PRN Chris Blanco M.D.       • enalaprilat (VASOTEC) injection 1.25 mg  1.25 mg Intravenous Q6HRS PRN Chris Blanco M.D.           Social History     Socioeconomic History   • Marital status:      Spouse name: Not on file   • Number of children: Not on file   • Years of education: Not on file   • Highest education level: Not on file   Occupational History   • Not on file   Tobacco Use   • Smoking status: Former Smoker     Years: 20.00     Quit date:      Years since quittin.6   • Smokeless tobacco: Never Used   Vaping Use   • Vaping Use: Never used   Substance and Sexual Activity   • Alcohol use: Never   • Drug use: Never   • Sexual activity: Not on file   Other Topics Concern   • Not on file   Social History Narrative   • Not on file     Social Determinants of Health     Financial Resource Strain:    • Difficulty of Paying Living Expenses:    Food Insecurity:    • Worried About Running Out of Food in the Last Year:    • Ran Out of Food in the Last Year:    Transportation Needs:    • Lack of Transportation (Medical):    • Lack of Transportation (Non-Medical):    Physical Activity:    • Days of Exercise per Week:    • Minutes of Exercise per Session:    Stress:    • Feeling of Stress :    Social Connections:    • Frequency of Communication with Friends and Family:    • Frequency of Social Gatherings with Friends and Family:    • Attends Shinto Services:    • Active Member of Clubs or Organizations:    • Attends Club or  Organization Meetings:    • Marital Status:    Intimate Partner Violence:    • Fear of Current or Ex-Partner:    • Emotionally Abused:    • Physically Abused:    • Sexually Abused:        No family history on file.    Allergies  Grass pollen(k-o-r-t-swt jose), Pet dander [cat hair extract], Milk [dairy food allergy], Sagebrush, and [place] oil    Review of Systems  Negative except for as per HPI    Physical Exam  Vitals reviewed.   Constitutional:       Appearance: Normal appearance.   HENT:      Head: Normocephalic and atraumatic.      Right Ear: External ear normal.      Left Ear: External ear normal.      Nose:      Comments: Right merocel packing, left rapid rhino, hemostatic bilaterally     Mouth/Throat:      Pharynx: Oropharynx is clear.      Comments: No bleeding in oropharynx  Eyes:      Extraocular Movements: Extraocular movements intact.   Cardiovascular:      Rate and Rhythm: Normal rate.   Pulmonary:      Effort: Pulmonary effort is normal.   Neurological:      Mental Status: He is alert.         Vital Signs  Blood Pressure : 157/73   Temperature: 36.7 °C (98.1 °F)   Pulse: 76   Respiration: 18   Pulse Oximetry: 96 %       Labs:  Recent Labs     08/09/21  1400 08/09/21  1600 08/10/21  0515   WBC 9.1 12.2* 8.5   RBC 4.30* 3.56* 3.38*   HEMOGLOBIN 15.7 13.1* 12.4*   HEMATOCRIT 45.2 38.0* 36.8*   .1* 106.7* 108.9*   MCH 36.5* 36.8* 36.7*   MCHC 34.7 34.5 33.7   RDW 53.4* 54.8* 57.0*   PLATELETCT 267 219 218   MPV 9.2 9.4 9.5     Recent Labs     08/09/21  1400 08/10/21  0515   SODIUM 141 140   POTASSIUM 4.4 4.3   CHLORIDE 106 106   CO2 21 25   GLUCOSE 140* 129*   BUN 20 16   CREATININE 1.10 0.85   CALCIUM 9.6 8.5     Recent Labs     08/09/21  1400   APTT 20.3*   INR 1.03     Recent Labs     08/09/21  1400   ASTSGOT 25   ALTSGPT 22   TBILIRUBIN 0.4   ALKPHOSPHAT 64   GLOBULIN 2.3   INR 1.03       Radiology:  No orders to display         Assessment/Plan:  Epistaxis, now hemostatic with bilateral nasal  packing in place.  He will need better control of hypertension and I recommend admit for overnight observation given the syncopal episode and reported volume of blood loss.  He will require ppx abx.

## 2021-08-10 NOTE — TELEPHONE ENCOUNTER
Called patient and informed him that we would need to rescheduled due to labs not being completed. He stated he thought he had them done, I informed him that we did not have them in the sytem and [facility] had no record of them just the [place]. He stated he would get them done. Appointment was rescheduled for 9/2

## 2021-08-10 NOTE — ASSESSMENT & PLAN NOTE
Not on HTN medications regularly  Usually BP well controlled  - IV antihypertensives and analgesics for now  - Cosnider Po if remains elevated

## 2021-08-10 NOTE — PROGRESS NOTES
Pt A&O x4. Pt denies pain. Respirations even and unlabored on room air. No changes from previous RN assessment.     Updated on POC, communication board updated. Bed locked and in lowest position. Call light and belongings within reach. Non-skid socks in place. Needs met, will continue to monitor.

## 2021-08-10 NOTE — DISCHARGE SUMMARY
Discharge Summary    CHIEF COMPLAINT ON ADMISSION  No chief complaint on file.      Reason for Admission  EMS 60     Admission Date  8/9/2021    CODE STATUS  Full Code    HPI & HOSPITAL COURSE  This is a 71 y.o. male here with epistaxis. He was transferred here from [facility] for ENT consultation and close monitoring. Rhino rocket was placed by ENT. H/H was monitored and remained stable and he did not require transfusion. Hemostasis was achieved. VSS on room air. He was cleared for discharge home by ENT with instructions to follow-up with Dr. Martinez for packing removal. He was prescribed prophylactic Augmentin upon discharge.     Therefore, he is discharged in fair and stable condition to home with close outpatient follow-up.    The patient recovered much more quickly than anticipated on admission.    Discharge Date  8/10/2021    FOLLOW UP ITEMS POST DISCHARGE  Dr. Martinez     DISCHARGE DIAGNOSES  Principal Problem (Resolved):    Epistaxis POA: Yes  Active Problems:    Dyslipidemia POA: Yes      Overview: Stable on statin    Multiple myeloma not having achieved remission (HCC) POA: Yes    Other secondary hypertension POA: Yes    Blood loss anemia POA: Yes      FOLLOW UP  Future Appointments   Date Time Provider Department Center   8/12/2021 10:30 AM [address] [facility]   8/13/2021 10:30 AM Gary Aaron M.D. [facility] None   5/16/2022 11:30 AM [facility]. [facility]     Bee Martinez M.D.  [address]  [place] [address]  [phone]    Schedule an appointment as soon as possible for a visit  packing removal       MEDICATIONS ON DISCHARGE     Medication List      START taking these medications      Instructions   amoxicillin-clavulanate 875-125 MG Tabs  Commonly known as: AUGMENTIN   Take 1 tablet by mouth 2 times a day for 7 days.  Dose: 1 tablet     oxyCODONE-acetaminophen 5-325 MG Tabs  Commonly known as: PERCOCET   Take 1 tablet by mouth every four hours as  needed for Moderate Pain for up to 3 days.  Dose: 1 tablet        CONTINUE taking these medications      Instructions   dexamethasone 4 MG Tabs  Commonly known as: DECADRON   Take 10 pills once a week     fish oil 1000 MG Caps capsule   Take 1,000 mg by mouth 2 times a day.  Dose: 1,000 mg     FLUoxetine 20 MG Caps  Commonly known as: PROZAC   Take 1 capsule by mouth every day.  Dose: 20 mg     levothyroxine 100 MCG Tabs  Commonly known as: SYNTHROID   Take 1 tablet by mouth Every morning on an empty stomach.  Dose: 100 mcg     simvastatin 20 MG Tabs  Commonly known as: ZOCOR   Take 1 tablet by mouth every evening.  Dose: 20 mg     testosterone cypionate 200 MG/ML Soln injection  Commonly known as: DEPO-TESTOSTERONE   Inject 100 mg into the shoulder, thigh, or buttocks every 10 days. 0.5 tablet = 100 mg  Dose: 100 mg     valACYclovir 500 MG Tabs  Commonly known as: VALTREX   Take 500 mg by mouth 2 times a day. No known stop date, TWICE DAILY  Dose: 500 mg            Allergies  Allergies   Allergen Reactions   • Grass Pollen(K-O-R-T-Swt Rodrigo) Shortness of Breath   • Pet Dander [Cat Hair Extract] Shortness of Breath and Unspecified   • Milk [Dairy Food Allergy] Unspecified   • Sagebrush Unspecified   • [place] Oil Shortness of Breath       DIET  Orders Placed This Encounter   Procedures   • Diet Order Diet: Cardiac     Standing Status:   Standing     Number of Occurrences:   1     Order Specific Question:   Diet:     Answer:   Cardiac [6]       ACTIVITY  As tolerated.  Weight bearing as tolerated    CONSULTATIONS  ENT, Dr. Guzman     PROCEDURES  N/A    LABORATORY  Lab Results   Component Value Date    SODIUM 140 08/10/2021    POTASSIUM 4.3 08/10/2021    CHLORIDE 106 08/10/2021    CO2 25 08/10/2021    GLUCOSE 129 (H) 08/10/2021    BUN 16 08/10/2021    CREATININE 0.85 08/10/2021        Lab Results   Component Value Date    WBC 8.5 08/10/2021    HEMOGLOBIN 12.4 (L) 08/10/2021    HEMATOCRIT 36.8 (L) 08/10/2021     PLATELETCT 218 08/10/2021

## 2021-08-10 NOTE — CARE PLAN
The patient is Stable - Low risk of patient condition declining or worsening       Progress made toward(s) clinical / shift goals:  monitor for pain and medicate PRN    Patient is not progressing towards the following goals:      Problem: Knowledge Deficit - Standard  Goal: Patient and family/care givers will demonstrate understanding of plan of care, disease process/condition, diagnostic tests and medications  Outcome: Progressing     Problem: Pain - Standard  Goal: Alleviation of pain or a reduction in pain to the patient’s comfort goal  Outcome: Progressing

## 2021-08-10 NOTE — ASSESSMENT & PLAN NOTE
Hb 15.7->13.1  Severe epistaxis for multiple hours  Now with rhino rockt and packing by ENT  - trend h/h  - holding chemical VTE prophylaxis  - TEG  - Unasyn per ENT rec  - ENT (Dr. Hernandez) consulted by ENT - rec abx and continue to trend h/h

## 2021-08-11 ENCOUNTER — TELEPHONE (OUTPATIENT)
Dept: HEMATOLOGY ONCOLOGY | Facility: MEDICAL CENTER | Age: 71
End: 2021-08-11

## 2021-08-11 ENCOUNTER — PATIENT OUTREACH (OUTPATIENT)
Dept: HEALTH INFORMATION MANAGEMENT | Facility: OTHER | Age: 71
End: 2021-08-11

## 2021-08-11 NOTE — PROGRESS NOTES
RAO Meyer called patient and left a voicemail introducing [facility] and provided all contact information and reminded patient of upcoming appointment with PCP 08/12/2021 @ 1pm and to call Bee Martinez ( ENT) [phone].

## 2021-08-11 NOTE — TELEPHONE ENCOUNTER
Patient returned call, I informed him that we got his appointments rescheduled. Appointment dates were given and he agreed that those would all work with his schedule. No further questions or concerns

## 2021-08-12 ENCOUNTER — TELEPHONE (OUTPATIENT)
Dept: MEDICAL GROUP | Facility: MEDICAL CENTER | Age: 71
End: 2021-08-12

## 2021-08-12 NOTE — TELEPHONE ENCOUNTER
Phone Number Called: [phone] (home)       Call outcome: Left detailed message for patient. Informed to call back with any additional questions.    Message: LVM to re blaine missed appt THR

## 2021-08-13 ENCOUNTER — PATIENT OUTREACH (OUTPATIENT)
Dept: HEALTH INFORMATION MANAGEMENT | Facility: OTHER | Age: 71
End: 2021-08-13

## 2021-08-13 ENCOUNTER — TELEPHONE (OUTPATIENT)
Dept: HEMATOLOGY ONCOLOGY | Facility: MEDICAL CENTER | Age: 71
End: 2021-08-13

## 2021-08-13 DIAGNOSIS — C90.00 MULTIPLE MYELOMA NOT HAVING ACHIEVED REMISSION (HCC): ICD-10-CM

## 2021-08-13 DIAGNOSIS — D80.1 HYPOGAMMAGLOBULINEMIA (HCC): ICD-10-CM

## 2021-08-13 RX ORDER — LENALIDOMIDE 25 MG/1
CAPSULE ORAL
Qty: 14 CAPSULE | Refills: 0 | Status: SHIPPED | OUTPATIENT
Start: 2021-08-13 | End: 2021-08-30 | Stop reason: SDUPTHER

## 2021-08-13 NOTE — PROGRESS NOTES
RAGHUW Mitch called patient via mobile phone and introduced [facility]/ [facility] and provided all contact information.

## 2021-08-13 NOTE — TELEPHONE ENCOUNTER
Called [facility] (which was given by insurance as the preferred specialty pharmacy) to f/u regarding status of Revlimid Rx.  [facility] rep stated that they are not able to dispense Revlimid & Rx was transferred over to [facility].    New e-script for Revlimid sent to [facility] & pt info (insurance card, face sheet, recent prog note) fax'd with confirmation.

## 2021-08-16 ENCOUNTER — TELEPHONE (OUTPATIENT)
Dept: HEMATOLOGY ONCOLOGY | Facility: MEDICAL CENTER | Age: 71
End: 2021-08-16

## 2021-08-16 ENCOUNTER — PATIENT OUTREACH (OUTPATIENT)
Dept: HEALTH INFORMATION MANAGEMENT | Facility: OTHER | Age: 71
End: 2021-08-16

## 2021-08-16 NOTE — PROGRESS NOTES
Patient follow's with Oncology. CHW Mitch will discharge patient from [facility] and remove from case load and master list.

## 2021-08-16 NOTE — TELEPHONE ENCOUNTER
Patient returned call and informed him that we would like to move his appointment up earlier, patient agreed and appointment with Dr. Aaron, and Victoria were moved to an earlier time.

## 2021-08-16 NOTE — TELEPHONE ENCOUNTER
Called [facility] regarding order & co-pay status of Revlimid.  Pharmacy rep stated that pt has a large co-pay (approx $2500) & is sending pt info over to their financial assistance dept at the pharmacy.    RN placed a referral to [facility] for financial assistance as well.

## 2021-08-19 ENCOUNTER — HOSPITAL ENCOUNTER (OUTPATIENT)
Dept: RADIOLOGY | Facility: MEDICAL CENTER | Age: 71
End: 2021-08-19
Attending: INTERNAL MEDICINE
Payer: MEDICARE

## 2021-08-19 DIAGNOSIS — C90.00 MULTIPLE MYELOMA NOT HAVING ACHIEVED REMISSION (HCC): ICD-10-CM

## 2021-08-19 PROCEDURE — A9552 F18 FDG: HCPCS

## 2021-08-20 ENCOUNTER — NON-PROVIDER VISIT (OUTPATIENT)
Dept: HEMATOLOGY ONCOLOGY | Facility: MEDICAL CENTER | Age: 71
End: 2021-08-20
Payer: MEDICARE

## 2021-08-20 ENCOUNTER — OFFICE VISIT (OUTPATIENT)
Dept: HEMATOLOGY ONCOLOGY | Facility: MEDICAL CENTER | Age: 71
End: 2021-08-20
Payer: MEDICARE

## 2021-08-20 VITALS
HEIGHT: 72 IN | DIASTOLIC BLOOD PRESSURE: 68 MMHG | TEMPERATURE: 98.1 F | RESPIRATION RATE: 16 BRPM | HEART RATE: 98 BPM | OXYGEN SATURATION: 96 % | BODY MASS INDEX: 23.14 KG/M2 | SYSTOLIC BLOOD PRESSURE: 124 MMHG | WEIGHT: 170.86 LBS

## 2021-08-20 DIAGNOSIS — C90.00 MULTIPLE MYELOMA NOT HAVING ACHIEVED REMISSION (HCC): ICD-10-CM

## 2021-08-20 PROCEDURE — 99214 OFFICE O/P EST MOD 30 MIN: CPT | Performed by: INTERNAL MEDICINE

## 2021-08-20 RX ORDER — ZOLPIDEM TARTRATE 5 MG/1
5 TABLET ORAL NIGHTLY PRN
Qty: 30 TABLET | Refills: 3 | Status: SHIPPED | OUTPATIENT
Start: 2021-08-20 | End: 2021-09-09 | Stop reason: DRUGHIGH

## 2021-08-20 ASSESSMENT — FIBROSIS 4 INDEX: FIB4 SCORE: 1.69

## 2021-08-20 ASSESSMENT — PAIN SCALES - GENERAL: PAINLEVEL: NO PAIN

## 2021-08-20 NOTE — NON-PROVIDER
The following appointments, labs, and medications have been provided to the patient:  Line: n/a  ECHO: n/a  On Pro/Neulasta: n/a  Labs: last set of Med/Onc labs were on 8/2/21  Lab Appointments: none, done in [facility]  Follow up appts: 9/9/21  Chemo/immunotherapy class: 1:1 Chemo Ed with RN  Nausea medication: none  Other treatment specific meds: dexamethasone, acyclovir  Oral chemo follow up: 9/9/21  Pain medication: n/a  Nurse rommel: Referral placed 8/20/21  FRA:  Referral placed 8/16/21 for co-pay assistance for Revlimid  Dietician:  n/a  SW: n/a      Additional info/teaching for chemotherapy patients:  1.  Neutropenia reminder card provided to patient      Patient and patient's spouse were provided with drug specific handouts and [facility] side effects sheet. Patient and patient's spouse verbalized that questions and concerns regarding treatment have been addressed. Consent to proceed with treatment was reviewed and signed during this visit.    Spent 60 minutes of continuous, non-interrupted, face-to-face patient contact in which greater than 50% of the visit was spent counseling and coordinating of care.

## 2021-08-25 ENCOUNTER — PATIENT MESSAGE (OUTPATIENT)
Dept: HEALTH INFORMATION MANAGEMENT | Facility: OTHER | Age: 71
End: 2021-08-25

## 2021-08-26 ENCOUNTER — PATIENT OUTREACH (OUTPATIENT)
Dept: OTHER | Facility: MEDICAL CENTER | Age: 71
End: 2021-08-26

## 2021-08-26 NOTE — PROGRESS NOTES
Oncology nurse navigator called patient to follow up on referral for patient and the introduction letter.  MELANY explained my role and the support services avaliable at [facility] for cancer.  Patient reports that he has already been working with Brissa who is trying to get some pharmaceutical alina to pay for his velcade to cover his out of pocket payment of $2000.  He denies any other barriers at this time but is happy to have the information for any future needs.

## 2021-08-30 DIAGNOSIS — C90.00 MULTIPLE MYELOMA NOT HAVING ACHIEVED REMISSION (HCC): ICD-10-CM

## 2021-08-30 DIAGNOSIS — D80.1 HYPOGAMMAGLOBULINEMIA (HCC): ICD-10-CM

## 2021-08-30 RX ORDER — LENALIDOMIDE 25 MG/1
CAPSULE ORAL
Qty: 14 CAPSULE | Refills: 0 | Status: SHIPPED | OUTPATIENT
Start: 2021-08-30 | End: 2021-09-15 | Stop reason: SDUPTHER

## 2021-09-01 ENCOUNTER — TELEPHONE (OUTPATIENT)
Dept: HEMATOLOGY ONCOLOGY | Facility: MEDICAL CENTER | Age: 71
End: 2021-09-01

## 2021-09-01 NOTE — PROGRESS NOTES
"Pharmacy Chemotherapy Verification  Patient Name: Gary Hedrick  Dx: MM  Cycle: 1 (Previous treatment = N/A)  Regimen and Dosing Reference  Bortezomib 1.3 mg/m2 subcutaneous on Days 1, 4, 8, 11   -MD dosing on Days 1, 8, 15 of a 21 day cycle  Lenalidomide 25 mg PO daily on Days 1-14  Dexamethasone 20 mg PO daily on Days 1-2, 4-5, 8-9, 11-12 OR 40 mg PO on Days 1, 8, and 15  21 day cycle for 3-4 cycles OR until disease progression or unacceptable toxicity  NCCN Guidelines for Multiple Myeloma.V.1.2022  Amadeo P, et al. Blood. 2010;116(5):679-86  Alfaro S, et al/ Blood. 2012;119(19):4375-82  John M, et al. J Clin Oncol. 2014;32(25):2712-7    Allergies:Grass pollen(k-o-r-t-swt jose), Pet dander [cat hair extract], [place] meal, Milk [dairy food allergy], [place], and [place] oil  /52   Pulse 83   Temp 36.7 °C (98 °F) (Temporal)   Resp 18   Ht 1.69 m (5' 6.54\")   Wt 79 kg (174 lb 2.6 oz)   SpO2 98%   BMI 27.66 kg/m²   Body surface area is 1.93 meters squared.    Labs 9/2/21  ANC 2610 Hgb 12.7 Plt 281k  SCr 1.08 CrCl 71.8 mL/min   AST/ALT/AP = 26/25/61 Tbili = 0.3    Bortezomib 1.3 mg/m2 x 1.93 m2 = 2.509 mg   <10% difference, OK to treat with final dose = 2.5 mg subcutaneous    Jeri Yoder, PharmD, BCOP      "

## 2021-09-01 NOTE — TELEPHONE ENCOUNTER
Oral Chemo Compliance review for lenalidomide (Revlimid)    Current dose:  25 mg PO on Days 1 - 14 of each 21-day cycle (start date 9/2/21)

## 2021-09-02 ENCOUNTER — OUTPATIENT INFUSION SERVICES (OUTPATIENT)
Dept: ONCOLOGY | Facility: MEDICAL CENTER | Age: 71
End: 2021-09-02
Attending: INTERNAL MEDICINE
Payer: MEDICARE

## 2021-09-02 VITALS
WEIGHT: 174.16 LBS | HEIGHT: 67 IN | DIASTOLIC BLOOD PRESSURE: 52 MMHG | OXYGEN SATURATION: 98 % | SYSTOLIC BLOOD PRESSURE: 117 MMHG | HEART RATE: 83 BPM | BODY MASS INDEX: 27.34 KG/M2 | RESPIRATION RATE: 18 BRPM | TEMPERATURE: 98 F

## 2021-09-02 DIAGNOSIS — C90.00 MULTIPLE MYELOMA NOT HAVING ACHIEVED REMISSION (HCC): ICD-10-CM

## 2021-09-02 LAB
ALBUMIN SERPL BCP-MCNC: 4.3 G/DL (ref 3.2–4.9)
ALBUMIN/GLOB SERPL: 2.3 G/DL
ALP SERPL-CCNC: 61 U/L (ref 30–99)
ALT SERPL-CCNC: 25 U/L (ref 2–50)
ANION GAP SERPL CALC-SCNC: 11 MMOL/L (ref 7–16)
AST SERPL-CCNC: 26 U/L (ref 12–45)
BASOPHILS # BLD AUTO: 0.4 % (ref 0–1.8)
BASOPHILS # BLD: 0.02 K/UL (ref 0–0.12)
BILIRUB SERPL-MCNC: 0.3 MG/DL (ref 0.1–1.5)
BUN SERPL-MCNC: 19 MG/DL (ref 8–22)
CALCIUM SERPL-MCNC: 9.5 MG/DL (ref 8.5–10.5)
CHLORIDE SERPL-SCNC: 101 MMOL/L (ref 96–112)
CO2 SERPL-SCNC: 25 MMOL/L (ref 20–33)
CREAT SERPL-MCNC: 1.08 MG/DL (ref 0.5–1.4)
EOSINOPHIL # BLD AUTO: 0.07 K/UL (ref 0–0.51)
EOSINOPHIL NFR BLD: 1.4 % (ref 0–6.9)
ERYTHROCYTE [DISTWIDTH] IN BLOOD BY AUTOMATED COUNT: 50.8 FL (ref 35.9–50)
GLOBULIN SER CALC-MCNC: 1.9 G/DL (ref 1.9–3.5)
GLUCOSE SERPL-MCNC: 73 MG/DL (ref 65–99)
HCT VFR BLD AUTO: 37.7 % (ref 42–52)
HGB BLD-MCNC: 12.7 G/DL (ref 14–18)
IMM GRANULOCYTES # BLD AUTO: 0.03 K/UL (ref 0–0.11)
IMM GRANULOCYTES NFR BLD AUTO: 0.6 % (ref 0–0.9)
LYMPHOCYTES # BLD AUTO: 1.6 K/UL (ref 1–4.8)
LYMPHOCYTES NFR BLD: 32.8 % (ref 22–41)
MCH RBC QN AUTO: 35.5 PG (ref 27–33)
MCHC RBC AUTO-ENTMCNC: 33.7 G/DL (ref 33.7–35.3)
MCV RBC AUTO: 105.3 FL (ref 81.4–97.8)
MONOCYTES # BLD AUTO: 0.55 K/UL (ref 0–0.85)
MONOCYTES NFR BLD AUTO: 11.3 % (ref 0–13.4)
NEUTROPHILS # BLD AUTO: 2.61 K/UL (ref 1.82–7.42)
NEUTROPHILS NFR BLD: 53.5 % (ref 44–72)
NRBC # BLD AUTO: 0 K/UL
NRBC BLD-RTO: 0 /100 WBC
PLATELET # BLD AUTO: 281 K/UL (ref 164–446)
PMV BLD AUTO: 9.4 FL (ref 9–12.9)
POTASSIUM SERPL-SCNC: 4.3 MMOL/L (ref 3.6–5.5)
PROT SERPL-MCNC: 6.2 G/DL (ref 6–8.2)
RBC # BLD AUTO: 3.58 M/UL (ref 4.7–6.1)
SODIUM SERPL-SCNC: 137 MMOL/L (ref 135–145)
WBC # BLD AUTO: 4.9 K/UL (ref 4.8–10.8)

## 2021-09-02 PROCEDURE — 96401 CHEMO ANTI-NEOPL SQ/IM: CPT

## 2021-09-02 PROCEDURE — 80053 COMPREHEN METABOLIC PANEL: CPT

## 2021-09-02 PROCEDURE — 700111 HCHG RX REV CODE 636 W/ 250 OVERRIDE (IP): Mod: JG | Performed by: INTERNAL MEDICINE

## 2021-09-02 PROCEDURE — 85025 COMPLETE CBC W/AUTO DIFF WBC: CPT

## 2021-09-02 RX ADMIN — BORTEZOMIB 2.5 MG: 3.5 INJECTION, POWDER, LYOPHILIZED, FOR SOLUTION INTRAVENOUS; SUBCUTANEOUS at 15:02

## 2021-09-02 ASSESSMENT — FIBROSIS 4 INDEX: FIB4 SCORE: 1.69

## 2021-09-02 NOTE — PROGRESS NOTES
Chemotherapy Verification - PRIMARY RN  C1 D1      Height = 1.69 m  Weight = 79 kg  BSA = 1.93 m2       Medication: bortezomib  Dose: 1.3 mg/m2  Calculated Dose: 2.51 mg                             (In mg/m2, AUC, mg/kg)       I confirm this process was performed independently with the BSA and all final chemotherapy dosing calculations congruent.  Any discrepancies of 10% or greater have been addressed with the chemotherapy pharmacist. The resolution of the discrepancy has been documented in the EPIC progress notes.

## 2021-09-02 NOTE — PROGRESS NOTES
"Patient Name: Gary Hedrick     Protocol: RVd       *Dosing Reference*  Bortezomib 1.3 mg/m2 subcutaneous on Days 1, 4, 8, 11              -MD dosing on Days 1, 8, 15 of a 21 day cycle  Lenalidomide 25 mg PO daily on Days 1-14  Dexamethasone 20 mg PO daily on Days 1-2, 4-5, 8-9, 11-12 OR 40 mg PO on Days 1, 8, and 15  21 day cycle for 3-4 cycles OR until disease progression or unacceptable toxicity  NCCN Guidelines for Multiple Myeloma.V.1.2022  Amadeo P, et al. Blood. 2010;116(5):679-86  Dominic S, et al/ Blood. 2012;119(19):7444-58    Dx: MM    Allergies:  Grass pollen(k-o-r-t-swt jose), Pet dander [cat hair extract], Milk [dairy food allergy], Sagebrush, and [place] oil     /52   Pulse 83   Temp 36.7 °C (98 °F) (Temporal)   Resp 18   Ht 1.69 m (5' 6.54\")   Wt 79 kg (174 lb 2.6 oz)   SpO2 98%   BMI 27.66 kg/m²  Body surface area is 1.93 meters squared.     Labs 9/2/21  ANC~ 2610 Plt = 281k   Hgb = 12.7     SCr = 1.08 mg/dL CrCl ~ 71.9 mL/min   LFT's = WNLs  TBili = 0.3      Drug Order   (Drug name, dose, route, IV Fluid & volume, frequency, number of doses) Cycle: 1 Day 1      Previous treatment: N/a     Medication = Bortezomib  Base Dose= 1.3 mg/m2  Calc Dose: Base Dose x 1.93 m2 = 2.509 mg  Final Dose = 2.5 mg  Route = Subcutaneous  Conc. = 2.5 mg/mL  Fluid & Volume = in syringe 1 mL          <10% difference, okay to treat with final dose   By my signature below, I confirm this process was performed independently with the BSA and all final chemotherapy dosing calculations congruent. I have reviewed the above chemotherapy order and that my calculation of the final dose and BSA (when applicable) corroborate those calculations of the  pharmacist. Discrepancies of 10% or greater in the written dose have been addressed and documented within the [facility] Progress notes.    Kenney Birch, PharmD    "

## 2021-09-02 NOTE — PROGRESS NOTES
Chemotherapy Verification - SECONDARY RN       Height = 169 cm  Weight = 79 kg  BSA = 1.93 m^2       Medication: bortezomb (Velcade)  Dose: 1.3 mg/m^2  Calculated Dose: 2.509 m^2                             (In mg/m2, AUC, mg/kg)     I confirm that this process was performed independently.

## 2021-09-02 NOTE — PROGRESS NOTES
Pt ambulatory to [facility] for C1 D1 of Velcade for MM.  Pt w/ no s/s of infection, pt has no complaints at this time.  Pt reports his revlimid will be delivered today and he will start taking upon receipt of medication.  Pt has rx for dexamethasone and brought his 1st dose w/ him.  Pt did not receive rx for acyclovir but does take valcyclovir bid.  Call placed to Dr Aaron for anti-viral clarification, per Dr Aaron pt can continue to take his valcyclovir for viral prophylaxis instead of acyclovir--TORB.  Pt educated, pt states his uinderstanding.  Pt given calendar printout of meds and appts provided by Victoria FLOREZ from [facility] oncology.  Pt labs drawn from LAC w/ 23G butterfly, gauze and coban dressing applied.  Pt lab results w/n parameters for tx today.  Pt took his dose of dexamethasone from home per MD order.  Velcade injected into LLQ, no band-aid placed.  Pt left on foot in care of self in NAD.  Confirmed pt's next appt.

## 2021-09-03 ENCOUNTER — TELEPHONE (OUTPATIENT)
Dept: HEMATOLOGY ONCOLOGY | Facility: MEDICAL CENTER | Age: 71
End: 2021-09-03

## 2021-09-03 NOTE — TELEPHONE ENCOUNTER
Patient asking if he can take Fish Oil, along with his 81 mg aspirin. - will it make his blood too thin?   Please call back, leave message.

## 2021-09-06 NOTE — PROGRESS NOTES
"Pharmacy Chemotherapy Verification  Patient Name: Gary Hedrick  Dx: MM  Cycle: 1 Day 8 (Previous treatment = 9/2/21)  Regimen and Dosing Reference  Bortezomib 1.3 mg/m2 subcutaneous on Days 1, 4, 8, 11   -MD dosing on Days 1, 8, 15 of a 21 day cycle  Lenalidomide 25 mg PO daily on Days 1-14  Dexamethasone 20 mg PO daily on Days 1-2, 4-5, 8-9, 11-12 OR 40 mg PO on Days 1, 8, and 15  21 day cycle for 3-4 cycles OR until disease progression or unacceptable toxicity  NCCN Guidelines for Multiple Myeloma.V.1.2022  Amadeo P, et al. Blood. 2010;116(5):679-86  Alfaro S, et al/ Blood. 2012;119(19):4375-82  John M, et al. J Clin Oncol. 2014;32(25):2712-7    Allergies:Grass pollen(k-o-r-t-swt jose), Pet dander [cat hair extract], [place] meal, Milk [dairy food allergy], [place]brush, and [place] oil  /56   Pulse 96   Temp 36.7 °C (98 °F) (Temporal)   Resp 18   Ht 1.68 m (5' 6.14\")   Wt 79.1 kg (174 lb 6.1 oz)   SpO2 95%   BMI 28.03 kg/m²   Body surface area is 1.92 meters squared.    Labs 9/9/21  ANC 4750 Hgb 12.5 Plt 209k    Labs 9/2/21  SCr 1.08 CrCl 71.8 mL/min   AST/ALT/AP = 26/25/61 Tbili = 0.3    Bortezomib 1.3 mg/m2 x 1.92 m2 = 2.496 mg   <10% difference, OK to treat with final dose = 2.5 mg subcutaneous    Jeri Yoder, PharmD, BCOP      "

## 2021-09-09 ENCOUNTER — TELEPHONE (OUTPATIENT)
Dept: HEMATOLOGY ONCOLOGY | Facility: MEDICAL CENTER | Age: 71
End: 2021-09-09

## 2021-09-09 ENCOUNTER — OFFICE VISIT (OUTPATIENT)
Dept: HEMATOLOGY ONCOLOGY | Facility: MEDICAL CENTER | Age: 71
End: 2021-09-09
Payer: MEDICARE

## 2021-09-09 ENCOUNTER — OUTPATIENT INFUSION SERVICES (OUTPATIENT)
Dept: ONCOLOGY | Facility: MEDICAL CENTER | Age: 71
End: 2021-09-09
Attending: INTERNAL MEDICINE
Payer: MEDICARE

## 2021-09-09 VITALS
TEMPERATURE: 98 F | WEIGHT: 174.38 LBS | HEART RATE: 96 BPM | DIASTOLIC BLOOD PRESSURE: 56 MMHG | OXYGEN SATURATION: 95 % | BODY MASS INDEX: 28.03 KG/M2 | RESPIRATION RATE: 18 BRPM | HEIGHT: 66 IN | SYSTOLIC BLOOD PRESSURE: 145 MMHG

## 2021-09-09 VITALS
WEIGHT: 173.72 LBS | TEMPERATURE: 98.4 F | SYSTOLIC BLOOD PRESSURE: 150 MMHG | DIASTOLIC BLOOD PRESSURE: 70 MMHG | HEIGHT: 68 IN | RESPIRATION RATE: 17 BRPM | OXYGEN SATURATION: 97 % | HEART RATE: 81 BPM | BODY MASS INDEX: 26.33 KG/M2

## 2021-09-09 DIAGNOSIS — G47.01 INSOMNIA DUE TO MEDICAL CONDITION: ICD-10-CM

## 2021-09-09 DIAGNOSIS — C90.00 MULTIPLE MYELOMA NOT HAVING ACHIEVED REMISSION (HCC): ICD-10-CM

## 2021-09-09 LAB
BASOPHILS # BLD AUTO: 0.2 % (ref 0–1.8)
BASOPHILS # BLD: 0.01 K/UL (ref 0–0.12)
EOSINOPHIL # BLD AUTO: 0.11 K/UL (ref 0–0.51)
EOSINOPHIL NFR BLD: 1.8 % (ref 0–6.9)
ERYTHROCYTE [DISTWIDTH] IN BLOOD BY AUTOMATED COUNT: 49.3 FL (ref 35.9–50)
HCT VFR BLD AUTO: 36.8 % (ref 42–52)
HGB BLD-MCNC: 12.5 G/DL (ref 14–18)
IMM GRANULOCYTES # BLD AUTO: 0.01 K/UL (ref 0–0.11)
IMM GRANULOCYTES NFR BLD AUTO: 0.2 % (ref 0–0.9)
LYMPHOCYTES # BLD AUTO: 0.78 K/UL (ref 1–4.8)
LYMPHOCYTES NFR BLD: 13 % (ref 22–41)
MCH RBC QN AUTO: 35.1 PG (ref 27–33)
MCHC RBC AUTO-ENTMCNC: 34 G/DL (ref 33.7–35.3)
MCV RBC AUTO: 103.4 FL (ref 81.4–97.8)
MONOCYTES # BLD AUTO: 0.35 K/UL (ref 0–0.85)
MONOCYTES NFR BLD AUTO: 5.8 % (ref 0–13.4)
NEUTROPHILS # BLD AUTO: 4.75 K/UL (ref 1.82–7.42)
NEUTROPHILS NFR BLD: 79 % (ref 44–72)
NRBC # BLD AUTO: 0 K/UL
NRBC BLD-RTO: 0 /100 WBC
PLATELET # BLD AUTO: 209 K/UL (ref 164–446)
PMV BLD AUTO: 10 FL (ref 9–12.9)
RBC # BLD AUTO: 3.56 M/UL (ref 4.7–6.1)
WBC # BLD AUTO: 6 K/UL (ref 4.8–10.8)

## 2021-09-09 PROCEDURE — 99214 OFFICE O/P EST MOD 30 MIN: CPT | Performed by: NURSE PRACTITIONER

## 2021-09-09 PROCEDURE — 700111 HCHG RX REV CODE 636 W/ 250 OVERRIDE (IP): Mod: JG | Performed by: INTERNAL MEDICINE

## 2021-09-09 PROCEDURE — 96401 CHEMO ANTI-NEOPL SQ/IM: CPT

## 2021-09-09 PROCEDURE — 85025 COMPLETE CBC W/AUTO DIFF WBC: CPT

## 2021-09-09 RX ORDER — ZOLPIDEM TARTRATE 10 MG/1
10 TABLET ORAL NIGHTLY PRN
Qty: 30 TABLET | Refills: 3 | Status: SHIPPED | OUTPATIENT
Start: 2021-09-09 | End: 2022-01-05

## 2021-09-09 RX ORDER — AMOXICILLIN AND CLAVULANATE POTASSIUM 875; 125 MG/1; MG/1
TABLET, FILM COATED ORAL
COMMUNITY
End: 2021-09-16

## 2021-09-09 RX ORDER — OXYCODONE HYDROCHLORIDE 5 MG/1
TABLET ORAL
COMMUNITY
End: 2021-09-16

## 2021-09-09 RX ORDER — ZOLPIDEM TARTRATE 10 MG/1
10 TABLET ORAL NIGHTLY PRN
Qty: 30 TABLET | Refills: 0 | Status: CANCELLED | OUTPATIENT
Start: 2021-09-09 | End: 2021-10-09

## 2021-09-09 RX ADMIN — BORTEZOMIB 2.5 MG: 3.5 INJECTION, POWDER, LYOPHILIZED, FOR SOLUTION INTRAVENOUS; SUBCUTANEOUS at 14:49

## 2021-09-09 ASSESSMENT — ENCOUNTER SYMPTOMS
FEVER: 0
HEADACHES: 0
VOMITING: 0
SHORTNESS OF BREATH: 1
MYALGIAS: 0
CONSTIPATION: 0
CHILLS: 0
COUGH: 0
INSOMNIA: 1
WHEEZING: 0
DIARRHEA: 0
PALPITATIONS: 0
WEIGHT LOSS: 0
NAUSEA: 0
TINGLING: 0
DIAPHORESIS: 1

## 2021-09-09 ASSESSMENT — PAIN SCALES - GENERAL: PAINLEVEL: NO PAIN

## 2021-09-09 ASSESSMENT — FIBROSIS 4 INDEX
FIB4 SCORE: 1.28
FIB4 SCORE: 1.28

## 2021-09-09 NOTE — PROGRESS NOTES
Chemotherapy Verification - PRIMARY RN  C1 D8      Height = 1.68 m  Weight = 79.1 kg  BSA = 1.92 m2       Medication: bortezomib  Dose: 1.3 mg/m2  Calculated Dose: 2.5 mg                             (In mg/m2, AUC, mg/kg)       I confirm this process was performed independently with the BSA and all final chemotherapy dosing calculations congruent.  Any discrepancies of 10% or greater have been addressed with the chemotherapy pharmacist. The resolution of the discrepancy has been documented in the EPIC progress notes.

## 2021-09-09 NOTE — PROGRESS NOTES
Subjective     Gary Hedrick is a 71 y.o. male who presents with Multiple Myeloma (EST/Pre chemo/Tox Check)            HPI    Patient seen today in follow-up for lambda light chain myeloma.  He presents unaccompanied for today's visit.    Patient initially established care in June 2021 with Dr. Aaron with macrocytosis.  Further work-up for macrocytosis completed including a monoclonal protein study and serum free light chains.  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  Bone marrow completed on 7/23/2021 showed 60-70% involvement of clonal plasma cells with cytogenetics all negative, patient found to have lambda light chain myeloma.  PET scan completed on 8/19/2021 showed no evidence of abnormal FDG accumulation.  Beta-2 microglobulin level on 8/2/2021 was within normal limits.  After further discussion Dr. Aaron started patient on RVD -Revlimid 25 mg daily days 1-14, 7 days off; Velcade 1.3 mg/m² SQ weekly with dexamethasone 40 mg p.o. weekly, q. 21-day cycle.  Cycle 1 started on 9/2/2021.    Interval history  Patient is being seen for a 1 week toxicity check following his first week of RVD treatment.  Patient is scheduled to receive cycle 1, day 8 today.  Clinically he did very well and tolerated treatment with expected side effects.  He did note significant fatigue around days 3 and 4 of treatment, but as of days 5 he has slowly improved.  He does note some shortness of breath at times which may be related to fatigue but none today.  He did note one-time episode of a drenching night sweats this past week.  He denies any nausea, vomiting, diarrhea or constipation and is having normal bowel movements per his normal routine.    Patient does have insomnia, and with starting of his chemotherapy regimen it has worsened.  He has been prescribed Ambien 5 mg tablets per Dr. Aaron due to his insomnia.   He has required to take 2 doses at times as 5 mg has not been adequate for sleep.    Patient also recently diagnosed with prostate cancer and is followed with Dr. Lazar at [facility].  He is scheduled for a follow-up visit today to discuss recommendations for treatment.    Allergies   Allergen Reactions   • Grass Pollen(K-O-R-T-Swt [place]) Shortness of Breath   • Pet Dander [Cat Hair Extract] Shortness of Breath and Unspecified   • [place] Meal Unspecified   • Milk [Dairy Food Allergy] Unspecified   • Sagebrush Unspecified   • [place] Oil Shortness of Breath     Current Outpatient Medications on File Prior to Visit   Medication Sig Dispense Refill   • aspirin EC (ECOTRIN) 81 MG Tablet Delayed Response Take 81 mg by mouth every day.     • lenalidomide 25 MG Cap Take 25 mg (1 cap) by mouth on Days 1 - 14 of each 21-day cycle (2 weeks ON & 1 week OFF) 14 Capsule 0   • dexamethasone (DECADRON) 4 MG Tab Take 10 pills once a week 40 tablet 6   • valACYclovir (VALTREX) 500 MG Tab Take 500 mg by mouth 2 times a day. No known stop date, TWICE DAILY     • FLUoxetine (PROZAC) 20 MG Cap Take 1 capsule by mouth every day. 90 capsule 4   • Omega-3 Fatty Acids (FISH OIL) 1000 MG Cap capsule Take 1,000 mg by mouth 2 times a day.     • levothyroxine (SYNTHROID) 100 MCG Tab Take 1 tablet by mouth Every morning on an empty stomach. 100 tablet 3   • simvastatin (ZOCOR) 20 MG Tab Take 1 tablet by mouth every evening. 100 tablet 3   • amoxicillin-clavulanate (AUGMENTIN) 875-125 MG Tab amoxicillin 875 mg-potassium clavulanate 125 mg tablet   TAKE 1 TABLET BY MOUTH TWICE A DAY FOR 7 DAYS (Patient not taking: Reported on 9/9/2021)     • oxyCODONE immediate-release (ROXICODONE) 5 MG Tab oxycodone 5 mg tablet   TAKE 1 TABLET EVERY 6 HOURS AS NEEDED FOR PAIN (Patient not taking: Reported on 9/9/2021)       No current facility-administered medications on file prior to visit.         Review of Systems   Constitutional: Positive for  "diaphoresis (drenching night sweat x1 this last week) and malaise/fatigue. Negative for chills, fever and weight loss.   Respiratory: Positive for shortness of breath (very minimal at times but not noticed today ). Negative for cough and wheezing.    Cardiovascular: Negative for chest pain and palpitations.   Gastrointestinal: Negative for constipation, diarrhea, nausea and vomiting.   Genitourinary: Negative for dysuria.   Musculoskeletal: Negative for myalgias.   Neurological: Negative for tingling and headaches.   Psychiatric/Behavioral: The patient has insomnia (takes Ambien as needed).               Objective     /70   Pulse 81   Temp 36.9 °C (98.4 °F) (Temporal)   Resp 17   Ht 1.727 m (5' 8\")   Wt 78.8 kg (173 lb 11.6 oz)   SpO2 97%   BMI 26.41 kg/m²      Physical Exam  Vitals reviewed.   Constitutional:       General: He is not in acute distress.     Appearance: Normal appearance. He is not diaphoretic.   HENT:      Head: Normocephalic and atraumatic.   Cardiovascular:      Rate and Rhythm: Normal rate and regular rhythm.      Pulses: Normal pulses.      Heart sounds: Normal heart sounds. No murmur heard.   No friction rub. No gallop.    Pulmonary:      Effort: Pulmonary effort is normal. No respiratory distress.      Breath sounds: Normal breath sounds. No wheezing.   Abdominal:      General: Bowel sounds are normal. There is no distension.      Palpations: Abdomen is soft.      Tenderness: There is no abdominal tenderness.   Musculoskeletal:         General: No swelling or tenderness. Normal range of motion.   Skin:     General: Skin is warm and dry.   Neurological:      Mental Status: He is alert and oriented to person, place, and time.   Psychiatric:         Mood and Affect: Mood normal.         Behavior: Behavior normal.            Results for JAMES GRANT (MRN [id number])    Ref. Range 9/2/2021 13:55 9/9/2021 14:00   WBC Latest Ref Range: 4.8 - 10.8 K/uL 4.9 6.0   RBC Latest Ref Range: " 4.70 - 6.10 M/uL 3.58 (L) 3.56 (L)   Hemoglobin Latest Ref Range: 14.0 - 18.0 g/dL 12.7 (L) 12.5 (L)   Hematocrit Latest Ref Range: 42.0 - 52.0 % 37.7 (L) 36.8 (L)   MCV Latest Ref Range: 81.4 - 97.8 fL 105.3 (H) 103.4 (H)   MCH Latest Ref Range: 27.0 - 33.0 pg 35.5 (H) 35.1 (H)   MCHC Latest Ref Range: 33.7 - 35.3 g/dL 33.7 34.0   RDW Latest Ref Range: 35.9 - 50.0 fL 50.8 (H) 49.3   Platelet Count Latest Ref Range: 164 - 446 K/uL 281 209   MPV Latest Ref Range: 9.0 - 12.9 fL 9.4 10.0   Neutrophils-Polys Latest Ref Range: 44.00 - 72.00 % 53.50 79.00 (H)   Neutrophils (Absolute) Latest Ref Range: 1.82 - 7.42 K/uL 2.61 4.75   Lymphocytes Latest Ref Range: 22.00 - 41.00 % 32.80 13.00 (L)   Lymphs (Absolute) Latest Ref Range: 1.00 - 4.80 K/uL 1.60 0.78 (L)   Monocytes Latest Ref Range: 0.00 - 13.40 % 11.30 5.80   Monos (Absolute) Latest Ref Range: 0.00 - 0.85 K/uL 0.55 0.35   Eosinophils Latest Ref Range: 0.00 - 6.90 % 1.40 1.80   Eos (Absolute) Latest Ref Range: 0.00 - 0.51 K/uL 0.07 0.11   Basophils Latest Ref Range: 0.00 - 1.80 % 0.40 0.20   Baso (Absolute) Latest Ref Range: 0.00 - 0.12 K/uL 0.02 0.01   Immature Granulocytes Latest Ref Range: 0.00 - 0.90 % 0.60 0.20   Immature Granulocytes (abs) Latest Ref Range: 0.00 - 0.11 K/uL 0.03 0.01   Nucleated RBC Latest Units: /100 WBC 0.00 0.00   NRBC (Absolute) Latest Units: K/uL 0.00 0.00   Sodium Latest Ref Range: 135 - 145 mmol/L 137    Potassium Latest Ref Range: 3.6 - 5.5 mmol/L 4.3    Chloride Latest Ref Range: 96 - 112 mmol/L 101    Co2 Latest Ref Range: 20 - 33 mmol/L 25    Anion Gap Latest Ref Range: 7.0 - 16.0  11.0    Glucose Latest Ref Range: 65 - 99 mg/dL 73    Bun Latest Ref Range: 8 - 22 mg/dL 19    Creatinine Latest Ref Range: 0.50 - 1.40 mg/dL 1.08    GFR If  Latest Ref Range: >60 mL/min/1.73 m 2 >60    GFR If Non  Latest Ref Range: >60 mL/min/1.73 m 2 >60    Calcium Latest Ref Range: 8.5 - 10.5 mg/dL 9.5    AST(SGOT) Latest  Ref Range: 12 - 45 U/L 26    ALT(SGPT) Latest Ref Range: 2 - 50 U/L 25    Alkaline Phosphatase Latest Ref Range: 30 - 99 U/L 61    Total Bilirubin Latest Ref Range: 0.1 - 1.5 mg/dL 0.3    Albumin Latest Ref Range: 3.2 - 4.9 g/dL 4.3    Total Protein Latest Ref Range: 6.0 - 8.2 g/dL 6.2    Globulin Latest Ref Range: 1.9 - 3.5 g/dL 1.9    A-G Ratio Latest Units: g/dL 2.3               Assessment & Plan        1. Multiple myeloma not having achieved remission (HCC)     2. Insomnia due to medical condition         Patient with lambda light chain myeloma currently on RVD.  Cycle 1 started on 9/2/2021 and is scheduled to proceed with day 8 of cycle 1 today.  Clinically he is done very well and tolerated treatment well.  He will have a CBC completed today prior to treatment and if labs meet parameters he is okay to continue as planned.  We will continue to monitor patient weekly at this time to ensure that he is tolerating treatment well, which patient is in agreement.    Patient with insomnia, and worse with his treatment.  Discussed with patient the importance of sleep during his treatment.  Did inform him days of his dexamethasone 40 mg, he will have more difficulty sleeping.  He has required to take 10 mg at times in order to achieve appropriate rest and sleep.  Patient concerned with regards to addiction, but long discussion with patient today on Ambien and half-life.  Discussed recommendation to increase dose to 10 mg p.o. nightly as needed.  Patient requesting a refill at this time.  Did review the [place] prescription monitoring program website and he was prescribed 30 tablets by Dr. Aaron on 8/20/2021.  We will go ahead and refill Ambien today.  Dr. Aaron was able to place a new medication prescription for Ambien 10 mg tablets to patient's pharmacy.        Patient to follow-up in the clinic in 1 week or sooner if needed.  He is following with [facility] [place] today and will update us next week.  He we have asked  patient to sign an LORI and will obtain records as well.    Also confirmed with patient that it is okay for him to receive the Covid-19 booster vaccine as he is currently on chemotherapy.      Please note that this dictation was created using voice recognition software. I have made every reasonable attempt to correct obvious errors, but I expect that there are errors of grammar and possibly content that I did not discover before finalizing the note.

## 2021-09-09 NOTE — TELEPHONE ENCOUNTER
Patient called to keep all of his providers in the loop. - He's wanting Victoria and Kerline to know that he was [facility] and [facility] Dr. Lazar who wrote him a referral to see Dr. Felix Singer at [facility] for radiation/onc. (Patient is not going to schedule anything there, as he plans on waiting until his appointment next week with Kerline, and wants to see the oncologist that Dr. Aaron recommends.)    FYI - patient requested records from [facility] to be sent to [facility] for us to review.

## 2021-09-09 NOTE — PROGRESS NOTES
Chemotherapy Verification - SECONDARY RN       Height = 168 cm  Weight = 79.1 kg  BSA = 1.92 m2       Medication: Velcade  Dose: 1.3 mg/m2    Calculated Dose: 2.5mg                             I confirm that this process was performed independently.

## 2021-09-09 NOTE — PROGRESS NOTES
"Patient Name: Gary Hedrick     Protocol: RVd       *Dosing Reference*  Bortezomib 1.3 mg/m2 subcutaneous on Days 1, 4, 8, 11              -MD dosing on Days 1, 8, 15 of a 21 day cycle  Lenalidomide 25 mg PO daily on Days 1-14  Dexamethasone 20 mg PO daily on Days 1-2, 4-5, 8-9, 11-12 OR 40 mg PO on Days 1, 8, and 15  21 day cycle for 3-4 cycles OR until disease progression or unacceptable toxicity  NCCN Guidelines for Multiple Myeloma.V.1.2022  Amadeo P, et al. Blood. 2010;116(5):679-86  Dominic S, et al/ Blood. 2012;119(19):4533-12    Dx: MM    Allergies:  Grass pollen(k-o-r-t-swt jose), Pet dander [cat hair extract], Milk [dairy food allergy], [place], and [place] oil     /56   Pulse 96   Temp 36.7 °C (98 °F) (Temporal)   Resp 18   Ht 1.68 m (5' 6.14\")   Wt 79.1 kg (174 lb 6.1 oz)   SpO2 95%   BMI 28.03 kg/m²  Body surface area is 1.92 meters squared.     Labs 9/9/21  ANC~ 4750 Plt = 309k   Hgb = 12.5       Drug Order   (Drug name, dose, route, IV Fluid & volume, frequency, number of doses) Cycle 1 Day 8      Previous treatment: C1D1 on 9/2/21     Medication = Bortezomib (Velcade)  Base Dose= 1.3 mg/m2  Calc Dose: Base Dose x 1.92 m2 = 2.496 mg  Final Dose = 2.5 mg  Route = Subcutaneous  Conc. = 2.5 mg/mL  Fluid & Volume = 1 mL in syringe  Administration = ABDOMEN or THIGH          <10% difference, okay to treat with final dose   By my signature below, I confirm this process was performed independently with the BSA and all final chemotherapy dosing calculations congruent. I have reviewed the above chemotherapy order and that my calculation of the final dose and BSA (when applicable) corroborate those calculations of the  pharmacist. Discrepancies of 10% or greater in the written dose have been addressed and documented within the [facility] Progress notes.    Nick Williamson, PharmD    "

## 2021-09-09 NOTE — PROGRESS NOTES
Pt ambulatory to [facility] for C1 D8 of Velcade for MM.  Pt w/ no s/s of infection, pt has no complaints at this time.  Pt labs drawn from RAC w/ 25G butterfly, gauze and coban dressing applied.  Pt lab results w/n parameters for tx today.  Pt took his dose of dexamethasone from home per MD order.  Velcade injected into RLQ, no band-aid placed.  Pt left on foot in care of self in NAD.  Confirmed pt's next appt.

## 2021-09-13 ENCOUNTER — TELEPHONE (OUTPATIENT)
Dept: HEMATOLOGY ONCOLOGY | Facility: MEDICAL CENTER | Age: 71
End: 2021-09-13

## 2021-09-14 NOTE — PROGRESS NOTES
Subjective     Gary Hedrick is a 71 y.o. male who presents with Multiple Myeloma (EST/Prechemo)          HPI    Patient seen today in follow-up for lambda light chain myeloma.  He presents unaccompanied for today's visit.     Patient initially established care in June 2021 with Dr. Aaron with macrocytosis.  Further work-up for macrocytosis completed including a monoclonal protein study and serum free light chains.  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  Bone marrow completed on 7/23/2021 showed 60-70% involvement of clonal plasma cells with cytogenetics all negative, patient found to have lambda light chain myeloma.  PET scan completed on 8/19/2021 showed no evidence of abnormal FDG accumulation.  Beta-2 microglobulin level on 8/2/2021 was within normal limits.  After further discussion Dr. Aaron started patient on RVD -Revlimid 25 mg daily days 1-14, 7 days off; Velcade 1.3 mg/m² SQ weekly with dexamethasone 40 mg p.o. weekly, q. 21-day cycle.  Cycle 1 started on 9/2/2021.     Interval history  Patient currently on cycle 1, day 15 of RVD.     Patient has done very well and feeling well.  He stated this past week he has done very well and feeling well.  He stated the first treatment he received he was quite fatigued in late on the couch most of the time.  But the second dose he felt much better and overall continues to do better.  He is still walking approximately 1-1/2 miles 4 times per week.  He is sleeping much better and taking Ambien.  He does complain of some constipation which he attributes to the chemotherapy.  He is using an herbal supplement as he is needing to take this approximately every other day or every 2 days.    Patient was seen last week by Dr. Lazar at [facility].  Recently diagnosed with prostate cancer and patient has been referred to radiation oncology, but  patient is interested in being referred to [facility] provider. Discussed with patient today.     Allergies   Allergen Reactions   • Grass Pollen(K-O-R-T-Swt Rodrigo) Shortness of Breath   • Pet Dander [Cat Hair Extract] Shortness of Breath and Unspecified   • [place] Meal Unspecified   • Milk [Dairy Food Allergy] Unspecified   • [place] Unspecified     Current Outpatient Medications on File Prior to Visit   Medication Sig Dispense Refill   • lenalidomide 25 MG Cap Take 25 mg (1 cap) by mouth on Days 1 - 14 of each 21-day cycle (2 weeks ON & 1 week OFF) 14 Capsule 0   • zolpidem (AMBIEN) 10 MG Tab Take 1 Tablet by mouth at bedtime as needed for Sleep for up to 30 days. 30 Tablet 3   • aspirin EC (ECOTRIN) 81 MG Tablet Delayed Response Take 81 mg by mouth every day.     • dexamethasone (DECADRON) 4 MG Tab Take 10 pills once a week 40 tablet 6   • valACYclovir (VALTREX) 500 MG Tab Take 500 mg by mouth 2 times a day. No known stop date, TWICE DAILY     • FLUoxetine (PROZAC) 20 MG Cap Take 1 capsule by mouth every day. 90 capsule 4   • Omega-3 Fatty Acids (FISH OIL) 1000 MG Cap capsule Take 1,000 mg by mouth 2 times a day.     • levothyroxine (SYNTHROID) 100 MCG Tab Take 1 tablet by mouth Every morning on an empty stomach. 100 tablet 3   • simvastatin (ZOCOR) 20 MG Tab Take 1 tablet by mouth every evening. 100 tablet 3   • Coenzyme Q10 100 MG Cap Take  by mouth every day.     • VITAMIN K PO Take  by mouth. 25mg PO bid       No current facility-administered medications on file prior to visit.         Review of Systems   Gastrointestinal: Positive for constipation. Negative for diarrhea, nausea and vomiting.   Genitourinary: Negative for dysuria and hematuria.   Musculoskeletal:        Mild in his feet at times but it is short lived   Neurological: Positive for dizziness (a little from time to time - gait is a little off). Negative for tingling and headaches.              Objective     /76   Pulse 79   Temp 36.9 °C  "(98.4 °F) (Temporal)   Resp 16   Ht 1.753 m (5' 9\")   Wt 76.4 kg (168 lb 6.9 oz)   SpO2 97%   BMI 24.87 kg/m²      Physical Exam  Vitals reviewed.   Constitutional:       General: He is not in acute distress.     Appearance: Normal appearance. He is not diaphoretic.   HENT:      Head: Normocephalic and atraumatic.   Cardiovascular:      Rate and Rhythm: Normal rate and regular rhythm.      Pulses: Normal pulses.      Heart sounds: No murmur heard.   No friction rub. No gallop.    Pulmonary:      Effort: Pulmonary effort is normal. No respiratory distress.      Breath sounds: Normal breath sounds. No wheezing.   Abdominal:      General: Bowel sounds are normal. There is no distension.      Palpations: Abdomen is soft.      Tenderness: There is no abdominal tenderness.   Musculoskeletal:         General: No swelling or tenderness. Normal range of motion.   Skin:     General: Skin is warm and dry.   Neurological:      Mental Status: He is alert and oriented to person, place, and time.   Psychiatric:         Mood and Affect: Mood normal.         Behavior: Behavior normal.            Results for JAMES GRANT (MRN [id number])    Ref. Range 9/9/2021 14:00   WBC Latest Ref Range: 4.8 - 10.8 K/uL 6.0   RBC Latest Ref Range: 4.70 - 6.10 M/uL 3.56 (L)   Hemoglobin Latest Ref Range: 14.0 - 18.0 g/dL 12.5 (L)   Hematocrit Latest Ref Range: 42.0 - 52.0 % 36.8 (L)   MCV Latest Ref Range: 81.4 - 97.8 fL 103.4 (H)   MCH Latest Ref Range: 27.0 - 33.0 pg 35.1 (H)   MCHC Latest Ref Range: 33.7 - 35.3 g/dL 34.0   RDW Latest Ref Range: 35.9 - 50.0 fL 49.3   Platelet Count Latest Ref Range: 164 - 446 K/uL 209   MPV Latest Ref Range: 9.0 - 12.9 fL 10.0   Neutrophils-Polys Latest Ref Range: 44.00 - 72.00 % 79.00 (H)   Neutrophils (Absolute) Latest Ref Range: 1.82 - 7.42 K/uL 4.75   Lymphocytes Latest Ref Range: 22.00 - 41.00 % 13.00 (L)   Lymphs (Absolute) Latest Ref Range: 1.00 - 4.80 K/uL 0.78 (L)   Monocytes Latest Ref Range: " 0.00 - 13.40 % 5.80   Monos (Absolute) Latest Ref Range: 0.00 - 0.85 K/uL 0.35   Eosinophils Latest Ref Range: 0.00 - 6.90 % 1.80   Eos (Absolute) Latest Ref Range: 0.00 - 0.51 K/uL 0.11   Basophils Latest Ref Range: 0.00 - 1.80 % 0.20   Baso (Absolute) Latest Ref Range: 0.00 - 0.12 K/uL 0.01   Immature Granulocytes Latest Ref Range: 0.00 - 0.90 % 0.20   Immature Granulocytes (abs) Latest Ref Range: 0.00 - 0.11 K/uL 0.01   Nucleated RBC Latest Units: /100 WBC 0.00   NRBC (Absolute) Latest Units: K/uL 0.00                    Assessment & Plan        1. Multiple myeloma not having achieved remission (HCC)     2. Prostate cancer (HCC)  REFERRAL TO RADIATION ONCOLOGY   3. Insomnia due to medical condition         1. Patient with lambda light chain myeloma currently on RVD.  Cycle 1 started on 9/2/2021 and is scheduled to proceed with day 15 of cycle 1 today.  Clinically he is done very well and tolerated treatment well.  He will have a CBC completed today prior to treatment and if labs meet parameters he is okay to continue as planned.  Patient to follow up next week prior to cycle 2, day 1 or sooner if needed.    2. Patient diagnosed with prostate cancer per Dr. Lazar at [facility]. Per medical record, prostate biopsy on 7/15/21, pathology showed [place] 3+4 = 7, 2/12 cores, 3+3 = 6, 4/12 cores.  Per Dr. Lazar recommendation for patient to proceed with radiation oncologist.  Patient is interested in being referred to radiation oncology within [facility] organization.  Referral has been placed to Dr. Isaac.    3.  Patient does have insomnia related to his current medical condition.  He has been taking Ambien with positive results.    4.  We will see patient back in 1 week prior to cycle 2, day 1 of treatment, sooner if needed.    Discussed with patient and okay to proceed with flu vaccine.  He plans on receiving this today.  Also did discuss with him that we do recommend the third booster vaccine for  Covid-19.        Please note that this dictation was created using voice recognition software. I have made every reasonable attempt to correct obvious errors, but I expect that there are errors of grammar and possibly content that I did not discover before finalizing the note.

## 2021-09-15 ENCOUNTER — TELEPHONE (OUTPATIENT)
Dept: HEMATOLOGY ONCOLOGY | Facility: MEDICAL CENTER | Age: 71
End: 2021-09-15

## 2021-09-15 DIAGNOSIS — C90.00 MULTIPLE MYELOMA NOT HAVING ACHIEVED REMISSION (HCC): ICD-10-CM

## 2021-09-15 DIAGNOSIS — D80.1 HYPOGAMMAGLOBULINEMIA (HCC): ICD-10-CM

## 2021-09-15 RX ORDER — LENALIDOMIDE 25 MG/1
CAPSULE ORAL
Qty: 14 CAPSULE | Refills: 0 | Status: SHIPPED | OUTPATIENT
Start: 2021-09-15 | End: 2021-10-06 | Stop reason: SDUPTHER

## 2021-09-15 NOTE — TELEPHONE ENCOUNTER
Oral Chemo Compliance review for lenalidomide (Revlimid)    Current dose:  25 mg PO on Days 1 - 14 of each 21-day cycle

## 2021-09-16 ENCOUNTER — OUTPATIENT INFUSION SERVICES (OUTPATIENT)
Dept: ONCOLOGY | Facility: MEDICAL CENTER | Age: 71
End: 2021-09-16
Attending: INTERNAL MEDICINE
Payer: MEDICARE

## 2021-09-16 ENCOUNTER — OFFICE VISIT (OUTPATIENT)
Dept: HEMATOLOGY ONCOLOGY | Facility: MEDICAL CENTER | Age: 71
End: 2021-09-16
Payer: MEDICARE

## 2021-09-16 VITALS
BODY MASS INDEX: 26.3 KG/M2 | SYSTOLIC BLOOD PRESSURE: 138 MMHG | DIASTOLIC BLOOD PRESSURE: 61 MMHG | OXYGEN SATURATION: 98 % | HEIGHT: 67 IN | RESPIRATION RATE: 18 BRPM | HEART RATE: 80 BPM | TEMPERATURE: 98 F | WEIGHT: 167.55 LBS

## 2021-09-16 VITALS
DIASTOLIC BLOOD PRESSURE: 76 MMHG | OXYGEN SATURATION: 97 % | BODY MASS INDEX: 24.95 KG/M2 | HEIGHT: 69 IN | TEMPERATURE: 98.4 F | RESPIRATION RATE: 16 BRPM | WEIGHT: 168.43 LBS | HEART RATE: 79 BPM | SYSTOLIC BLOOD PRESSURE: 126 MMHG

## 2021-09-16 DIAGNOSIS — C61 PROSTATE CANCER (HCC): ICD-10-CM

## 2021-09-16 DIAGNOSIS — G47.01 INSOMNIA DUE TO MEDICAL CONDITION: ICD-10-CM

## 2021-09-16 DIAGNOSIS — C90.00 MULTIPLE MYELOMA NOT HAVING ACHIEVED REMISSION (HCC): ICD-10-CM

## 2021-09-16 LAB
BASOPHILS # BLD AUTO: 0.3 % (ref 0–1.8)
BASOPHILS # BLD: 0.02 K/UL (ref 0–0.12)
EOSINOPHIL # BLD AUTO: 0.09 K/UL (ref 0–0.51)
EOSINOPHIL NFR BLD: 1.4 % (ref 0–6.9)
ERYTHROCYTE [DISTWIDTH] IN BLOOD BY AUTOMATED COUNT: 49 FL (ref 35.9–50)
HCT VFR BLD AUTO: 36.5 % (ref 42–52)
HGB BLD-MCNC: 12.5 G/DL (ref 14–18)
IMM GRANULOCYTES # BLD AUTO: 0.02 K/UL (ref 0–0.11)
IMM GRANULOCYTES NFR BLD AUTO: 0.3 % (ref 0–0.9)
LYMPHOCYTES # BLD AUTO: 0.87 K/UL (ref 1–4.8)
LYMPHOCYTES NFR BLD: 13.1 % (ref 22–41)
MCH RBC QN AUTO: 34.9 PG (ref 27–33)
MCHC RBC AUTO-ENTMCNC: 34.2 G/DL (ref 33.7–35.3)
MCV RBC AUTO: 102 FL (ref 81.4–97.8)
MONOCYTES # BLD AUTO: 1.08 K/UL (ref 0–0.85)
MONOCYTES NFR BLD AUTO: 16.3 % (ref 0–13.4)
NEUTROPHILS # BLD AUTO: 4.55 K/UL (ref 1.82–7.42)
NEUTROPHILS NFR BLD: 68.6 % (ref 44–72)
NRBC # BLD AUTO: 0 K/UL
NRBC BLD-RTO: 0 /100 WBC
PLATELET # BLD AUTO: 246 K/UL (ref 164–446)
PMV BLD AUTO: 10 FL (ref 9–12.9)
RBC # BLD AUTO: 3.58 M/UL (ref 4.7–6.1)
WBC # BLD AUTO: 6.6 K/UL (ref 4.8–10.8)

## 2021-09-16 PROCEDURE — 700111 HCHG RX REV CODE 636 W/ 250 OVERRIDE (IP): Mod: JW,JG | Performed by: INTERNAL MEDICINE

## 2021-09-16 PROCEDURE — 99213 OFFICE O/P EST LOW 20 MIN: CPT | Performed by: NURSE PRACTITIONER

## 2021-09-16 PROCEDURE — 85025 COMPLETE CBC W/AUTO DIFF WBC: CPT

## 2021-09-16 PROCEDURE — 96401 CHEMO ANTI-NEOPL SQ/IM: CPT

## 2021-09-16 RX ORDER — UBIDECARENONE 100 MG
CAPSULE ORAL DAILY
Status: ON HOLD | COMMUNITY
End: 2021-12-16

## 2021-09-16 RX ORDER — HEPARIN SODIUM (PORCINE) LOCK FLUSH IV SOLN 100 UNIT/ML 100 UNIT/ML
500 SOLUTION INTRAVENOUS PRN
Status: DISCONTINUED | OUTPATIENT
Start: 2021-09-16 | End: 2021-09-16 | Stop reason: CLARIF

## 2021-09-16 RX ADMIN — BORTEZOMIB 2.45 MG: 3.5 INJECTION, POWDER, LYOPHILIZED, FOR SOLUTION INTRAVENOUS; SUBCUTANEOUS at 15:01

## 2021-09-16 ASSESSMENT — ENCOUNTER SYMPTOMS
TINGLING: 0
NAUSEA: 0
CONSTIPATION: 1
DIZZINESS: 1
DIARRHEA: 0
VOMITING: 0
HEADACHES: 0

## 2021-09-16 ASSESSMENT — FIBROSIS 4 INDEX
FIB4 SCORE: 1.72
FIB4 SCORE: 1.72

## 2021-09-16 ASSESSMENT — PAIN SCALES - GENERAL: PAINLEVEL: NO PAIN

## 2021-09-16 NOTE — PROGRESS NOTES
"Pharmacy Chemotherapy Verification  Patient Name: Gary Hedrick  Dx: MM  Cycle: 1 Day 15 (Previous treatment = 9/9/21)  Regimen and Dosing Reference  Bortezomib 1.3 mg/m2 subcutaneous on Days 1, 4, 8, 11   -MD dosing on Days 1, 8, 15 of a 21 day cycle  Lenalidomide 25 mg PO daily on Days 1-14  Dexamethasone 20 mg PO daily on Days 1-2, 4-5, 8-9, 11-12 OR 40 mg PO on Days 1, 8, and 15  21 day cycle for 3-4 cycles OR until disease progression or unacceptable toxicity  NCCN Guidelines for Multiple Myeloma.V.1.2022  Amadeo P, et al. Blood. 2010;116(5):679-86  Alfaro S, et al/ Blood. 2012;119(19):4375-82  John M, et al. J Clin Oncol. 2014;32(25):2712-7    Allergies:Grass pollen(k-o-r-t-swt jose), Pet dander [cat hair extract], [place] meal, Milk [dairy food allergy], [place], and [place] oil  /61   Pulse 80   Temp 36.7 °C (98 °F) (Temporal)   Resp 18   Ht 1.7 m (5' 6.93\")   Wt 76 kg (167 lb 8.8 oz)   SpO2 98%   BMI 26.30 kg/m²   Body surface area is 1.89 meters squared.    Labs 9/16/21  ANC~ 4550 Plt = 246k   Hgb = 12.5       Bortezomib 1.3 mg/m2 x 1.89 m2 = 2.457 mg   <10% difference, OK to treat with final dose = 2.45 mg subcutaneous    Kenney Birch, PharmD    "

## 2021-09-16 NOTE — PROGRESS NOTES
Chemotherapy Verification - SECONDARY RN       Height = 170 cm  Weight = 76 kg  BSA = 1.89 m2       Medication: Velcade  Dose: 1.3 mg/m2  Calculated Dose: 2.457 mg                             (In mg/m2, AUC, mg/kg)       I confirm that this process was performed independently.

## 2021-09-16 NOTE — PROGRESS NOTES
"Pharmacy Chemotherapy Calculation    Dx: MM    Protocol: RVd     *Dosing Reference*  Bortezomib 1.3 mg/m2 subcutaneous on Days 1, 4, 8, 11              -MD dosing on Days 1, 8, 15 of a 21 day cycle  Lenalidomide 25 mg PO daily on Days 1-14  Dexamethasone 20 mg PO daily on Days 1-2, 4-5, 8-9, 11-12 OR 40 mg PO on Days 1, 8, and 15  21 day cycle for 3-4 cycles OR until disease progression or unacceptable toxicity  NCCN Guidelines for Multiple Myeloma.V.1.2022  Amadeo P, et al. Blood. 2010;116(5):679-86  Dominic S, et al/ Blood. 2012;119(19):2003-82    Allergies:  Grass pollen(k-o-r-t-swt jose), Pet dander [cat hair extract], Milk [dairy food allergy], [place], and [place] oil     /61   Pulse 80   Temp 36.7 °C (98 °F) (Temporal)   Resp 18   Ht 1.7 m (5' 6.93\")   Wt 76 kg (167 lb 8.8 oz)   SpO2 98%   BMI 26.30 kg/m²  Body surface area is 1.89 meters squared.     All labs (9/16/21) within treatment plan parameters.        Drug Order   (Drug name, dose, route, IV Fluid & volume, frequency, number of doses) Cycle 1, Day 15  Previous treatment: C1D8 on 9/9/21   Medication = Bortezomib (Velcade)  Base Dose = 1.3 mg/m2  Calc Dose: Base Dose x 1.89 m2 = 2.457 mg  Final Dose = 2.45 mg  Route = Subcutaneous  Conc. = 2.5 mg/mL  Fluid & Volume = 0.98 mL in syringe  Administration = ABDOMEN or THIGH          <10% difference, okay to treat with final dose   By my signature below, I confirm this process was performed independently with the BSA and all final chemotherapy dosing calculations congruent. I have reviewed the above chemotherapy order and that my calculation of the final dose and BSA (when applicable) corroborate those calculations of the  pharmacist. Discrepancies of 10% or greater in the written dose have been addressed and documented within the [facility] Progress notes.    Macarena Allison, PharmD  "

## 2021-09-16 NOTE — PROGRESS NOTES
Chemotherapy Verification - PRIMARY RN    C1 D15    Height = 170 cm  Weight = 76 kg  BSA = 1.89 m^2       Medication: Bortezomib (Velcade)  Dose: 1.3 mg/m^2  Calculated Dose: 2.457 mg                             (In mg/m2, AUC, mg/kg)     I confirm this process was performed independently with the BSA and all final chemotherapy dosing calculations congruent.  Any discrepancies of 10% or greater have been addressed with the chemotherapy pharmacist. The resolution of the discrepancy has been documented in the EPIC progress notes.

## 2021-09-16 NOTE — PROGRESS NOTES
Pt arrives to [facility] for C1 D15 of Velcade.  Pt was seen by CHANDRIKA Chambers prior to this appt.  Pt denies s/s of infection.  Pt reports injection site where Velcade was given last week was tender/red for a few days.  Discussed possible adverse effects of Velcade with pt.  Pt reports this is his off week for Revlimid and he brought Dexamethasone tablets to take prior to Velcade injection.  CBC drawn via 25g butterfly needle to R-AC.  Site wrapped with pressure dressing.  CBC reviewed and results meet parameters for treatment.  Pt took Dexamethasone while in chair.  Velcade given SC to LLQ of abdomen.  Site covered with gauze/paper tape.  Confirmed next appt time with pt.  Scheduled out future infusion appts to coordinate with Oncologist's office.  Pt dc home to self care.

## 2021-09-20 ENCOUNTER — TELEPHONE (OUTPATIENT)
Dept: HEMATOLOGY ONCOLOGY | Facility: MEDICAL CENTER | Age: 71
End: 2021-09-20

## 2021-09-20 NOTE — TELEPHONE ENCOUNTER
Returned pt's call regarding his Revlimid Rx.  Left detailed message on pt's voicemail stating that the Rx for Revlimid had been refilled as of 9/15 & to contact the pharmacy directly to schedule a shipment if pt has not heard from pharmacy.  Contact number for [facility] (pt's specialty pharmacy) was left on voicemail.

## 2021-09-20 NOTE — TELEPHONE ENCOUNTER
Patient calling, asking if Victoria has called pharmacy for medications. He hasn't heard anything yet, and wants to coordinate with Victoria.  He works the next 2 days, and will be unable to receive phone calls, and would hate to miss an important call from the pharmacy.

## 2021-09-22 RX ORDER — PROCHLORPERAZINE MALEATE 10 MG
10 TABLET ORAL EVERY 6 HOURS PRN
Status: CANCELLED | OUTPATIENT
Start: 2021-09-23

## 2021-09-22 RX ORDER — PROCHLORPERAZINE MALEATE 10 MG
10 TABLET ORAL EVERY 6 HOURS PRN
Status: CANCELLED | OUTPATIENT
Start: 2021-11-04

## 2021-09-22 RX ORDER — 0.9 % SODIUM CHLORIDE 0.9 %
10 VIAL (ML) INJECTION PRN
Status: CANCELLED | OUTPATIENT
Start: 2021-09-22

## 2021-09-22 RX ORDER — HEPARIN SODIUM (PORCINE) LOCK FLUSH IV SOLN 100 UNIT/ML 100 UNIT/ML
500 SOLUTION INTRAVENOUS PRN
Status: CANCELLED | OUTPATIENT
Start: 2021-11-11

## 2021-09-22 RX ORDER — SODIUM CHLORIDE 9 MG/ML
INJECTION, SOLUTION INTRAVENOUS CONTINUOUS
Status: CANCELLED | OUTPATIENT
Start: 2021-09-30

## 2021-09-22 RX ORDER — ONDANSETRON 8 MG/1
8 TABLET, ORALLY DISINTEGRATING ORAL PRN
Status: CANCELLED | OUTPATIENT
Start: 2021-10-07

## 2021-09-22 RX ORDER — HEPARIN SODIUM (PORCINE) LOCK FLUSH IV SOLN 100 UNIT/ML 100 UNIT/ML
500 SOLUTION INTRAVENOUS PRN
Status: CANCELLED | OUTPATIENT
Start: 2021-10-07

## 2021-09-22 RX ORDER — HEPARIN SODIUM (PORCINE) LOCK FLUSH IV SOLN 100 UNIT/ML 100 UNIT/ML
500 SOLUTION INTRAVENOUS PRN
Status: CANCELLED | OUTPATIENT
Start: 2021-09-22

## 2021-09-22 RX ORDER — PROCHLORPERAZINE MALEATE 10 MG
10 TABLET ORAL EVERY 6 HOURS PRN
Status: CANCELLED | OUTPATIENT
Start: 2021-09-30

## 2021-09-22 RX ORDER — ONDANSETRON 8 MG/1
8 TABLET, ORALLY DISINTEGRATING ORAL PRN
Status: CANCELLED | OUTPATIENT
Start: 2021-10-28

## 2021-09-22 RX ORDER — 0.9 % SODIUM CHLORIDE 0.9 %
VIAL (ML) INJECTION PRN
Status: CANCELLED | OUTPATIENT
Start: 2021-09-23

## 2021-09-22 RX ORDER — 0.9 % SODIUM CHLORIDE 0.9 %
VIAL (ML) INJECTION PRN
Status: CANCELLED | OUTPATIENT
Start: 2021-09-30

## 2021-09-22 RX ORDER — HEPARIN SODIUM (PORCINE) LOCK FLUSH IV SOLN 100 UNIT/ML 100 UNIT/ML
500 SOLUTION INTRAVENOUS PRN
Status: CANCELLED | OUTPATIENT
Start: 2021-11-18

## 2021-09-22 RX ORDER — HEPARIN SODIUM (PORCINE) LOCK FLUSH IV SOLN 100 UNIT/ML 100 UNIT/ML
500 SOLUTION INTRAVENOUS PRN
Status: CANCELLED | OUTPATIENT
Start: 2021-10-15

## 2021-09-22 RX ORDER — HEPARIN SODIUM (PORCINE) LOCK FLUSH IV SOLN 100 UNIT/ML 100 UNIT/ML
500 SOLUTION INTRAVENOUS PRN
Status: CANCELLED | OUTPATIENT
Start: 2021-10-14

## 2021-09-22 RX ORDER — ONDANSETRON 8 MG/1
8 TABLET, ORALLY DISINTEGRATING ORAL PRN
Status: CANCELLED | OUTPATIENT
Start: 2021-10-22

## 2021-09-22 RX ORDER — 0.9 % SODIUM CHLORIDE 0.9 %
10 VIAL (ML) INJECTION PRN
Status: CANCELLED | OUTPATIENT
Start: 2021-10-14

## 2021-09-22 RX ORDER — ONDANSETRON 2 MG/ML
4 INJECTION INTRAMUSCULAR; INTRAVENOUS PRN
Status: CANCELLED | OUTPATIENT
Start: 2021-10-15

## 2021-09-22 RX ORDER — ONDANSETRON 8 MG/1
8 TABLET, ORALLY DISINTEGRATING ORAL PRN
Status: CANCELLED | OUTPATIENT
Start: 2021-09-30

## 2021-09-22 RX ORDER — 0.9 % SODIUM CHLORIDE 0.9 %
3 VIAL (ML) INJECTION PRN
Status: CANCELLED | OUTPATIENT
Start: 2021-11-04

## 2021-09-22 RX ORDER — SODIUM CHLORIDE 9 MG/ML
INJECTION, SOLUTION INTRAVENOUS CONTINUOUS
Status: CANCELLED | OUTPATIENT
Start: 2021-10-07

## 2021-09-22 RX ORDER — ONDANSETRON 8 MG/1
8 TABLET, ORALLY DISINTEGRATING ORAL PRN
Status: CANCELLED | OUTPATIENT
Start: 2021-09-23

## 2021-09-22 RX ORDER — 0.9 % SODIUM CHLORIDE 0.9 %
3 VIAL (ML) INJECTION PRN
Status: CANCELLED | OUTPATIENT
Start: 2021-10-14

## 2021-09-22 RX ORDER — 0.9 % SODIUM CHLORIDE 0.9 %
3 VIAL (ML) INJECTION PRN
Status: CANCELLED | OUTPATIENT
Start: 2021-09-22

## 2021-09-22 RX ORDER — ONDANSETRON 8 MG/1
8 TABLET, ORALLY DISINTEGRATING ORAL PRN
Status: CANCELLED | OUTPATIENT
Start: 2021-11-04

## 2021-09-22 RX ORDER — 0.9 % SODIUM CHLORIDE 0.9 %
3 VIAL (ML) INJECTION PRN
Status: CANCELLED | OUTPATIENT
Start: 2021-10-28

## 2021-09-22 RX ORDER — 0.9 % SODIUM CHLORIDE 0.9 %
10 VIAL (ML) INJECTION PRN
Status: CANCELLED | OUTPATIENT
Start: 2021-11-03

## 2021-09-22 RX ORDER — SODIUM CHLORIDE 9 MG/ML
INJECTION, SOLUTION INTRAVENOUS CONTINUOUS
Status: CANCELLED | OUTPATIENT
Start: 2021-11-04

## 2021-09-22 RX ORDER — ONDANSETRON 2 MG/ML
4 INJECTION INTRAMUSCULAR; INTRAVENOUS PRN
Status: CANCELLED | OUTPATIENT
Start: 2021-10-28

## 2021-09-22 RX ORDER — 0.9 % SODIUM CHLORIDE 0.9 %
10 VIAL (ML) INJECTION PRN
Status: CANCELLED | OUTPATIENT
Start: 2021-10-15

## 2021-09-22 RX ORDER — SODIUM CHLORIDE 9 MG/ML
INJECTION, SOLUTION INTRAVENOUS CONTINUOUS
Status: CANCELLED | OUTPATIENT
Start: 2021-10-15

## 2021-09-22 RX ORDER — PROCHLORPERAZINE MALEATE 10 MG
10 TABLET ORAL EVERY 6 HOURS PRN
Status: CANCELLED | OUTPATIENT
Start: 2021-10-07

## 2021-09-22 RX ORDER — SODIUM CHLORIDE 9 MG/ML
INJECTION, SOLUTION INTRAVENOUS CONTINUOUS
Status: CANCELLED | OUTPATIENT
Start: 2021-11-18

## 2021-09-22 RX ORDER — 0.9 % SODIUM CHLORIDE 0.9 %
10 VIAL (ML) INJECTION PRN
Status: CANCELLED | OUTPATIENT
Start: 2021-10-22

## 2021-09-22 RX ORDER — 0.9 % SODIUM CHLORIDE 0.9 %
10 VIAL (ML) INJECTION PRN
Status: CANCELLED | OUTPATIENT
Start: 2021-10-28

## 2021-09-22 RX ORDER — 0.9 % SODIUM CHLORIDE 0.9 %
3 VIAL (ML) INJECTION PRN
Status: CANCELLED | OUTPATIENT
Start: 2021-10-15

## 2021-09-22 RX ORDER — 0.9 % SODIUM CHLORIDE 0.9 %
10 VIAL (ML) INJECTION PRN
Status: CANCELLED | OUTPATIENT
Start: 2021-11-04

## 2021-09-22 RX ORDER — 0.9 % SODIUM CHLORIDE 0.9 %
VIAL (ML) INJECTION PRN
Status: CANCELLED | OUTPATIENT
Start: 2021-11-04

## 2021-09-22 RX ORDER — HEPARIN SODIUM (PORCINE) LOCK FLUSH IV SOLN 100 UNIT/ML 100 UNIT/ML
500 SOLUTION INTRAVENOUS PRN
Status: CANCELLED | OUTPATIENT
Start: 2021-11-04

## 2021-09-22 RX ORDER — ONDANSETRON 2 MG/ML
4 INJECTION INTRAMUSCULAR; INTRAVENOUS PRN
Status: CANCELLED | OUTPATIENT
Start: 2021-09-30

## 2021-09-22 RX ORDER — ONDANSETRON 8 MG/1
8 TABLET, ORALLY DISINTEGRATING ORAL PRN
Status: CANCELLED | OUTPATIENT
Start: 2021-10-15

## 2021-09-22 RX ORDER — PROCHLORPERAZINE MALEATE 10 MG
10 TABLET ORAL EVERY 6 HOURS PRN
Status: CANCELLED | OUTPATIENT
Start: 2021-11-18

## 2021-09-22 RX ORDER — SODIUM CHLORIDE 9 MG/ML
INJECTION, SOLUTION INTRAVENOUS CONTINUOUS
Status: CANCELLED | OUTPATIENT
Start: 2021-10-28

## 2021-09-22 RX ORDER — 0.9 % SODIUM CHLORIDE 0.9 %
VIAL (ML) INJECTION PRN
Status: CANCELLED | OUTPATIENT
Start: 2021-10-14

## 2021-09-22 RX ORDER — HEPARIN SODIUM (PORCINE) LOCK FLUSH IV SOLN 100 UNIT/ML 100 UNIT/ML
500 SOLUTION INTRAVENOUS PRN
Status: CANCELLED | OUTPATIENT
Start: 2021-09-30

## 2021-09-22 RX ORDER — SODIUM CHLORIDE 9 MG/ML
INJECTION, SOLUTION INTRAVENOUS CONTINUOUS
Status: CANCELLED | OUTPATIENT
Start: 2021-10-22

## 2021-09-22 RX ORDER — 0.9 % SODIUM CHLORIDE 0.9 %
VIAL (ML) INJECTION PRN
Status: CANCELLED | OUTPATIENT
Start: 2021-10-07

## 2021-09-22 RX ORDER — ONDANSETRON 2 MG/ML
4 INJECTION INTRAMUSCULAR; INTRAVENOUS PRN
Status: CANCELLED | OUTPATIENT
Start: 2021-11-11

## 2021-09-22 RX ORDER — HEPARIN SODIUM (PORCINE) LOCK FLUSH IV SOLN 100 UNIT/ML 100 UNIT/ML
500 SOLUTION INTRAVENOUS PRN
Status: CANCELLED | OUTPATIENT
Start: 2021-10-22

## 2021-09-22 RX ORDER — 0.9 % SODIUM CHLORIDE 0.9 %
VIAL (ML) INJECTION PRN
Status: CANCELLED | OUTPATIENT
Start: 2021-10-22

## 2021-09-22 RX ORDER — 0.9 % SODIUM CHLORIDE 0.9 %
VIAL (ML) INJECTION PRN
Status: CANCELLED | OUTPATIENT
Start: 2021-09-22

## 2021-09-22 RX ORDER — ONDANSETRON 2 MG/ML
4 INJECTION INTRAMUSCULAR; INTRAVENOUS PRN
Status: CANCELLED | OUTPATIENT
Start: 2021-09-23

## 2021-09-22 RX ORDER — 0.9 % SODIUM CHLORIDE 0.9 %
10 VIAL (ML) INJECTION PRN
Status: CANCELLED | OUTPATIENT
Start: 2021-11-18

## 2021-09-22 RX ORDER — ONDANSETRON 2 MG/ML
4 INJECTION INTRAMUSCULAR; INTRAVENOUS PRN
Status: CANCELLED | OUTPATIENT
Start: 2021-11-04

## 2021-09-22 RX ORDER — 0.9 % SODIUM CHLORIDE 0.9 %
3 VIAL (ML) INJECTION PRN
Status: CANCELLED | OUTPATIENT
Start: 2021-09-30

## 2021-09-22 RX ORDER — 0.9 % SODIUM CHLORIDE 0.9 %
10 VIAL (ML) INJECTION PRN
Status: CANCELLED | OUTPATIENT
Start: 2021-11-11

## 2021-09-22 RX ORDER — SODIUM CHLORIDE 9 MG/ML
INJECTION, SOLUTION INTRAVENOUS CONTINUOUS
Status: CANCELLED | OUTPATIENT
Start: 2021-11-11

## 2021-09-22 RX ORDER — 0.9 % SODIUM CHLORIDE 0.9 %
VIAL (ML) INJECTION PRN
Status: CANCELLED | OUTPATIENT
Start: 2021-10-15

## 2021-09-22 RX ORDER — 0.9 % SODIUM CHLORIDE 0.9 %
3 VIAL (ML) INJECTION PRN
Status: CANCELLED | OUTPATIENT
Start: 2021-10-22

## 2021-09-22 RX ORDER — 0.9 % SODIUM CHLORIDE 0.9 %
10 VIAL (ML) INJECTION PRN
Status: CANCELLED | OUTPATIENT
Start: 2021-09-23

## 2021-09-22 RX ORDER — PROCHLORPERAZINE MALEATE 10 MG
10 TABLET ORAL EVERY 6 HOURS PRN
Status: CANCELLED | OUTPATIENT
Start: 2021-10-22

## 2021-09-22 RX ORDER — 0.9 % SODIUM CHLORIDE 0.9 %
10 VIAL (ML) INJECTION PRN
Status: CANCELLED | OUTPATIENT
Start: 2021-10-07

## 2021-09-22 RX ORDER — 0.9 % SODIUM CHLORIDE 0.9 %
3 VIAL (ML) INJECTION PRN
Status: CANCELLED | OUTPATIENT
Start: 2021-10-07

## 2021-09-22 RX ORDER — ONDANSETRON 2 MG/ML
4 INJECTION INTRAMUSCULAR; INTRAVENOUS PRN
Status: CANCELLED | OUTPATIENT
Start: 2021-10-07

## 2021-09-22 RX ORDER — ONDANSETRON 8 MG/1
8 TABLET, ORALLY DISINTEGRATING ORAL PRN
Status: CANCELLED | OUTPATIENT
Start: 2021-11-18

## 2021-09-22 RX ORDER — PROCHLORPERAZINE MALEATE 10 MG
10 TABLET ORAL EVERY 6 HOURS PRN
Status: CANCELLED | OUTPATIENT
Start: 2021-10-28

## 2021-09-22 RX ORDER — 0.9 % SODIUM CHLORIDE 0.9 %
VIAL (ML) INJECTION PRN
Status: CANCELLED | OUTPATIENT
Start: 2021-10-28

## 2021-09-22 RX ORDER — HEPARIN SODIUM (PORCINE) LOCK FLUSH IV SOLN 100 UNIT/ML 100 UNIT/ML
500 SOLUTION INTRAVENOUS PRN
Status: CANCELLED | OUTPATIENT
Start: 2021-11-03

## 2021-09-22 RX ORDER — ONDANSETRON 2 MG/ML
4 INJECTION INTRAMUSCULAR; INTRAVENOUS PRN
Status: CANCELLED | OUTPATIENT
Start: 2021-11-18

## 2021-09-22 RX ORDER — PROCHLORPERAZINE MALEATE 10 MG
10 TABLET ORAL EVERY 6 HOURS PRN
Status: CANCELLED | OUTPATIENT
Start: 2021-10-15

## 2021-09-22 RX ORDER — PROCHLORPERAZINE MALEATE 10 MG
10 TABLET ORAL EVERY 6 HOURS PRN
Status: CANCELLED | OUTPATIENT
Start: 2021-11-11

## 2021-09-22 RX ORDER — HEPARIN SODIUM (PORCINE) LOCK FLUSH IV SOLN 100 UNIT/ML 100 UNIT/ML
500 SOLUTION INTRAVENOUS PRN
Status: CANCELLED | OUTPATIENT
Start: 2021-10-28

## 2021-09-22 RX ORDER — 0.9 % SODIUM CHLORIDE 0.9 %
3 VIAL (ML) INJECTION PRN
Status: CANCELLED | OUTPATIENT
Start: 2021-11-11

## 2021-09-22 RX ORDER — 0.9 % SODIUM CHLORIDE 0.9 %
3 VIAL (ML) INJECTION PRN
Status: CANCELLED | OUTPATIENT
Start: 2021-09-23

## 2021-09-22 RX ORDER — 0.9 % SODIUM CHLORIDE 0.9 %
10 VIAL (ML) INJECTION PRN
Status: CANCELLED | OUTPATIENT
Start: 2021-09-30

## 2021-09-22 RX ORDER — 0.9 % SODIUM CHLORIDE 0.9 %
VIAL (ML) INJECTION PRN
Status: CANCELLED | OUTPATIENT
Start: 2021-11-11

## 2021-09-22 RX ORDER — 0.9 % SODIUM CHLORIDE 0.9 %
VIAL (ML) INJECTION PRN
Status: CANCELLED | OUTPATIENT
Start: 2021-11-03

## 2021-09-22 RX ORDER — 0.9 % SODIUM CHLORIDE 0.9 %
3 VIAL (ML) INJECTION PRN
Status: CANCELLED | OUTPATIENT
Start: 2021-11-03

## 2021-09-22 RX ORDER — 0.9 % SODIUM CHLORIDE 0.9 %
VIAL (ML) INJECTION PRN
Status: CANCELLED | OUTPATIENT
Start: 2021-11-18

## 2021-09-22 RX ORDER — SODIUM CHLORIDE 9 MG/ML
INJECTION, SOLUTION INTRAVENOUS CONTINUOUS
Status: CANCELLED | OUTPATIENT
Start: 2021-09-23

## 2021-09-22 RX ORDER — ONDANSETRON 2 MG/ML
4 INJECTION INTRAMUSCULAR; INTRAVENOUS PRN
Status: CANCELLED | OUTPATIENT
Start: 2021-10-22

## 2021-09-22 RX ORDER — HEPARIN SODIUM (PORCINE) LOCK FLUSH IV SOLN 100 UNIT/ML 100 UNIT/ML
500 SOLUTION INTRAVENOUS PRN
Status: CANCELLED | OUTPATIENT
Start: 2021-09-23

## 2021-09-22 RX ORDER — ONDANSETRON 8 MG/1
8 TABLET, ORALLY DISINTEGRATING ORAL PRN
Status: CANCELLED | OUTPATIENT
Start: 2021-11-11

## 2021-09-22 RX ORDER — 0.9 % SODIUM CHLORIDE 0.9 %
3 VIAL (ML) INJECTION PRN
Status: CANCELLED | OUTPATIENT
Start: 2021-11-18

## 2021-09-23 ENCOUNTER — OUTPATIENT INFUSION SERVICES (OUTPATIENT)
Dept: ONCOLOGY | Facility: MEDICAL CENTER | Age: 71
End: 2021-09-23
Attending: INTERNAL MEDICINE
Payer: MEDICARE

## 2021-09-23 ENCOUNTER — OFFICE VISIT (OUTPATIENT)
Dept: HEMATOLOGY ONCOLOGY | Facility: MEDICAL CENTER | Age: 71
End: 2021-09-23
Payer: MEDICARE

## 2021-09-23 VITALS
BODY MASS INDEX: 26.3 KG/M2 | TEMPERATURE: 97 F | SYSTOLIC BLOOD PRESSURE: 129 MMHG | RESPIRATION RATE: 18 BRPM | HEIGHT: 67 IN | OXYGEN SATURATION: 98 % | DIASTOLIC BLOOD PRESSURE: 61 MMHG | WEIGHT: 167.55 LBS | HEART RATE: 69 BPM

## 2021-09-23 VITALS
RESPIRATION RATE: 17 BRPM | BODY MASS INDEX: 25.36 KG/M2 | OXYGEN SATURATION: 95 % | SYSTOLIC BLOOD PRESSURE: 140 MMHG | WEIGHT: 167.33 LBS | HEART RATE: 67 BPM | DIASTOLIC BLOOD PRESSURE: 70 MMHG | TEMPERATURE: 98 F | HEIGHT: 68 IN

## 2021-09-23 VITALS
DIASTOLIC BLOOD PRESSURE: 61 MMHG | OXYGEN SATURATION: 68 % | HEIGHT: 67 IN | BODY MASS INDEX: 26.3 KG/M2 | SYSTOLIC BLOOD PRESSURE: 129 MMHG | WEIGHT: 167.55 LBS | TEMPERATURE: 97 F | HEART RATE: 69 BPM | RESPIRATION RATE: 18 BRPM

## 2021-09-23 DIAGNOSIS — C90.00 MULTIPLE MYELOMA NOT HAVING ACHIEVED REMISSION (HCC): ICD-10-CM

## 2021-09-23 DIAGNOSIS — C61 PROSTATE CANCER (HCC): ICD-10-CM

## 2021-09-23 LAB
ALBUMIN SERPL BCP-MCNC: 3.9 G/DL (ref 3.2–4.9)
ALBUMIN/GLOB SERPL: 1.6 G/DL
ALP SERPL-CCNC: 67 U/L (ref 30–99)
ALT SERPL-CCNC: 20 U/L (ref 2–50)
ANION GAP SERPL CALC-SCNC: 8 MMOL/L (ref 7–16)
AST SERPL-CCNC: 18 U/L (ref 12–45)
BASOPHILS # BLD AUTO: 0.4 % (ref 0–1.8)
BASOPHILS # BLD: 0.02 K/UL (ref 0–0.12)
BILIRUB SERPL-MCNC: 0.3 MG/DL (ref 0.1–1.5)
BUN SERPL-MCNC: 19 MG/DL (ref 8–22)
CALCIUM SERPL-MCNC: 9.2 MG/DL (ref 8.5–10.5)
CHLORIDE SERPL-SCNC: 102 MMOL/L (ref 96–112)
CO2 SERPL-SCNC: 26 MMOL/L (ref 20–33)
CREAT SERPL-MCNC: 0.93 MG/DL (ref 0.5–1.4)
EOSINOPHIL # BLD AUTO: 0.02 K/UL (ref 0–0.51)
EOSINOPHIL NFR BLD: 0.4 % (ref 0–6.9)
ERYTHROCYTE [DISTWIDTH] IN BLOOD BY AUTOMATED COUNT: 51.1 FL (ref 35.9–50)
GLOBULIN SER CALC-MCNC: 2.4 G/DL (ref 1.9–3.5)
GLUCOSE SERPL-MCNC: 96 MG/DL (ref 65–99)
HCT VFR BLD AUTO: 37.4 % (ref 42–52)
HGB BLD-MCNC: 12.6 G/DL (ref 14–18)
IMM GRANULOCYTES # BLD AUTO: 0.01 K/UL (ref 0–0.11)
IMM GRANULOCYTES NFR BLD AUTO: 0.2 % (ref 0–0.9)
LYMPHOCYTES # BLD AUTO: 0.97 K/UL (ref 1–4.8)
LYMPHOCYTES NFR BLD: 19.8 % (ref 22–41)
MCH RBC QN AUTO: 35.3 PG (ref 27–33)
MCHC RBC AUTO-ENTMCNC: 33.7 G/DL (ref 33.7–35.3)
MCV RBC AUTO: 104.8 FL (ref 81.4–97.8)
MONOCYTES # BLD AUTO: 0.79 K/UL (ref 0–0.85)
MONOCYTES NFR BLD AUTO: 16.2 % (ref 0–13.4)
NEUTROPHILS # BLD AUTO: 3.08 K/UL (ref 1.82–7.42)
NEUTROPHILS NFR BLD: 63 % (ref 44–72)
NRBC # BLD AUTO: 0 K/UL
NRBC BLD-RTO: 0 /100 WBC
PLATELET # BLD AUTO: 293 K/UL (ref 164–446)
PMV BLD AUTO: 9.8 FL (ref 9–12.9)
POTASSIUM SERPL-SCNC: 4.9 MMOL/L (ref 3.6–5.5)
PROT SERPL-MCNC: 6.3 G/DL (ref 6–8.2)
RBC # BLD AUTO: 3.57 M/UL (ref 4.7–6.1)
SODIUM SERPL-SCNC: 136 MMOL/L (ref 135–145)
WBC # BLD AUTO: 4.9 K/UL (ref 4.8–10.8)

## 2021-09-23 PROCEDURE — 82784 ASSAY IGA/IGD/IGG/IGM EACH: CPT

## 2021-09-23 PROCEDURE — 84155 ASSAY OF PROTEIN SERUM: CPT | Mod: 91

## 2021-09-23 PROCEDURE — 99213 OFFICE O/P EST LOW 20 MIN: CPT | Performed by: NURSE PRACTITIONER

## 2021-09-23 PROCEDURE — 84165 PROTEIN E-PHORESIS SERUM: CPT

## 2021-09-23 PROCEDURE — 83520 IMMUNOASSAY QUANT NOS NONAB: CPT

## 2021-09-23 PROCEDURE — 96401 CHEMO ANTI-NEOPL SQ/IM: CPT

## 2021-09-23 PROCEDURE — 80053 COMPREHEN METABOLIC PANEL: CPT

## 2021-09-23 PROCEDURE — 85025 COMPLETE CBC W/AUTO DIFF WBC: CPT

## 2021-09-23 PROCEDURE — 700111 HCHG RX REV CODE 636 W/ 250 OVERRIDE (IP): Mod: JG | Performed by: INTERNAL MEDICINE

## 2021-09-23 PROCEDURE — 86334 IMMUNOFIX E-PHORESIS SERUM: CPT

## 2021-09-23 RX ADMIN — BORTEZOMIB 2.45 MG: 3.5 INJECTION, POWDER, LYOPHILIZED, FOR SOLUTION INTRAVENOUS; SUBCUTANEOUS at 14:13

## 2021-09-23 ASSESSMENT — ENCOUNTER SYMPTOMS
DIZZINESS: 0
SHORTNESS OF BREATH: 1
COUGH: 0
HEADACHES: 0
VOMITING: 0
WHEEZING: 0
BACK PAIN: 1
INSOMNIA: 1
FEVER: 0
DIARRHEA: 0
CHILLS: 0
PALPITATIONS: 0
TINGLING: 0
WEIGHT LOSS: 0
TREMORS: 1
CONSTIPATION: 0
NAUSEA: 0

## 2021-09-23 ASSESSMENT — PAIN SCALES - GENERAL: PAINLEVEL: NO PAIN

## 2021-09-23 ASSESSMENT — FIBROSIS 4 INDEX
FIB4 SCORE: 1.46
FIB4 SCORE: 1.46
FIB4 SCORE: 0.95

## 2021-09-23 NOTE — PROGRESS NOTES
Subjective     Gary Hedrick is a 71 y.o. male who presents with Multiple Myeloma (EST/Prechemo)            HPI    Patient seen today in follow-up for lambda light chain myeloma.  He presents unaccompanied for today's visit.     Patient initially established care in June 2021 with Dr. Aaron with macrocytosis.  Further work-up for macrocytosis completed including a monoclonal protein study and serum free light chains.  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  Bone marrow completed on 7/23/2021 showed 60-70% involvement of clonal plasma cells with cytogenetics all negative, patient found to have lambda light chain myeloma.  PET scan completed on 8/19/2021 showed no evidence of abnormal FDG accumulation.  Beta-2 microglobulin level on 8/2/2021 was within normal limits.  After further discussion Dr. Aaron started patient on RVD -Revlimid 25 mg daily days 1-14, 7 days off; Velcade 1.3 mg/m² SQ weekly with dexamethasone 40 mg p.o. weekly, q. 21-day cycle.  Cycle 1 started on 9/2/2021.     Interval history  Patient currently scheduled to receive cycle 2, day 1 of RVD tomorrow.    Clinically patient continues to do very well.  He does have some mild fatigue but stated he noticed improvement during his week off of Revlimid.  He also notes some shortness of breath intermittently at times which is mild and not provoked.  He denies any coughing or wheezing.  He is eating well and has stable weight.  No nausea or vomiting, diarrhea or constipation at this time.  He did have some constipation which she attributes to Revlimid as it was improved this week.  He has herbal medications to help with constipation if needed.  He did have some back pain this week but it is improved at this time.  He continues to have insomnia which is resolved with the use of Ambien.    Allergies   Allergen Reactions   • Grass  "Pollen(K-O-R-T-Swt Rodrigo) Shortness of Breath   • Pet Dander [Cat Hair Extract] Shortness of Breath and Unspecified   • Milk [Dairy Food Allergy] Unspecified   • [place] Unspecified     Current Outpatient Medications on File Prior to Visit   Medication Sig Dispense Refill   • Coenzyme Q10 100 MG Cap Take  by mouth every day.     • VITAMIN K PO Take  by mouth. 25mg PO bid     • lenalidomide 25 MG Cap Take 25 mg (1 cap) by mouth on Days 1 - 14 of each 21-day cycle (2 weeks ON & 1 week OFF) 14 Capsule 0   • zolpidem (AMBIEN) 10 MG Tab Take 1 Tablet by mouth at bedtime as needed for Sleep for up to 30 days. 30 Tablet 3   • aspirin EC (ECOTRIN) 81 MG Tablet Delayed Response Take 81 mg by mouth every day.     • dexamethasone (DECADRON) 4 MG Tab Take 10 pills once a week 40 tablet 6   • valACYclovir (VALTREX) 500 MG Tab Take 500 mg by mouth 2 times a day. No known stop date, TWICE DAILY     • FLUoxetine (PROZAC) 20 MG Cap Take 1 capsule by mouth every day. 90 capsule 4   • Omega-3 Fatty Acids (FISH OIL) 1000 MG Cap capsule Take 1,000 mg by mouth 2 times a day.     • levothyroxine (SYNTHROID) 100 MCG Tab Take 1 tablet by mouth Every morning on an empty stomach. 100 tablet 3   • simvastatin (ZOCOR) 20 MG Tab Take 1 tablet by mouth every evening. 100 tablet 3     No current facility-administered medications on file prior to visit.         Review of Systems   Constitutional: Positive for malaise/fatigue (mild but better with the week off of Revlimid). Negative for chills, fever and weight loss.   Respiratory: Positive for shortness of breath (mild intermittently). Negative for cough and wheezing.    Cardiovascular: Negative for chest pain and palpitations.   Gastrointestinal: Negative for constipation, diarrhea, nausea and vomiting.   Genitourinary: Negative for dysuria and hematuria.   Musculoskeletal: Positive for back pain (\"through back out\" - improvement now).   Neurological: Positive for tremors (intermittently " "throughout the week). Negative for dizziness, tingling and headaches.   Psychiatric/Behavioral: The patient has insomnia (from steroids).               Objective     /70   Pulse 67   Temp 36.7 °C (98 °F) (Temporal)   Resp 17   Ht 1.727 m (5' 8\")   Wt 75.9 kg (167 lb 5.3 oz)   SpO2 95%   BMI 25.44 kg/m²      Physical Exam  Vitals reviewed.   Constitutional:       General: He is not in acute distress.     Appearance: Normal appearance. He is not diaphoretic.   HENT:      Head: Normocephalic and atraumatic.   Cardiovascular:      Rate and Rhythm: Normal rate and regular rhythm.      Pulses: Normal pulses.      Heart sounds: Normal heart sounds. No murmur heard.   No friction rub. No gallop.    Pulmonary:      Effort: Pulmonary effort is normal. No respiratory distress.      Breath sounds: Normal breath sounds. No wheezing.   Abdominal:      General: Bowel sounds are normal. There is no distension.      Palpations: Abdomen is soft.   Musculoskeletal:         General: No swelling or tenderness. Normal range of motion.   Skin:     General: Skin is warm and dry.   Neurological:      Mental Status: He is alert and oriented to person, place, and time.   Psychiatric:         Mood and Affect: Mood normal.         Behavior: Behavior normal.             Results for JAMES GRANT (MRN [id number])    Ref. Range 9/23/2021 11:00   WBC Latest Ref Range: 4.8 - 10.8 K/uL 4.9   RBC Latest Ref Range: 4.70 - 6.10 M/uL 3.57 (L)   Hemoglobin Latest Ref Range: 14.0 - 18.0 g/dL 12.6 (L)   Hematocrit Latest Ref Range: 42.0 - 52.0 % 37.4 (L)   MCV Latest Ref Range: 81.4 - 97.8 fL 104.8 (H)   MCH Latest Ref Range: 27.0 - 33.0 pg 35.3 (H)   MCHC Latest Ref Range: 33.7 - 35.3 g/dL 33.7   RDW Latest Ref Range: 35.9 - 50.0 fL 51.1 (H)   Platelet Count Latest Ref Range: 164 - 446 K/uL 293   MPV Latest Ref Range: 9.0 - 12.9 fL 9.8   Neutrophils-Polys Latest Ref Range: 44.00 - 72.00 % 63.00   Neutrophils (Absolute) Latest Ref Range: " 1.82 - 7.42 K/uL 3.08   Lymphocytes Latest Ref Range: 22.00 - 41.00 % 19.80 (L)   Lymphs (Absolute) Latest Ref Range: 1.00 - 4.80 K/uL 0.97 (L)   Monocytes Latest Ref Range: 0.00 - 13.40 % 16.20 (H)   Monos (Absolute) Latest Ref Range: 0.00 - 0.85 K/uL 0.79   Eosinophils Latest Ref Range: 0.00 - 6.90 % 0.40   Eos (Absolute) Latest Ref Range: 0.00 - 0.51 K/uL 0.02   Basophils Latest Ref Range: 0.00 - 1.80 % 0.40   Baso (Absolute) Latest Ref Range: 0.00 - 0.12 K/uL 0.02   Immature Granulocytes Latest Ref Range: 0.00 - 0.90 % 0.20   Immature Granulocytes (abs) Latest Ref Range: 0.00 - 0.11 K/uL 0.01   Nucleated RBC Latest Units: /100 WBC 0.00   NRBC (Absolute) Latest Units: K/uL 0.00   Sodium Latest Ref Range: 135 - 145 mmol/L 136   Potassium Latest Ref Range: 3.6 - 5.5 mmol/L 4.9   Chloride Latest Ref Range: 96 - 112 mmol/L 102   Co2 Latest Ref Range: 20 - 33 mmol/L 26   Anion Gap Latest Ref Range: 7.0 - 16.0  8.0   Glucose Latest Ref Range: 65 - 99 mg/dL 96   Bun Latest Ref Range: 8 - 22 mg/dL 19   Creatinine Latest Ref Range: 0.50 - 1.40 mg/dL 0.93   GFR If  Latest Ref Range: >60 mL/min/1.73 m 2 >60   GFR If Non  Latest Ref Range: >60 mL/min/1.73 m 2 >60   Calcium Latest Ref Range: 8.5 - 10.5 mg/dL 9.2   AST(SGOT) Latest Ref Range: 12 - 45 U/L 18   ALT(SGPT) Latest Ref Range: 2 - 50 U/L 20   Alkaline Phosphatase Latest Ref Range: 30 - 99 U/L 67   Total Bilirubin Latest Ref Range: 0.1 - 1.5 mg/dL 0.3   Albumin Latest Ref Range: 3.2 - 4.9 g/dL 3.9   Total Protein Latest Ref Range: 6.0 - 8.2 g/dL 6.3   Globulin Latest Ref Range: 1.9 - 3.5 g/dL 2.4   A-G Ratio Latest Units: g/dL 1.6                   Assessment & Plan        1. Multiple myeloma not having achieved remission (HCC)  Monoclonal Protein and FLC, Serum   2. Prostate cancer (HCC)             1. Patient with lambda light chain myeloma currently on RVD.  Cycle 1 started on 9/2/2021 and is scheduled to proceed with cycle 2, day 1  today.  Clinically he has done very well and tolerated treatment well.  I did review his CBC and CMP labs and they are appropriate to proceed with treatment as planned.  Will request myeloma labs to be completed today during infusion appointment.  Discussed with patient and infusion RN.  Patient to follow-up in the clinic in 1 week prior to day 8 or sooner if needed.     2. Patient diagnosed with prostate cancer per Dr. Lazar at [facility]. Per medical record, prostate biopsy on 7/15/21, pathology showed [place] 3+4 = 7, 2/12 cores, 3+3 = 6, 4/12 cores.  Per Dr. Lazar recommendation for patient to proceed with radiation. Patient referred to Dr. Isaac, radiation oncology via [facility] and is scheduled for consult on 10/8/21.        Please note that this dictation was created using voice recognition software. I have made every reasonable attempt to correct obvious errors, but I expect that there are errors of grammar and possibly content that I did not discover before finalizing the note.

## 2021-09-23 NOTE — PROGRESS NOTES
Pt returns to [facility] for C2 D1 of Velcade.  Spoke to CHANDRIKA Chambers who just saw pt this morning and ordered additional labs to be drawn at this visit.  Pt denies s/s of infection or other complaints today.  Discussed plan of care with pt and he verbalized understanding.  Pt reports that he brought Dexamethasone tablets to take prior to Velcade injection.  Labs drawn via 23g butterfly needle to R-AC.  Site wrapped with pressure dressing.  CBC/CMP that was drawn this morning was reviewed and results meet parameters for treatment.  Pt took Dexamethasone while in chair.  Velcade given SC to RLQ of abdomen.  Site covered with gauze/paper tape.  Gave pt a copy of schedule.  Pt dc home to self care.

## 2021-09-23 NOTE — PROGRESS NOTES
Chemotherapy Verification - SECONDARY RN       Height = 1.7m  Weight = 76kg  BSA = 1.89m2       Medication: bortezomib  Dose: 1.3mg/m2  Calculated Dose: 2.45mg                             (In mg/m2, AUC, mg/kg)       I confirm that this process was performed independently.

## 2021-09-23 NOTE — PROGRESS NOTES
"Pharmacy Chemotherapy Verification  Patient Name: Gary Hedrick  Dx: MM  Cycle: 2 Day 1 (Previous treatment = 9/16/21)    Regimen and Dosing Reference  Bortezomib 1.3 mg/m2 subcutaneous on Days 1, 4, 8, 11   -MD dosing on Days 1, 8, 15 of a 21 day cycle  Lenalidomide 25 mg PO daily on Days 1-14  Dexamethasone 20 mg PO daily on Days 1-2, 4-5, 8-9, 11-12 OR 40 mg PO on Days 1, 8, and 15  21 day cycle for 3-4 cycles OR until disease progression or unacceptable toxicity  NCCN Guidelines for Multiple Myeloma.V.1.2022  Amadeo P, et al. Blood. 2010;116(5):679-86  Alfaro S, et al/ Blood. 2012;119(19):4375-82  John M, et al. J Clin Oncol. 2014;32(25):2712-7    Allergies:Grass pollen(k-o-r-t-swt jose), Pet dander [cat hair extract], [place] meal, Milk [dairy food allergy], Sagebrush, and [place] oil  /61   Pulse 69   Temp 36.1 °C (97 °F) (Temporal)   Resp 18   Ht 1.7 m (5' 6.93\") Comment: ht,wt,and v/s transfered from tis morning.  Wt 76 kg (167 lb 8.8 oz)   SpO2 (!) 68%   BMI 26.30 kg/m²   Body surface area is 1.89 meters squared.    Labs 9/23/21  ANC~ 3080 Plt = 293k   Hgb = 12.6     SCr = 0.93 mg/dL CrCl ~ 80.2 mL/min   LFT's = WNLs  TBili = 0.3     Bortezomib 1.3 mg/m2 x 1.89 m2 = 2.457 mg   <10% difference, OK to treat with final dose = 2.45 mg subcutaneous    Kenney Birch, PharmD    "

## 2021-09-23 NOTE — PROGRESS NOTES
"Pharmacy Chemotherapy Calculation    Dx: MM    Protocol: RVd     *Dosing Reference*  Bortezomib 1.3 mg/m2 subcutaneous on Days 1, 4, 8, 11              -MD dosing on Days 1, 8, 15 of a 21 day cycle  Lenalidomide 25 mg PO daily on Days 1-14  Dexamethasone 20 mg PO daily on Days 1-2, 4-5, 8-9, 11-12 OR 40 mg PO on Days 1, 8, and 15  21 day cycle for 3-4 cycles OR until disease progression or unacceptable toxicity  NCCN Guidelines for Multiple Myeloma.V.1.2022  Amadeo P, et al. Blood. 2010;116(5):679-86  Dominic S, et al/ Blood. 2012;119(19):5152-79    Allergies:  Grass pollen(k-o-r-t-swt jose), Pet dander [cat hair extract], Milk [dairy food allergy], Sagebrush, and [place] oil     /61   Pulse 69   Temp 36.1 °C (97 °F) (Temporal)   Resp 18   Ht 1.7 m (5' 6.93\") Comment: ht,wt,and v/s transfered from tis morning.  Wt 76 kg (167 lb 8.8 oz)   SpO2 (!) 68%   BMI 26.30 kg/m²  Body surface area is 1.89 meters squared.     Labs 9/23/21:  ANC~ 3080 Plt = 293k   Hgb = 12.6     SCr = 0.93 mg/dL CrCl ~ 81 mL/min   LFT's =WNL TBili = 0.3     Drug Order   (Drug name, dose, route, IV Fluid & volume, frequency, number of doses) Cycle 2, Day 1  Previous treatment: C1D15 on 9/16/21   Medication = Bortezomib (Velcade)  Base Dose = 1.3 mg/m2  Calc Dose: Base Dose x 1.89 m2 = 2.457mg  Final Dose = 2.45 mg  Route = Subcutaneous  Conc. = 2.5 mg/mL  Fluid & Volume = 0.98 mL in syringe  Administration = ABDOMEN or THIGH          <10% difference, okay to treat with final dose   By my signature below, I confirm this process was performed independently with the BSA and all final chemotherapy dosing calculations congruent. I have reviewed the above chemotherapy order and that my calculation of the final dose and BSA (when applicable) corroborate those calculations of the  pharmacist. Discrepancies of 10% or greater in the written dose have been addressed and documented within the EPIC Progress notes.    Nick PARRISH" Clay, PharmD

## 2021-09-23 NOTE — PROGRESS NOTES
Patient arrived to [facility] alert, oriented x4, ambulatory for his pre-chemo labs. Labs drawn via venipuncture: CBC/diff, CMP. Patient voices no new health concerns since last visit. He will be back today at 1330 for his Velcade. Discharged to self care in no apparent distress.

## 2021-09-23 NOTE — PROGRESS NOTES
Chemotherapy Verification - PRIMARY RN    C2 D1    Height = 170 cm  Weight = 76 kg  BSA = 1.89 m^2       Medication: Bortezomib (Velcade)  Dose: 1.3 mg/m^2  Calculated Dose: 2.457 mg                             (In mg/m2, AUC, mg/kg)       I confirm this process was performed independently with the BSA and all final chemotherapy dosing calculations congruent.  Any discrepancies of 10% or greater have been addressed with the chemotherapy pharmacist. The resolution of the discrepancy has been documented in the EPIC progress notes.

## 2021-09-27 LAB
ALBUMIN SERPL ELPH-MCNC: 3.35 G/DL (ref 3.75–5.01)
ALPHA1 GLOB SERPL ELPH-MCNC: 0.32 G/DL (ref 0.19–0.46)
ALPHA2 GLOB SERPL ELPH-MCNC: 0.96 G/DL (ref 0.48–1.05)
B-GLOBULIN SERPL ELPH-MCNC: 0.71 G/DL (ref 0.48–1.1)
EER MONOCLONAL PROTEIN AND FLC, SERUM Q5224: ABNORMAL
GAMMA GLOB SERPL ELPH-MCNC: 0.25 G/DL (ref 0.62–1.51)
IGA SERPL-MCNC: 8 MG/DL (ref 68–408)
IGG SERPL-MCNC: 270 MG/DL (ref 768–1632)
IGM SERPL-MCNC: <10 MG/DL (ref 35–263)
INTERPRETATION SERPL IFE-IMP: ABNORMAL
INTERPRETATION SERPL IFE-IMP: ABNORMAL
KAPPA LC FREE SER-MCNC: 3.07 MG/L (ref 3.3–19.4)
KAPPA LC FREE/LAMBDA FREE SER NEPH: <0.01 {RATIO} (ref 0.26–1.65)
LAMBDA LC FREE SERPL-MCNC: 890.19 MG/L (ref 5.71–26.3)
PROT SERPL-MCNC: 5.6 G/DL (ref 6.3–8.2)

## 2021-09-30 ENCOUNTER — OFFICE VISIT (OUTPATIENT)
Dept: HEMATOLOGY ONCOLOGY | Facility: MEDICAL CENTER | Age: 71
End: 2021-09-30
Payer: MEDICARE

## 2021-09-30 ENCOUNTER — OUTPATIENT INFUSION SERVICES (OUTPATIENT)
Dept: ONCOLOGY | Facility: MEDICAL CENTER | Age: 71
End: 2021-09-30
Attending: INTERNAL MEDICINE
Payer: MEDICARE

## 2021-09-30 VITALS
HEIGHT: 67 IN | WEIGHT: 166.89 LBS | TEMPERATURE: 97.4 F | DIASTOLIC BLOOD PRESSURE: 56 MMHG | OXYGEN SATURATION: 98 % | BODY MASS INDEX: 26.19 KG/M2 | SYSTOLIC BLOOD PRESSURE: 139 MMHG | HEART RATE: 76 BPM | RESPIRATION RATE: 18 BRPM

## 2021-09-30 VITALS
DIASTOLIC BLOOD PRESSURE: 68 MMHG | WEIGHT: 167.11 LBS | SYSTOLIC BLOOD PRESSURE: 132 MMHG | BODY MASS INDEX: 24.75 KG/M2 | OXYGEN SATURATION: 97 % | TEMPERATURE: 98.3 F | RESPIRATION RATE: 16 BRPM | HEIGHT: 69 IN | HEART RATE: 77 BPM

## 2021-09-30 DIAGNOSIS — G47.01 INSOMNIA DUE TO MEDICAL CONDITION: ICD-10-CM

## 2021-09-30 DIAGNOSIS — C61 PROSTATE CANCER (HCC): ICD-10-CM

## 2021-09-30 DIAGNOSIS — C90.00 MULTIPLE MYELOMA NOT HAVING ACHIEVED REMISSION (HCC): ICD-10-CM

## 2021-09-30 LAB
BASOPHILS # BLD AUTO: 0.8 % (ref 0–1.8)
BASOPHILS # BLD: 0.03 K/UL (ref 0–0.12)
EOSINOPHIL # BLD AUTO: 0.1 K/UL (ref 0–0.51)
EOSINOPHIL NFR BLD: 2.6 % (ref 0–6.9)
ERYTHROCYTE [DISTWIDTH] IN BLOOD BY AUTOMATED COUNT: 52.2 FL (ref 35.9–50)
HCT VFR BLD AUTO: 39.4 % (ref 42–52)
HGB BLD-MCNC: 13.1 G/DL (ref 14–18)
IMM GRANULOCYTES # BLD AUTO: 0 K/UL (ref 0–0.11)
IMM GRANULOCYTES NFR BLD AUTO: 0 % (ref 0–0.9)
LYMPHOCYTES # BLD AUTO: 0.82 K/UL (ref 1–4.8)
LYMPHOCYTES NFR BLD: 21.2 % (ref 22–41)
MCH RBC QN AUTO: 34.4 PG (ref 27–33)
MCHC RBC AUTO-ENTMCNC: 33.2 G/DL (ref 33.7–35.3)
MCV RBC AUTO: 103.4 FL (ref 81.4–97.8)
MONOCYTES # BLD AUTO: 0.27 K/UL (ref 0–0.85)
MONOCYTES NFR BLD AUTO: 7 % (ref 0–13.4)
NEUTROPHILS # BLD AUTO: 2.64 K/UL (ref 1.82–7.42)
NEUTROPHILS NFR BLD: 68.4 % (ref 44–72)
NRBC # BLD AUTO: 0 K/UL
NRBC BLD-RTO: 0 /100 WBC
PLATELET # BLD AUTO: 221 K/UL (ref 164–446)
PMV BLD AUTO: 10.5 FL (ref 9–12.9)
RBC # BLD AUTO: 3.81 M/UL (ref 4.7–6.1)
WBC # BLD AUTO: 3.9 K/UL (ref 4.8–10.8)

## 2021-09-30 PROCEDURE — 85025 COMPLETE CBC W/AUTO DIFF WBC: CPT

## 2021-09-30 PROCEDURE — 96401 CHEMO ANTI-NEOPL SQ/IM: CPT

## 2021-09-30 PROCEDURE — 700111 HCHG RX REV CODE 636 W/ 250 OVERRIDE (IP): Mod: JG | Performed by: INTERNAL MEDICINE

## 2021-09-30 PROCEDURE — 99213 OFFICE O/P EST LOW 20 MIN: CPT | Performed by: NURSE PRACTITIONER

## 2021-09-30 RX ADMIN — BORTEZOMIB 2.45 MG: 3.5 INJECTION, POWDER, LYOPHILIZED, FOR SOLUTION INTRAVENOUS; SUBCUTANEOUS at 14:20

## 2021-09-30 ASSESSMENT — ENCOUNTER SYMPTOMS
SHORTNESS OF BREATH: 1
TINGLING: 0
COUGH: 0
INSOMNIA: 1
VOMITING: 0
DIZZINESS: 1
CHILLS: 0
FEVER: 0
MYALGIAS: 0
WEIGHT LOSS: 0
NAUSEA: 0
DIARRHEA: 0
CONSTIPATION: 1
HEADACHES: 0
PALPITATIONS: 0
DIAPHORESIS: 0
WHEEZING: 0

## 2021-09-30 ASSESSMENT — FIBROSIS 4 INDEX
FIB4 SCORE: 0.95
FIB4 SCORE: 0.95

## 2021-09-30 NOTE — PROGRESS NOTES
"Pharmacy Chemotherapy Verification  Patient Name: Gary Hedrick  Dx: MM    Cycle 2 Day 8  Previous treatment = C2D1 on 9/23/21    Regimen and Dosing Reference  Bortezomib 1.3 mg/m2 subcutaneous on Days 1, 4, 8, 11   -MD dosing on Days 1, 8, 15 of a 21 day cycle  Lenalidomide 25 mg PO daily on Days 1-14  Dexamethasone 20 mg PO daily on Days 1-2, 4-5, 8-9, 11-12 OR 40 mg PO on Days 1, 8, and 15  21 day cycle for 3-4 cycles OR until disease progression or unacceptable toxicity  NCCN Guidelines for Multiple Myeloma.V.1.2022  Amadeo P, et al. Blood. 2010;116(5):679-86  Alfaro S, et al/ Blood. 2012;119(19):4375-82  John M, et al. J Clin Oncol. 2014;32(25):2712-7    Allergies:Grass pollen(k-o-r-t-swt jose), Pet dander [cat hair extract], [place] meal, Milk [dairy food allergy], Sagebrush, and [place] oil  /56   Pulse 76   Temp 36.3 °C (97.4 °F) (Temporal)   Resp 18   Ht 1.7 m (5' 6.93\")   Wt 75.7 kg (166 lb 14.2 oz)   SpO2 98%   BMI 26.19 kg/m²   Body surface area is 1.89 meters squared.    Labs 9/30/21  ANC~ 2640 Plt = 221k   Hgb = 13.1       Bortezomib (Velcade) 1.3 mg/m2 x 1.89 m2 = 2.457 mg   <10% difference, OK to treat with final dose = 2.45 mg subcutaneous    Nick Williamson, PharmD    "

## 2021-09-30 NOTE — PROGRESS NOTES
Subjective     Gary Hedrick is a 71 y.o. male who presents with Multiple Myeloma (Prechemo)            HPI    Patient seen today in follow-up for lambda light chain myeloma.  He presents unaccompanied for today's visit.     Patient initially established care in June 2021 with Dr. Aaron with macrocytosis.  Further work-up for macrocytosis completed including a monoclonal protein study and serum free light chains.  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  Bone marrow completed on 7/23/2021 showed 60-70% involvement of clonal plasma cells with cytogenetics all negative, patient found to have lambda light chain myeloma.  PET scan completed on 8/19/2021 showed no evidence of abnormal FDG accumulation.  Beta-2 microglobulin level on 8/2/2021 was within normal limits.  After further discussion Dr. Aaron started patient on RVD -Revlimid 25 mg daily days 1-14, 7 days off; Velcade 1.3 mg/m² SQ weekly with dexamethasone 40 mg p.o. weekly, q. 21-day cycle.  Cycle 1 started on 9/2/2021.     Interval history  Patient currently scheduled to receive cycle 2, day 8 of RVD tomorrow.    Clinically patient continues to do very well.  He stated that the first week of cycle 2 his energy has been much better this time.  He does note sometimes when he is more sedentary he will feel quite low, described as a low blood sugar feeling, but then he recovers after couple hours of resting.  He does have constipation he notices with his Revlimid as his off week of Revlimid he was much better.  He takes over-the-counter herbal medication which helps have a normal bowel movements consistently.  He stated overall he feels well and is still able to work 2 days a week.  He does note some shortness of breath during his low energy points, as well as some dizziness from time to time.  He is noticing feeling a little bit more  "\"foggy ear\" than previously.  He does continue to have the insomnia but he is taking Ambien with positive results.    I did review patient's most recent labs as he had a repeat myeloma labs completed.  Patient with lambda light chain myeloma.  His lambda light chains have slightly increased now 890, 3 months ago it was at 795.  However no labs were completed just prior to start of treatment.  Discussed the results with patient as well as Dr. Aaron today.    Allergies   Allergen Reactions   • Grass Pollen(K-O-R-T-Swt Rodrigo) Shortness of Breath   • Pet Dander [Cat Hair Extract] Shortness of Breath and Unspecified   • Milk [Dairy Food Allergy] Unspecified   • Sagebrush Unspecified     Current Outpatient Medications on File Prior to Visit   Medication Sig Dispense Refill   • Coenzyme Q10 100 MG Cap Take  by mouth every day.     • VITAMIN K PO Take  by mouth. 25mg PO bid     • lenalidomide 25 MG Cap Take 25 mg (1 cap) by mouth on Days 1 - 14 of each 21-day cycle (2 weeks ON & 1 week OFF) 14 Capsule 0   • zolpidem (AMBIEN) 10 MG Tab Take 1 Tablet by mouth at bedtime as needed for Sleep for up to 30 days. 30 Tablet 3   • aspirin EC (ECOTRIN) 81 MG Tablet Delayed Response Take 81 mg by mouth every day.     • valACYclovir (VALTREX) 500 MG Tab Take 500 mg by mouth 2 times a day. No known stop date, TWICE DAILY     • FLUoxetine (PROZAC) 20 MG Cap Take 1 capsule by mouth every day. 90 capsule 4   • Omega-3 Fatty Acids (FISH OIL) 1000 MG Cap capsule Take 1,000 mg by mouth 2 times a day.     • levothyroxine (SYNTHROID) 100 MCG Tab Take 1 tablet by mouth Every morning on an empty stomach. 100 tablet 3   • simvastatin (ZOCOR) 20 MG Tab Take 1 tablet by mouth every evening. 100 tablet 3   • dexamethasone (DECADRON) 4 MG Tab Take 10 pills once a week 40 tablet 6     No current facility-administered medications on file prior to visit.         Review of Systems   Constitutional: Positive for malaise/fatigue (intermittent at times). " "Negative for chills, diaphoresis, fever and weight loss.   Respiratory: Positive for shortness of breath (with the low energy points - recovers). Negative for cough and wheezing.    Cardiovascular: Negative for chest pain and palpitations.   Gastrointestinal: Positive for constipation. Negative for diarrhea, nausea and vomiting.   Genitourinary: Negative for dysuria.   Musculoskeletal: Negative for myalgias.   Skin: Negative for itching and rash.   Neurological: Positive for dizziness (noted from time to time). Negative for tingling and headaches.        Feels like he is more \"Foggier\" than previously   Psychiatric/Behavioral: The patient has insomnia (takes ambien as needed).               Objective     /68   Pulse 77   Temp 36.8 °C (98.3 °F) (Temporal)   Resp 16   Ht 1.745 m (5' 8.7\")   Wt 75.8 kg (167 lb 1.7 oz)   SpO2 97%   BMI 24.89 kg/m²      Physical Exam  Vitals reviewed.   Constitutional:       General: He is not in acute distress.     Appearance: Normal appearance. He is not diaphoretic.   HENT:      Head: Normocephalic and atraumatic.   Cardiovascular:      Rate and Rhythm: Normal rate and regular rhythm.      Pulses: Normal pulses.      Heart sounds: Normal heart sounds. No murmur heard.   No friction rub. No gallop.    Pulmonary:      Effort: Pulmonary effort is normal. No respiratory distress.      Breath sounds: Normal breath sounds. No wheezing.   Abdominal:      General: Bowel sounds are normal. There is no distension.      Palpations: Abdomen is soft.      Tenderness: There is no abdominal tenderness.   Musculoskeletal:         General: No swelling or tenderness. Normal range of motion.   Skin:     General: Skin is warm and dry.   Neurological:      Mental Status: He is alert and oriented to person, place, and time.   Psychiatric:         Mood and Affect: Mood normal.         Behavior: Behavior normal.               Results for JAMES GRANT (MRN [id number])    Ref. Range 6/4/2021 " 14:51 9/23/2021 13:40   Immunoglobulin A Latest Ref Range: 68 - 408 mg/dL 18.0 (L) 8 (L)   Immunoglobulin G Latest Ref Range: 768 - 1632 mg/dL 340.0 (L) 270 (L)   Immunoglobulin M Latest Ref Range: 35 - 263 mg/dL <10.00 (L) <10 (L)   Interpretation Unknown See Note See Note   Albumin Latest Ref Range: 3.75 - 5.01 g/dL 4.03 3.35 (L)   Gamma Globulin Latest Ref Range: 0.62 - 1.51 g/dL 0.32 (L) 0.25 (L)   Alpha-1 Globulin Latest Ref Range: 0.19 - 0.46 g/dL 0.23 0.32   Alpha-2 Globulin Latest Ref Range: 0.48 - 1.05 g/dL 0.73 0.96   Beta Globulin Latest Ref Range: 0.48 - 1.10 g/dL 0.70 0.71   Free Kappa Light Chains Latest Ref Range: 3.30 - 19.40 mg/L 3.09 (L) 3.07 (L)   Free Lambda Light Chains Latest Ref Range: 5.71 - 26.30 mg/L 795.40 (H) 890.19 (H)   Kappa-Lambda Ratio Latest Ref Range: 0.26 - 1.65  <0.01 (L) <0.01 (L)   Immunofixation Unknown SARINA Done SARINA Done   EER Monoclonal Protein and FLC, Serum Unknown See Note See Note               Assessment & Plan        1. Multiple myeloma not having achieved remission (HCC)     2. Prostate cancer (HCC)     3. Insomnia due to medical condition         1. Patient with lambda light chain myeloma currently on RVD.  Cycle 1 started on 9/2/2021 and is scheduled to proceed with cycle 2, day 8 today.  Clinically he has done very well and tolerated treatment well.  If labs meet parameters patient is okay to proceed with treatment today as planned.    Patient did have repeat myeloma labs completed day 1 of cycle 2.  His lambda light chains have slightly increased from 3 months prior.  I did speak with Dr. Aaron and uncertain if his lambda light chains were higher just prior to starting treatment or not, therefore no changes to his current treatment plan at this time.  Discussed with patient as well.    2. Patient diagnosed with prostate cancer per Dr. Lazar at [facility]. Per medical record, prostate biopsy on 7/15/21, pathology showed Sarah 3+4 = 7, 2/12 cores, 3+3 =  6, 4/12 cores.  Per Dr. Lazar recommendation for patient to proceed with radiation. Patient referred to Dr. Isaac, radiation oncology via [facility] and is scheduled for consult on 10/8/21.    3.  Patient with insomnia related to his treatment.  He is taking Ambien with positive results.  Patient feeling well and sleeping well at this time with the use of Ambien, and confirmed that he does have refills as needed.    4.  Patient to follow-up with Dr. Aaron next week prior to day 15 of cycle 2.  We will continue to monitor patient closely throughout treatment.  Patient to contact office sooner if needed.      Please note that this dictation was created using voice recognition software. I have made every reasonable attempt to correct obvious errors, but I expect that there are errors of grammar and possibly content that I did not discover before finalizing the note.

## 2021-09-30 NOTE — PROGRESS NOTES
Pt ambulatory to Infusion for C2 D8 of Velcade for MM.  Pt w/ no s/s of infection, pt has no complaints at this time.  CBC drawn from RAC w/ 23G butterfly, gauze and coban dressing applied.  Pt lab results w/n parameters for tx today.  Velcade injected into LLQ, no band-aid needed.  Pt left on foot in care of self in NAD.  Confirmed pt's next appt.

## 2021-09-30 NOTE — PROGRESS NOTES
Chemotherapy Verification - SECONDARY RN       Height = 170 cm  Weight = 75.7 kg  BSA = 1.89 m2       Medication: Velcade  Dose: 1.3 mg/m2  Calculated Dose: 2.457 mg       I confirm that this process was performed independently.

## 2021-09-30 NOTE — PROGRESS NOTES
Chemotherapy Verification - PRIMARY RN  C2 D8      Height = 1.7 m  Weight = 75.7 kg  BSA = 1.89 m2       Medication: bortezomib  Dose: 1.3 mg/m2  Calculated Dose: 2.46 mg                             (In mg/m2, AUC, mg/kg)       I confirm this process was performed independently with the BSA and all final chemotherapy dosing calculations congruent.  Any discrepancies of 10% or greater have been addressed with the chemotherapy pharmacist. The resolution of the discrepancy has been documented in the EPIC progress notes.

## 2021-09-30 NOTE — PROGRESS NOTES
"Pharmacy Chemotherapy Calculation    Dx: MM    Protocol: RVd     *Dosing Reference*  Bortezomib 1.3 mg/m2 subcutaneous on Days 1, 4, 8, 11              -MD dosing on Days 1, 8, 15 of a 21 day cycle  Lenalidomide 25 mg PO daily on Days 1-14  Dexamethasone 20 mg PO daily on Days 1-2, 4-5, 8-9, 11-12 OR 40 mg PO on Days 1, 8, and 15  21 day cycle for 3-4 cycles OR until disease progression or unacceptable toxicity  NCCN Guidelines for Multiple Myeloma.V.1.2022  Amadeo P, et al. Blood. 2010;116(5):679-86  Dominic S, et al/ Blood. 2012;119(19):3238-82    Allergies:  Grass pollen(k-o-r-t-swt jose), Pet dander [cat hair extract], Milk [dairy food allergy], Sagebrush, and [place] oil     /56   Pulse 76   Temp 36.3 °C (97.4 °F) (Temporal)   Resp 18   Ht 1.7 m (5' 6.93\")   Wt 75.7 kg (166 lb 14.2 oz)   SpO2 98%   BMI 26.19 kg/m²  Body surface area is 1.89 meters squared.     Labs 9/30/21  ANC~ 2640 Plt = 221k   Hgb = 13.1       Drug Order   (Drug name, dose, route, IV Fluid & volume, frequency, number of doses) Cycle 2, Day 8  Previous treatment: C2D1 on 9/23/21   Medication = Bortezomib (Velcade)  Base Dose = 1.3 mg/m2  Calc Dose: Base Dose x 1.89 m2 = 2.457 mg  Final Dose = 2.45 mg  Route = Subcutaneous  Conc. = 2.5 mg/mL  Fluid & Volume = 0.98 mL in syringe  Administration = ABDOMEN or THIGH          <10% difference, okay to treat with final dose   By my signature below, I confirm this process was performed independently with the BSA and all final chemotherapy dosing calculations congruent. I have reviewed the above chemotherapy order and that my calculation of the final dose and BSA (when applicable) corroborate those calculations of the  pharmacist. Discrepancies of 10% or greater in the written dose have been addressed and documented within the [facility] Progress notes.    Kenney Birch, PharmD  "

## 2021-10-01 NOTE — PROGRESS NOTES
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 Initial work-up was for macrocytosis which led to a protein electrophoresis which showed markedly elevated lambda light chains and BMX  showing increased plasma cells. Cytogenetics were negative. He has had 2 cycles of RVD. Proteins went up with first cycle, being repeated today.He feels well with minimal side effects from treatment and CBCs have been ok. He also has prostate cancer Seeing Dr. Isaac in consultation tomorrow.     ROS:    Constitutional: No weight loss. Some fatigue  Skin: No rash or jaundice  HENT: No change in eyesight or hearing  Cardiovascular:No chest pain or arrythmia  Respiratory:No cough. Occasional  SOB  GI:No nausea, vomiting, diarrhea, constipation  :No dysuria or frequency  Musculoskeletal:No bone or joint pain  Neuro:No sx's of neuropathy  Psych: No complaints    PMH:      Allergies   Allergen Reactions   • Grass Pollen(K-O-R-T-Swt Rodrigo) Shortness of Breath   • Pet Dander [Cat Hair Extract] Shortness of Breath and Unspecified   • Milk [Dairy Food Allergy] Unspecified   • Sagebrush Unspecified       Past Medical History:   Diagnosis Date   • Disorder of thyroid     hypothyroid   • High cholesterol    • Psychiatric problem     depression        Past Surgical History:   Procedure Laterality Date   • CO DX BONE MARROW ASPIRATIONS Left 7/23/2021    Procedure: ASPIRATION, BONE MARROW - DURANT;  Surgeon: Hair Okeefe M.D.;  Location: [facility];  Service: Orthopedics   • CO DX BONE MARROW BIOPSIES Left 7/23/2021    Procedure: BIOPSY, BONE MARROW, USING NEEDLE OR TROCAR;  Surgeon: Hair Okeefe M.D.;  Location: [facility];  Service: Orthopedics        Medications:    Current Outpatient Medications on File Prior to Visit   Medication Sig Dispense Refill   • lenalidomide 25 MG Cap Take 25 mg (1 cap) by  "mouth on Days 1 - 14 of each 21-day cycle (2 weeks ON & 1 week OFF) 14 Capsule 0   • Coenzyme Q10 100 MG Cap Take  by mouth every day.     • VITAMIN K PO Take  by mouth. 25mg PO bid     • zolpidem (AMBIEN) 10 MG Tab Take 1 Tablet by mouth at bedtime as needed for Sleep for up to 30 days. 30 Tablet 3   • aspirin EC (ECOTRIN) 81 MG Tablet Delayed Response Take 81 mg by mouth every day.     • dexamethasone (DECADRON) 4 MG Tab Take 10 pills once a week 40 tablet 6   • valACYclovir (VALTREX) 500 MG Tab Take 500 mg by mouth 2 times a day. No known stop date, TWICE DAILY     • FLUoxetine (PROZAC) 20 MG Cap Take 1 capsule by mouth every day. 90 capsule 4   • Omega-3 Fatty Acids (FISH OIL) 1000 MG Cap capsule Take 1,000 mg by mouth 2 times a day.     • levothyroxine (SYNTHROID) 100 MCG Tab Take 1 tablet by mouth Every morning on an empty stomach. 100 tablet 3   • simvastatin (ZOCOR) 20 MG Tab Take 1 tablet by mouth every evening. 100 tablet 3     No current facility-administered medications on file prior to visit.       Social History     Tobacco Use   • Smoking status: Former Smoker     Years: 20.00     Quit date:      Years since quittin.7   • Smokeless tobacco: Never Used   Substance Use Topics   • Alcohol use: Never        History reviewed. No pertinent family history.     Objective    Vitals:    /72 (BP Location: Right arm, Patient Position: Sitting, BP Cuff Size: Adult)   Pulse 84   Temp 36.8 °C (98.2 °F) (Temporal)   Resp 16   Ht 1.7 m (5' 6.93\")   Wt 76.5 kg (168 lb 12.2 oz)   SpO2 96%   BMI 26.49 kg/m²     Physical Exam:    Appears well-developed and well-nourished. No distress.    Head -  Normocephalic .   Eyes - Pupils are equal. Conjunctivae normal. No scleral icterus.   Ears - normal hearing.    Neurological -   Alert and oriented.  Skin -  No rash noted. Not diaphoretic. No erythema. No pallor. No jaundice   Psychiatric -  Normal mood and affect.    Labs:      AssessResults for JUANITA " JAMES SANCHES (MRN [id number])    Ref. Range 9/16/2021 14:10 9/23/2021 11:00 9/30/2021 13:40   WBC Latest Ref Range: 4.8 - 10.8 K/uL 6.6 4.9 3.9 (L)   RBC Latest Ref Range: 4.70 - 6.10 M/uL 3.58 (L) 3.57 (L) 3.81 (L)   Hemoglobin Latest Ref Range: 14.0 - 18.0 g/dL 12.5 (L) 12.6 (L) 13.1 (L)   Hematocrit Latest Ref Range: 42.0 - 52.0 % 36.5 (L) 37.4 (L) 39.4 (L)   MCV Latest Ref Range: 81.4 - 97.8 fL 102.0 (H) 104.8 (H) 103.4 (H)   MCH Latest Ref Range: 27.0 - 33.0 pg 34.9 (H) 35.3 (H) 34.4 (H)   MCHC Latest Ref Range: 33.7 - 35.3 g/dL 34.2 33.7 33.2 (L)   RDW Latest Ref Range: 35.9 - 50.0 fL 49.0 51.1 (H) 52.2 (H)   Platelet Count Latest Ref Range: 164 - 446 K/uL 246 293 221   MPV Latest Ref Range: 9.0 - 12.9 fL 10.0 9.8 10.5   Neutrophils-Polys Latest Ref Range: 44.00 - 72.00 % 68.60 63.00 68.40   Neutrophils (Absolute) Latest Ref Range: 1.82 - 7.42 K/uL 4.55 3.08 2.64   Lymphocytes Latest Ref Range: 22.00 - 41.00 % 13.10 (L) 19.80 (L) 21.20 (L)   Lymphs (Absolute) Latest Ref Range: 1.00 - 4.80 K/uL 0.87 (L) 0.97 (L) 0.82 (L)   Results for JAMES GRANT (MRN [id number])    Ref. Range 6/4/2021 14:51 8/2/2021 16:16 9/23/2021 13:40   Free Kappa Light Chains Latest Ref Range: 3.30 - 19.40 mg/L 3.09 (L)  3.07 (L)   Free Lambda Light Chains Latest Ref Range: 5.71 - 26.30 mg/L 795.40 (H)  890.19 (H)   Kappa-Lambda Ratio Latest Ref Range: 0.26 - 1.65  <0.01 (L)  <0.01 (L)   Results for JAMES GRANT (MRN [id number])    Ref. Range 10/7/2021 11:03   WBC Latest Ref Range: 4.8 - 10.8 K/uL 5.7   RBC Latest Ref Range: 4.70 - 6.10 M/uL 3.96 (L)   Hemoglobin Latest Ref Range: 14.0 - 18.0 g/dL 13.9 (L)   Hematocrit Latest Ref Range: 42.0 - 52.0 % 41.2 (L)   MCV Latest Ref Range: 81.4 - 97.8 fL 104.0 (H)   MCH Latest Ref Range: 27.0 - 33.0 pg 35.1 (H)   MCHC Latest Ref Range: 33.7 - 35.3 g/dL 33.7   RDW Latest Ref Range: 35.9 - 50.0 fL 52.8 (H)   Platelet Count Latest Ref Range: 164 - 446 K/uL 208       Imp:    Visit Diagnosis:     1. Multiple myeloma not having achieved remission (HCC)  Monoclonal Protein and FLC, Serum   2. Prostate cancer (HCC)           Plan:  Continue RVD  See me in a month with lab 1 week prior    Gary Aaron M.D.

## 2021-10-06 ENCOUNTER — TELEPHONE (OUTPATIENT)
Dept: HEMATOLOGY ONCOLOGY | Facility: MEDICAL CENTER | Age: 71
End: 2021-10-06

## 2021-10-06 DIAGNOSIS — C90.00 MULTIPLE MYELOMA NOT HAVING ACHIEVED REMISSION (HCC): ICD-10-CM

## 2021-10-06 DIAGNOSIS — D80.1 HYPOGAMMAGLOBULINEMIA (HCC): ICD-10-CM

## 2021-10-06 RX ORDER — LENALIDOMIDE 25 MG/1
CAPSULE ORAL
Qty: 14 CAPSULE | Refills: 0 | Status: SHIPPED | OUTPATIENT
Start: 2021-10-06 | End: 2021-10-25 | Stop reason: SDUPTHER

## 2021-10-06 NOTE — TELEPHONE ENCOUNTER
Oral Chemo Compliance review for lenalidomide (Revlimid)    Current dose:  25 mg PO on Days 1 - 14 orf each 21-day cycle

## 2021-10-07 ENCOUNTER — OUTPATIENT INFUSION SERVICES (OUTPATIENT)
Dept: ONCOLOGY | Facility: MEDICAL CENTER | Age: 71
End: 2021-10-07
Attending: INTERNAL MEDICINE
Payer: MEDICARE

## 2021-10-07 ENCOUNTER — OFFICE VISIT (OUTPATIENT)
Dept: HEMATOLOGY ONCOLOGY | Facility: MEDICAL CENTER | Age: 71
End: 2021-10-07
Payer: MEDICARE

## 2021-10-07 VITALS
WEIGHT: 167.33 LBS | DIASTOLIC BLOOD PRESSURE: 61 MMHG | RESPIRATION RATE: 17 BRPM | HEART RATE: 74 BPM | SYSTOLIC BLOOD PRESSURE: 135 MMHG | HEIGHT: 67 IN | OXYGEN SATURATION: 98 % | BODY MASS INDEX: 26.26 KG/M2 | TEMPERATURE: 97.4 F

## 2021-10-07 VITALS
SYSTOLIC BLOOD PRESSURE: 132 MMHG | TEMPERATURE: 98.2 F | BODY MASS INDEX: 26.49 KG/M2 | HEART RATE: 84 BPM | HEIGHT: 67 IN | WEIGHT: 168.76 LBS | RESPIRATION RATE: 16 BRPM | DIASTOLIC BLOOD PRESSURE: 72 MMHG | OXYGEN SATURATION: 96 %

## 2021-10-07 DIAGNOSIS — C90.00 MULTIPLE MYELOMA NOT HAVING ACHIEVED REMISSION (HCC): ICD-10-CM

## 2021-10-07 DIAGNOSIS — C61 PROSTATE CANCER (HCC): ICD-10-CM

## 2021-10-07 LAB
BASOPHILS # BLD AUTO: 0.7 % (ref 0–1.8)
BASOPHILS # BLD: 0.04 K/UL (ref 0–0.12)
EOSINOPHIL # BLD AUTO: 0.18 K/UL (ref 0–0.51)
EOSINOPHIL NFR BLD: 3.2 % (ref 0–6.9)
ERYTHROCYTE [DISTWIDTH] IN BLOOD BY AUTOMATED COUNT: 52.8 FL (ref 35.9–50)
HCT VFR BLD AUTO: 41.2 % (ref 42–52)
HGB BLD-MCNC: 13.9 G/DL (ref 14–18)
IMM GRANULOCYTES # BLD AUTO: 0.02 K/UL (ref 0–0.11)
IMM GRANULOCYTES NFR BLD AUTO: 0.4 % (ref 0–0.9)
LYMPHOCYTES # BLD AUTO: 0.73 K/UL (ref 1–4.8)
LYMPHOCYTES NFR BLD: 12.9 % (ref 22–41)
MCH RBC QN AUTO: 35.1 PG (ref 27–33)
MCHC RBC AUTO-ENTMCNC: 33.7 G/DL (ref 33.7–35.3)
MCV RBC AUTO: 104 FL (ref 81.4–97.8)
MONOCYTES # BLD AUTO: 0.57 K/UL (ref 0–0.85)
MONOCYTES NFR BLD AUTO: 10.1 % (ref 0–13.4)
NEUTROPHILS # BLD AUTO: 4.13 K/UL (ref 1.82–7.42)
NEUTROPHILS NFR BLD: 72.7 % (ref 44–72)
NRBC # BLD AUTO: 0 K/UL
NRBC BLD-RTO: 0 /100 WBC
PLATELET # BLD AUTO: 208 K/UL (ref 164–446)
PMV BLD AUTO: 10.8 FL (ref 9–12.9)
RBC # BLD AUTO: 3.96 M/UL (ref 4.7–6.1)
WBC # BLD AUTO: 5.7 K/UL (ref 4.8–10.8)

## 2021-10-07 PROCEDURE — 83520 IMMUNOASSAY QUANT NOS NONAB: CPT | Mod: 91

## 2021-10-07 PROCEDURE — 96401 CHEMO ANTI-NEOPL SQ/IM: CPT

## 2021-10-07 PROCEDURE — 36415 COLL VENOUS BLD VENIPUNCTURE: CPT

## 2021-10-07 PROCEDURE — 85025 COMPLETE CBC W/AUTO DIFF WBC: CPT

## 2021-10-07 PROCEDURE — 99213 OFFICE O/P EST LOW 20 MIN: CPT | Performed by: INTERNAL MEDICINE

## 2021-10-07 PROCEDURE — 700111 HCHG RX REV CODE 636 W/ 250 OVERRIDE (IP): Mod: JG | Performed by: INTERNAL MEDICINE

## 2021-10-07 RX ADMIN — BORTEZOMIB 2.48 MG: 3.5 INJECTION, POWDER, LYOPHILIZED, FOR SOLUTION INTRAVENOUS; SUBCUTANEOUS at 14:02

## 2021-10-07 ASSESSMENT — FIBROSIS 4 INDEX
FIB4 SCORE: 1.34
FIB4 SCORE: 1.34

## 2021-10-07 ASSESSMENT — PAIN SCALES - GENERAL: PAINLEVEL: NO PAIN

## 2021-10-07 NOTE — PROGRESS NOTES
"Pharmacy Chemotherapy Calculation    Dx: MM    Protocol: RVd     *Dosing Reference*  Bortezomib 1.3 mg/m2 subcutaneous on Days 1, 4, 8, 11              -MD dosing on Days 1, 8, 15 of a 21 day cycle  Lenalidomide 25 mg PO daily on Days 1-14  Dexamethasone 20 mg PO daily on Days 1-2, 4-5, 8-9, 11-12 OR 40 mg PO on Days 1, 8, and 15  21 day cycle for 3-4 cycles OR until disease progression or unacceptable toxicity  NCCN Guidelines for Multiple Myeloma.V.1.2022  Amadeo P, et al. Blood. 2010;116(5):679-86  Dominic S, et al/ Blood. 2012;119(19):9435-82    Allergies:  Grass pollen(k-o-r-t-swt jose), Pet dander [cat hair extract], Milk [dairy food allergy], [place], and [place] oil     /61   Pulse 74   Temp 36.3 °C (97.4 °F) (Temporal)   Resp 17   Ht 1.71 m (5' 7.32\")   Wt 75.9 kg (167 lb 5.3 oz)   SpO2 98%   BMI 25.96 kg/m²  Body surface area is 1.9 meters squared.     Labs 10/7/21:  ANC~ 4130 Plt = 208k   Hgb = 13.9       Drug Order   (Drug name, dose, route, IV Fluid & volume, frequency, number of doses) Cycle 2, Day 15  Previous treatment: C2D8 on 9/30/21   Medication = Bortezomib (Velcade)  Base Dose = 1.3 mg/m2  Calc Dose: Base Dose x 1.9 m2 = 2.47 mg  Final Dose = 2.48 mg  Route = Subcutaneous  Conc. = 2.5 mg/mL  Fluid & Volume = 0.99 mL in syringe  Administration = ABDOMEN or THIGH          <10% difference, okay to treat with final dose   By my signature below, I confirm this process was performed independently with the BSA and all final chemotherapy dosing calculations congruent. I have reviewed the above chemotherapy order and that my calculation of the final dose and BSA (when applicable) corroborate those calculations of the  pharmacist. Discrepancies of 10% or greater in the written dose have been addressed and documented within the [facility] Progress notes.    Nick Williamson, PharmD  "

## 2021-10-07 NOTE — PROGRESS NOTES
Chemotherapy Verification - SECONDARY RN       Height = 171 cm  Weight = 75.9 kg  BSA = 1.9 m2       Medication: Velcade  Dose: 1.3 mg/m2  Calculated Dose: 2.47 mg                             (In mg/m2, AUC, mg/kg)       I confirm that this process was performed independently.

## 2021-10-07 NOTE — PROGRESS NOTES
Chemotherapy Verification - SECONDARY RN       Height = 171 cm  Weight = 75.9 kg  BSA = 1.9 m^2       Medication: Bortezomib  Dose: 1.3 mg/m^2  Calculated Dose: 2.47 mg                             (In mg/m2, AUC, mg/kg)     I confirm that this process was performed independently.

## 2021-10-07 NOTE — PROGRESS NOTES
Pt arrived to [facility], ambulatory, for Velcade. Pt voices no complaints. Labs reviewed, pt within parameters to treat today. Velcade injected into the RLQ abdomen with no complications. Pt left IS with no s/sx of distress. Follow up appointment confirmed.

## 2021-10-07 NOTE — PROGRESS NOTES
"Pharmacy Chemotherapy Verification  Patient Name: Gary Hedrick  Dx: MM    Cycle 2 Day 15  Previous treatment = C2D8 on 9/30/21    Regimen and Dosing Reference  Bortezomib 1.3 mg/m2 subcutaneous on    -MD dosing on Days 1, 8, 15 of a 21 day cycle  Lenalidomide 25 mg PO daily on Days 1-14  Dexamethasone 20 mg PO daily on Days 1-2, 4-5, 8-9, 11-12 OR 40 mg PO on Days 1, 8, and 15  21 day cycle for 3-4 cycles OR until disease progression or unacceptable toxicity  NCCN Guidelines for Multiple Myeloma.V.1.2022  Amadeo P, et al. Blood. 2010;116(5):679-86  Alfaro S, et al/ Blood. 2012;119(19):4375-82  John M, et al. J Clin Oncol. 2014;32(25):2712-7    Allergies:Grass pollen(k-o-r-t-swt jose), Pet dander [cat hair extract], [place] meal, Milk [dairy food allergy], [place], and [place] oil  /61   Pulse 74   Temp 36.3 °C (97.4 °F) (Temporal)   Resp 17   Ht 1.71 m (5' 7.32\")   Wt 75.9 kg (167 lb 5.3 oz)   SpO2 98%   BMI 25.96 kg/m²   Body surface area is 1.9 meters squared.    Labs 10/7/21  ANC~ 4130 Plt = 208k   Hgb = 13.9       Bortezomib (Velcade) 1.3 mg/m2 x 1.9 m2 = 2.47 mg   <10% difference, OK to treat with final dose = 2.48 mg subcutaneous    Kenney Birch, PharmD    "

## 2021-10-08 ENCOUNTER — HOSPITAL ENCOUNTER (OUTPATIENT)
Dept: RADIATION ONCOLOGY | Facility: MEDICAL CENTER | Age: 71
End: 2021-10-31
Attending: RADIOLOGY
Payer: MEDICARE

## 2021-10-08 VITALS
HEIGHT: 68 IN | BODY MASS INDEX: 25.66 KG/M2 | WEIGHT: 169.31 LBS | OXYGEN SATURATION: 98 % | SYSTOLIC BLOOD PRESSURE: 175 MMHG | HEART RATE: 91 BPM | DIASTOLIC BLOOD PRESSURE: 70 MMHG

## 2021-10-08 DIAGNOSIS — N40.1 BENIGN LOCALIZED PROSTATIC HYPERPLASIA WITH LOWER URINARY TRACT SYMPTOMS (LUTS): ICD-10-CM

## 2021-10-08 DIAGNOSIS — C61 PROSTATE CANCER (HCC): ICD-10-CM

## 2021-10-08 PROBLEM — M75.100 ROTATOR CUFF SYNDROME: Status: ACTIVE | Noted: 2021-10-08

## 2021-10-08 PROCEDURE — 99205 OFFICE O/P NEW HI 60 MIN: CPT | Performed by: RADIOLOGY

## 2021-10-08 PROCEDURE — 99214 OFFICE O/P EST MOD 30 MIN: CPT

## 2021-10-08 RX ORDER — RELUGOLIX 120 MG/1
TABLET, FILM COATED ORAL
Qty: 30 TABLET | Refills: 5 | Status: SHIPPED | OUTPATIENT
Start: 2021-10-08 | End: 2021-10-10

## 2021-10-08 RX ORDER — TAMSULOSIN HYDROCHLORIDE 0.4 MG/1
0.8 CAPSULE ORAL
Qty: 60 CAPSULE | Refills: 5 | Status: SHIPPED | OUTPATIENT
Start: 2021-10-08 | End: 2021-11-03

## 2021-10-08 ASSESSMENT — PAIN SCALES - GENERAL: PAINLEVEL: NO PAIN

## 2021-10-08 ASSESSMENT — FIBROSIS 4 INDEX: FIB4 SCORE: 1.34

## 2021-10-08 NOTE — CONSULTS
RADIATION ONCOLOGY CONSULT    DATE OF SERVICE: 10/8/2021    IDENTIFICATION: A 71 y.o. male with  Visit Diagnoses     ICD-10-CM   1. Benign localized prostatic hyperplasia with lower urinary tract symptoms (LUTS)  N40.1   2. Prostate cancer (HCC)  C61     Prostate cancer (HCC)  Staging form: Prostate, AJCC 8th Edition  - Clinical stage from 10/8/2021: Stage IIB (cT1c, cN0, cM0, PSA: 4.1, Grade Group: 2) - Signed by Kirk DELANEY M.D. on 10/8/2021  Histopathologic type: Adenocarcinoma, NOS  Stage prefix: Initial diagnosis  Prostate specific antigen (PSA) range: Less than 10  Sarah primary pattern: 3  Sarah secondary pattern: 4  Sarah score: 7  Histologic grading system: 5 grade system  Bilateral cancer: Yes  Number of biopsy cores examined: 12  Number of biopsy cores positive: 6  Location of positive needle core biopsies: Both sides    He is here at the kind request of Kerline MARTIN for radiotherapy evaluation.     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  He is presently on RVD having completed 3 cycles.  He also has a past medical history significant for hypogonadism and has been on testosterone during replacement therapy until recently.    He states his PSAs have fluctuated in the 1-2 range.  More recently PSA was noted to be 4.10.  He underwent an MRI on 6/24/2021.  MRI demonstrated 57 cc gland without suspicious lesion.    He was referred by his primary care physician for urologic work-up.  He saw Dr. Lazar and underwent transrectal ultrasound directed biopsies of the prostate on 7/15/2021.  Volume was noted to be 52 cc.  12 core biopsy prostate obtained demonstrating Sarah 3+4 disease involving the right base and right base lateral.  [place] 3+3 disease noted right mid lateral right apex lateral left mid and left mid lateral.  In all 6 out of 12 cores were  involved with prostate cancer.    June 2021 he was evaluated for macrocytosis without anemia.  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  Bone marrow biopsy demonstrates 66 to 70% monoclonal plasma cells.  PET/CT, FDG, 8/19/2021 showed no evidence of metastatic disease.  RVD started and completed x3.    Current complaints mild fatigue.  He does report urinary frequency, intermittency, weak stream, incomplete emptying and nocturia 1-2 times per night.  Current IPSS score 17-18.  Quality-of-life score 3.    PAST MEDICAL HISTORY:   Past Medical History:   Diagnosis Date   • Disorder of thyroid     hypothyroid   • High cholesterol    • Light chain myeloma (HCC) 2021   • Prostate cancer (HCC)    • Psychiatric problem     depression       PAST SURGICAL HISTORY:  Past Surgical History:   Procedure Laterality Date   • MN DX BONE MARROW ASPIRATIONS Left 7/23/2021    Procedure: ASPIRATION, BONE MARROW - DURANT;  Surgeon: Hair Okeefe M.D.;  Location: ENDOSCOPY [facility];  Service: Orthopedics   • MN DX BONE MARROW BIOPSIES Left 7/23/2021    Procedure: BIOPSY, BONE MARROW, USING NEEDLE OR TROCAR;  Surgeon: Hair Okeefe M.D.;  Location: ENDOSCOPY [facility];  Service: Orthopedics   • WRIST FUSION Left        CURRENT MEDICATIONS:  Current Outpatient Medications   Medication Sig Dispense Refill   • VITAMIN D PO Take 8,000 Units by mouth.     • tamsulosin (FLOMAX) 0.4 MG capsule Take 2 Capsules by mouth 1/2 hour after dinner for 180 doses. Take one capsule for a week then increase to two capsules 60 Capsule 5   • lenalidomide 25 MG Cap Take 25 mg (1 cap) by mouth on Days 1 - 14 of each 21-day cycle (2 weeks ON & 1 week OFF) 14 Capsule 0   • Coenzyme Q10 100 MG Cap Take  by mouth every day.     • VITAMIN K PO Take  by mouth. 25mg PO bid     • zolpidem (AMBIEN) 10 MG Tab Take 1 Tablet by mouth at bedtime as needed for Sleep for up to 30 days. 30 Tablet 3   • aspirin EC (ECOTRIN)  "81 MG Tablet Delayed Response Take 81 mg by mouth every day.     • dexamethasone (DECADRON) 4 MG Tab Take 10 pills once a week 40 tablet 6   • valACYclovir (VALTREX) 500 MG Tab Take 500 mg by mouth 2 times a day. No known stop date, TWICE DAILY     • FLUoxetine (PROZAC) 20 MG Cap Take 1 capsule by mouth every day. 90 capsule 4   • Omega-3 Fatty Acids (FISH OIL) 1000 MG Cap capsule Take 1,000 mg by mouth 2 times a day.     • levothyroxine (SYNTHROID) 100 MCG Tab Take 1 tablet by mouth Every morning on an empty stomach. 100 tablet 3   • simvastatin (ZOCOR) 20 MG Tab Take 1 tablet by mouth every evening. 100 tablet 3     No current facility-administered medications for this encounter.       ALLERGIES:    Grass pollen(k-o-r-t-swt jose), Pet dander [cat hair extract], Milk [dairy food allergy], [place]brush, and [place] (diagnostic)    FAMILY HISTORY:    Family History   Problem Relation Age of Onset   • Cancer Neg Hx          SOCIAL HISTORY:     reports that he quit smoking about 34 years ago. He quit after 20.00 years of use. He has never used smokeless tobacco. He reports previous drug use. Drug: Marijuana. He reports that he does not drink alcohol.     OCCUPATION: working as a  Clinical Psychologist    PAIN ASSESSMENT:  Pain Assessment 10/8/2021 10/7/2021 9/23/2021 9/16/2021 9/9/2021 8/20/2021   Pain Score 0 0 0 0 0 0   Some recent data might be hidden       REVIEW OF SYSTEMS:  A review of systems for today's date of service was reviewed and uploaded into the electronic medical record.      ECOG PERFORMANCE STATUS:  No flowsheet data found.    PHYSICAL EXAM:    Vitals:    10/08/21 1029   BP: (!) 175/70   Pulse: 91   SpO2: 98%   Weight: 76.8 kg (169 lb 5 oz)   Height: 1.727 m (5' 8\")   Pain Score: No pain        GENERAL: Alert oriented male appearing in no acute distress  HEENT:  Pupils are equal, round, and reactive to light.  Extraocular muscles   are intact. Sclerae nonicteric.  Conjunctivae pink.  Oral cavity, tongue "   protrudes midline.   NECK:  Supple without evidence of thyromegaly.  NODES:  No palpable adenopathy of the neck, supraclavicular fossa or axillae   bilaterally.  LUNGS:  Clear to ascultation and resonant to percussion.  HEART:  Regular rate and rhythm.  No murmur appreciated  ABDOMEN:  Soft. No evidence of hepatosplenomegaly.  Positive bowel sounds.  RECTAL: Normal sphincter tone.  Prostate gland mildly enlarged without nodularity.  EXTREMITIES:  Without Edema.  NEUROLOGIC:  Cranial nerves II through XII were intact.  Strength is 5/5 in   lower extremities bilaterally.   There was no focal sensory deficit appreciated.          INTERNATIONAL PROSTATE SYMPTOM SCORE (I-PSS):  I-PSS Review 10/8/2021   Incomplete Emptying- How often have you had the sensation of not emptying your bladder? 4   How often have you had to urinate less than every tow hours? 3   Intermittency- How often have you found you stopped and started again several times when you urinated? 3   Urgency- How often have you found it difficult to postpone urination? 1   Weak Stream- How often have you had a weak urinary stream? 4   Straining- How often have you had to strain to start urination? 1   Nocturia- How many times did you typically get up at night to urinate? 2   Score: 18       QUALITY OF LIFE DUE TO URINARY SYMPTOMS:     If you were to spend the rest of your life with your urinary condition just the way it is now, how would you feel about that? 3 = Mixed    SEXUAL HEALTH INVENTORY FOR MEN (EULALIO):   EULALIO Total 10/8/2021   How do you rate your confidence that you could get and keep an erection? 1   When you had erections with sexual stimulation, how often were your erections hard enough for penetration (entering your partner)? 1   During sexual intercourse, how often were you able to maintain you erection after you had penetrated (entered) your partner? 1   During Sexual intercourse, how difficult was it to maintain your erection to completion of  intercourse? 1   When you attampted sexual intercourse, how often was it satisfactory for you? 1   Total: 5       LABORATORY DATA:   Recent Labs     10/07/21  1103   WBC 5.7   RBC 3.96*   HEMOGLOBIN 13.9*   HEMATOCRIT 41.2*   .0*   MCH 35.1*   MCHC 33.7   RDW 52.8*   PLATELETCT 208   MPV 10.8         Lab Results   Component Value Date/Time    PSATOTAL 4.10 (H) 04/12/2021 08:04 AM    PSATOTAL 4.10 (H) 04/12/2021 08:01 AM       RADIOLOGY DATA:  Results for orders placed during the hospital encounter of 08/19/21    [id number] BASE TO MID-THIGH    Impression  No evidence of abnormal FDG accumulation.    IMPRESSION:    A 71 y.o. with   Visit Diagnoses     ICD-10-CM   1. Benign localized prostatic hyperplasia with lower urinary tract symptoms (LUTS)  N40.1   2. Prostate cancer (HCC)  C61     Prostate cancer (HCC)  Staging form: Prostate, AJCC 8th Edition  - Clinical stage from 10/8/2021: Stage IIB (cT1c, cN0, cM0, PSA: 4.1, Grade Group: 2) - Signed by Kirk DELANEY M.D. on 10/8/2021  Histopathologic type: Adenocarcinoma, NOS  Stage prefix: Initial diagnosis  Prostate specific antigen (PSA) range: Less than 10  Sarah primary pattern: 3  [place] secondary pattern: 4  Sarah score: 7  Histologic grading system: 5 grade system  Bilateral cancer: Yes  Number of biopsy cores examined: 12  Number of biopsy cores positive: 6  Location of positive needle core biopsies: Both sides        RECOMMENDATIONS:    We reviewed with Mr. Hedrick  the risk stratification of prostate cancer.  He falls into the unfavorable intermediate-risk  group secondary to Sarah score 3+4 disease and percent positive biopsies.(1) This group of patients appears to have similar outcomes as high-risk patients.  We reviewed his options, including surgical resection, external beam radiotherapy with 4 to 6 months of ADT, or external beam radiotherapy combined with low-dose rate seed implant and 4 to 6 months of ADT.     Based on the data  from the ASCENDE-RT trial (2) I do think the combined seed implant external beam ADT option would provide the best biochemical disease-free survival results.  Recent updated results with median follow-up of 10 years continues to demonstrate significant improvement in biochemical progression free survival at 10 and 15 years with low-dose rate prostate boost versus dose escalated external beam radiotherapy -- 85 and 80% versus 66 and 53% respectively.( 3)    We reviewed the technical aspects benefits risks associated with all the treatment options.  We also reviewed the use of space OAR and gold fiducial markers for rectal avoidance and  prostatic tracking during treatment respectively.      After a lengthy 1 hour face-to-face discussion he would like like to proceed with plan.  He will begin Orgovyx followed approximately 4 weeks by cesium 131, ultrasound-guided, transperineal, seed implant delivering 9500 cGy.  Space OAR and gold fiducial markers will be placed immediately post seed implant.  This will be followed approximately 8 weeks later with supplemental external beam radiotherapy to the pelvis delivering 5040 centigrade in 28 fractions over 5.5 weeks using IMRT.  Tentative implant date 12/14/2021.   Return for preop visit early part of December 2021.    Discussed with Dr. Aaron any potential interactions/complications with his current myeloma regimen.  Dr. Aaron felt there would be no contraindications.  He may experience decreased blood counts will need to be monitored.    Thank you for the opportunity to participate in his care.  If any questions or comments, please do not hesitate in calling.     1. JIMENEZ Franco I. Pei, THA Ward, YJean Paul Barba, SHIVANI Sharma, et al. A new risk classification system for therapeutic decision making with intermediate-risk prostate cancer patients undergoing dose-escalated external-beam radiation therapy. Eur Urol 2013 Vol. 64 Issue 6 Pages 890-90 DOI:  10.1016/j.eururo.2013.03.033    2.  GIANNA. RONIT Shoemaker S. Rodda, R. Halperin, H. Pai, SHIVANI Alejandro, et al. Androgen Suppression Combined with Elective Afshin and Dose Escalated Radiation Therapy (the ASCENDE-RT Trial): An Analysis of Survival Endpoints for a Randomized Trial Comparing a Low-Dose-Rate Brachytherapy Boost to a Dose-Escalated External Beam Boost for High- and Intermediate-risk Prostate Cancer. Int J Radiat Oncol Biol Phys 2017 Vol. 98 Issue 2 Pages 275-285  DOI: 10.1016/j.ijrobp.2016.11.026    3. QIAN. Oh et al. Updated analysis of survival endpoints for ASCENDE-RT, a randomized trial compairng a low-dose-rate brachytherapy boost to a dose-escalated external beam boost for High and intermediate risk prostate cancer.  IJORBP Vol 108, Issue 3, Supplement, S62, Nov 1, 2020  https://doi.org/10.1016/j.ijrobp.2020.07.2194

## 2021-10-08 NOTE — ADDENDUM NOTE
Encounter addended by: Kirk DELANEY M.D. on: 10/8/2021 3:20 PM   Actions taken: Pharmacy for encounter modified, Order list changed, Diagnosis association updated

## 2021-10-09 LAB
KAPPA LC FREE SER-MCNC: 5.02 MG/L (ref 3.3–19.4)
KAPPA LC FREE/LAMBDA FREE SER NEPH: 0.01 {RATIO} (ref 0.26–1.65)
LAMBDA LC FREE SERPL-MCNC: 451.62 MG/L (ref 5.71–26.3)

## 2021-10-11 ENCOUNTER — TELEPHONE (OUTPATIENT)
Dept: HEMATOLOGY ONCOLOGY | Facility: MEDICAL CENTER | Age: 71
End: 2021-10-11

## 2021-10-11 NOTE — TELEPHONE ENCOUNTER
Patient is not currently scheduled for treatment on date, no further action needs to be completed

## 2021-10-11 NOTE — TELEPHONE ENCOUNTER
Patient is calling re: infusion scheduling.  He just saw Dr. Isaac, and scheduled surgery for December 16th, which is the same day as infusion.  Patient would prefer that infusion be re-scheduled on Monday or Friday of that week.

## 2021-10-13 ENCOUNTER — PATIENT OUTREACH (OUTPATIENT)
Dept: OTHER | Facility: MEDICAL CENTER | Age: 71
End: 2021-10-13

## 2021-10-14 ENCOUNTER — APPOINTMENT (OUTPATIENT)
Dept: HEMATOLOGY ONCOLOGY | Facility: MEDICAL CENTER | Age: 71
End: 2021-10-14
Payer: MEDICARE

## 2021-10-14 ENCOUNTER — OUTPATIENT INFUSION SERVICES (OUTPATIENT)
Dept: ONCOLOGY | Facility: MEDICAL CENTER | Age: 71
End: 2021-10-14
Attending: INTERNAL MEDICINE
Payer: MEDICARE

## 2021-10-14 VITALS
OXYGEN SATURATION: 98 % | TEMPERATURE: 98 F | SYSTOLIC BLOOD PRESSURE: 141 MMHG | HEART RATE: 83 BPM | DIASTOLIC BLOOD PRESSURE: 51 MMHG | WEIGHT: 169.75 LBS | HEIGHT: 67 IN | RESPIRATION RATE: 18 BRPM | BODY MASS INDEX: 26.64 KG/M2

## 2021-10-14 VITALS — HEIGHT: 67 IN | WEIGHT: 169.75 LBS | BODY MASS INDEX: 26.64 KG/M2

## 2021-10-14 DIAGNOSIS — C90.00 MULTIPLE MYELOMA NOT HAVING ACHIEVED REMISSION (HCC): ICD-10-CM

## 2021-10-14 LAB
ALBUMIN SERPL BCP-MCNC: 4.1 G/DL (ref 3.2–4.9)
ALBUMIN/GLOB SERPL: 1.8 G/DL
ALP SERPL-CCNC: 63 U/L (ref 30–99)
ALT SERPL-CCNC: 20 U/L (ref 2–50)
ANION GAP SERPL CALC-SCNC: 14 MMOL/L (ref 7–16)
AST SERPL-CCNC: 15 U/L (ref 12–45)
BASOPHILS # BLD AUTO: 0.6 % (ref 0–1.8)
BASOPHILS # BLD: 0.03 K/UL (ref 0–0.12)
BILIRUB SERPL-MCNC: 0.4 MG/DL (ref 0.1–1.5)
BUN SERPL-MCNC: 16 MG/DL (ref 8–22)
CALCIUM SERPL-MCNC: 9.4 MG/DL (ref 8.5–10.5)
CHLORIDE SERPL-SCNC: 102 MMOL/L (ref 96–112)
CO2 SERPL-SCNC: 24 MMOL/L (ref 20–33)
CREAT SERPL-MCNC: 0.9 MG/DL (ref 0.5–1.4)
EOSINOPHIL # BLD AUTO: 0.05 K/UL (ref 0–0.51)
EOSINOPHIL NFR BLD: 1 % (ref 0–6.9)
ERYTHROCYTE [DISTWIDTH] IN BLOOD BY AUTOMATED COUNT: 54.7 FL (ref 35.9–50)
GLOBULIN SER CALC-MCNC: 2.3 G/DL (ref 1.9–3.5)
GLUCOSE SERPL-MCNC: 166 MG/DL (ref 65–99)
HCT VFR BLD AUTO: 38.8 % (ref 42–52)
HGB BLD-MCNC: 13 G/DL (ref 14–18)
IMM GRANULOCYTES # BLD AUTO: 0.01 K/UL (ref 0–0.11)
IMM GRANULOCYTES NFR BLD AUTO: 0.2 % (ref 0–0.9)
LYMPHOCYTES # BLD AUTO: 1.07 K/UL (ref 1–4.8)
LYMPHOCYTES NFR BLD: 22.4 % (ref 22–41)
MCH RBC QN AUTO: 35 PG (ref 27–33)
MCHC RBC AUTO-ENTMCNC: 33.5 G/DL (ref 33.7–35.3)
MCV RBC AUTO: 104.6 FL (ref 81.4–97.8)
MONOCYTES # BLD AUTO: 0.86 K/UL (ref 0–0.85)
MONOCYTES NFR BLD AUTO: 18 % (ref 0–13.4)
NEUTROPHILS # BLD AUTO: 2.76 K/UL (ref 1.82–7.42)
NEUTROPHILS NFR BLD: 57.8 % (ref 44–72)
NRBC # BLD AUTO: 0 K/UL
NRBC BLD-RTO: 0 /100 WBC
PLATELET # BLD AUTO: 173 K/UL (ref 164–446)
PMV BLD AUTO: 10.1 FL (ref 9–12.9)
POTASSIUM SERPL-SCNC: 4.4 MMOL/L (ref 3.6–5.5)
PROT SERPL-MCNC: 6.4 G/DL (ref 6–8.2)
RBC # BLD AUTO: 3.71 M/UL (ref 4.7–6.1)
SODIUM SERPL-SCNC: 140 MMOL/L (ref 135–145)
WBC # BLD AUTO: 4.8 K/UL (ref 4.8–10.8)

## 2021-10-14 PROCEDURE — 96401 CHEMO ANTI-NEOPL SQ/IM: CPT

## 2021-10-14 PROCEDURE — 80053 COMPREHEN METABOLIC PANEL: CPT

## 2021-10-14 PROCEDURE — 85025 COMPLETE CBC W/AUTO DIFF WBC: CPT

## 2021-10-14 PROCEDURE — 700111 HCHG RX REV CODE 636 W/ 250 OVERRIDE (IP): Mod: JG | Performed by: INTERNAL MEDICINE

## 2021-10-14 RX ORDER — RELUGOLIX 120 MG/1
TABLET, FILM COATED ORAL
COMMUNITY
Start: 2021-10-13 | End: 2021-12-14 | Stop reason: SDUPTHER

## 2021-10-14 RX ADMIN — BORTEZOMIB 2.48 MG: 3.5 INJECTION, POWDER, LYOPHILIZED, FOR SOLUTION INTRAVENOUS; SUBCUTANEOUS at 15:31

## 2021-10-14 ASSESSMENT — FIBROSIS 4 INDEX
FIB4 SCORE: 1.34
FIB4 SCORE: 1.34

## 2021-10-14 NOTE — PROGRESS NOTES
Chemotherapy Verification - PRIMARY RN      Height = 1.71 m  Weight = 77 kg  BSA = 1.91 m^2       Medication: bortezomib  Dose: 1.3 mg/m^2  Calculated Dose: 2.483 mg                             (In mg/m2, AUC, mg/kg)       I confirm this process was performed independently with the BSA and all final chemotherapy dosing calculations congruent.  Any discrepancies of 10% or greater have been addressed with the chemotherapy pharmacist. The resolution of the discrepancy has been documented in the EPIC progress notes.

## 2021-10-14 NOTE — PROGRESS NOTES
"Pharmacy Chemotherapy Verification  Patient Name: Gary Hedrick  Dx: MM    Cycle 3 Day 1  Previous treatment = C2D15 on 10/7/21    Regimen and Dosing Reference  Bortezomib 1.3 mg/m2 subcutaneous on    -MD dosing on Days 1, 8, 15 of a 21 day cycle  Lenalidomide 25 mg PO daily on Days 1-14  Dexamethasone 20 mg PO daily on Days 1-2, 4-5, 8-9, 11-12 OR 40 mg PO on Days 1, 8, and 15  21 day cycle for 3-4 cycles OR until disease progression or unacceptable toxicity  NCCN Guidelines for Multiple Myeloma.V.1.2022  Amadeo P, et al. Blood. 2010;116(5):679-86  Alfaro S, et al/ Blood. 2012;119(19):4375-82  John M, et al. J Clin Oncol. 2014;32(25):2712-7    Allergies:Grass pollen(k-o-r-t-swt jose), Pet dander [cat hair extract], [place] meal, Milk [dairy food allergy], [place], and [place] oil  Ht 1.71 m (5' 7.32\")   Wt 77 kg (169 lb 12.1 oz)   BMI 26.33 kg/m²   Body surface area is 1.91 meters squared.    Labs 10/14/21  ANC~ 2760 Plt = 173k   Hgb = 13     SCr = 0.9 mg/dL CrCl ~ 83.9 mL/min   LFT's = WNLs  TBili = 0.4     Bortezomib (Velcade) 1.3 mg/m2 x 1.91 m2 = 2.483 mg   <10% difference, OK to treat with final dose = 2.48 mg subcutaneous    Kenney Bicrh, PharmD    "

## 2021-10-14 NOTE — PROGRESS NOTES
"Pharmacy Chemotherapy Calculation    Dx: MM    Protocol: RVd     *Dosing Reference*  Bortezomib 1.3 mg/m2 subcutaneous on Days 1, 4, 8, 11              -MD dosing on Days 1, 8, 15 of a 21 day cycle  Lenalidomide 25 mg PO daily on Days 1-14  Dexamethasone 20 mg PO daily on Days 1-2, 4-5, 8-9, 11-12 OR 40 mg PO on Days 1, 8, and 15  21 day cycle for 3-4 cycles OR until disease progression or unacceptable toxicity  NCCN Guidelines for Multiple Myeloma.V.1.2022  Amadeo P, et al. Blood. 2010;116(5):679-86  Dominic S, et al/ Blood. 2012;119(19):3273-82    Allergies:  Grass pollen(k-o-r-t-swt jose), Pet dander [cat hair extract], Milk [dairy food allergy], [place], and [place] oil     Ht 1.71 m (5' 7.32\")   Wt 77 kg (169 lb 12.1 oz)   BMI 26.33 kg/m²  Body surface area is 1.91 meters squared.     Labs 10/14/21:  ANC~ 2760 Plt = 173k   Hgb = 13     SCr = 0.9 mg/dL CrCl ~ 84 mL/min   LFT's = WNL TBili = 0.4     Drug Order   (Drug name, dose, route, IV Fluid & volume, frequency, number of doses) Cycle 3 Day 1  Previous treatment: C2D15 on 10/7/21   Medication = Bortezomib (Velcade)  Base Dose = 1.3 mg/m2  Calc Dose: Base Dose x 1.91 m2 = 2.483 mg  Final Dose = 2.48 mg  Route = Subcutaneous  Conc. = 2.5 mg/mL  Fluid & Volume = 0.99 mL in syringe  Administration = ABDOMEN or THIGH          <10% difference, okay to treat with final dose   By my signature below, I confirm this process was performed independently with the BSA and all final chemotherapy dosing calculations congruent. I have reviewed the above chemotherapy order and that my calculation of the final dose and BSA (when applicable) corroborate those calculations of the  pharmacist. Discrepancies of 10% or greater in the written dose have been addressed and documented within the [facility] Progress notes.    Nick Williamson, PharmD  "

## 2021-10-14 NOTE — PROGRESS NOTES
Pt arrived to [facility] ambulatory, here for C3D1 Velcade for MM. Pt had prior appt for labs, results reviewed and WNL. Pt took home premed. Velcade given SC to LLQ, band aide applied. Pt knows to return next week, discharged home under self care in no apparent distress.

## 2021-10-14 NOTE — PROGRESS NOTES
Chemotherapy Verification - PRIMARY RN    C3    Height = 171 cm  Weight = 77 kg  BSA = 1.91 m2       Medication: Bortezomib (Velcade)  Dose: 1.3 mg/m2  Calculated Dose: 2.483 mg                               I confirm this process was performed independently with the BSA and all final chemotherapy dosing calculations congruent.  Any discrepancies of 10% or greater have been addressed with the chemotherapy pharmacist. The resolution of the discrepancy has been documented in the EPIC progress notes.

## 2021-10-14 NOTE — PROGRESS NOTES
Here for lab work (CBC, CMP), in good spirits, voices no new complaints. In good spirits, voices no new c/o.. Lab drawn from left antecubital vein with 25 x 5/8 needle, well tolerated, compression dressing applied. Discharge to self care in [facility]. Appointment confirm for next visit.

## 2021-10-18 ENCOUNTER — TELEPHONE (OUTPATIENT)
Dept: HEMATOLOGY ONCOLOGY | Facility: MEDICAL CENTER | Age: 71
End: 2021-10-18

## 2021-10-18 NOTE — TELEPHONE ENCOUNTER
Rec'vd call from pt with request for advice on what to take to help facilitate a BM.  Reminded pt of info presented in Chemo Ed regarding Milk of Magnesium or Miralax.  Pt reports he has been using a herbal mixture but it hasn't been working for him lately.  Pt reports he drinks at least 2 quarts of fluid a day & already uses Benefiber.  RN advised increasing water intake with daily fluids, if not so already.  Pt agreeable with trying MOM & will notify office if constipation persists.

## 2021-10-21 ENCOUNTER — OUTPATIENT INFUSION SERVICES (OUTPATIENT)
Dept: ONCOLOGY | Facility: MEDICAL CENTER | Age: 71
End: 2021-10-21
Attending: INTERNAL MEDICINE
Payer: MEDICARE

## 2021-10-21 VITALS
HEART RATE: 81 BPM | HEIGHT: 67 IN | SYSTOLIC BLOOD PRESSURE: 151 MMHG | DIASTOLIC BLOOD PRESSURE: 57 MMHG | OXYGEN SATURATION: 98 % | WEIGHT: 171.08 LBS | BODY MASS INDEX: 26.85 KG/M2 | TEMPERATURE: 97.9 F | RESPIRATION RATE: 18 BRPM

## 2021-10-21 VITALS
HEART RATE: 81 BPM | TEMPERATURE: 97.9 F | BODY MASS INDEX: 26.85 KG/M2 | WEIGHT: 171.08 LBS | HEIGHT: 67 IN | DIASTOLIC BLOOD PRESSURE: 57 MMHG | RESPIRATION RATE: 18 BRPM | OXYGEN SATURATION: 98 % | SYSTOLIC BLOOD PRESSURE: 151 MMHG

## 2021-10-21 DIAGNOSIS — C90.00 MULTIPLE MYELOMA NOT HAVING ACHIEVED REMISSION (HCC): ICD-10-CM

## 2021-10-21 LAB
BASOPHILS # BLD AUTO: 0.7 % (ref 0–1.8)
BASOPHILS # BLD: 0.02 K/UL (ref 0–0.12)
EOSINOPHIL # BLD AUTO: 0.12 K/UL (ref 0–0.51)
EOSINOPHIL NFR BLD: 4.1 % (ref 0–6.9)
ERYTHROCYTE [DISTWIDTH] IN BLOOD BY AUTOMATED COUNT: 55.6 FL (ref 35.9–50)
HCT VFR BLD AUTO: 40.3 % (ref 42–52)
HGB BLD-MCNC: 13.4 G/DL (ref 14–18)
IMM GRANULOCYTES # BLD AUTO: 0.01 K/UL (ref 0–0.11)
IMM GRANULOCYTES NFR BLD AUTO: 0.3 % (ref 0–0.9)
LYMPHOCYTES # BLD AUTO: 0.63 K/UL (ref 1–4.8)
LYMPHOCYTES NFR BLD: 21.6 % (ref 22–41)
MCH RBC QN AUTO: 34.6 PG (ref 27–33)
MCHC RBC AUTO-ENTMCNC: 33.3 G/DL (ref 33.7–35.3)
MCV RBC AUTO: 104.1 FL (ref 81.4–97.8)
MONOCYTES # BLD AUTO: 0.29 K/UL (ref 0–0.85)
MONOCYTES NFR BLD AUTO: 10 % (ref 0–13.4)
NEUTROPHILS # BLD AUTO: 1.84 K/UL (ref 1.82–7.42)
NEUTROPHILS NFR BLD: 63.3 % (ref 44–72)
NRBC # BLD AUTO: 0 K/UL
NRBC BLD-RTO: 0 /100 WBC
OUTPT INFUS CBC COMMENT OICOM: ABNORMAL
PLATELET # BLD AUTO: 205 K/UL (ref 164–446)
PMV BLD AUTO: 10.5 FL (ref 9–12.9)
RBC # BLD AUTO: 3.87 M/UL (ref 4.7–6.1)
WBC # BLD AUTO: 2.9 K/UL (ref 4.8–10.8)

## 2021-10-21 PROCEDURE — 700111 HCHG RX REV CODE 636 W/ 250 OVERRIDE (IP): Mod: JG | Performed by: INTERNAL MEDICINE

## 2021-10-21 PROCEDURE — 85025 COMPLETE CBC W/AUTO DIFF WBC: CPT

## 2021-10-21 PROCEDURE — 96401 CHEMO ANTI-NEOPL SQ/IM: CPT

## 2021-10-21 RX ADMIN — BORTEZOMIB 2.48 MG: 3.5 INJECTION, POWDER, LYOPHILIZED, FOR SOLUTION INTRAVENOUS; SUBCUTANEOUS at 14:36

## 2021-10-21 ASSESSMENT — FIBROSIS 4 INDEX
FIB4 SCORE: 1.13
FIB4 SCORE: 1.34

## 2021-10-21 NOTE — PROGRESS NOTES
"Pharmacy Chemotherapy Calculation:    Dx: MM    Protocol: RVd     *Dosing Reference*  Bortezomib 1.3 mg/m2 subcutaneous on Days 1, 4, 8, 11              -MD dosing on Days 1, 8, 15 of a 21 day cycle  Lenalidomide 25 mg PO daily on Days 1-14  Dexamethasone 20 mg PO daily on Days 1-2, 4-5, 8-9, 11-12 OR 40 mg PO on Days 1, 8, and 15  21 day cycle for 3-4 cycles OR until disease progression or unacceptable toxicity  NCCN Guidelines for Multiple Myeloma.V.1.2022  Amadeo P, et al. Blood. 2010;116(5):679-86  Dominic S, et al/ Blood. 2012;119(19):1676-90    Allergies:  Grass pollen(k-o-r-t-swt jsoe), Pet dander [cat hair extract], Milk [dairy food allergy], [place], and [place] oil     /57   Pulse 81   Temp 36.6 °C (97.9 °F) (Temporal)   Resp 18   Ht 1.7 m (5' 6.93\")   Wt 77.6 kg (171 lb 1.2 oz) Comment: Coppied  SpO2 98%   BMI 26.85 kg/m²  Body surface area is 1.91 meters squared.     Labs 10/21/21:  ANC~ 1840 Plt = 205k   Hgb = 13.4       Drug Order   (Drug name, dose, route, IV Fluid & volume, frequency, number of doses) Cycle 3 Day 8  Previous treatment: C3D1 on 10/14/21   Medication = Bortezomib (Velcade)  Base Dose = 1.3 mg/m2  Calc Dose: Base Dose x 1.91 m2 = 2.483 mg  Final Dose = 2.48 mg  Route = Subcutaneous  Conc. = 2.5 mg/mL  Fluid & Volume = 0.99 mL in syringe  Administration = ABDOMEN or THIGH          <10% difference, okay to treat with final dose   By my signature below, I confirm this process was performed independently with the BSA and all final chemotherapy dosing calculations congruent. I have reviewed the above chemotherapy order and that my calculation of the final dose and BSA (when applicable) corroborate those calculations of the  pharmacist. Discrepancies of 10% or greater in the written dose have been addressed and documented within the [facility] Progress notes.    Nick Williamson, PharmD  "

## 2021-10-21 NOTE — PROGRESS NOTES
"Pharmacy Chemotherapy Verification  Patient Name: Gary Hedrick  Dx: MM    Cycle 3 Day 8  Previous treatment = C3D1 on 10/14/21    Regimen and Dosing Reference  Bortezomib 1.3 mg/m2 subcutaneous on    -MD dosing on Days 1, 8, 15 of a 21 day cycle  Lenalidomide 25 mg PO daily on Days 1-14  Dexamethasone 20 mg PO daily on Days 1-2, 4-5, 8-9, 11-12 OR 40 mg PO on Days 1, 8, and 15  21 day cycle for 3-4 cycles OR until disease progression or unacceptable toxicity  NCCN Guidelines for Multiple Myeloma.V.1.2022  Amadeo P, et al. Blood. 2010;116(5):679-86  Alfaro S, et al/ Blood. 2012;119(19):4375-82  John M, et al. J Clin Oncol. 2014;32(25):2712-7    Allergies:Grass pollen(k-o-r-t-swt jose), Pet dander [cat hair extract], [place] meal, Milk [dairy food allergy], [place], and [place] oil  /57   Pulse 81   Temp 36.6 °C (97.9 °F) (Temporal)   Resp 18   Ht 1.7 m (5' 6.93\")   Wt 77.6 kg (171 lb 1.2 oz) Comment: Coppied  SpO2 98%   BMI 26.85 kg/m²   Body surface area is 1.91 meters squared.    Labs 10/21/21  ANC~ 1840 Plt = 205k   Hgb = 13.4       Bortezomib (Velcade) 1.3 mg/m2 x 1.91 m2 = 2.483 mg   <10% difference, OK to treat with final dose = 2.48 mg subcutaneous    Kenney Birch, PharmD    "

## 2021-10-21 NOTE — PROGRESS NOTES
Pt arrives to [facility] for cycle 3 day 8 of Velcade.  Discussed plan of care with pt.  Pt denies s/sx of infection.  Pt reports occasional fatigue.  CBC drawn via 25g butterfly needle to R-AC.  CBC reviewed and pt meets parameters for treatment.  Pt took own supply of Dexamethasone as pre-medication.   Velcade given SC to RLQ of abdomen.  Site covered with gauze/tape.  Gave pt a copy of schedule.  Pt dc home to self care.

## 2021-10-21 NOTE — PROGRESS NOTES
Chemotherapy Verification - SECONDARY RN       Height = 170 cm  Weight = 77.6 kg  BSA = 1.9 m2       Medication: Velcade  Dose: 1.3 mg/m2  Calculated Dose: 2.47 mg                             (In mg/m2, AUC, mg/kg)         I confirm that this process was performed independently.

## 2021-10-21 NOTE — PROGRESS NOTES
Chemotherapy Verification - PRIMARY RN    C3 D8    Height = 170 cm  Weight = 77.6 kg  BSA = 1.91 m^2       Medication: Bortezomib (Velcade)  Dose: 1.3 mg/m^2  Calculated Dose: 2.483 mg                             (In mg/m2, AUC, mg/kg)     I confirm this process was performed independently with the BSA and all final chemotherapy dosing calculations congruent.  Any discrepancies of 10% or greater have been addressed with the chemotherapy pharmacist. The resolution of the discrepancy has been documented in the EPIC progress notes.

## 2021-10-25 ENCOUNTER — TELEPHONE (OUTPATIENT)
Dept: HEMATOLOGY ONCOLOGY | Facility: MEDICAL CENTER | Age: 71
End: 2021-10-25

## 2021-10-25 DIAGNOSIS — C90.00 MULTIPLE MYELOMA NOT HAVING ACHIEVED REMISSION (HCC): ICD-10-CM

## 2021-10-25 DIAGNOSIS — D80.1 HYPOGAMMAGLOBULINEMIA (HCC): ICD-10-CM

## 2021-10-25 RX ORDER — LENALIDOMIDE 25 MG/1
CAPSULE ORAL
Qty: 14 CAPSULE | Refills: 0 | Status: SHIPPED | OUTPATIENT
Start: 2021-10-25 | End: 2021-11-12 | Stop reason: SDUPTHER

## 2021-10-28 ENCOUNTER — OUTPATIENT INFUSION SERVICES (OUTPATIENT)
Dept: ONCOLOGY | Facility: MEDICAL CENTER | Age: 71
End: 2021-10-28
Attending: INTERNAL MEDICINE
Payer: MEDICARE

## 2021-10-28 VITALS
BODY MASS INDEX: 27.09 KG/M2 | TEMPERATURE: 97.3 F | HEIGHT: 67 IN | SYSTOLIC BLOOD PRESSURE: 135 MMHG | RESPIRATION RATE: 18 BRPM | WEIGHT: 172.62 LBS | DIASTOLIC BLOOD PRESSURE: 69 MMHG | HEART RATE: 81 BPM | OXYGEN SATURATION: 94 %

## 2021-10-28 DIAGNOSIS — C90.00 MULTIPLE MYELOMA NOT HAVING ACHIEVED REMISSION (HCC): ICD-10-CM

## 2021-10-28 LAB
BASOPHILS # BLD AUTO: 0.7 % (ref 0–1.8)
BASOPHILS # BLD: 0.03 K/UL (ref 0–0.12)
EOSINOPHIL # BLD AUTO: 0.11 K/UL (ref 0–0.51)
EOSINOPHIL NFR BLD: 2.6 % (ref 0–6.9)
ERYTHROCYTE [DISTWIDTH] IN BLOOD BY AUTOMATED COUNT: 55.7 FL (ref 35.9–50)
HCT VFR BLD AUTO: 38.4 % (ref 42–52)
HGB BLD-MCNC: 12.9 G/DL (ref 14–18)
IMM GRANULOCYTES # BLD AUTO: 0.02 K/UL (ref 0–0.11)
IMM GRANULOCYTES NFR BLD AUTO: 0.5 % (ref 0–0.9)
LYMPHOCYTES # BLD AUTO: 0.61 K/UL (ref 1–4.8)
LYMPHOCYTES NFR BLD: 14.4 % (ref 22–41)
MCH RBC QN AUTO: 35.1 PG (ref 27–33)
MCHC RBC AUTO-ENTMCNC: 33.6 G/DL (ref 33.7–35.3)
MCV RBC AUTO: 104.3 FL (ref 81.4–97.8)
MONOCYTES # BLD AUTO: 0.57 K/UL (ref 0–0.85)
MONOCYTES NFR BLD AUTO: 13.5 % (ref 0–13.4)
NEUTROPHILS # BLD AUTO: 2.89 K/UL (ref 1.82–7.42)
NEUTROPHILS NFR BLD: 68.3 % (ref 44–72)
NRBC # BLD AUTO: 0 K/UL
NRBC BLD-RTO: 0 /100 WBC
PLATELET # BLD AUTO: 184 K/UL (ref 164–446)
PMV BLD AUTO: 11.3 FL (ref 9–12.9)
RBC # BLD AUTO: 3.68 M/UL (ref 4.7–6.1)
WBC # BLD AUTO: 4.2 K/UL (ref 4.8–10.8)

## 2021-10-28 PROCEDURE — 83520 IMMUNOASSAY QUANT NOS NONAB: CPT

## 2021-10-28 PROCEDURE — 96401 CHEMO ANTI-NEOPL SQ/IM: CPT

## 2021-10-28 PROCEDURE — 85025 COMPLETE CBC W/AUTO DIFF WBC: CPT

## 2021-10-28 PROCEDURE — 700111 HCHG RX REV CODE 636 W/ 250 OVERRIDE (IP): Mod: JG | Performed by: INTERNAL MEDICINE

## 2021-10-28 RX ADMIN — BORTEZOMIB 2.5 MG: 3.5 INJECTION, POWDER, LYOPHILIZED, FOR SOLUTION INTRAVENOUS; SUBCUTANEOUS at 14:47

## 2021-10-28 ASSESSMENT — FIBROSIS 4 INDEX: FIB4 SCORE: 1.13

## 2021-10-28 NOTE — PROGRESS NOTES
Chemotherapy Verification - SECONDARY RN   C3 D15      Height = 1.7 m  Weight = 78.3 kg  BSA = 1.92 m2       Medication: bortezomib  Dose: 1.3 mg/m2  Calculated Dose: 2.5 mg                             (In mg/m2, AUC, mg/kg)       I confirm that this process was performed independently.

## 2021-10-28 NOTE — PROGRESS NOTES
Pt arrives to [facility] for cycle 3 day 15 of Velcade.  Discussed plan of care with pt.  Pt denies s/sx of infection or other complaints.  Labs drawn via 23g butterfly needle to L-AC.  Site wrapped with pressure dressing.  Labs reviewed and pt meets parameters for treatment.  Pt took oral Dexamethasone 20 mg while in chair.  Velcade given SC to LLQ of abdomen.  Site covered with gauze/paper tape.  Gave pt a copy of schedule.  Pt to see Dr. Aaron on 11/04/2021.  Pt dc home to self care.

## 2021-10-28 NOTE — PROGRESS NOTES
11//04/21    Subjective    Chief Complaint:  Follow up lambda light chain myeloma on RVD    HPI:  Due for cycle number 4. Tolerating well. Also has prostate cancer and now on Orgovyx per Dr. Isaac with plans to start XRT plus seed implants in December. Feels well. Some pains in his feet that come and go. Fatigue from the LHRH agonist med.     ROS:    Constitutional: No weight loss  Skin: No rash or jaundice  HENT: No change in eyesight or hearing  Cardiovascular:No chest pain or arrythmia  Respiratory:No cough or SOB  GI:No nausea, vomiting, diarrhea, constipation  :No dysuria or frequency  Musculoskeletal:No bone or joint pain  Neuro:No sx's of neuropathy  Psych: No complaints    PMH:      Allergies   Allergen Reactions   • Grass Pollen(K-O-R-T-Swt [place]) Shortness of Breath   • Pet Dander [Cat Hair Extract] Shortness of Breath and Unspecified   • Milk [Dairy Food Allergy] Unspecified   • Sagebrush Unspecified   • Supai (Diagnostic) Shortness of Breath       Past Medical History:   Diagnosis Date   • Disorder of thyroid     hypothyroid   • High cholesterol    • Light chain myeloma (HCC) 2021   • Prostate cancer (HCC)    • Psychiatric problem     depression        Past Surgical History:   Procedure Laterality Date   • [place] DX BONE MARROW ASPIRATIONS Left 7/23/2021    Procedure: ASPIRATION, BONE MARROW - DURANT;  Surgeon: Hair Okeefe M.D.;  Location: [facility];  Service: Orthopedics   • [place] DX BONE MARROW BIOPSIES Left 7/23/2021    Procedure: BIOPSY, BONE MARROW, USING NEEDLE OR TROCAR;  Surgeon: Hair Okeefe M.D.;  Location: [facility];  Service: Orthopedics   • WRIST FUSION Left         Medications:    Current Outpatient Medications on File Prior to Visit   Medication Sig Dispense Refill   • tamsulosin (FLOMAX) 0.4 MG capsule TAKE 2 CAPSULES BY MOUTH 30 MINUTES AFTER DINNER. TAKE 1 CAPSULE BY MOUTH FOR A WEEK THEN INCREASE TO 2 CAPSULES 60 Capsule 5   • lenalidomide 25 MG Cap Take 25 mg  "(1 cap) by mouth on Days 1 - 14 of each 21-day cycle (2 weeks ON & 1 week OFF) 14 Capsule 0   • Magnesium Hydroxide (MILK OF MAGNESIA PO) Take  by mouth as needed.     • ORGOVYX 120 MG Tab      • VITAMIN D PO Take 8,000 Units by mouth.     • Coenzyme Q10 100 MG Cap Take  by mouth every day.     • VITAMIN K PO Take  by mouth. 25mg PO bid     • aspirin EC (ECOTRIN) 81 MG Tablet Delayed Response Take 81 mg by mouth every day.     • dexamethasone (DECADRON) 4 MG Tab Take 10 pills once a week 40 tablet 6   • valACYclovir (VALTREX) 500 MG Tab Take 500 mg by mouth 2 times a day. No known stop date, TWICE DAILY     • FLUoxetine (PROZAC) 20 MG Cap Take 1 capsule by mouth every day. 90 capsule 4   • Omega-3 Fatty Acids (FISH OIL) 1000 MG Cap capsule Take 1,000 mg by mouth 2 times a day.     • levothyroxine (SYNTHROID) 100 MCG Tab Take 1 tablet by mouth Every morning on an empty stomach. 100 tablet 3   • simvastatin (ZOCOR) 20 MG Tab Take 1 tablet by mouth every evening. 100 tablet 3     No current facility-administered medications on file prior to visit.       Social History     Tobacco Use   • Smoking status: Former Smoker     Years: 20.00     Quit date:      Years since quittin.8   • Smokeless tobacco: Never Used   Substance Use Topics   • Alcohol use: Never        Family History   Problem Relation Age of Onset   • Cancer Neg Hx         Objective    Vitals:    /74   Pulse 75   Temp 37 °C (98.6 °F) (Temporal)   Resp 16   Ht 1.7 m (5' 6.93\")   Wt 77.6 kg (171 lb 1.2 oz)   SpO2 96%   BMI 26.85 kg/m²     Physical Exam:    Appears well-developed and well-nourished. No distress.    Head -  Normocephalic .   Eyes - Pupils are equal. Conjunctivae normal. No scleral icterus.   Ears - normal hearing   Neurological -   Alert and oriented  Skin -  Not diaphoretic. No erythema. No pallor. No jaundice   Psychiatric -  Normal mood and affect.    Labs:    Results for JAMES GRANT (MRN [id number])    Ref. Range " 10/14/2021 13:40 10/21/2021 13:55 10/28/2021 13:46   WBC Latest Ref Range: 4.8 - 10.8 K/uL 4.8 2.9 (L) 4.2 (L)   RBC Latest Ref Range: 4.70 - 6.10 M/uL 3.71 (L) 3.87 (L) 3.68 (L)   Hemoglobin Latest Ref Range: 14.0 - 18.0 g/dL 13.0 (L) 13.4 (L) 12.9 (L)   Hematocrit Latest Ref Range: 42.0 - 52.0 % 38.8 (L) 40.3 (L) 38.4 (L)   MCV Latest Ref Range: 81.4 - 97.8 fL 104.6 (H) 104.1 (H) 104.3 (H)   MCH Latest Ref Range: 27.0 - 33.0 pg 35.0 (H) 34.6 (H) 35.1 (H)   MCHC Latest Ref Range: 33.7 - 35.3 g/dL 33.5 (L) 33.3 (L) 33.6 (L)   RDW Latest Ref Range: 35.9 - 50.0 fL 54.7 (H) 55.6 (H) 55.7 (H)   Platelet Count Latest Ref Range: 164 - 446 K/uL 173 205 184   Results for JAMES GRANT (MRN [id number])    Ref. Range 10/14/2021 13:40   Sodium Latest Ref Range: 135 - 145 mmol/L 140   Potassium Latest Ref Range: 3.6 - 5.5 mmol/L 4.4   Chloride Latest Ref Range: 96 - 112 mmol/L 102   Co2 Latest Ref Range: 20 - 33 mmol/L 24   Anion Gap Latest Ref Range: 7.0 - 16.0  14.0   Glucose Latest Ref Range: 65 - 99 mg/dL 166 (H)   Bun Latest Ref Range: 8 - 22 mg/dL 16   Creatinine Latest Ref Range: 0.50 - 1.40 mg/dL 0.90   GFR If  Latest Ref Range: >60 mL/min/1.73 m 2 >60   GFR If Non  Latest Ref Range: >60 mL/min/1.73 m 2 >60   Calcium Latest Ref Range: 8.5 - 10.5 mg/dL 9.4   AST(SGOT) Latest Ref Range: 12 - 45 U/L 15   ALT(SGPT) Latest Ref Range: 2 - 50 U/L 20   Alkaline Phosphatase Latest Ref Range: 30 - 99 U/L 63   Total Bilirubin Latest Ref Range: 0.1 - 1.5 mg/dL 0.4   Albumin Latest Ref Range: 3.2 - 4.9 g/dL 4.1   Total Protein Latest Ref Range: 6.0 - 8.2 g/dL 6.4   Globulin Latest Ref Range: 1.9 - 3.5 g/dL 2.3   A-G Ratio Latest Units: g/dL 1.8   Results for JAMES GRANTN (MRN [id number])    Ref. Range 9/23/2021 13:40 10/7/2021 11:03 10/28/2021 13:46   Free Kappa Light Chains Latest Ref Range: 3.30 - 19.40 mg/L 3.07 (L) 5.02 5.24   Free Lambda Light Chains Latest Ref Range: 5.71 - 26.30 mg/L  890.19 (H) 451.62 (H) 491.27 (H)   Kappa-Lambda Ratio Latest Ref Range: 0.26 - 1.65  <0.01 (L) 0.01 (L) 0.01 (L)     Assessment  Stable  Imp:    Visit Diagnosis:    1. Multiple myeloma not having achieved remission (HCC)     2. Hypogammaglobulinemia (HCC)     3. Prostate cancer (HCC)           Plan:  Continue same regimen for now  Discussed stem cell transplant      Gary Aaron M.D.

## 2021-10-28 NOTE — PROGRESS NOTES
"Pharmacy Chemotherapy Calculation:    Dx: MM    Protocol: RVd     *Dosing Reference*  Bortezomib 1.3 mg/m2 subcutaneous on Days 1, 4, 8, 11              -MD dosing on Days 1, 8, 15 of a 21 day cycle  Lenalidomide 25 mg PO daily on Days 1-14  Dexamethasone 20 mg PO daily on Days 1-2, 4-5, 8-9, 11-12 OR 40 mg PO on Days 1, 8, and 15  21 day cycle for 3-4 cycles OR until disease progression or unacceptable toxicity  NCCN Guidelines for Multiple Myeloma.V.1.2022  Amadeo P, et al. Blood. 2010;116(5):679-86  Dominic S, et al/ Blood. 2012;119(19):0379-98    Allergies:  Grass pollen(k-o-r-t-swt jose), Pet dander [cat hair extract], Milk [dairy food allergy], [place], and [place] oil     /69   Pulse 81   Temp 36.3 °C (97.3 °F) (Temporal)   Resp 18   Ht 1.7 m (5' 6.93\")   Wt 78.3 kg (172 lb 9.9 oz)   SpO2 94%   BMI 27.09 kg/m²  Body surface area is 1.92 meters squared.     Labs 10/28/21:  ANC~ 2890 Plt = 184k   Hgb = 12.9       Drug Order   (Drug name, dose, route, IV Fluid & volume, frequency, number of doses) Cycle 3 Day 15  Previous treatment: C3D8 on 10/21/21   Medication = Bortezomib (Velcade)  Base Dose = 1.3 mg/m2  Calc Dose: Base Dose x 1.92 m2 = 2.496 mg  Final Dose = 2.5 mg  Route = Subcutaneous  Conc. = 2.5 mg/mL  Fluid & Volume = 1 mL in syringe  Administration = ABDOMEN or THIGH          <10% difference, okay to treat with final dose   By my signature below, I confirm this process was performed independently with the BSA and all final chemotherapy dosing calculations congruent. I have reviewed the above chemotherapy order and that my calculation of the final dose and BSA (when applicable) corroborate those calculations of the  pharmacist. Discrepancies of 10% or greater in the written dose have been addressed and documented within the [facility] Progress notes.    Nick Williamson, PharmD  "

## 2021-10-28 NOTE — PROGRESS NOTES
Chemotherapy Verification - PRIMARY RN    C3 D15    Height = 170 cm  Weight = 78.3 kg  BSA = 1.92 m^2       Medication: Bortezomib (Velcade)  Dose: 1.3 mg/m^2  Calculated Dose: 2.5 mg                             (In mg/m2, AUC, mg/kg)     I confirm this process was performed independently with the BSA and all final chemotherapy dosing calculations congruent.  Any discrepancies of 10% or greater have been addressed with the chemotherapy pharmacist. The resolution of the discrepancy has been documented in the EPIC progress notes.

## 2021-10-28 NOTE — PROGRESS NOTES
"Pharmacy Chemotherapy Verification  Patient Name: Gary Hedrick  Dx: MM    Cycle 3 Day 15  Previous treatment = C3D8 on 10/21/21    Regimen and Dosing Reference  Bortezomib 1.3 mg/m2 subcutaneous on    -MD dosing on Days 1, 8, 15 of a 21 day cycle  Lenalidomide 25 mg PO daily on Days 1-14  Dexamethasone 20 mg PO daily on Days 1-2, 4-5, 8-9, 11-12 OR 40 mg PO on Days 1, 8, and 15  21 day cycle for 3-4 cycles OR until disease progression or unacceptable toxicity  NCCN Guidelines for Multiple Myeloma.V.1.2022  Amadeo P, et al. Blood. 2010;116(5):679-86  Alfaro S, et al/ Blood. 2012;119(19):4375-82  John M, et al. J Clin Oncol. 2014;32(25):2712-7    Allergies:Grass pollen(k-o-r-t-swt jose), Pet dander [cat hair extract], [place] meal, Milk [dairy food allergy], [place], and [place] oil  /69   Pulse 81   Temp 36.3 °C (97.3 °F) (Temporal)   Resp 18   Ht 1.7 m (5' 6.93\")   Wt 78.3 kg (172 lb 9.9 oz)   SpO2 94%   BMI 27.09 kg/m²   Body surface area is 1.92 meters squared.    Labs 10/28/21  ANC~ 2890 Plt = 184k   Hgb = 12.9       Bortezomib (Velcade) 1.3 mg/m2 x 1.92 m2 = 2.496 mg   <10% difference, OK to treat with final dose = 2.5 mg subcutaneous    Kenney Birch, PharmD    "

## 2021-10-30 DIAGNOSIS — N40.1 BENIGN LOCALIZED PROSTATIC HYPERPLASIA WITH LOWER URINARY TRACT SYMPTOMS (LUTS): ICD-10-CM

## 2021-10-30 LAB
KAPPA LC FREE SER-MCNC: 5.24 MG/L (ref 3.3–19.4)
KAPPA LC FREE/LAMBDA FREE SER NEPH: 0.01 {RATIO} (ref 0.26–1.65)
LAMBDA LC FREE SERPL-MCNC: 491.27 MG/L (ref 5.71–26.3)

## 2021-11-03 RX ORDER — TAMSULOSIN HYDROCHLORIDE 0.4 MG/1
CAPSULE ORAL
Qty: 60 CAPSULE | Refills: 5 | Status: SHIPPED | OUTPATIENT
Start: 2021-11-03 | End: 2022-05-04

## 2021-11-04 ENCOUNTER — OUTPATIENT INFUSION SERVICES (OUTPATIENT)
Dept: ONCOLOGY | Facility: MEDICAL CENTER | Age: 71
End: 2021-11-04
Attending: INTERNAL MEDICINE
Payer: MEDICARE

## 2021-11-04 ENCOUNTER — APPOINTMENT (OUTPATIENT)
Dept: HEMATOLOGY ONCOLOGY | Facility: MEDICAL CENTER | Age: 71
End: 2021-11-04
Payer: MEDICARE

## 2021-11-04 ENCOUNTER — OFFICE VISIT (OUTPATIENT)
Dept: HEMATOLOGY ONCOLOGY | Facility: MEDICAL CENTER | Age: 71
End: 2021-11-04
Payer: MEDICARE

## 2021-11-04 VITALS
DIASTOLIC BLOOD PRESSURE: 74 MMHG | HEART RATE: 75 BPM | OXYGEN SATURATION: 96 % | SYSTOLIC BLOOD PRESSURE: 132 MMHG | WEIGHT: 171.08 LBS | HEIGHT: 67 IN | RESPIRATION RATE: 16 BRPM | BODY MASS INDEX: 26.85 KG/M2 | TEMPERATURE: 98.6 F

## 2021-11-04 VITALS
HEART RATE: 74 BPM | WEIGHT: 170.64 LBS | HEIGHT: 67 IN | TEMPERATURE: 98 F | DIASTOLIC BLOOD PRESSURE: 61 MMHG | SYSTOLIC BLOOD PRESSURE: 145 MMHG | OXYGEN SATURATION: 98 % | BODY MASS INDEX: 26.78 KG/M2 | RESPIRATION RATE: 18 BRPM

## 2021-11-04 DIAGNOSIS — C90.00 MULTIPLE MYELOMA NOT HAVING ACHIEVED REMISSION (HCC): ICD-10-CM

## 2021-11-04 DIAGNOSIS — D80.1 HYPOGAMMAGLOBULINEMIA (HCC): ICD-10-CM

## 2021-11-04 DIAGNOSIS — C61 PROSTATE CANCER (HCC): ICD-10-CM

## 2021-11-04 LAB
ALBUMIN SERPL BCP-MCNC: 4.2 G/DL (ref 3.2–4.9)
ALBUMIN/GLOB SERPL: 1.9 G/DL
ALP SERPL-CCNC: 68 U/L (ref 30–99)
ALT SERPL-CCNC: 36 U/L (ref 2–50)
ANION GAP SERPL CALC-SCNC: 10 MMOL/L (ref 7–16)
AST SERPL-CCNC: 22 U/L (ref 12–45)
BASOPHILS # BLD AUTO: 0.8 % (ref 0–1.8)
BASOPHILS # BLD: 0.03 K/UL (ref 0–0.12)
BILIRUB SERPL-MCNC: 0.3 MG/DL (ref 0.1–1.5)
BUN SERPL-MCNC: 17 MG/DL (ref 8–22)
CALCIUM SERPL-MCNC: 9.3 MG/DL (ref 8.5–10.5)
CHLORIDE SERPL-SCNC: 101 MMOL/L (ref 96–112)
CO2 SERPL-SCNC: 25 MMOL/L (ref 20–33)
CREAT SERPL-MCNC: 0.91 MG/DL (ref 0.5–1.4)
EOSINOPHIL # BLD AUTO: 0.07 K/UL (ref 0–0.51)
EOSINOPHIL NFR BLD: 1.8 % (ref 0–6.9)
ERYTHROCYTE [DISTWIDTH] IN BLOOD BY AUTOMATED COUNT: 55.4 FL (ref 35.9–50)
GLOBULIN SER CALC-MCNC: 2.2 G/DL (ref 1.9–3.5)
GLUCOSE SERPL-MCNC: 133 MG/DL (ref 65–99)
HCT VFR BLD AUTO: 40.1 % (ref 42–52)
HGB BLD-MCNC: 13.5 G/DL (ref 14–18)
IMM GRANULOCYTES # BLD AUTO: 0.01 K/UL (ref 0–0.11)
IMM GRANULOCYTES NFR BLD AUTO: 0.3 % (ref 0–0.9)
LYMPHOCYTES # BLD AUTO: 0.89 K/UL (ref 1–4.8)
LYMPHOCYTES NFR BLD: 23 % (ref 22–41)
MCH RBC QN AUTO: 34.9 PG (ref 27–33)
MCHC RBC AUTO-ENTMCNC: 33.7 G/DL (ref 33.7–35.3)
MCV RBC AUTO: 103.6 FL (ref 81.4–97.8)
MONOCYTES # BLD AUTO: 0.62 K/UL (ref 0–0.85)
MONOCYTES NFR BLD AUTO: 16 % (ref 0–13.4)
NEUTROPHILS # BLD AUTO: 2.25 K/UL (ref 1.82–7.42)
NEUTROPHILS NFR BLD: 58.1 % (ref 44–72)
NRBC # BLD AUTO: 0 K/UL
NRBC BLD-RTO: 0 /100 WBC
PLATELET # BLD AUTO: 161 K/UL (ref 164–446)
PMV BLD AUTO: 10.2 FL (ref 9–12.9)
POTASSIUM SERPL-SCNC: 4.2 MMOL/L (ref 3.6–5.5)
PROT SERPL-MCNC: 6.4 G/DL (ref 6–8.2)
RBC # BLD AUTO: 3.87 M/UL (ref 4.7–6.1)
SODIUM SERPL-SCNC: 136 MMOL/L (ref 135–145)
WBC # BLD AUTO: 3.9 K/UL (ref 4.8–10.8)

## 2021-11-04 PROCEDURE — 80053 COMPREHEN METABOLIC PANEL: CPT

## 2021-11-04 PROCEDURE — 99213 OFFICE O/P EST LOW 20 MIN: CPT | Performed by: INTERNAL MEDICINE

## 2021-11-04 PROCEDURE — 85025 COMPLETE CBC W/AUTO DIFF WBC: CPT

## 2021-11-04 PROCEDURE — 96401 CHEMO ANTI-NEOPL SQ/IM: CPT

## 2021-11-04 PROCEDURE — 700111 HCHG RX REV CODE 636 W/ 250 OVERRIDE (IP): Mod: JG | Performed by: INTERNAL MEDICINE

## 2021-11-04 RX ADMIN — BORTEZOMIB 2.48 MG: 3.5 INJECTION, POWDER, LYOPHILIZED, FOR SOLUTION INTRAVENOUS; SUBCUTANEOUS at 17:00

## 2021-11-04 ASSESSMENT — FIBROSIS 4 INDEX
FIB4 SCORE: 1.571428571428571429
FIB4 SCORE: 1.571428571428571429
FIB4 SCORE: 1.257788237343631704

## 2021-11-04 NOTE — PROGRESS NOTES
Chemotherapy Verification - SECONDARY RN   C4 D1      Height = 1.7 m  Weight = 77.6 kg  BSA = 1.91 m2       Medication: bortezomib  Dose: 1.3 mg/m2  Calculated Dose: 2.483 mg                             (In mg/m2, AUC, mg/kg)       I confirm that this process was performed independently.

## 2021-11-04 NOTE — PROGRESS NOTES
Chemotherapy Verification - PRIMARY RN      Height = 1.7m  Weight = 77.6kg  BSA = 1.91m2       Medication: bortezomib  Dose: 1.3mg/m2  Calculated Dose: 2.48mg                             (In mg/m2, AUC, mg/kg)       I confirm this process was performed independently with the BSA and all final chemotherapy dosing calculations congruent.  Any discrepancies of 10% or greater have been addressed with the chemotherapy pharmacist. The resolution of the discrepancy has been documented in the EPIC progress notes.

## 2021-11-04 NOTE — PROGRESS NOTES
"Pharmacy Chemotherapy Calculation:    Dx: MM    Protocol: RVd     *Dosing Reference*  Bortezomib 1.3 mg/m2 subcutaneous on Days 1, 4, 8, 11              -MD dosing on Days 1, 8, 15 of a 21 day cycle  Lenalidomide 25 mg PO daily on Days 1-14  Dexamethasone 20 mg PO daily on Days 1-2, 4-5, 8-9, 11-12 OR 40 mg PO on Days 1, 8, and 15  21 day cycle for 3-4 cycles OR until disease progression or unacceptable toxicity  NCCN Guidelines for Multiple Myeloma.V.1.2022  Amadeo P, et al. Blood. 2010;116(5):679-86  Dominic S, et al/ Blood. 2012;119(19):2092-43    Allergies:  Grass pollen(k-o-r-t-swt jose), Pet dander [cat hair extract], Milk [dairy food allergy], [place], and [place] oil     Ht 1.7 m (5' 6.93\")   Wt 77.6 kg (171 lb 1.2 oz)   BMI 26.85 kg/m²  Body surface area is 1.91 meters squared.     Labs 11/4/21:  ANC~ 2250 Plt = 161k   Hgb = 13.5     SCr = 0.91 mg/dL CrCl ~ 84 mL/min   LFT's = WNL TBili = 0.3     Drug Order   (Drug name, dose, route, IV Fluid & volume, frequency, number of doses) Cycle 4 Day 1  Previous treatment: C3D15 on 10/28/21   Medication = Bortezomib (Velcade)  Base Dose = 1.3 mg/m2  Calc Dose: Base Dose x 1.91 m2 = 2.483 mg  Final Dose = 2.48 mg  Route = Subcutaneous  Conc. = 2.5 mg/mL  Fluid & Volume = 0.99 mL in syringe  Administration = ABDOMEN or THIGH          <10% difference, okay to treat with final dose     By my signature below, I confirm this process was performed independently with the BSA and all final chemotherapy dosing calculations congruent. I have reviewed the above chemotherapy order and that my calculation of the final dose and BSA (when applicable) corroborate those calculations of the  pharmacist. Discrepancies of 10% or greater in the written dose have been addressed and documented within the [facility] Progress notes.    Nick Williamson, PharmD  "

## 2021-11-04 NOTE — PROGRESS NOTES
Gary presents for pre-chemo labs.  He voices no complaints.  Labs collected via 23g butterfly to left AC, gauze and coban dressing applied.  Discharged in NAD, returns today for chemotherapy treatment.

## 2021-11-04 NOTE — PROGRESS NOTES
"Pharmacy Chemotherapy Verification  Patient Name: Gary Hedrick  Dx: MM    Cycle 4 Day 1  Previous treatment = C3D15 on 10/28/21    Regimen and Dosing Reference  Bortezomib 1.3 mg/m2 subcutaneous on    -MD dosing on Days 1, 8, 15 of a 21 day cycle  Lenalidomide 25 mg PO daily on Days 1-14  Dexamethasone 20 mg PO daily on Days 1-2, 4-5, 8-9, 11-12 OR 40 mg PO on Days 1, 8, and 15  21 day cycle for 3-4 cycles OR until disease progression or unacceptable toxicity  NCCN Guidelines for Multiple Myeloma.V.1.2022  Amadeo P, et al. Blood. 2010;116(5):679-86  Alfaro S, et al/ Blood. 2012;119(19):4375-82  John M, et al. J Clin Oncol. 2014;32(25):2712-7    Allergies:Grass pollen(k-o-r-t-swt jose), Pet dander [cat hair extract], [place] meal, Milk [dairy food allergy], [place], and [place] oil  /61   Pulse 74   Temp 36.7 °C (98 °F) (Temporal)   Resp 18   Ht 1.7 m (5' 6.93\")   Wt 77.4 kg (170 lb 10.2 oz)   SpO2 98%   BMI 26.78 kg/m²   Body surface area is 1.91 meters squared.    Labs 11/4/21  ANC~ 2250 Plt = 161k   Hgb = 13.5     SCr = 0.91 mg/dL CrCl ~ 83.6 mL/min   LFT's = WNLs  TBili = 0.3     Bortezomib (Velcade) 1.3 mg/m2 x 1.91 m2 = 2.483 mg   <10% difference, OK to treat with final dose = 2.48 mg subcutaneous    Kenney Birch, PharmD    "

## 2021-11-05 NOTE — PROGRESS NOTES
Patient to [facility] for Velcade injection. Labs previously done. Patient meets parameters for injection. Velcade injected into right lowe quad of abdomen. Patient has future appointment. Patient to home in care of self.

## 2021-11-11 ENCOUNTER — NON-PROVIDER VISIT (OUTPATIENT)
Dept: ONCOLOGY | Facility: MEDICAL CENTER | Age: 71
End: 2021-11-11
Attending: INTERNAL MEDICINE
Payer: MEDICARE

## 2021-11-11 ENCOUNTER — APPOINTMENT (OUTPATIENT)
Dept: HEMATOLOGY ONCOLOGY | Facility: MEDICAL CENTER | Age: 71
End: 2021-11-11
Payer: MEDICARE

## 2021-11-11 ENCOUNTER — TELEPHONE (OUTPATIENT)
Dept: HEMATOLOGY ONCOLOGY | Facility: MEDICAL CENTER | Age: 71
End: 2021-11-11

## 2021-11-11 VITALS
HEART RATE: 86 BPM | BODY MASS INDEX: 27.27 KG/M2 | OXYGEN SATURATION: 98 % | WEIGHT: 173.72 LBS | TEMPERATURE: 97.4 F | RESPIRATION RATE: 17 BRPM | HEIGHT: 67 IN | SYSTOLIC BLOOD PRESSURE: 139 MMHG | DIASTOLIC BLOOD PRESSURE: 51 MMHG

## 2021-11-11 DIAGNOSIS — C90.00 MULTIPLE MYELOMA NOT HAVING ACHIEVED REMISSION (HCC): ICD-10-CM

## 2021-11-11 LAB
BASOPHILS # BLD AUTO: 0.7 % (ref 0–1.8)
BASOPHILS # BLD: 0.02 K/UL (ref 0–0.12)
EOSINOPHIL # BLD AUTO: 0.12 K/UL (ref 0–0.51)
EOSINOPHIL NFR BLD: 4.4 % (ref 0–6.9)
ERYTHROCYTE [DISTWIDTH] IN BLOOD BY AUTOMATED COUNT: 59.7 FL (ref 35.9–50)
HCT VFR BLD AUTO: 36.8 % (ref 42–52)
HGB BLD-MCNC: 12.9 G/DL (ref 14–18)
IMM GRANULOCYTES # BLD AUTO: 0 K/UL (ref 0–0.11)
IMM GRANULOCYTES NFR BLD AUTO: 0 % (ref 0–0.9)
LYMPHOCYTES # BLD AUTO: 0.62 K/UL (ref 1–4.8)
LYMPHOCYTES NFR BLD: 22.7 % (ref 22–41)
MCH RBC QN AUTO: 36.3 PG (ref 27–33)
MCHC RBC AUTO-ENTMCNC: 35.1 G/DL (ref 33.7–35.3)
MCV RBC AUTO: 103.7 FL (ref 81.4–97.8)
MONOCYTES # BLD AUTO: 0.27 K/UL (ref 0–0.85)
MONOCYTES NFR BLD AUTO: 9.9 % (ref 0–13.4)
NEUTROPHILS # BLD AUTO: 1.7 K/UL (ref 1.82–7.42)
NEUTROPHILS NFR BLD: 62.3 % (ref 44–72)
NRBC # BLD AUTO: 0 K/UL
NRBC BLD-RTO: 0 /100 WBC
PLATELET # BLD AUTO: 190 K/UL (ref 164–446)
PMV BLD AUTO: 11 FL (ref 9–12.9)
RBC # BLD AUTO: 3.55 M/UL (ref 4.7–6.1)
WBC # BLD AUTO: 2.7 K/UL (ref 4.8–10.8)

## 2021-11-11 PROCEDURE — 96401 CHEMO ANTI-NEOPL SQ/IM: CPT

## 2021-11-11 PROCEDURE — 85025 COMPLETE CBC W/AUTO DIFF WBC: CPT

## 2021-11-11 PROCEDURE — 700111 HCHG RX REV CODE 636 W/ 250 OVERRIDE (IP): Mod: JG | Performed by: INTERNAL MEDICINE

## 2021-11-11 RX ADMIN — BORTEZOMIB 2.5 MG: 3.5 INJECTION, POWDER, LYOPHILIZED, FOR SOLUTION INTRAVENOUS; SUBCUTANEOUS at 16:00

## 2021-11-11 ASSESSMENT — FIBROSIS 4 INDEX: FIB4 SCORE: 1.571428571428571429

## 2021-11-11 NOTE — PROGRESS NOTES
Chemotherapy Verification - PRIMARY RN      Height = 66.54 in  Weight = 173 lb  BSA = 1.92 m2       Medication: Velcade  Dose: 1.3 mg/m2  Calculated Dose: 2.496 mg                             (In mg/m2, AUC, mg/kg)     I confirm this process was performed independently with the BSA and all final chemotherapy dosing calculations congruent.  Any discrepancies of 10% or greater have been addressed with the chemotherapy pharmacist. The resolution of the discrepancy has been documented in the EPIC progress notes.

## 2021-11-11 NOTE — PROGRESS NOTES
"Pharmacy Chemotherapy Verification  Patient Name: Gary Hedrick  Dx: MM    Cycle 4 Day 8  Previous treatment = C4D1 on 11/4/21    Regimen and Dosing Reference  Bortezomib 1.3 mg/m2 subcutaneous on    -MD dosing on Days 1, 8, 15 of a 21 day cycle  Lenalidomide 25 mg PO daily on Days 1-14  Dexamethasone 20 mg PO daily on Days 1-2, 4-5, 8-9, 11-12 OR 40 mg PO on Days 1, 8, and 15  21 day cycle for 3-4 cycles OR until disease progression or unacceptable toxicity  NCCN Guidelines for Multiple Myeloma.V.1.2022  Amadeo P, et al. Blood. 2010;116(5):679-86  Alfaro S, et al/ Blood. 2012;119(19):4375-82  John M, et al. J Clin Oncol. 2014;32(25):2712-7    Allergies:Grass pollen(k-o-r-t-swt jose), Pet dander [cat hair extract], [place] meal, Milk [dairy food allergy], [place], and [place] oil  /51   Pulse 86   Temp 36.3 °C (97.4 °F) (Temporal)   Resp 17   Ht 1.69 m (5' 6.54\")   Wt 78.8 kg (173 lb 11.6 oz)   SpO2 98%   BMI 27.59 kg/m²   Body surface area is 1.92 meters squared.    Labs 11/11/21  ANC~ 1700 Plt = 190k   Hgb = 12.9       Bortezomib (Velcade) 1.3 mg/m2 x 1.92 m2 = 2.496 mg   <10% difference, OK to treat with final dose = 2.5 mg subcutaneous    Kenney Birch, PharmD    "

## 2021-11-11 NOTE — PROGRESS NOTES
"Pharmacy Chemotherapy Calculation:    Dx: MM    Protocol: RVd     *Dosing Reference*  Bortezomib 1.3 mg/m2 subcutaneous on Days 1, 4, 8, 11              -MD dosing on Days 1, 8, 15 of a 21 day cycle  Lenalidomide 25 mg PO daily on Days 1-14  Dexamethasone 20 mg PO daily on Days 1-2, 4-5, 8-9, 11-12 OR 40 mg PO on Days 1, 8, and 15  21 day cycle for 3-4 cycles OR until disease progression or unacceptable toxicity  NCCN Guidelines for Multiple Myeloma.V.1.2022  Amadeo P, et al. Blood. 2010;116(5):679-86  Dominic S, et al/ Blood. 2012;119(19):1615-08    Allergies:  Grass pollen(k-o-r-t-swt jose), Pet dander [cat hair extract], Milk [dairy food allergy], [place], and [place] oil     /51   Pulse 86   Temp 36.3 °C (97.4 °F) (Temporal)   Resp 17   Ht 1.69 m (5' 6.54\")   Wt 78.8 kg (173 lb 11.6 oz)   SpO2 98%   BMI 27.59 kg/m²  Body surface area is 1.92 meters squared.     Labs 11/11/21:  ANC~ 1700 Plt = 190k   Hgb = 12.9       Drug Order   (Drug name, dose, route, IV Fluid & volume, frequency, number of doses) Cycle 4 Day 8  Previous treatment: C4D1 on 11/4/21   Medication = Bortezomib (Velcade)  Base Dose = 1.3 mg/m2  Calc Dose: Base Dose x 1.92 m2 = 2.496 mg  Final Dose = 2.5 mg  Route = Subcutaneous  Conc. = 2.5 mg/mL  Fluid & Volume = 1 mL in syringe  Administration = ABDOMEN or THIGH          <10% difference, okay to treat with final dose     By my signature below, I confirm this process was performed independently with the BSA and all final chemotherapy dosing calculations congruent. I have reviewed the above chemotherapy order and that my calculation of the final dose and BSA (when applicable) corroborate those calculations of the  pharmacist. Discrepancies of 10% or greater in the written dose have been addressed and documented within the [facility] Progress notes.    Nick Williamson, PharmD  "

## 2021-11-11 NOTE — TELEPHONE ENCOUNTER
"Patient has infusion on 12/16/21, but also has prostate surgery scheduled on the same day.  Should he just \"skip\" infusion, or get it rescheduled for that same week?  Please return call.   "

## 2021-11-11 NOTE — PROGRESS NOTES
[facility] into [facility] for Day 8 / Cycle 4 of Velcade for Multiple myeloma not having achieved remission. Pt denied having any new or acute complaints today, reports tolerating past treatments well. Lab drawn from [facility] with 25 x 3/4 gauge needle, well tolerated, gauze and coban applied to site. Pt given Velcade as prescribed, tolerated well, denied having any complaints during or after injection, gauze and paper tape applied to site. Discharge home to self care in [facility]. Appointment confirm for the next treatment.

## 2021-11-12 ENCOUNTER — TELEPHONE (OUTPATIENT)
Dept: HEMATOLOGY ONCOLOGY | Facility: MEDICAL CENTER | Age: 71
End: 2021-11-12

## 2021-11-12 DIAGNOSIS — C90.00 MULTIPLE MYELOMA NOT HAVING ACHIEVED REMISSION (HCC): ICD-10-CM

## 2021-11-12 DIAGNOSIS — D80.1 HYPOGAMMAGLOBULINEMIA (HCC): ICD-10-CM

## 2021-11-12 RX ORDER — LENALIDOMIDE 25 MG/1
CAPSULE ORAL
Qty: 14 CAPSULE | Refills: 0 | Status: SHIPPED | OUTPATIENT
Start: 2021-11-12 | End: 2021-11-30 | Stop reason: SDUPTHER

## 2021-11-12 NOTE — TELEPHONE ENCOUNTER
Per Dr. Aaron, ok for pt to skip velcade on 12/16 d/t surgical procedure for prostate.  Called pt & left a detailed voicemail stating that RN would cancel his appt [facility] & ok to skip that injection on that day.    Called [facility] & spoke with pt  to cancel 12/16 velcade injection.

## 2021-11-12 NOTE — TELEPHONE ENCOUNTER
Oral Chemo Compliance review for lenalidomide (Revlimid)    Current dose:  25 mg on Days 1 - 14 of each 21-day cycle

## 2021-11-18 ENCOUNTER — OUTPATIENT INFUSION SERVICES (OUTPATIENT)
Dept: ONCOLOGY | Facility: MEDICAL CENTER | Age: 71
End: 2021-11-18
Attending: INTERNAL MEDICINE
Payer: MEDICARE

## 2021-11-18 ENCOUNTER — APPOINTMENT (OUTPATIENT)
Dept: HEMATOLOGY ONCOLOGY | Facility: MEDICAL CENTER | Age: 71
End: 2021-11-18
Payer: MEDICARE

## 2021-11-18 VITALS
HEIGHT: 66 IN | SYSTOLIC BLOOD PRESSURE: 132 MMHG | WEIGHT: 172.18 LBS | DIASTOLIC BLOOD PRESSURE: 55 MMHG | BODY MASS INDEX: 27.67 KG/M2 | HEART RATE: 69 BPM | RESPIRATION RATE: 18 BRPM | TEMPERATURE: 97 F | OXYGEN SATURATION: 94 %

## 2021-11-18 DIAGNOSIS — C90.00 MULTIPLE MYELOMA NOT HAVING ACHIEVED REMISSION (HCC): ICD-10-CM

## 2021-11-18 LAB
BASOPHILS # BLD AUTO: 0.9 % (ref 0–1.8)
BASOPHILS # BLD: 0.03 K/UL (ref 0–0.12)
EOSINOPHIL # BLD AUTO: 0.12 K/UL (ref 0–0.51)
EOSINOPHIL NFR BLD: 3.5 % (ref 0–6.9)
ERYTHROCYTE [DISTWIDTH] IN BLOOD BY AUTOMATED COUNT: 60.5 FL (ref 35.9–50)
HCT VFR BLD AUTO: 38.7 % (ref 42–52)
HGB BLD-MCNC: 13.4 G/DL (ref 14–18)
IMM GRANULOCYTES # BLD AUTO: 0.01 K/UL (ref 0–0.11)
IMM GRANULOCYTES NFR BLD AUTO: 0.3 % (ref 0–0.9)
LYMPHOCYTES # BLD AUTO: 0.68 K/UL (ref 1–4.8)
LYMPHOCYTES NFR BLD: 19.7 % (ref 22–41)
MCH RBC QN AUTO: 35.6 PG (ref 27–33)
MCHC RBC AUTO-ENTMCNC: 34.6 G/DL (ref 33.7–35.3)
MCV RBC AUTO: 102.9 FL (ref 81.4–97.8)
MONOCYTES # BLD AUTO: 0.56 K/UL (ref 0–0.85)
MONOCYTES NFR BLD AUTO: 16.2 % (ref 0–13.4)
NEUTROPHILS # BLD AUTO: 2.06 K/UL (ref 1.82–7.42)
NEUTROPHILS NFR BLD: 59.4 % (ref 44–72)
NRBC # BLD AUTO: 0 K/UL
NRBC BLD-RTO: 0 /100 WBC
PLATELET # BLD AUTO: 180 K/UL (ref 164–446)
PMV BLD AUTO: 10.7 FL (ref 9–12.9)
RBC # BLD AUTO: 3.76 M/UL (ref 4.7–6.1)
WBC # BLD AUTO: 3.5 K/UL (ref 4.8–10.8)

## 2021-11-18 PROCEDURE — 85025 COMPLETE CBC W/AUTO DIFF WBC: CPT

## 2021-11-18 PROCEDURE — 83520 IMMUNOASSAY QUANT NOS NONAB: CPT

## 2021-11-18 PROCEDURE — 96401 CHEMO ANTI-NEOPL SQ/IM: CPT

## 2021-11-18 PROCEDURE — 700111 HCHG RX REV CODE 636 W/ 250 OVERRIDE (IP): Performed by: INTERNAL MEDICINE

## 2021-11-18 RX ORDER — 0.9 % SODIUM CHLORIDE 0.9 %
10 VIAL (ML) INJECTION PRN
Status: CANCELLED | OUTPATIENT
Start: 2021-12-09

## 2021-11-18 RX ORDER — ONDANSETRON 2 MG/ML
4 INJECTION INTRAMUSCULAR; INTRAVENOUS PRN
Status: CANCELLED | OUTPATIENT
Start: 2021-12-09

## 2021-11-18 RX ORDER — 0.9 % SODIUM CHLORIDE 0.9 %
VIAL (ML) INJECTION PRN
Status: CANCELLED | OUTPATIENT
Start: 2021-11-25

## 2021-11-18 RX ORDER — 0.9 % SODIUM CHLORIDE 0.9 %
3 VIAL (ML) INJECTION PRN
Status: CANCELLED | OUTPATIENT
Start: 2021-12-09

## 2021-11-18 RX ORDER — HEPARIN SODIUM (PORCINE) LOCK FLUSH IV SOLN 100 UNIT/ML 100 UNIT/ML
500 SOLUTION INTRAVENOUS PRN
Status: CANCELLED | OUTPATIENT
Start: 2021-12-02

## 2021-11-18 RX ORDER — PROCHLORPERAZINE MALEATE 10 MG
10 TABLET ORAL EVERY 6 HOURS PRN
Status: CANCELLED | OUTPATIENT
Start: 2021-12-09

## 2021-11-18 RX ORDER — SODIUM CHLORIDE 9 MG/ML
INJECTION, SOLUTION INTRAVENOUS CONTINUOUS
Status: CANCELLED | OUTPATIENT
Start: 2021-12-09

## 2021-11-18 RX ORDER — 0.9 % SODIUM CHLORIDE 0.9 %
VIAL (ML) INJECTION PRN
Status: CANCELLED | OUTPATIENT
Start: 2021-12-02

## 2021-11-18 RX ORDER — 0.9 % SODIUM CHLORIDE 0.9 %
3 VIAL (ML) INJECTION PRN
Status: CANCELLED | OUTPATIENT
Start: 2021-11-25

## 2021-11-18 RX ORDER — ONDANSETRON 2 MG/ML
4 INJECTION INTRAMUSCULAR; INTRAVENOUS PRN
Status: CANCELLED | OUTPATIENT
Start: 2021-12-02

## 2021-11-18 RX ORDER — 0.9 % SODIUM CHLORIDE 0.9 %
3 VIAL (ML) INJECTION PRN
Status: CANCELLED | OUTPATIENT
Start: 2021-11-24

## 2021-11-18 RX ORDER — 0.9 % SODIUM CHLORIDE 0.9 %
10 VIAL (ML) INJECTION PRN
Status: CANCELLED | OUTPATIENT
Start: 2021-11-24

## 2021-11-18 RX ORDER — 0.9 % SODIUM CHLORIDE 0.9 %
3 VIAL (ML) INJECTION PRN
Status: CANCELLED | OUTPATIENT
Start: 2021-12-02

## 2021-11-18 RX ORDER — 0.9 % SODIUM CHLORIDE 0.9 %
VIAL (ML) INJECTION PRN
Status: CANCELLED | OUTPATIENT
Start: 2021-11-24

## 2021-11-18 RX ORDER — ONDANSETRON 2 MG/ML
4 INJECTION INTRAMUSCULAR; INTRAVENOUS PRN
Status: CANCELLED | OUTPATIENT
Start: 2021-11-25

## 2021-11-18 RX ORDER — ZOLPIDEM TARTRATE 10 MG/1
1 TABLET ORAL NIGHTLY PRN
COMMUNITY
Start: 2021-11-07 | End: 2022-01-05

## 2021-11-18 RX ORDER — ONDANSETRON 8 MG/1
8 TABLET, ORALLY DISINTEGRATING ORAL PRN
Status: CANCELLED | OUTPATIENT
Start: 2021-12-02

## 2021-11-18 RX ORDER — PROCHLORPERAZINE MALEATE 10 MG
10 TABLET ORAL EVERY 6 HOURS PRN
Status: CANCELLED | OUTPATIENT
Start: 2021-11-25

## 2021-11-18 RX ORDER — HEPARIN SODIUM (PORCINE) LOCK FLUSH IV SOLN 100 UNIT/ML 100 UNIT/ML
500 SOLUTION INTRAVENOUS PRN
Status: CANCELLED | OUTPATIENT
Start: 2021-11-24

## 2021-11-18 RX ORDER — HEPARIN SODIUM (PORCINE) LOCK FLUSH IV SOLN 100 UNIT/ML 100 UNIT/ML
500 SOLUTION INTRAVENOUS PRN
Status: CANCELLED | OUTPATIENT
Start: 2021-11-25

## 2021-11-18 RX ORDER — HEPARIN SODIUM (PORCINE) LOCK FLUSH IV SOLN 100 UNIT/ML 100 UNIT/ML
500 SOLUTION INTRAVENOUS PRN
Status: CANCELLED | OUTPATIENT
Start: 2021-12-09

## 2021-11-18 RX ORDER — 0.9 % SODIUM CHLORIDE 0.9 %
10 VIAL (ML) INJECTION PRN
Status: CANCELLED | OUTPATIENT
Start: 2021-12-02

## 2021-11-18 RX ORDER — SODIUM CHLORIDE 9 MG/ML
INJECTION, SOLUTION INTRAVENOUS CONTINUOUS
Status: CANCELLED | OUTPATIENT
Start: 2021-12-02

## 2021-11-18 RX ORDER — 0.9 % SODIUM CHLORIDE 0.9 %
10 VIAL (ML) INJECTION PRN
Status: CANCELLED | OUTPATIENT
Start: 2021-11-25

## 2021-11-18 RX ORDER — ONDANSETRON 8 MG/1
8 TABLET, ORALLY DISINTEGRATING ORAL PRN
Status: CANCELLED | OUTPATIENT
Start: 2021-12-09

## 2021-11-18 RX ORDER — SODIUM CHLORIDE 9 MG/ML
INJECTION, SOLUTION INTRAVENOUS CONTINUOUS
Status: CANCELLED | OUTPATIENT
Start: 2021-11-25

## 2021-11-18 RX ORDER — ONDANSETRON 8 MG/1
8 TABLET, ORALLY DISINTEGRATING ORAL PRN
Status: CANCELLED | OUTPATIENT
Start: 2021-11-25

## 2021-11-18 RX ORDER — 0.9 % SODIUM CHLORIDE 0.9 %
VIAL (ML) INJECTION PRN
Status: CANCELLED | OUTPATIENT
Start: 2021-12-09

## 2021-11-18 RX ORDER — PROCHLORPERAZINE MALEATE 10 MG
10 TABLET ORAL EVERY 6 HOURS PRN
Status: CANCELLED | OUTPATIENT
Start: 2021-12-02

## 2021-11-18 RX ADMIN — BORTEZOMIB 2.48 MG: 3.5 INJECTION, POWDER, LYOPHILIZED, FOR SOLUTION INTRAVENOUS; SUBCUTANEOUS at 15:58

## 2021-11-18 ASSESSMENT — FIBROSIS 4 INDEX: FIB4 SCORE: 1.33

## 2021-11-18 NOTE — PROGRESS NOTES
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 Also has prostate cancer and seeing Dr. Isaac. On Orgovyx with implants and external radiation planned for later this month. Lambda light chains are declining. Having radiation implants on 12/16, external beam x 5 weeks starting 4 weeks later. Only c/o some numbness in his feet.     ROS:    Constitutional: No weight loss  Skin: No rash or jaundice  HENT: No change in eyesight or hearing  Cardiovascular:No chest pain or arrythmia  Respiratory:No cough or SOB  GI:No nausea, vomiting, diarrhea, constipation  :No dysuria or frequency  Musculoskeletal:No bone or joint pain  Neuro:See PI  Psych: No complaints    PMH:      Allergies   Allergen Reactions   • Grass Pollen(K-O-R-T-Swt [place]) Shortness of Breath   • Pet Dander [Cat Hair Extract] Shortness of Breath and Unspecified   • Milk [Dairy Food Allergy] Unspecified   • [place] Unspecified   • [place] (Diagnostic) Shortness of Breath       Past Medical History:   Diagnosis Date   • Disorder of thyroid     hypothyroid   • High cholesterol    • Light chain myeloma (HCC) 2021   • Prostate cancer (HCC)    • Psychiatric problem     depression        Past Surgical History:   Procedure Laterality Date   • MO DX BONE MARROW ASPIRATIONS Left 7/23/2021    Procedure: ASPIRATION, BONE MARROW - DURANT;  Surgeon: Hair Okeefe M.D.;  Location: [facility];  Service: Orthopedics   • MO DX BONE MARROW BIOPSIES Left 7/23/2021    Procedure: BIOPSY, BONE MARROW, USING NEEDLE OR TROCAR;  Surgeon: Hair Okeefe M.D.;  Location: ENDOSCOPY [facility];  Service: Orthopedics   • WRIST FUSION Left         Medications:    Current Outpatient Medications on File Prior to Visit   Medication Sig Dispense Refill   • lenalidomide 25 MG Cap Take 25 mg (1 cap) by mouth on Days 1 - 14 of each 21-day cycle (2 weeks ON & 1 week OFF) 14 Capsule 0   •  tamsulosin (FLOMAX) 0.4 MG capsule TAKE 2 CAPSULES BY MOUTH 30 MINUTES AFTER DINNER. TAKE 1 CAPSULE BY MOUTH FOR A WEEK THEN INCREASE TO 2 CAPSULES 60 Capsule 5   • Magnesium Hydroxide (MILK OF MAGNESIA PO) Take  by mouth as needed.     • ORGOVYX 120 MG Tab      • VITAMIN D PO Take 8,000 Units by mouth.     • Coenzyme Q10 100 MG Cap Take  by mouth every day.     • VITAMIN K PO Take  by mouth. 25mg PO bid     • aspirin EC (ECOTRIN) 81 MG Tablet Delayed Response Take 81 mg by mouth every day.     • dexamethasone (DECADRON) 4 MG Tab Take 10 pills once a week 40 tablet 6   • valACYclovir (VALTREX) 500 MG Tab Take 500 mg by mouth 2 times a day. No known stop date, TWICE DAILY     • FLUoxetine (PROZAC) 20 MG Cap Take 1 capsule by mouth every day. 90 capsule 4   • Omega-3 Fatty Acids (FISH OIL) 1000 MG Cap capsule Take 1,000 mg by mouth 2 times a day.     • levothyroxine (SYNTHROID) 100 MCG Tab Take 1 tablet by mouth Every morning on an empty stomach. 100 tablet 3   • simvastatin (ZOCOR) 20 MG Tab Take 1 tablet by mouth every evening. 100 tablet 3     No current facility-administered medications on file prior to visit.       Social History     Tobacco Use   • Smoking status: Former Smoker     Years: 20.00     Quit date:      Years since quittin.9   • Smokeless tobacco: Never Used   Substance Use Topics   • Alcohol use: Never        Family History   Problem Relation Age of Onset   • Cancer Neg Hx         Objective    Vitals:    There were no vitals taken for this visit.    Physical Exam:    Appears well-developed and well-nourished. No distress.    Head -  Normocephalic .   Eyes - Pupils are equal.. Conjunctivae normal. No scleral icterus.   Ears - normal hearing  Neurological -   Alert and oriented to person, place, and time. No focal findings  Skin - . No rash noted. Not diaphoretic. No erythema. No pallor. No jaundice   Psychiatric -  Normal mood and affect.    Labs:    Results for JAMES GRANT (MRN  [id number])    Ref. Range 11/11/2021 15:15 11/18/2021 15:08 11/26/2021 14:42   WBC Latest Ref Range: 4.8 - 10.8 K/uL 2.7 (L) 3.5 (L) 4.7 (L)   RBC Latest Ref Range: 4.70 - 6.10 M/uL 3.55 (L) 3.76 (L) 3.78 (L)   Hemoglobin Latest Ref Range: 14.0 - 18.0 g/dL 12.9 (L) 13.4 (L) 13.3 (L)   Hematocrit Latest Ref Range: 42.0 - 52.0 % 36.8 (L) 38.7 (L) 39.1 (L)   MCV Latest Ref Range: 81.4 - 97.8 fL 103.7 (H) 102.9 (H) 103.4 (H)   MCH Latest Ref Range: 27.0 - 33.0 pg 36.3 (H) 35.6 (H) 35.2 (H)   MCHC Latest Ref Range: 33.7 - 35.3 g/dL 35.1 34.6 34.0   RDW Latest Ref Range: 35.9 - 50.0 fL 59.7 (H) 60.5 (H) 61.7 (H)   Platelet Count Latest Ref Range: 164 - 446 K/uL 190 180 177   MPV Latest Ref Range: 9.0 - 12.9 fL 11.0 10.7 10.1   Neutrophils-Polys Latest Ref Range: 44.00 - 72.00 % 62.30 59.40 59.10   Neutrophils (Absolute) Latest Ref Range: 1.82 - 7.42 K/uL 1.70 (L) 2.06 2.76   Lymphocytes Latest Ref Range: 22.00 - 41.00 % 22.70 19.70 (L) 18.80 (L)   Lymphs (Absolute) Latest Ref Range: 1.00 - 4.80 K/uL 0.62 (L) 0.68 (L) 0.88 (L)   Monocytes Latest Ref Range: 0.00 - 13.40 % 9.90 16.20 (H) 18.40 (H)   Results for JAMES GRANT (MRN [id number])    Ref. Range 11/26/2021 14:42   Sodium Latest Ref Range: 135 - 145 mmol/L 134 (L)   Potassium Latest Ref Range: 3.6 - 5.5 mmol/L 4.6   Chloride Latest Ref Range: 96 - 112 mmol/L 98   Co2 Latest Ref Range: 20 - 33 mmol/L 27   Anion Gap Latest Ref Range: 7.0 - 16.0  9.0   Glucose Latest Ref Range: 65 - 99 mg/dL 139 (H)   Bun Latest Ref Range: 8 - 22 mg/dL 15   Creatinine Latest Ref Range: 0.50 - 1.40 mg/dL 0.93   GFR If  Latest Ref Range: >60 mL/min/1.73 m 2 >60   GFR If Non  Latest Ref Range: >60 mL/min/1.73 m 2 >60   Calcium Latest Ref Range: 8.5 - 10.5 mg/dL 9.3   AST(SGOT) Latest Ref Range: 12 - 45 U/L 26   ALT(SGPT) Latest Ref Range: 2 - 50 U/L 33   Alkaline Phosphatase Latest Ref Range: 30 - 99 U/L 68   Total Bilirubin Latest Ref Range: 0.1 - 1.5  mg/dL 0.4   Albumin Latest Ref Range: 3.2 - 4.9 g/dL 4.2   Total Protein Latest Ref Range: 6.0 - 8.2 g/dL 6.4   Globulin Latest Ref Range: 1.9 - 3.5 g/dL 2.2   A-G Ratio Latest Units: g/dL 1.9   Results for JAMES GRANT (MRN [id number])    Ref. Range 9/23/2021 13:40 10/7/2021 11:03 10/28/2021 13:46 11/18/2021 15:08   Free Kappa Light Chains Latest Ref Range: 3.30 - 19.40 mg/L 3.07 (L) 5.02 5.24 4.95   Free Lambda Light Chains Latest Ref Range: 5.71 - 26.30 mg/L 890.19 (H) 451.62 (H) 491.27 (H) 329.63 (H)   Kappa-Lambda Ratio Latest Ref Range: 0.26 - 1.65  <0.01 (L) 0.01 (L) 0.01 (L) 0.02 (L)     Assessment  Responding to RVD  Imp:    Visit Diagnosis:    1. Multiple myeloma not having achieved remission (HCC)     2. Prostate cancer (HCC)           Plan:    Continue same\RTC 4 weeks  Will look into insurance coverage for stem cell transplant  James Aaron M.D.

## 2021-11-18 NOTE — PROGRESS NOTES
"Pharmacy Chemotherapy Calculation:    Dx: MM    Protocol: RVd     *Dosing Reference*  Bortezomib 1.3 mg/m2 subcutaneous on Days 1, 4, 8, 11              -MD dosing on Days 1, 8, 15 of a 21 day cycle  Lenalidomide 25 mg PO daily on Days 1-14  Dexamethasone 20 mg PO daily on Days 1-2, 4-5, 8-9, 11-12 OR 40 mg PO on Days 1, 8, and 15  21 day cycle for 3-4 cycles OR until disease progression or unacceptable toxicity  NCCN Guidelines for Multiple Myeloma.V.1.2022  Amadeo P, et al. Blood. 2010;116(5):679-86  Dominic S, et al/ Blood. 2012;119(19):7811-82    Allergies:  Grass pollen(k-o-r-t-swt jose), Pet dander [cat hair extract], Milk [dairy food allergy], [place], and [place] oil     /55   Pulse 69   Temp 36.1 °C (97 °F) (Temporal)   Resp 18   Ht 1.685 m (5' 6.34\")   Wt 78.1 kg (172 lb 2.9 oz)   SpO2 94%   BMI 27.51 kg/m²  Body surface area is 1.91 meters squared.     Labs 11/18/21:  ANC~ 2060 Plt = 180k   Hgb = 13.4       Drug Order   (Drug name, dose, route, IV Fluid & volume, frequency, number of doses) Cycle 4 Day 15  Previous treatment: C4D8 on 11/11/21   Medication = Bortezomib (Velcade)  Base Dose = 1.3 mg/m2  Calc Dose: Base Dose x 1.91 m2 = 2.483 mg  Final Dose = 2.48 mg  Route = Subcutaneous  Conc. = 2.5 mg/mL  Fluid & Volume = 0.99 mL in syringe  Administration = ABDOMEN or THIGH          <10% difference, okay to treat with final dose     By my signature below, I confirm this process was performed independently with the BSA and all final chemotherapy dosing calculations congruent. I have reviewed the above chemotherapy order and that my calculation of the final dose and BSA (when applicable) corroborate those calculations of the  pharmacist. Discrepancies of 10% or greater in the written dose have been addressed and documented within the [facility] Progress notes.    Nick Williamson, PharmD  " Current some day smoker

## 2021-11-18 NOTE — PROGRESS NOTES
Chemotherapy Verification - SECONDARY RN       Height = 168.5 cm  Weight = 78.1 kg  BSA = 1.91 m2       Medication: Velcade  Dose: 1.3 mg/m2  Calculated Dose: 2.483 mg                             I confirm that this process was performed independently.

## 2021-11-18 NOTE — PROGRESS NOTES
Chemotherapy Verification - PRIMARY RN      Height = 168.5 cm  Weight = 78.1 kg  BSA = 1.91 m^2       Medication: Bortezomib  Dose: 1.3 mg/m^2  Calculated Dose: 2.48 mg                             (In mg/m2, AUC, mg/kg)       I confirm this process was performed independently with the BSA and all final chemotherapy dosing calculations congruent.  Any discrepancies of 10% or greater have been addressed with the chemotherapy pharmacist. The resolution of the discrepancy has been documented in the EPIC progress notes.

## 2021-11-18 NOTE — PROGRESS NOTES
"Pharmacy Chemotherapy Verification  Patient Name: Gary Hedrick  Dx: MM    Cycle 4 Day 15  Previous treatment = C4D8 on 11/11/21    Regimen and Dosing Reference  Bortezomib 1.3 mg/m2 subcutaneous on    -MD dosing on Days 1, 8, 15 of a 21 day cycle  Lenalidomide 25 mg PO daily on Days 1-14  Dexamethasone 20 mg PO daily on Days 1-2, 4-5, 8-9, 11-12 OR 40 mg PO on Days 1, 8, and 15  21 day cycle for 3-4 cycles OR until disease progression or unacceptable toxicity  NCCN Guidelines for Multiple Myeloma.V.1.2022  Amadeo P, et al. Blood. 2010;116(5):679-86  Alfaro S, et al/ Blood. 2012;119(19):4375-82  John M, et al. J Clin Oncol. 2014;32(25):2712-7    Allergies:Grass pollen(k-o-r-t-swt jose), Pet dander [cat hair extract], [place] meal, Milk [dairy food allergy], [place], and [place] oil  /55   Pulse 69   Temp 36.1 °C (97 °F) (Temporal)   Resp 18   Ht 1.685 m (5' 6.34\")   Wt 78.1 kg (172 lb 2.9 oz)   SpO2 94%   BMI 27.51 kg/m²   Body surface area is 1.91 meters squared.     Labs 11/18/21  ANC~ 2060 Plt = 180k   Hgb = 13.4    Bortezomib (Velcade) 1.3 mg/m2 x 1.91 m2 = 2.48 mg   <10% difference, OK to treat with final dose = 2.48 mg subcutaneous    Candido Gooden, PharmD    "

## 2021-11-19 NOTE — PROGRESS NOTES
Pt presented for D15D4 Velcade. Denied any s/s of infection or open wounds. Labs drawn from [facility] with 23G butterfly, gauze drsg placed. Pt met parameters for tx. Velcade given in abdomen, pt tolerated well, bandaid declined. Has next appt, left on foot to self care.

## 2021-11-20 LAB
KAPPA LC FREE SER-MCNC: 4.95 MG/L (ref 3.3–19.4)
KAPPA LC FREE/LAMBDA FREE SER NEPH: 0.02 {RATIO} (ref 0.26–1.65)
LAMBDA LC FREE SERPL-MCNC: 329.63 MG/L (ref 5.71–26.3)

## 2021-11-22 ENCOUNTER — TELEPHONE (OUTPATIENT)
Dept: HEMATOLOGY ONCOLOGY | Facility: MEDICAL CENTER | Age: 71
End: 2021-11-22

## 2021-11-22 NOTE — TELEPHONE ENCOUNTER
Telephone Call    Contacted patient today to let him know that his lambda light chains are trending down this month.  His lambda light chain now at 329.63, previously 491.27.  Patient was very pleased with the results and grateful for the call.    Results for JAMES GRANT (MRN [id number])   Ref. Range 11/18/2021 15:08   Free Kappa Light Chains Latest Ref Range: 3.30 - 19.40 mg/L 4.95   Free Lambda Light Chains Latest Ref Range: 5.71 - 26.30 mg/L 329.63 (H)   Kappa-Lambda Ratio Latest Ref Range: 0.26 - 1.65  0.02 (L)

## 2021-11-26 ENCOUNTER — OUTPATIENT INFUSION SERVICES (OUTPATIENT)
Dept: ONCOLOGY | Facility: MEDICAL CENTER | Age: 71
End: 2021-11-26
Attending: INTERNAL MEDICINE
Payer: MEDICARE

## 2021-11-26 VITALS
SYSTOLIC BLOOD PRESSURE: 140 MMHG | OXYGEN SATURATION: 100 % | DIASTOLIC BLOOD PRESSURE: 55 MMHG | TEMPERATURE: 97.5 F | RESPIRATION RATE: 18 BRPM | HEART RATE: 76 BPM | BODY MASS INDEX: 27.02 KG/M2 | WEIGHT: 172.18 LBS | HEIGHT: 67 IN

## 2021-11-26 DIAGNOSIS — C90.00 MULTIPLE MYELOMA NOT HAVING ACHIEVED REMISSION (HCC): ICD-10-CM

## 2021-11-26 LAB
ALBUMIN SERPL BCP-MCNC: 4.2 G/DL (ref 3.2–4.9)
ALBUMIN/GLOB SERPL: 1.9 G/DL
ALP SERPL-CCNC: 68 U/L (ref 30–99)
ALT SERPL-CCNC: 33 U/L (ref 2–50)
ANION GAP SERPL CALC-SCNC: 9 MMOL/L (ref 7–16)
AST SERPL-CCNC: 26 U/L (ref 12–45)
BASOPHILS # BLD AUTO: 1.1 % (ref 0–1.8)
BASOPHILS # BLD: 0.05 K/UL (ref 0–0.12)
BILIRUB SERPL-MCNC: 0.4 MG/DL (ref 0.1–1.5)
BUN SERPL-MCNC: 15 MG/DL (ref 8–22)
CALCIUM SERPL-MCNC: 9.3 MG/DL (ref 8.5–10.5)
CHLORIDE SERPL-SCNC: 98 MMOL/L (ref 96–112)
CO2 SERPL-SCNC: 27 MMOL/L (ref 20–33)
CREAT SERPL-MCNC: 0.93 MG/DL (ref 0.5–1.4)
EOSINOPHIL # BLD AUTO: 0.11 K/UL (ref 0–0.51)
EOSINOPHIL NFR BLD: 2.4 % (ref 0–6.9)
ERYTHROCYTE [DISTWIDTH] IN BLOOD BY AUTOMATED COUNT: 61.7 FL (ref 35.9–50)
GLOBULIN SER CALC-MCNC: 2.2 G/DL (ref 1.9–3.5)
GLUCOSE SERPL-MCNC: 139 MG/DL (ref 65–99)
HCT VFR BLD AUTO: 39.1 % (ref 42–52)
HGB BLD-MCNC: 13.3 G/DL (ref 14–18)
IMM GRANULOCYTES # BLD AUTO: 0.01 K/UL (ref 0–0.11)
IMM GRANULOCYTES NFR BLD AUTO: 0.2 % (ref 0–0.9)
LYMPHOCYTES # BLD AUTO: 0.88 K/UL (ref 1–4.8)
LYMPHOCYTES NFR BLD: 18.8 % (ref 22–41)
MCH RBC QN AUTO: 35.2 PG (ref 27–33)
MCHC RBC AUTO-ENTMCNC: 34 G/DL (ref 33.7–35.3)
MCV RBC AUTO: 103.4 FL (ref 81.4–97.8)
MONOCYTES # BLD AUTO: 0.86 K/UL (ref 0–0.85)
MONOCYTES NFR BLD AUTO: 18.4 % (ref 0–13.4)
NEUTROPHILS # BLD AUTO: 2.76 K/UL (ref 1.82–7.42)
NEUTROPHILS NFR BLD: 59.1 % (ref 44–72)
NRBC # BLD AUTO: 0 K/UL
NRBC BLD-RTO: 0 /100 WBC
PLATELET # BLD AUTO: 177 K/UL (ref 164–446)
PMV BLD AUTO: 10.1 FL (ref 9–12.9)
POTASSIUM SERPL-SCNC: 4.6 MMOL/L (ref 3.6–5.5)
PROT SERPL-MCNC: 6.4 G/DL (ref 6–8.2)
RBC # BLD AUTO: 3.78 M/UL (ref 4.7–6.1)
SODIUM SERPL-SCNC: 134 MMOL/L (ref 135–145)
WBC # BLD AUTO: 4.7 K/UL (ref 4.8–10.8)

## 2021-11-26 PROCEDURE — 80053 COMPREHEN METABOLIC PANEL: CPT

## 2021-11-26 PROCEDURE — 96401 CHEMO ANTI-NEOPL SQ/IM: CPT

## 2021-11-26 PROCEDURE — 700111 HCHG RX REV CODE 636 W/ 250 OVERRIDE (IP): Performed by: NURSE PRACTITIONER

## 2021-11-26 PROCEDURE — 85025 COMPLETE CBC W/AUTO DIFF WBC: CPT

## 2021-11-26 RX ADMIN — BORTEZOMIB 2.5 MG: 3.5 INJECTION, POWDER, LYOPHILIZED, FOR SOLUTION INTRAVENOUS; SUBCUTANEOUS at 16:20

## 2021-11-26 ASSESSMENT — FIBROSIS 4 INDEX: FIB4 SCORE: 1.41

## 2021-11-26 NOTE — PROGRESS NOTES
"Pharmacy Chemotherapy Calculation:    Dx: MM    Protocol: RVd     *Dosing Reference*  Bortezomib 1.3 mg/m2 subcutaneous on Days 1, 4, 8, 11              -MD dosing on Days 1, 8, 15 of a 21 day cycle  Lenalidomide 25 mg PO daily on Days 1-14  Dexamethasone 20 mg PO daily on Days 1-2, 4-5, 8-9, 11-12 OR 40 mg PO on Days 1, 8, and 15  21 day cycle for 3-4 cycles OR until disease progression or unacceptable toxicity  NCCN Guidelines for Multiple Myeloma.V.1.2022  Amadeo P, et al. Blood. 2010;116(5):679-86  Dominic S, et al/ Blood. 2012;119(19):1214-57    Allergies:  Grass pollen(k-o-r-t-swt jose), Pet dander [cat hair extract], Milk [dairy food allergy], [place], and [place] oil     /55   Pulse 76   Temp 36.4 °C (97.5 °F) (Temporal)   Resp 18   Ht 1.7 m (5' 6.93\")   Wt 78.1 kg (172 lb 2.9 oz)   SpO2 100%   BMI 27.02 kg/m²  Body surface area is 1.92 meters squared.     Labs 11/26/21:  ANC~ 2760 Plt = 177k   Hgb = 13.3     SCr = 0.93 mg/dL CrCl ~ 83 mL/min   LFT's = WNL TBili = 0.4     Drug Order   (Drug name, dose, route, IV Fluid & volume, frequency, number of doses) Cycle 5 Day 1  Previous treatment: C4D15 on 11/18/21   Medication = Bortezomib (Velcade)  Base Dose = 1.3 mg/m2  Calc Dose: Base Dose x 1.92 m2 = 2.496 mg  Final Dose = 2.5 mg  Route = Subcutaneous  Conc. = 2.5 mg/mL  Fluid & Volume = 1 mL in syringe  Administration = ABDOMEN or THIGH          <10% difference, okay to treat with final dose     By my signature below, I confirm this process was performed independently with the BSA and all final chemotherapy dosing calculations congruent. I have reviewed the above chemotherapy order and that my calculation of the final dose and BSA (when applicable) corroborate those calculations of the  pharmacist. Discrepancies of 10% or greater in the written dose have been addressed and documented within the [facility] Progress notes.    Nick Williamson, PharmD  "

## 2021-11-26 NOTE — PROGRESS NOTES
Chemotherapy Verification - PRIMARY RN      Height = 170 cm  Weight = 78.1 kg  BSA = 1.92 m^2       Medication: bortezomib  Dose: 1.3 mg/m^2  Calculated Dose: 2.496 mg                             (In mg/m2, AUC, mg/kg)           I confirm this process was performed independently with the BSA and all final chemotherapy dosing calculations congruent.  Any discrepancies of 10% or greater have been addressed with the chemotherapy pharmacist. The resolution of the discrepancy has been documented in the EP[facility] progress notes.

## 2021-11-26 NOTE — PROGRESS NOTES
Chemotherapy Verification - SECONDARY RN       Height = 1.7 m  Weight = 78.1 kg  BSA = 1.92 m^2       Medication: bortezomib  Dose: 1.3 mg/m^2  Calculated Dose: 2.496 mg                             (In mg/m2, AUC, mg/kg)     I confirm that this process was performed independently.

## 2021-11-26 NOTE — PROGRESS NOTES
[facility] into [facility] for Day [facility]/ Cycle 5 of velcade. Pt denied having any new or acute complaints today, reports tolerating past treatments well. Labs drawn as ordered from [facility] via 23 g butterfly needle, gauze and coban applied to site. Labs within parameters. Pt given velcade injection subcutaneously as prescribed to LLQ of abdomen. Gary tolerated well, denied having any complaints during or after injection. Next appointment scheduled, Gary discharged home to self care.

## 2021-11-30 ENCOUNTER — TELEPHONE (OUTPATIENT)
Dept: HEMATOLOGY ONCOLOGY | Facility: MEDICAL CENTER | Age: 71
End: 2021-11-30

## 2021-11-30 DIAGNOSIS — C90.00 MULTIPLE MYELOMA NOT HAVING ACHIEVED REMISSION (HCC): ICD-10-CM

## 2021-11-30 DIAGNOSIS — D80.1 HYPOGAMMAGLOBULINEMIA (HCC): ICD-10-CM

## 2021-11-30 RX ORDER — LENALIDOMIDE 25 MG/1
CAPSULE ORAL
Qty: 14 CAPSULE | Refills: 0 | Status: SHIPPED | OUTPATIENT
Start: 2021-11-30 | End: 2021-12-22 | Stop reason: SDUPTHER

## 2021-11-30 NOTE — TELEPHONE ENCOUNTER
Oral Chemo Compliance review for lenalidomide (Revlimid)    Current dose:  25 mg PO on Days 1 -14 of each 21-day cycle

## 2021-12-01 ENCOUNTER — HOSPITAL ENCOUNTER (OUTPATIENT)
Dept: RADIATION ONCOLOGY | Facility: MEDICAL CENTER | Age: 71
End: 2021-12-31
Attending: RADIOLOGY
Payer: MEDICARE

## 2021-12-02 ENCOUNTER — OUTPATIENT INFUSION SERVICES (OUTPATIENT)
Dept: ONCOLOGY | Facility: MEDICAL CENTER | Age: 71
End: 2021-12-02
Attending: INTERNAL MEDICINE
Payer: MEDICARE

## 2021-12-02 ENCOUNTER — OFFICE VISIT (OUTPATIENT)
Dept: HEMATOLOGY ONCOLOGY | Facility: MEDICAL CENTER | Age: 71
End: 2021-12-02
Payer: MEDICARE

## 2021-12-02 ENCOUNTER — HOSPITAL ENCOUNTER (OUTPATIENT)
Dept: RADIATION ONCOLOGY | Facility: MEDICAL CENTER | Age: 71
End: 2021-12-02

## 2021-12-02 VITALS
WEIGHT: 175.93 LBS | BODY MASS INDEX: 27.61 KG/M2 | DIASTOLIC BLOOD PRESSURE: 79 MMHG | HEART RATE: 77 BPM | SYSTOLIC BLOOD PRESSURE: 161 MMHG | OXYGEN SATURATION: 95 %

## 2021-12-02 VITALS
TEMPERATURE: 98 F | DIASTOLIC BLOOD PRESSURE: 54 MMHG | BODY MASS INDEX: 26.99 KG/M2 | HEIGHT: 67 IN | RESPIRATION RATE: 18 BRPM | HEART RATE: 77 BPM | SYSTOLIC BLOOD PRESSURE: 145 MMHG | WEIGHT: 171.96 LBS | OXYGEN SATURATION: 98 %

## 2021-12-02 VITALS
SYSTOLIC BLOOD PRESSURE: 132 MMHG | HEART RATE: 76 BPM | OXYGEN SATURATION: 97 % | HEIGHT: 67 IN | BODY MASS INDEX: 27.46 KG/M2 | RESPIRATION RATE: 16 BRPM | DIASTOLIC BLOOD PRESSURE: 64 MMHG | TEMPERATURE: 98.6 F | WEIGHT: 174.94 LBS

## 2021-12-02 DIAGNOSIS — C61 PROSTATE CANCER (HCC): ICD-10-CM

## 2021-12-02 DIAGNOSIS — C90.00 MULTIPLE MYELOMA NOT HAVING ACHIEVED REMISSION (HCC): ICD-10-CM

## 2021-12-02 LAB
ANISOCYTOSIS BLD QL SMEAR: ABNORMAL
BASOPHILS # BLD AUTO: 0.6 % (ref 0–1.8)
BASOPHILS # BLD: 0.02 K/UL (ref 0–0.12)
COMMENT 1642: NORMAL
EOSINOPHIL # BLD AUTO: 0.13 K/UL (ref 0–0.51)
EOSINOPHIL NFR BLD: 4.1 % (ref 0–6.9)
ERYTHROCYTE [DISTWIDTH] IN BLOOD BY AUTOMATED COUNT: 65.8 FL (ref 35.9–50)
HCT VFR BLD AUTO: 39.3 % (ref 42–52)
HGB BLD-MCNC: 13.3 G/DL (ref 14–18)
IMM GRANULOCYTES # BLD AUTO: 0.01 K/UL (ref 0–0.11)
IMM GRANULOCYTES NFR BLD AUTO: 0.3 % (ref 0–0.9)
LYMPHOCYTES # BLD AUTO: 0.72 K/UL (ref 1–4.8)
LYMPHOCYTES NFR BLD: 22.7 % (ref 22–41)
MACROCYTES BLD QL SMEAR: ABNORMAL
MCH RBC QN AUTO: 35.8 PG (ref 27–33)
MCHC RBC AUTO-ENTMCNC: 33.8 G/DL (ref 33.7–35.3)
MCV RBC AUTO: 105.6 FL (ref 81.4–97.8)
MICROCYTES BLD QL SMEAR: ABNORMAL
MONOCYTES # BLD AUTO: 0.29 K/UL (ref 0–0.85)
MONOCYTES NFR BLD AUTO: 9.1 % (ref 0–13.4)
MORPHOLOGY BLD-IMP: NORMAL
NEUTROPHILS # BLD AUTO: 2 K/UL (ref 1.82–7.42)
NEUTROPHILS NFR BLD: 63.2 % (ref 44–72)
NRBC # BLD AUTO: 0 K/UL
NRBC BLD-RTO: 0 /100 WBC
PLATELET # BLD AUTO: 190 K/UL (ref 164–446)
PLATELET BLD QL SMEAR: NORMAL
PMV BLD AUTO: 10.1 FL (ref 9–12.9)
RBC # BLD AUTO: 3.72 M/UL (ref 4.7–6.1)
RBC BLD AUTO: PRESENT
WBC # BLD AUTO: 3.2 K/UL (ref 4.8–10.8)

## 2021-12-02 PROCEDURE — 99215 OFFICE O/P EST HI 40 MIN: CPT | Mod: 25 | Performed by: RADIOLOGY

## 2021-12-02 PROCEDURE — 700111 HCHG RX REV CODE 636 W/ 250 OVERRIDE (IP): Performed by: NURSE PRACTITIONER

## 2021-12-02 PROCEDURE — 77334 RADIATION TREATMENT AID(S): CPT | Performed by: RADIOLOGY

## 2021-12-02 PROCEDURE — 77470 SPECIAL RADIATION TREATMENT: CPT | Mod: 26 | Performed by: RADIOLOGY

## 2021-12-02 PROCEDURE — 99213 OFFICE O/P EST LOW 20 MIN: CPT | Performed by: INTERNAL MEDICINE

## 2021-12-02 PROCEDURE — 77280 THER RAD SIMULAJ FIELD SMPL: CPT | Performed by: RADIOLOGY

## 2021-12-02 PROCEDURE — 77263 THER RADIOLOGY TX PLNG CPLX: CPT | Performed by: RADIOLOGY

## 2021-12-02 PROCEDURE — 77334 RADIATION TREATMENT AID(S): CPT | Mod: 26 | Performed by: RADIOLOGY

## 2021-12-02 PROCEDURE — 99212 OFFICE O/P EST SF 10 MIN: CPT | Mod: 25 | Performed by: RADIOLOGY

## 2021-12-02 PROCEDURE — 85025 COMPLETE CBC W/AUTO DIFF WBC: CPT

## 2021-12-02 PROCEDURE — 77290 THER RAD SIMULAJ FIELD CPLX: CPT | Mod: 26 | Performed by: RADIOLOGY

## 2021-12-02 PROCEDURE — 96401 CHEMO ANTI-NEOPL SQ/IM: CPT

## 2021-12-02 RX ORDER — ONDANSETRON 8 MG/1
8 TABLET, ORALLY DISINTEGRATING ORAL PRN
Status: CANCELLED | OUTPATIENT
Start: 2021-12-23

## 2021-12-02 RX ORDER — 0.9 % SODIUM CHLORIDE 0.9 %
3 VIAL (ML) INJECTION PRN
Status: CANCELLED | OUTPATIENT
Start: 2021-12-31

## 2021-12-02 RX ORDER — HEPARIN SODIUM (PORCINE) LOCK FLUSH IV SOLN 100 UNIT/ML 100 UNIT/ML
500 SOLUTION INTRAVENOUS PRN
Status: CANCELLED | OUTPATIENT
Start: 2021-12-23

## 2021-12-02 RX ORDER — ONDANSETRON 2 MG/ML
4 INJECTION INTRAMUSCULAR; INTRAVENOUS PRN
Status: CANCELLED | OUTPATIENT
Start: 2021-12-23

## 2021-12-02 RX ORDER — 0.9 % SODIUM CHLORIDE 0.9 %
VIAL (ML) INJECTION PRN
Status: CANCELLED | OUTPATIENT
Start: 2021-12-23

## 2021-12-02 RX ORDER — ONDANSETRON 2 MG/ML
4 INJECTION INTRAMUSCULAR; INTRAVENOUS PRN
Status: CANCELLED | OUTPATIENT
Start: 2022-01-07

## 2021-12-02 RX ORDER — 0.9 % SODIUM CHLORIDE 0.9 %
VIAL (ML) INJECTION PRN
Status: CANCELLED | OUTPATIENT
Start: 2021-12-31

## 2021-12-02 RX ORDER — 0.9 % SODIUM CHLORIDE 0.9 %
10 VIAL (ML) INJECTION PRN
Status: CANCELLED | OUTPATIENT
Start: 2021-12-31

## 2021-12-02 RX ORDER — 0.9 % SODIUM CHLORIDE 0.9 %
10 VIAL (ML) INJECTION PRN
Status: CANCELLED | OUTPATIENT
Start: 2021-12-23

## 2021-12-02 RX ORDER — 0.9 % SODIUM CHLORIDE 0.9 %
10 VIAL (ML) INJECTION PRN
Status: CANCELLED | OUTPATIENT
Start: 2022-01-07

## 2021-12-02 RX ORDER — SODIUM CHLORIDE 9 MG/ML
INJECTION, SOLUTION INTRAVENOUS CONTINUOUS
Status: CANCELLED | OUTPATIENT
Start: 2021-12-23

## 2021-12-02 RX ORDER — 0.9 % SODIUM CHLORIDE 0.9 %
VIAL (ML) INJECTION PRN
Status: CANCELLED | OUTPATIENT
Start: 2022-01-07

## 2021-12-02 RX ORDER — ONDANSETRON 2 MG/ML
4 INJECTION INTRAMUSCULAR; INTRAVENOUS PRN
Status: CANCELLED | OUTPATIENT
Start: 2021-12-31

## 2021-12-02 RX ORDER — 0.9 % SODIUM CHLORIDE 0.9 %
3 VIAL (ML) INJECTION PRN
Status: CANCELLED | OUTPATIENT
Start: 2022-01-07

## 2021-12-02 RX ORDER — SODIUM CHLORIDE 9 MG/ML
INJECTION, SOLUTION INTRAVENOUS CONTINUOUS
Status: CANCELLED | OUTPATIENT
Start: 2021-12-31

## 2021-12-02 RX ORDER — HEPARIN SODIUM (PORCINE) LOCK FLUSH IV SOLN 100 UNIT/ML 100 UNIT/ML
500 SOLUTION INTRAVENOUS PRN
Status: CANCELLED | OUTPATIENT
Start: 2021-12-31

## 2021-12-02 RX ORDER — PROCHLORPERAZINE MALEATE 10 MG
10 TABLET ORAL EVERY 6 HOURS PRN
Status: CANCELLED | OUTPATIENT
Start: 2021-12-23

## 2021-12-02 RX ORDER — PROCHLORPERAZINE MALEATE 10 MG
10 TABLET ORAL EVERY 6 HOURS PRN
Status: CANCELLED | OUTPATIENT
Start: 2021-12-31

## 2021-12-02 RX ORDER — ONDANSETRON 8 MG/1
8 TABLET, ORALLY DISINTEGRATING ORAL PRN
Status: CANCELLED | OUTPATIENT
Start: 2022-01-07

## 2021-12-02 RX ORDER — 0.9 % SODIUM CHLORIDE 0.9 %
3 VIAL (ML) INJECTION PRN
Status: CANCELLED | OUTPATIENT
Start: 2021-12-23

## 2021-12-02 RX ORDER — ONDANSETRON 8 MG/1
8 TABLET, ORALLY DISINTEGRATING ORAL PRN
Status: CANCELLED | OUTPATIENT
Start: 2021-12-31

## 2021-12-02 RX ORDER — HEPARIN SODIUM (PORCINE) LOCK FLUSH IV SOLN 100 UNIT/ML 100 UNIT/ML
500 SOLUTION INTRAVENOUS PRN
Status: CANCELLED | OUTPATIENT
Start: 2022-01-07

## 2021-12-02 RX ORDER — SODIUM CHLORIDE 9 MG/ML
INJECTION, SOLUTION INTRAVENOUS CONTINUOUS
Status: CANCELLED | OUTPATIENT
Start: 2022-01-07

## 2021-12-02 RX ORDER — PROCHLORPERAZINE MALEATE 10 MG
10 TABLET ORAL EVERY 6 HOURS PRN
Status: CANCELLED | OUTPATIENT
Start: 2022-01-07

## 2021-12-02 RX ORDER — CIPROFLOXACIN 500 MG/1
500 TABLET, FILM COATED ORAL 2 TIMES DAILY
Qty: 6 TABLET | Refills: 0 | Status: SHIPPED | OUTPATIENT
Start: 2021-12-02 | End: 2021-12-05

## 2021-12-02 RX ADMIN — BORTEZOMIB 2.5 MG: 3.5 INJECTION, POWDER, LYOPHILIZED, FOR SOLUTION INTRAVENOUS; SUBCUTANEOUS at 15:40

## 2021-12-02 ASSESSMENT — PAIN SCALES - GENERAL: PAINLEVEL: NO PAIN

## 2021-12-02 ASSESSMENT — FIBROSIS 4 INDEX
FIB4 SCORE: 1.76

## 2021-12-02 NOTE — PROGRESS NOTES
Chemotherapy Verification - PRIMARY RN      Height = 66.93 in  Weight = 171 lb  BSA = 1.92 m2       Medication: Velcade  Dose: 1.3 mg/m2  Calculated Dose: 2.496 mg                             (In mg/m2, AUC, mg/kg)     I confirm this process was performed independently with the BSA and all final chemotherapy dosing calculations congruent.  Any discrepancies of 10% or greater have been addressed with the chemotherapy pharmacist. The resolution of the discrepancy has been documented in the EPIC progress notes.

## 2021-12-02 NOTE — RADIATION PLANNING NOTES
PATIENT NAME Gary Hedrick   PRIMARY PHYSICIAN [id number]   REFERRING PHYSICIAN No ref. provider found 1950     DATE OF SERVICE: 12/2/2021    DIAGNOSIS:  Prostate cancer (HCC)  Staging form: Prostate, AJCC 8th Edition  - Clinical stage from 10/8/2021: Stage IIB (cT1c, cN0, cM0, PSA: 4.1, Grade Group: 2) - Signed by Kirk DELANEY M.D. on 10/8/2021  Histopathologic type: Adenocarcinoma, NOS  Stage prefix: Initial diagnosis  Prostate specific antigen (PSA) range: Less than 10  Sarah primary pattern: 3  [place] secondary pattern: 4  Sarah score: 7  Histologic grading system: 5 grade system  Bilateral cancer: Yes  Number of biopsy cores examined: 12  Number of biopsy cores positive: 6  Location of positive needle core biopsies: Both sides         SPECIAL TREATMENT PROCEDURE NOTE:  Considerable additional effort required in the management of this case because of administration of Brachytherapy, which may result in increased normal tissue toxicity and require greater effort in contouring and treatment because of greater precision.    Gretchen Ridley (Self)  Phone 1: [phone] (Home Phone)      ENCOUNTER STARTED:  08/31/

## 2021-12-02 NOTE — PROGRESS NOTES
"Pharmacy Chemotherapy Calculation:    Dx: MM    Protocol: RVd     *Dosing Reference*  Bortezomib 1.3 mg/m2 subcutaneous on Days 1, 4, 8, 11              -MD dosing on Days 1, 8, 15 of a 21 day cycle  Lenalidomide 25 mg PO daily on Days 1-14  Dexamethasone 20 mg PO daily on Days 1-2, 4-5, 8-9, 11-12 OR 40 mg PO on Days 1, 8, and 15  21 day cycle for 3-4 cycles OR until disease progression or unacceptable toxicity  NCCN Guidelines for Multiple Myeloma.V.1.2022  Amadeo P, et al. Blood. 2010;116(5):679-86  Dominic S, et al/ Blood. 2012;119(19):1007-19    Allergies:  Grass pollen(k-o-r-t-swt jose), Pet dander [cat hair extract], Milk [dairy food allergy], [place], and [place] oil     /54   Pulse 77   Temp 36.7 °C (98 °F) (Temporal)   Resp 18   Ht 1.7 m (5' 6.93\")   Wt 78 kg (171 lb 15.3 oz)   SpO2 98%   BMI 26.99 kg/m²  Body surface area is 1.92 meters squared.     Labs 12/2/21:  ANC~ 2000 Plt = 190k   Hgb = 13.3       Drug Order   (Drug name, dose, route, IV Fluid & volume, frequency, number of doses) Cycle 5 Day 8  Previous treatment: C5D1 on 11/26/21   Medication = Bortezomib (Velcade)  Base Dose = 1.3 mg/m2  Calc Dose: Base Dose x 1.92 m2 = 2.496 mg  Final Dose = 2.5 mg  Route = Subcutaneous  Conc. = 2.5 mg/mL  Fluid & Volume = 1 mL in syringe  Administration = ABDOMEN or THIGH          <10% difference, okay to treat with final dose     By my signature below, I confirm this process was performed independently with the BSA and all final chemotherapy dosing calculations congruent. I have reviewed the above chemotherapy order and that my calculation of the final dose and BSA (when applicable) corroborate those calculations of the  pharmacist. Discrepancies of 10% or greater in the written dose have been addressed and documented within the [facility] Progress notes.    Nick Williamson, PharmD  "

## 2021-12-02 NOTE — PROGRESS NOTES
"Pharmacy Chemotherapy Verification  Patient Name: Gary Hedrick  Dx: MM    Cycle 5 Day 8  Previous treatment = C5D1 on 11/26/21    Regimen and Dosing Reference  Bortezomib 1.3 mg/m2 subcutaneous on    -MD dosing on Days 1, 8, 15 of a 21 day cycle  Lenalidomide 25 mg PO daily on Days 1-14  Dexamethasone 20 mg PO daily on Days 1-2, 4-5, 8-9, 11-12 OR 40 mg PO on Days 1, 8, and 15  21 day cycle for 3-4 cycles OR until disease progression or unacceptable toxicity  NCCN Guidelines for Multiple Myeloma.V.1.2022  Amadeo P, et al. Blood. 2010;116(5):679-86  Alfaro S, et al/ Blood. 2012;119(19):4375-82  John M, et al. J Clin Oncol. 2014;32(25):2712-7    Allergies:Grass pollen(k-o-r-t-swt jose), Pet dander [cat hair extract], [place] meal, Milk [dairy food allergy], [place], and [place] oil  /54   Pulse 77   Temp 36.7 °C (98 °F) (Temporal)   Resp 18   Ht 1.7 m (5' 6.93\")   Wt 78 kg (171 lb 15.3 oz)   SpO2 98%   BMI 26.99 kg/m²   Body surface area is 1.92 meters squared.    Labs 12/2/21  ANC~ 2000 Plt = 190k   Hgb = 13.3       Bortezomib (Velcade) 1.3 mg/m2 x 1.92 m2 = 2.496 mg   <10% difference, OK to treat with final dose = 2.5 mg subcutaneous    Kenney Birch, PharmD    "

## 2021-12-02 NOTE — RADIATION PLANNING NOTES
DATE OF SERVICE: 12/2/2021    DIAGNOSIS:  Prostate cancer (HCC)  Staging form: Prostate, AJCC 8th Edition  - Clinical stage from 10/8/2021: Stage IIB (cT1c, cN0, cM0, PSA: 4.1, Grade Group: 2) - Signed by Kirk DELANEY M.D. on 10/8/2021  Histopathologic type: Adenocarcinoma, NOS  Stage prefix: Initial diagnosis  Prostate specific antigen (PSA) range: Less than 10  [place] primary pattern: 3  [place] secondary pattern: 4  [place] score: 7  Histologic grading system: 5 grade system  Bilateral cancer: Yes  Number of biopsy cores examined: 12  Number of biopsy cores positive: 6  Location of positive needle core biopsies: Both sides       DATE OF SERVICE: 12/2/2021    TYPE OF SIMULATION: volume study    GOAL OF TREATMENT:   [x] Curative  [] Palliative  [] Oligometastatic    CONTRAST:    [] IV Contrast*  [] Small Bowel  [] Rectal  [] Urethral              POSITION:    [x]  Supine  [] Prone with belly board    COMPLEX:  [] Complex Blocking   []Arcs  [] Custom Blocks  [] >3 Sites    PROCEDURE: Patient positioned on CT table in Vac-Cayla immobilization device with or without belly board depending on position. CT acquired thorough the entire volume of interest.  Images reviewed and exported to treatment planning system.    I have personally reviewed the relevant data, performed the target localization, and determined all relevant factors for this patient’s simulation.    *Omnipaque 80 -100cc IVP in conjunction with 500cc NS

## 2021-12-02 NOTE — RADIATION PLANNING NOTES
Clinical Treatment Planning Note    DATE OF SERVICE: 12/2/2021    DIAGNOSIS:  Prostate cancer (HCC)  Staging form: Prostate, AJCC 8th Edition  - Clinical stage from 10/8/2021: Stage IIB (cT1c, cN0, cM0, PSA: 4.1, Grade Group: 2) - Signed by Kirk DELANEY M.D. on 10/8/2021  Histopathologic type: Adenocarcinoma, NOS  Stage prefix: Initial diagnosis  Prostate specific antigen (PSA) range: Less than 10  [place] primary pattern: 3  [place] secondary pattern: 4  [place] score: 7  Histologic grading system: 5 grade system  Bilateral cancer: Yes  Number of biopsy cores examined: 12  Number of biopsy cores positive: 6  Location of positive needle core biopsies: Both sides         IMAGING REVIEWED:  [] CT     [] MRI     [] PET/CT     [] BONE SCAN     [] MAMMO     [] OTHER      TREATMENT INTENT:   [x] CURATIVE     [] MAINTENANCE     []  PALLIATIVE      []  SUPPORTIVE     []  PROPHYLACTIC     [] BENIGN     []  CONSOLIDATIVE      [] DEFINITIVE   []  OLOGIMETASTATIC      LINE OF TREATMENT:  [] ADJUVANT   [x] DEFINITIVE   [] NEOADJUVANT   [] RE-TREATMENT      TECHNIQUE PLANNED:  [x] IMRT   [] 3D   [] SBRT   [] SRS/SRT   [x] LDR   [] ELECTRON       IMRT JUSTIFICATION:  []   An immediately adjacent area has been previously irradiated and abutting portals must be established with high precision.    [x]  Dose escalation is planned to deliver radiation doses in excess of those commonly utilized for similar tumors with conventional treatment.    []  The target volume is concave or convex, and the critical normal tissues are within or around that convexity or concavity.    [x]  The target volume is in close proximity to critical structures that must be protected.    [x]  The volume of interest must be covered with narrow margins to adequately protect  immediately adjacent structures.      FIELDS & BLOCKING:  [] COMPLEX BLOCKS     []  = 3 TX AREAS     [x]  ARCS     []  CUSTOM SHEILD        []  SIMPLE BLOCK      CHEMOTHERAPY:  []   CONCURRENT     []  INDUCTION     [] SEQUENTIAL     []  <30 DAYS FROM XRT      NOTES:  OAR CONSTRAINTS: (GUIDELINES ONLY NOT ABSOLUTE)   Target Prescribed Coverage   PTVboost 100% of PTVboost covered by 95% (cGy) of RX Dose     PTVinitial 100% of PTVinitial covered by 95% (Gy) of RX Dose       LORI Goal   Plan [place] Max Dose < 110%   Rectum V60Gy < 35%   Rectum V65Gy < 25%   Rectum V70Gy < 20%   Rectum V75Gy < 15%   Bladder V65Gy < 50%   Bladder V70Gy < 35%   Bladder V75Gy < 25%   Bladder V80Gy < 15%   R Femoral Head Max Dose < 50Gy   L Femoral Head Max Dose < 50Gy   individual Small Bowel Loops V15Gy < 120cc   Entire Cavity Small Bowel V45Gy < 195cc   Penile Bulb Mean Dose < 52.5Gy   *RTOG 0924, RTOG 1115, QUANTEC  OAR CONSTRAINTS: (GUIDELINES ONLY NOT ABSOLUTE)   Target Prescribed Coverage   Prostate   D90 > 90%       LORI Goal   Urethra V150 < 5%   Rectum V100 <1.2cc

## 2021-12-02 NOTE — PROGRESS NOTES
Chemotherapy Verification - SECONDARY RN       Height = 170 cm  Weight = 78 kg  BSA = 1.92 m2       Medication: Velcade  Dose: 1.3 mg/m2  Calculated Dose: 2.5 mg                             (In mg/m2, AUC, mg/kg)       I confirm that this process was performed independently.

## 2021-12-02 NOTE — PROGRESS NOTES
one into [facility] for Day 8 / Cycle 5 of Velcade for Multiple myeloma not having achieved remission. Pt denied having any new or acute complaints today, reports tolerating past treatments well. Lab drawn from L AC with 25 x 3/4 gauge needle, well tolerated, gauze and coban applied to site. Pt given Velcade as prescribed in RL abdomen tolerated well, denied having any complaints during or after injection, gauze and paper tape applied to site. Discharge home to self care in [facility]. Appointment confirm for the next treatment.

## 2021-12-02 NOTE — PROGRESS NOTES
RADIATION ONCOLOGY H&P    Patient name:  Gary Hedrick    Primary Physician:  Elda Box M.D. MRN: [id number]  CSN: [id number]   Referring physician:  No ref. provider found : 1950, 71 y.o.     DATE OF SERVICE: 2021    IDENTIFICATION:   A 71 y.o. male with   Visit Diagnoses     ICD-10-CM   1. Prostate cancer (HCC)  C61     Prostate cancer (HCC)  Staging form: Prostate, AJCC 8th Edition  - Clinical stage from 10/8/2021: Stage IIB (cT1c, cN0, cM0, PSA: 4.1, Grade Group: 2) - Signed by Kirk DELANEY M.D. 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  He is presently on RVD having completed 3 cycles.  He also has a past medical history significant for hypogonadism and has been on testosterone during replacement therapy until recently.     He states his PSAs have fluctuated in the 1-2 range.  More recently PSA was noted to be 4.10.  He underwent an MRI on 2021.  MRI demonstrated 57 cc gland without suspicious lesion.     He was referred by his primary care physician for urologic work-up.  He saw Dr. Lazar and underwent transrectal ultrasound directed biopsies of the prostate on 7/15/2021.  Volume was noted to be 52 cc.  12 core biopsy prostate obtained demonstrating [place] 3+4 disease involving the right base and right base lateral.  [place] 3+3 disease noted right mid lateral right apex lateral left mid and left mid lateral.  In all 6 out  of 12 cores were involved with prostate cancer.     June 2021 he was evaluated for macrocytosis without anemia.  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  Bone marrow biopsy demonstrates 66 to 70% monoclonal plasma cells.  PET/CT, FDG, 8/19/2021 showed no evidence of metastatic disease.  RVD started and completed x3.     Current complaints mild fatigue.  He does report urinary frequency, intermittency, weak stream, incomplete emptying and nocturia 1-2 times per night.  Current IPSS score 17-18.  Quality-of-life score 3.    INTERVAL HISTORY:  Preop visit for upcoming cesium 131 low-dose rate seed implant.  He has been on Orgovyx and Flomax.  Reports significant improvement in his urinary function.  His IPSS score is decreased 6 from 18.  Quality-of-life score is 2.  Is experiencing some hot flashes.  But overall feels good.  Also continues on his myeloma therapy.    CURRENT MEDICATIONS:  Current Outpatient Medications   Medication Sig Dispense Refill   • ciprofloxacin (CIPRO) 500 MG Tab Take 1 Tablet by mouth 2 times a day for 3 days. 6 Tablet 0   • lenalidomide 25 MG Cap Take 25 mg (1 cap) by mouth on Days 1 - 14 of each 21-day cycle (2 weeks ON & 1 week OFF) 14 Capsule 0   • zolpidem (AMBIEN) 10 MG Tab Take 1 Tablet by mouth at bedtime as needed.     • tamsulosin (FLOMAX) 0.4 MG capsule TAKE 2 CAPSULES BY MOUTH 30 MINUTES AFTER DINNER. TAKE 1 CAPSULE BY MOUTH FOR A WEEK THEN INCREASE TO 2 CAPSULES 60 Capsule 5   • Magnesium Hydroxide (MILK OF MAGNESIA PO) Take  by mouth as needed.     • ORGOVYX 120 MG Tab      • VITAMIN D PO Take 8,000 Units by mouth.     • Coenzyme Q10 100 MG Cap Take  by mouth every day.     • VITAMIN K PO Take  by mouth. 25mg PO bid     • aspirin EC (ECOTRIN) 81 MG Tablet Delayed Response Take 81 mg by mouth every day.     • dexamethasone (DECADRON) 4 MG Tab Take 10 pills once a week 40 tablet 6   • valACYclovir (VALTREX) 500 MG Tab Take 500 mg by  mouth 2 times a day. No known stop date, TWICE DAILY     • FLUoxetine (PROZAC) 20 MG Cap Take 1 capsule by mouth every day. 90 capsule 4   • Omega-3 Fatty Acids (FISH OIL) 1000 MG Cap capsule Take 1,000 mg by mouth 2 times a day.     • levothyroxine (SYNTHROID) 100 MCG Tab Take 1 tablet by mouth Every morning on an empty stomach. 100 tablet 3   • simvastatin (ZOCOR) 20 MG Tab Take 1 tablet by mouth every evening. 100 tablet 3     No current facility-administered medications for this encounter.     Family History   Problem Relation Age of Onset   • Cancer Neg Hx      Social History     Tobacco Use   • Smoking status: Former Smoker     Years: 20.00     Quit date:      Years since quittin.9   • Smokeless tobacco: Never Used   Vaping Use   • Vaping Use: Never used   Substance Use Topics   • Alcohol use: Never   • Drug use: Not Currently     Types: Marijuana     Comment: quit        ALLERGIES:  Grass pollen(k-o-r-t-swt jose), Pet dander [cat hair extract], Milk [dairy food allergy], [place]brush, and [place] (diagnostic)    PHYSICAL EXAM:   ECOG PERFORMANCE STATUS:100  No flowsheet data found.  BP (!) 161/79   Pulse 77   Wt 79.8 kg (175 lb 14.8 oz)   SpO2 95%   BMI 27.61 kg/m²   GENERAL: Alert oriented no acute distress  HEENT:  Pupils are equal, round, and reactive to light.  Extraocular muscles   are intact. Sclerae nonicteric.  Conjunctivae pink.  Oral cavity, tongue   protrudes midline.   NECK:  Supple without evidence of thyromegaly.  NODES:  No palpable adenopathy of the neck, supraclavicular fossa or axillae   bilaterally.  LUNGS:  Clear to ascultation and resonant to percussion.  HEART:  Regular rate and rhythm.  No murmur appreciated  ABDOMEN:  Soft. No evidence of hepatosplenomegaly.  Positive bowel sounds.  RECTAL: Moderate size gland without nodularity  EXTREMITIES:  Without Edema.  NEUROLOGIC:  Cranial nerves II through XII were intact.  Strength is 5/5 in   lower extremities bilaterally.    There was no focal sensory deficit appreciated.      INTERNATIONAL PROSTATE SYMPTOM SCORE (I-PSS):  I-PSS Review 10/8/2021 12/2/2021   Incomplete Emptying- How often have you had the sensation of not emptying your bladder? 4 1   How often have you had to urinate less than every tow hours? 3 1   Intermittency- How often have you found you stopped and started again several times when you urinated? 3 1   Urgency- How often have you found it difficult to postpone urination? 1 1   Weak Stream- How often have you had a weak urinary stream? 4 1   Straining- How often have you had to strain to start urination? 1 0   Nocturia- How many times did you typically get up at night to urinate? 2 1   Score: 18 6       QUALITY OF LIFE DUE TO URINARY SYMPTOMS:   If you were to spend the rest of your life with your urinary condition just the way it is now, how would you feel about that? 2  = Mostly satisfied    SEXUAL HEALTH INVENTORY FOR MEN (EULALIO):   EULALIO Total 10/8/2021 12/2/2021   How do you rate your confidence that you could get and keep an erection? 1 0   When you had erections with sexual stimulation, how often were your erections hard enough for penetration (entering your partner)? 1 0   During sexual intercourse, how often were you able to maintain you erection after you had penetrated (entered) your partner? 1 0   During Sexual intercourse, how difficult was it to maintain your erection to completion of intercourse? 1 0   When you attampted sexual intercourse, how often was it satisfactory for you? 1 0   Total: 5 0       LABORATORY DATA:   Lab Results   Component Value Date/Time    WBC 4.7 (L) 11/26/2021 02:42 PM    RBC 3.78 (L) 11/26/2021 02:42 PM    HEMOGLOBIN 13.3 (L) 11/26/2021 02:42 PM    HEMATOCRIT 39.1 (L) 11/26/2021 02:42 PM    .4 (H) 11/26/2021 02:42 PM    MCH 35.2 (H) 11/26/2021 02:42 PM    MCHC 34.0 11/26/2021 02:42 PM    RDW 61.7 (H) 11/26/2021 02:42 PM    PLATELETCT 177 11/26/2021 02:42 PM    MPV 10.1  11/26/2021 02:42 PM    NEUTSPOLYS 59.10 11/26/2021 02:42 PM    LYMPHOCYTES 18.80 (L) 11/26/2021 02:42 PM    MONOCYTES 18.40 (H) 11/26/2021 02:42 PM    EOSINOPHILS 2.40 11/26/2021 02:42 PM    BASOPHILS 1.10 11/26/2021 02:42 PM      Lab Results   Component Value Date/Time    SODIUM 134 (L) 11/26/2021 02:42 PM    POTASSIUM 4.6 11/26/2021 02:42 PM    CHLORIDE 98 11/26/2021 02:42 PM    CO2 27 11/26/2021 02:42 PM    GLUCOSE 139 (H) 11/26/2021 02:42 PM    BUN 15 11/26/2021 02:42 PM    CREATININE 0.93 11/26/2021 02:42 PM       Lab Results   Component Value Date/Time    PSATOTAL 4.10 (H) 04/12/2021 08:04 AM    PSATOTAL 4.10 (H) 04/12/2021 08:01 AM       RADIOLOGY DATA:  No results found.    IMPRESSION:    A 71 y.o. with   Prostate cancer (HCC)  Staging form: Prostate, AJCC 8th Edition  - Clinical stage from 10/8/2021: Stage IIB (cT1c, cN0, cM0, PSA: 4.1, Grade Group: 2) - Signed by Kirk DELANEY M.D. on 10/8/2021  Histopathologic type: Adenocarcinoma, NOS  Stage prefix: Initial diagnosis  Prostate specific antigen (PSA) range: Less than 10  [place] primary pattern: 3  [place] secondary pattern: 4  [place] score: 7  Histologic grading system: 5 grade system  Bilateral cancer: Yes  Number of biopsy cores examined: 12  Number of biopsy cores positive: 6  Location of positive needle core biopsies: Both sides    RECOMMENDATIONS:   Unfavorable intermediate risk prostate cancer currently on androgen deprivation therapy for total of 6months.  He is scheduled for seed implant December 16 with supplemental external beam radiotherapy to start proximately 6 to 8 weeks post implant.    We reviewed the technical aspects benefits risks associate with the upcoming transperineal ultrasound-guided implantation of radioactive cesium 131 seeds.  Reviewed the need for general anesthesia.  We reviewed the use of a Hairston catheter during procedure as well as going home with a Hairston catheter in place.  We reviewed the radiation precautions he  will need to follow-up for 2 months post implant.  Detailed preop instructions were given.  Patient signed consent.    We will see him on December 16 [facility] for implant.    Thank you for the opportunity to participate in his care.  If any questions or comments, please do not hesitate in calling.    Orders Placed This Encounter   • ciprofloxacin (CIPRO) 500 MG Tab

## 2021-12-02 NOTE — NON-PROVIDER
Patient was seen today in clinic with Dr. Isaac for follow up.  Vitals signs and weight were obtained and pain assessment was completed.  Allergies and medications were reviewed with the patient.  Toxicities of treatment assessed.     Vitals/Pain:  Vitals:    12/02/21 0906   BP: (!) 161/79   Pulse: 77   SpO2: 95%   Weight: 79.8 kg (175 lb 14.8 oz)   Pain Score: No pain        Allergies:   Grass pollen(k-o-r-t-swt jose), Pet dander [cat hair extract], Milk [dairy food allergy], [place]brush, and [place] (diagnostic)    Current Medications:  Current Outpatient Medications   Medication Sig Dispense Refill   • lenalidomide 25 MG Cap Take 25 mg (1 cap) by mouth on Days 1 - 14 of each 21-day cycle (2 weeks ON & 1 week OFF) 14 Capsule 0   • zolpidem (AMBIEN) 10 MG Tab Take 1 Tablet by mouth at bedtime as needed.     • tamsulosin (FLOMAX) 0.4 MG capsule TAKE 2 CAPSULES BY MOUTH 30 MINUTES AFTER DINNER. TAKE 1 CAPSULE BY MOUTH FOR A WEEK THEN INCREASE TO 2 CAPSULES 60 Capsule 5   • Magnesium Hydroxide (MILK OF MAGNESIA PO) Take  by mouth as needed.     • ORGOVYX 120 MG Tab      • VITAMIN D PO Take 8,000 Units by mouth.     • Coenzyme Q10 100 MG Cap Take  by mouth every day.     • VITAMIN K PO Take  by mouth. 25mg PO bid     • aspirin EC (ECOTRIN) 81 MG Tablet Delayed Response Take 81 mg by mouth every day.     • dexamethasone (DECADRON) 4 MG Tab Take 10 pills once a week 40 tablet 6   • valACYclovir (VALTREX) 500 MG Tab Take 500 mg by mouth 2 times a day. No known stop date, TWICE DAILY     • FLUoxetine (PROZAC) 20 MG Cap Take 1 capsule by mouth every day. 90 capsule 4   • Omega-3 Fatty Acids (FISH OIL) 1000 MG Cap capsule Take 1,000 mg by mouth 2 times a day.     • levothyroxine (SYNTHROID) 100 MCG Tab Take 1 tablet by mouth Every morning on an empty stomach. 100 tablet 3   • simvastatin (ZOCOR) 20 MG Tab Take 1 tablet by mouth every evening. 100 tablet 3     No current facility-administered medications for this encounter.          PCP:  Charu Fernandes, Med Ass't

## 2021-12-03 ENCOUNTER — TELEPHONE (OUTPATIENT)
Dept: HEMATOLOGY ONCOLOGY | Facility: MEDICAL CENTER | Age: 71
End: 2021-12-03

## 2021-12-03 NOTE — TELEPHONE ENCOUNTER
Patient is calling, asking for calendar/schedule for his December medication.  Please mail it. Thank you.

## 2021-12-08 ENCOUNTER — TELEPHONE (OUTPATIENT)
Dept: HEMATOLOGY ONCOLOGY | Facility: MEDICAL CENTER | Age: 71
End: 2021-12-08

## 2021-12-08 NOTE — TELEPHONE ENCOUNTER
Called [facility] to check and see if they will cover a Stem Cell transplant @ [facility]. They advised me that is a covered service at a Medicare approved Transplant Center with Pre-Authorization at a Non-preferred rate. Hospital stays are an out of pocket rate of $440 per day for days (1-5) with days (6-90) covered.

## 2021-12-09 ENCOUNTER — OUTPATIENT INFUSION SERVICES (OUTPATIENT)
Dept: ONCOLOGY | Facility: MEDICAL CENTER | Age: 71
End: 2021-12-09
Attending: INTERNAL MEDICINE
Payer: MEDICARE

## 2021-12-09 VITALS
BODY MASS INDEX: 27.27 KG/M2 | HEART RATE: 74 BPM | DIASTOLIC BLOOD PRESSURE: 58 MMHG | WEIGHT: 173.72 LBS | SYSTOLIC BLOOD PRESSURE: 145 MMHG | RESPIRATION RATE: 17 BRPM | TEMPERATURE: 98.4 F | HEIGHT: 67 IN | OXYGEN SATURATION: 98 %

## 2021-12-09 DIAGNOSIS — C90.00 MULTIPLE MYELOMA NOT HAVING ACHIEVED REMISSION (HCC): ICD-10-CM

## 2021-12-09 LAB
BASOPHILS # BLD AUTO: 0.4 % (ref 0–1.8)
BASOPHILS # BLD: 0.02 K/UL (ref 0–0.12)
EOSINOPHIL # BLD AUTO: 0.11 K/UL (ref 0–0.51)
EOSINOPHIL NFR BLD: 2.3 % (ref 0–6.9)
ERYTHROCYTE [DISTWIDTH] IN BLOOD BY AUTOMATED COUNT: 63.2 FL (ref 35.9–50)
HCT VFR BLD AUTO: 37.4 % (ref 42–52)
HGB BLD-MCNC: 13.2 G/DL (ref 14–18)
IMM GRANULOCYTES # BLD AUTO: 0.02 K/UL (ref 0–0.11)
IMM GRANULOCYTES NFR BLD AUTO: 0.4 % (ref 0–0.9)
LYMPHOCYTES # BLD AUTO: 0.53 K/UL (ref 1–4.8)
LYMPHOCYTES NFR BLD: 11.1 % (ref 22–41)
MCH RBC QN AUTO: 36.2 PG (ref 27–33)
MCHC RBC AUTO-ENTMCNC: 35.3 G/DL (ref 33.7–35.3)
MCV RBC AUTO: 102.5 FL (ref 81.4–97.8)
MONOCYTES # BLD AUTO: 0.63 K/UL (ref 0–0.85)
MONOCYTES NFR BLD AUTO: 13.2 % (ref 0–13.4)
NEUTROPHILS # BLD AUTO: 3.47 K/UL (ref 1.82–7.42)
NEUTROPHILS NFR BLD: 72.6 % (ref 44–72)
NRBC # BLD AUTO: 0 K/UL
NRBC BLD-RTO: 0 /100 WBC
PLATELET # BLD AUTO: 151 K/UL (ref 164–446)
PMV BLD AUTO: 10.8 FL (ref 9–12.9)
RBC # BLD AUTO: 3.65 M/UL (ref 4.7–6.1)
WBC # BLD AUTO: 4.8 K/UL (ref 4.8–10.8)

## 2021-12-09 PROCEDURE — 85025 COMPLETE CBC W/AUTO DIFF WBC: CPT

## 2021-12-09 PROCEDURE — 96401 CHEMO ANTI-NEOPL SQ/IM: CPT

## 2021-12-09 PROCEDURE — 700111 HCHG RX REV CODE 636 W/ 250 OVERRIDE (IP): Performed by: NURSE PRACTITIONER

## 2021-12-09 PROCEDURE — 83520 IMMUNOASSAY QUANT NOS NONAB: CPT | Mod: 91

## 2021-12-09 RX ADMIN — BORTEZOMIB 2.5 MG: 3.5 INJECTION, POWDER, LYOPHILIZED, FOR SOLUTION INTRAVENOUS; SUBCUTANEOUS at 16:35

## 2021-12-09 ASSESSMENT — FIBROSIS 4 INDEX: FIB4 SCORE: 1.64

## 2021-12-09 NOTE — PROGRESS NOTES
Chemotherapy Verification - PRIMARY RN      Height = 170 cm  Weight = 78.8 kg  BSA = 1.93 m^2       Medication: bortezomib (VELCADE)  Dose: 1.3 mg/m^2  Calculated Dose: 2.509 mg                             (In mg/m2, AUC, mg/kg)     I confirm this process was performed independently with the BSA and all final chemotherapy dosing calculations congruent.  Any discrepancies of 10% or greater have been addressed with the chemotherapy pharmacist. The resolution of the discrepancy has been documented in the EPIC progress notes.

## 2021-12-09 NOTE — PROGRESS NOTES
"Pharmacy Chemotherapy Calculation:    Dx: MM    Protocol: RVd     *Dosing Reference*  Bortezomib 1.3 mg/m2 subcutaneous on Days 1, 4, 8, 11              -MD dosing on Days 1, 8, 15 of a 21 day cycle  Lenalidomide 25 mg PO daily on Days 1-14  Dexamethasone 20 mg PO daily on Days 1-2, 4-5, 8-9, 11-12 OR 40 mg PO on Days 1, 8, and 15  21 day cycle for 3-4 cycles OR until disease progression or unacceptable toxicity  NCCN Guidelines for Multiple Myeloma.V.1.2022  Amadeo P, et al. Blood. 2010;116(5):679-86  Dominic S, et al/ Blood. 2012;119(19):0664-40    Allergies:  Grass pollen(k-o-r-t-swt jose), Pet dander [cat hair extract], Milk [dairy food allergy], [place]brush, and [place] oil     /58   Pulse 74   Temp 36.9 °C (98.4 °F) (Temporal)   Resp 17   Ht 1.7 m (5' 6.93\")   Wt 78.8 kg (173 lb 11.6 oz)   SpO2 98%   BMI 27.27 kg/m²  Body surface area is 1.93 meters squared.     Labs 12/9/21:  ANC~ 3470 Plt = 151k   Hgb = 13.2       Drug Order   (Drug name, dose, route, IV Fluid & volume, frequency, number of doses) Cycle 5 Day 15  Previous treatment: C5D8 on 12/2/21   Medication = Bortezomib (Velcade)  Base Dose = 1.3 mg/m2  Calc Dose: Base Dose x 1.93 m2 = 2.509 mg  Final Dose = 2.5 mg  Route = Subcutaneous  Conc. = 2.5 mg/mL  Fluid & Volume = 1 mL in syringe  Administration = ABDOMEN or THIGH          <10% difference, okay to treat with final dose     By my signature below, I confirm this process was performed independently with the BSA and all final chemotherapy dosing calculations congruent. I have reviewed the above chemotherapy order and that my calculation of the final dose and BSA (when applicable) corroborate those calculations of the  pharmacist. Discrepancies of 10% or greater in the written dose have been addressed and documented within the [facility] Progress notes.    Nick Williamson, PharmD  "

## 2021-12-09 NOTE — PROGRESS NOTES
"Pharmacy Chemotherapy Verification  Patient Name: Gary Hedrick  Dx: MM    Cycle 5 Day 15  Previous treatment = C5D8 on 12/2/21    Regimen and Dosing Reference  Bortezomib 1.3 mg/m2 subcutaneous on    -MD dosing on Days 1, 8, 15 of a 21 day cycle  Lenalidomide 25 mg PO daily on Days 1-14  Dexamethasone 20 mg PO daily on Days 1-2, 4-5, 8-9, 11-12 OR 40 mg PO on Days 1, 8, and 15  21 day cycle for 3-4 cycles OR until disease progression or unacceptable toxicity  NCCN Guidelines for Multiple Myeloma.V.1.2022  Amadeo P, et al. Blood. 2010;116(5):679-86  Alfaro S, et al/ Blood. 2012;119(19):4375-82  John M, et al. J Clin Oncol. 2014;32(25):2712-7    Allergies:Grass pollen(k-o-r-t-swt jose), Pet dander [cat hair extract], [place] meal, Milk [dairy food allergy], [place], and [place] oil  /58   Pulse 74   Temp 36.9 °C (98.4 °F) (Temporal)   Resp 17   Ht 1.7 m (5' 6.93\")   Wt 78.8 kg (173 lb 11.6 oz)   SpO2 98%   BMI 27.27 kg/m²   Body surface area is 1.93 meters squared.    Labs 12/9/21  ANC~ 3470 Plt = 151k   Hgb = 13.2       Bortezomib (Velcade) 1.3 mg/m2 x 1.93 m2 = 2.509 mg   <10% difference, OK to treat with final dose = 2.5 mg subcutaneous    Kenney Birch, PharmD    "

## 2021-12-10 ENCOUNTER — PRE-ADMISSION TESTING (OUTPATIENT)
Dept: ADMISSIONS | Facility: MEDICAL CENTER | Age: 71
End: 2021-12-10
Attending: RADIOLOGY
Payer: MEDICARE

## 2021-12-10 DIAGNOSIS — Z01.812 PRE-OPERATIVE LABORATORY EXAMINATION: ICD-10-CM

## 2021-12-10 LAB
SARS-COV-2 RNA RESP QL NAA+PROBE: NOTDETECTED
SPECIMEN SOURCE: NORMAL

## 2021-12-10 PROCEDURE — U0005 INFEC AGEN DETEC AMPLI PROBE: HCPCS

## 2021-12-10 PROCEDURE — U0003 INFECTIOUS AGENT DETECTION BY NUCLEIC ACID (DNA OR RNA); SEVERE ACUTE RESPIRATORY SYNDROME CORONAVIRUS 2 (SARS-COV-2) (CORONAVIRUS DISEASE [COVID-19]), AMPLIFIED PROBE TECHNIQUE, MAKING USE OF HIGH THROUGHPUT TECHNOLOGIES AS DESCRIBED BY CMS-2020-01-R: HCPCS

## 2021-12-10 ASSESSMENT — FIBROSIS 4 INDEX: FIB4 SCORE: 2.07

## 2021-12-10 NOTE — PROGRESS NOTES
Chemotherapy Verification - SECONDARY RN       Height = 170 cm  Weight = 78.8 kg  BSA = 1.93 m^2       Medication: Bortezomib  Dose: 1.3 mg/m^2  Calculated Dose: 2.51 mg                             (In mg/m2, AUC, mg/kg)       I confirm that this process was performed independently.

## 2021-12-10 NOTE — PROGRESS NOTES
Gary is here for Day 15, Cycle 5 bortezomib (VELCADE). Labs drawn peripherally using 23g butterfly to right AC; gauze/coban applied to site. Lab results reviewed and within parameters to proceed with treatment. bortezomib (VELCADE) given per MAR to LLQ. Next appointment scheduled. Discharged to self care; no apparent distress noted.

## 2021-12-11 LAB
KAPPA LC FREE SER-MCNC: 4.52 MG/L (ref 3.3–19.4)
KAPPA LC FREE/LAMBDA FREE SER NEPH: 0.01 {RATIO} (ref 0.26–1.65)
LAMBDA LC FREE SERPL-MCNC: 356.42 MG/L (ref 5.71–26.3)

## 2021-12-13 ENCOUNTER — TELEPHONE (OUTPATIENT)
Dept: HEMATOLOGY ONCOLOGY | Facility: MEDICAL CENTER | Age: 71
End: 2021-12-13

## 2021-12-14 DIAGNOSIS — C61 PROSTATE CANCER (HCC): ICD-10-CM

## 2021-12-14 RX ORDER — RELUGOLIX 120 MG/1
120 TABLET, FILM COATED ORAL DAILY
Qty: 30 TABLET | Refills: 3 | Status: SHIPPED | OUTPATIENT
Start: 2021-12-20 | End: 2022-01-19

## 2021-12-16 ENCOUNTER — HOSPITAL ENCOUNTER (OUTPATIENT)
Facility: MEDICAL CENTER | Age: 71
End: 2021-12-16
Attending: RADIOLOGY | Admitting: RADIOLOGY
Payer: MEDICARE

## 2021-12-16 ENCOUNTER — APPOINTMENT (OUTPATIENT)
Dept: RADIOLOGY | Facility: MEDICAL CENTER | Age: 71
End: 2021-12-16
Attending: RADIOLOGY
Payer: MEDICARE

## 2021-12-16 ENCOUNTER — ANESTHESIA (OUTPATIENT)
Dept: SURGERY | Facility: MEDICAL CENTER | Age: 71
End: 2021-12-16
Payer: MEDICARE

## 2021-12-16 ENCOUNTER — ANESTHESIA EVENT (OUTPATIENT)
Dept: SURGERY | Facility: MEDICAL CENTER | Age: 71
End: 2021-12-16
Payer: MEDICARE

## 2021-12-16 VITALS
RESPIRATION RATE: 18 BRPM | BODY MASS INDEX: 25.73 KG/M2 | OXYGEN SATURATION: 100 % | HEIGHT: 68 IN | SYSTOLIC BLOOD PRESSURE: 143 MMHG | DIASTOLIC BLOOD PRESSURE: 56 MMHG | WEIGHT: 169.75 LBS | HEART RATE: 82 BPM | TEMPERATURE: 97.3 F

## 2021-12-16 PROCEDURE — 160009 HCHG ANES TIME/MIN: Performed by: RADIOLOGY

## 2021-12-16 PROCEDURE — 55876 PLACE RT DEVICE/MARKER PROS: CPT | Mod: XU | Performed by: RADIOLOGY

## 2021-12-16 PROCEDURE — 77370 RADIATION PHYSICS CONSULT: CPT | Mod: XU | Performed by: RADIOLOGY

## 2021-12-16 PROCEDURE — 700105 HCHG RX REV CODE 258: Performed by: RADIOLOGY

## 2021-12-16 PROCEDURE — 55875 TRANSPERI NEEDLE PLACE PROS: CPT | Mod: XU | Performed by: RADIOLOGY

## 2021-12-16 PROCEDURE — 700117 HCHG RX CONTRAST REV CODE 255: Performed by: RADIOLOGY

## 2021-12-16 PROCEDURE — C1889 IMPLANT/INSERT DEVICE, NOC: HCPCS | Performed by: RADIOLOGY

## 2021-12-16 PROCEDURE — 76000 FLUOROSCOPY <1 HR PHYS/QHP: CPT | Mod: 26,XU | Performed by: RADIOLOGY

## 2021-12-16 PROCEDURE — 77295 3-D RADIOTHERAPY PLAN: CPT | Performed by: RADIOLOGY

## 2021-12-16 PROCEDURE — 77470 SPECIAL RADIATION TREATMENT: CPT | Performed by: RADIOLOGY

## 2021-12-16 PROCEDURE — 72170 X-RAY EXAM OF PELVIS: CPT

## 2021-12-16 PROCEDURE — 700111 HCHG RX REV CODE 636 W/ 250 OVERRIDE (IP): Performed by: ANESTHESIOLOGY

## 2021-12-16 PROCEDURE — 502000 HCHG MISC OR IMPLANTS RC 0278: Performed by: RADIOLOGY

## 2021-12-16 PROCEDURE — 160029 HCHG SURGERY MINUTES - 1ST 30 MINS LEVEL 4: Performed by: RADIOLOGY

## 2021-12-16 PROCEDURE — A9270 NON-COVERED ITEM OR SERVICE: HCPCS | Performed by: ANESTHESIOLOGY

## 2021-12-16 PROCEDURE — 700102 HCHG RX REV CODE 250 W/ 637 OVERRIDE(OP): Performed by: ANESTHESIOLOGY

## 2021-12-16 PROCEDURE — A4648 IMPLANTABLE TISSUE MARKER: HCPCS | Performed by: RADIOLOGY

## 2021-12-16 PROCEDURE — 55874 TPRNL PLMT BIODEGRDABL MATRL: CPT | Mod: XU | Performed by: RADIOLOGY

## 2021-12-16 PROCEDURE — 76965 ECHO GUIDANCE RADIOTHERAPY: CPT | Mod: 26,XU | Performed by: RADIOLOGY

## 2021-12-16 PROCEDURE — 160046 HCHG PACU - 1ST 60 MINS PHASE II: Performed by: RADIOLOGY

## 2021-12-16 PROCEDURE — 77778 APPLY INTERSTIT RADIAT COMPL: CPT | Mod: 26 | Performed by: RADIOLOGY

## 2021-12-16 PROCEDURE — 160036 HCHG PACU - EA ADDL 30 MINS PHASE I: Performed by: RADIOLOGY

## 2021-12-16 PROCEDURE — 77295 3-D RADIOTHERAPY PLAN: CPT | Mod: 26 | Performed by: RADIOLOGY

## 2021-12-16 PROCEDURE — 502240 HCHG MISC OR SUPPLY RC 0272: Performed by: RADIOLOGY

## 2021-12-16 PROCEDURE — 160035 HCHG PACU - 1ST 60 MINS PHASE I: Performed by: RADIOLOGY

## 2021-12-16 PROCEDURE — 700101 HCHG RX REV CODE 250: Performed by: ANESTHESIOLOGY

## 2021-12-16 PROCEDURE — 160002 HCHG RECOVERY MINUTES (STAT): Performed by: RADIOLOGY

## 2021-12-16 PROCEDURE — 160048 HCHG OR STATISTICAL LEVEL 1-5: Performed by: RADIOLOGY

## 2021-12-16 PROCEDURE — 160041 HCHG SURGERY MINUTES - EA ADDL 1 MIN LEVEL 4: Performed by: RADIOLOGY

## 2021-12-16 PROCEDURE — 77332 RADIATION TREATMENT AID(S): CPT | Performed by: RADIOLOGY

## 2021-12-16 PROCEDURE — A9270 NON-COVERED ITEM OR SERVICE: HCPCS | Performed by: RADIOLOGY

## 2021-12-16 PROCEDURE — 700102 HCHG RX REV CODE 250 W/ 637 OVERRIDE(OP): Performed by: RADIOLOGY

## 2021-12-16 PROCEDURE — 502587 HCHG PACK, D&C: Performed by: RADIOLOGY

## 2021-12-16 PROCEDURE — C2643 BRACHYTX, NON-STRANDED,C-131: HCPCS | Performed by: RADIOLOGY

## 2021-12-16 PROCEDURE — 77778 APPLY INTERSTIT RADIAT COMPL: CPT | Performed by: RADIOLOGY

## 2021-12-16 PROCEDURE — A4340 INDWELLING CATHETER SPECIAL: HCPCS | Performed by: RADIOLOGY

## 2021-12-16 PROCEDURE — 160025 RECOVERY II MINUTES (STATS): Performed by: RADIOLOGY

## 2021-12-16 PROCEDURE — 77470 SPECIAL RADIATION TREATMENT: CPT | Mod: 26 | Performed by: RADIOLOGY

## 2021-12-16 PROCEDURE — 77336 RADIATION PHYSICS CONSULT: CPT | Mod: XU | Performed by: RADIOLOGY

## 2021-12-16 DEVICE — SPACER PROSTATE HYDROGEL SPACEOAR VUE (1/EA): Type: IMPLANTABLE DEVICE | Status: FUNCTIONAL

## 2021-12-16 RX ORDER — BACITRACIN ZINC 500 [USP'U]/G
OINTMENT TOPICAL
Status: DISCONTINUED
Start: 2021-12-16 | End: 2021-12-16 | Stop reason: HOSPADM

## 2021-12-16 RX ORDER — PHENYLEPHRINE HYDROCHLORIDE 10 MG/ML
INJECTION, SOLUTION INTRAMUSCULAR; INTRAVENOUS; SUBCUTANEOUS PRN
Status: DISCONTINUED | OUTPATIENT
Start: 2021-12-16 | End: 2021-12-16 | Stop reason: SURG

## 2021-12-16 RX ORDER — HYDROMORPHONE HYDROCHLORIDE 1 MG/ML
0.1 INJECTION, SOLUTION INTRAMUSCULAR; INTRAVENOUS; SUBCUTANEOUS
Status: DISCONTINUED | OUTPATIENT
Start: 2021-12-16 | End: 2021-12-16 | Stop reason: HOSPADM

## 2021-12-16 RX ORDER — HYDROMORPHONE HYDROCHLORIDE 1 MG/ML
0.2 INJECTION, SOLUTION INTRAMUSCULAR; INTRAVENOUS; SUBCUTANEOUS
Status: DISCONTINUED | OUTPATIENT
Start: 2021-12-16 | End: 2021-12-16 | Stop reason: HOSPADM

## 2021-12-16 RX ORDER — HYDROMORPHONE HYDROCHLORIDE 1 MG/ML
0.4 INJECTION, SOLUTION INTRAMUSCULAR; INTRAVENOUS; SUBCUTANEOUS
Status: DISCONTINUED | OUTPATIENT
Start: 2021-12-16 | End: 2021-12-16 | Stop reason: HOSPADM

## 2021-12-16 RX ORDER — ONDANSETRON 2 MG/ML
4 INJECTION INTRAMUSCULAR; INTRAVENOUS
Status: COMPLETED | OUTPATIENT
Start: 2021-12-16 | End: 2021-12-16

## 2021-12-16 RX ORDER — LIDOCAINE HYDROCHLORIDE 20 MG/ML
INJECTION, SOLUTION EPIDURAL; INFILTRATION; INTRACAUDAL; PERINEURAL PRN
Status: DISCONTINUED | OUTPATIENT
Start: 2021-12-16 | End: 2021-12-16 | Stop reason: SURG

## 2021-12-16 RX ORDER — IODIXANOL 270 MG/ML
INJECTION, SOLUTION INTRAVASCULAR
Status: DISCONTINUED
Start: 2021-12-16 | End: 2021-12-16 | Stop reason: HOSPADM

## 2021-12-16 RX ORDER — SODIUM CHLORIDE, SODIUM LACTATE, POTASSIUM CHLORIDE, CALCIUM CHLORIDE 600; 310; 30; 20 MG/100ML; MG/100ML; MG/100ML; MG/100ML
INJECTION, SOLUTION INTRAVENOUS CONTINUOUS
Status: ACTIVE | OUTPATIENT
Start: 2021-12-16 | End: 2021-12-16

## 2021-12-16 RX ORDER — OXYCODONE HCL 5 MG/5 ML
5 SOLUTION, ORAL ORAL
Status: COMPLETED | OUTPATIENT
Start: 2021-12-16 | End: 2021-12-16

## 2021-12-16 RX ORDER — DIPHENHYDRAMINE HYDROCHLORIDE 50 MG/ML
12.5 INJECTION INTRAMUSCULAR; INTRAVENOUS
Status: DISCONTINUED | OUTPATIENT
Start: 2021-12-16 | End: 2021-12-16 | Stop reason: HOSPADM

## 2021-12-16 RX ORDER — OXYCODONE HCL 5 MG/5 ML
10 SOLUTION, ORAL ORAL
Status: COMPLETED | OUTPATIENT
Start: 2021-12-16 | End: 2021-12-16

## 2021-12-16 RX ORDER — SODIUM CHLORIDE, SODIUM LACTATE, POTASSIUM CHLORIDE, CALCIUM CHLORIDE 600; 310; 30; 20 MG/100ML; MG/100ML; MG/100ML; MG/100ML
INJECTION, SOLUTION INTRAVENOUS CONTINUOUS
Status: DISCONTINUED | OUTPATIENT
Start: 2021-12-16 | End: 2021-12-16 | Stop reason: HOSPADM

## 2021-12-16 RX ORDER — MEPERIDINE HYDROCHLORIDE 25 MG/ML
6.25 INJECTION INTRAMUSCULAR; INTRAVENOUS; SUBCUTANEOUS
Status: DISCONTINUED | OUTPATIENT
Start: 2021-12-16 | End: 2021-12-16 | Stop reason: HOSPADM

## 2021-12-16 RX ORDER — HALOPERIDOL 5 MG/ML
1 INJECTION INTRAMUSCULAR
Status: DISCONTINUED | OUTPATIENT
Start: 2021-12-16 | End: 2021-12-16 | Stop reason: HOSPADM

## 2021-12-16 RX ORDER — IODIXANOL 270 MG/ML
INJECTION, SOLUTION INTRAVASCULAR
Status: DISCONTINUED | OUTPATIENT
Start: 2021-12-16 | End: 2021-12-16 | Stop reason: HOSPADM

## 2021-12-16 RX ORDER — CEFAZOLIN SODIUM 1 G/3ML
INJECTION, POWDER, FOR SOLUTION INTRAMUSCULAR; INTRAVENOUS PRN
Status: DISCONTINUED | OUTPATIENT
Start: 2021-12-16 | End: 2021-12-16 | Stop reason: SURG

## 2021-12-16 RX ORDER — BACITRACIN ZINC 500 [USP'U]/G
OINTMENT TOPICAL
Status: DISCONTINUED | OUTPATIENT
Start: 2021-12-16 | End: 2021-12-16 | Stop reason: HOSPADM

## 2021-12-16 RX ADMIN — PHENYLEPHRINE HYDROCHLORIDE 100 MCG: 10 INJECTION INTRAVENOUS at 08:10

## 2021-12-16 RX ADMIN — PHENYLEPHRINE HYDROCHLORIDE 100 MCG: 10 INJECTION INTRAVENOUS at 09:09

## 2021-12-16 RX ADMIN — PHENYLEPHRINE HYDROCHLORIDE 100 MCG: 10 INJECTION INTRAVENOUS at 07:50

## 2021-12-16 RX ADMIN — FENTANYL CITRATE 100 MCG: 50 INJECTION, SOLUTION INTRAMUSCULAR; INTRAVENOUS at 07:47

## 2021-12-16 RX ADMIN — CEFAZOLIN 1 G: 330 INJECTION, POWDER, FOR SOLUTION INTRAMUSCULAR; INTRAVENOUS at 08:00

## 2021-12-16 RX ADMIN — LIDOCAINE HYDROCHLORIDE 100 MG: 20 INJECTION, SOLUTION EPIDURAL; INFILTRATION; INTRACAUDAL at 07:47

## 2021-12-16 RX ADMIN — ONDANSETRON 4 MG: 2 INJECTION INTRAMUSCULAR; INTRAVENOUS at 10:03

## 2021-12-16 RX ADMIN — PHENYLEPHRINE HYDROCHLORIDE 100 MCG: 10 INJECTION INTRAVENOUS at 09:35

## 2021-12-16 RX ADMIN — HALOPERIDOL LACTATE 1 MG: 5 INJECTION, SOLUTION INTRAMUSCULAR at 10:31

## 2021-12-16 RX ADMIN — SODIUM CHLORIDE, POTASSIUM CHLORIDE, SODIUM LACTATE AND CALCIUM CHLORIDE 1000 ML: 600; 310; 30; 20 INJECTION, SOLUTION INTRAVENOUS at 07:10

## 2021-12-16 RX ADMIN — PROPOFOL 200 MG: 10 INJECTION, EMULSION INTRAVENOUS at 07:47

## 2021-12-16 RX ADMIN — PHENYLEPHRINE HYDROCHLORIDE 100 MCG: 10 INJECTION INTRAVENOUS at 08:50

## 2021-12-16 RX ADMIN — OXYCODONE HYDROCHLORIDE 10 MG: 5 SOLUTION ORAL at 10:05

## 2021-12-16 RX ADMIN — PHENYLEPHRINE HYDROCHLORIDE 100 MCG: 10 INJECTION INTRAVENOUS at 09:01

## 2021-12-16 RX ADMIN — FENTANYL CITRATE 25 MCG: 50 INJECTION, SOLUTION INTRAMUSCULAR; INTRAVENOUS at 10:07

## 2021-12-16 RX ADMIN — PHENYLEPHRINE HYDROCHLORIDE 100 MCG: 10 INJECTION INTRAVENOUS at 08:25

## 2021-12-16 ASSESSMENT — PAIN DESCRIPTION - PAIN TYPE
TYPE: SURGICAL PAIN

## 2021-12-16 ASSESSMENT — FIBROSIS 4 INDEX: FIB4 SCORE: 2.07

## 2021-12-16 NOTE — ANESTHESIA TIME REPORT
Anesthesia Start and Stop Event Times     Date Time Event    12/16/2021 0730 Anesthesia Start     0735 Ready for Procedure     0936 Anesthesia Stop        Responsible Staff  12/16/21    Name Role Begin End    Rock Alvarenga M.D. Anesth 0730 0936        Preop Diagnosis (Free Text):  Pre-op Diagnosis     PROSTATE CANCER        Preop Diagnosis (Codes):    Premium Reason  Non-Premium    Comments:

## 2021-12-16 NOTE — DISCHARGE INSTRUCTIONS
ACTIVITY: Rest and take it easy for the first 24 hours.  A responsible adult is recommended to remain with you during that time.  It is normal to feel sleepy.  We encourage you to not do anything that requires balance, judgment or coordination.    MILD FLU-LIKE SYMPTOMS ARE NORMAL. YOU MAY EXPERIENCE GENERALIZED MUSCLE ACHES, THROAT IRRITATION, HEADACHE AND/OR SOME NAUSEA.    FOR 24 HOURS DO NOT:  Drive, operate machinery or run household appliances.  Drink beer or alcoholic beverages.   Make important decisions or sign legal documents.    SPECIAL INSTRUCTIONS: Leave Hairston catheter in place.  Remind patient of appointment in the radiotherapy department tomorrow at 7:30 AM    DIET: To avoid nausea, slowly advance diet as tolerated, avoiding spicy or greasy foods for the first day.  Add more substantial food to your diet according to your physician's instructions.  Babies can be fed formula or breast milk as soon as they are hungry.  INCREASE FLUIDS AND FIBER TO AVOID CONSTIPATION.    SURGICAL DRESSING/BATHING: follow Dr méndez instructions     FOLLOW-UP APPOINTMENT:  A follow-up appointment should be arranged with your doctor in [phone]; call to schedule.    You should CALL YOUR PHYSICIAN if you develop:  Fever greater than 101 degrees F.  Pain not relieved by medication, or persistent nausea or vomiting.  Excessive bleeding (blood soaking through dressing) or unexpected drainage from the wound.  Extreme redness or swelling around the incision site, drainage of pus or foul smelling drainage.  Inability to urinate or empty your bladder within 8 hours.  Problems with breathing or chest pain.    You should call 911 if you develop problems with breathing or chest pain.  If you are unable to contact your doctor or surgical center, you should go to the nearest emergency room or urgent care center.  Physician's telephone #: [phone]    If any questions arise, call your doctor.  If your doctor is not available,  please feel free to call the Surgical Center at [phone].     A registered nurse may call you a few days after your surgery to see how you are doing after your procedure.    MEDICATIONS: Resume taking daily medication.  Take prescribed pain medication with food.  If no medication is prescribed, you may take non-aspirin pain medication if needed.  PAIN MEDICATION CAN BE VERY CONSTIPATING.  Take a stool softener or laxative such as senokot, pericolace, or milk of magnesia if needed.    Prescription given for none.  Last pain medication given at 10:05 am    If your physician has prescribed pain medication that includes Acetaminophen (Tylenol), do not take additional Acetaminophen (Tylenol) while taking the prescribed medication.    Depression / Suicide Risk    As you are discharged from this [facility] facility, it is important to learn how to keep safe from harming yourself.    Recognize the warning signs:  · Abrupt changes in personality, positive or negative- including increase in energy   · Giving away possessions  · Change in eating patterns- significant weight changes-  positive or negative  · Change in sleeping patterns- unable to sleep or sleeping all the time   · Unwillingness or inability to communicate  · Depression  · Unusual sadness, discouragement and loneliness  · Talk of wanting to die  · Neglect of personal appearance   · Rebelliousness- reckless behavior  · Withdrawal from people/activities they love  · Confusion- inability to concentrate     If you or a loved one observes any of these behaviors or has concerns about self-harm, here's what you can do:  · Talk about it- your feelings and reasons for harming yourself  · Remove any means that you might use to hurt yourself (examples: pills, rope, extension cords, firearm)  · Get professional help from the community (Mental Health, Substance Abuse, psychological counseling)  · Do not be alone:Call your Safe Contact- someone whom you trust who  will be there for you.  · Call your local CRISIS HOTLINE 228-9765 or 017-912-0775  · Call your local Children's Mobile Crisis Response Team [place] (181) 774-3949 or www.Awesomi  · Call the toll free National Suicide Prevention Hotlines   · National Suicide Prevention Lifeline 891-509-QHSR (9396)  · National Mobento Line Network 800-SUICIDE (661-6770)

## 2021-12-16 NOTE — ANESTHESIA PROCEDURE NOTES
Airway    Date/Time: 12/16/2021 7:47 AM  Performed by: Rock Alvarenga M.D.  Authorized by: Rock Alvarenga M.D.     Location:  OR  Urgency:  Elective  Indications for Airway Management:  Anesthesia      Spontaneous Ventilation: absent    Sedation Level:  Deep  Preoxygenated: Yes    Final Airway Type:  Supraglottic airway  Final Supraglottic Airway:  Standard LMA    SGA Size:  4  Number of Attempts at Approach:  1

## 2021-12-16 NOTE — OR NURSING
0921 Patient arrived from OR via gurney unresponsive.  Oral airway in place. Blood pressure low. 2 patient identifier completed. Chin lift needed for obstruction.   0942 Oral airway removed. Patient on 2L oxygen via NC. Denies pain, denies nausea. vss stable.   1005 oxycodone given for pain as ordered  1058 Discharge instructions given to patient and wife. Both verbalize understanding of the orders. Copy of instructions given to wife.   1109 patient and wife educated on how to take care of murcia catheter received verbal understanding.  1111 Criteria met to discharge patient home.   1114 Patient escorted via w/c with all his personal belongings.

## 2021-12-16 NOTE — OP REPORT
OPERATIVE NOTE    DATE OF OPERATION: 12/16/21    RADIATION ONCOLOGIST:Kirk Isaac MD    ANESTHESIA: General    ANESTHESIOLOGIST: Rock Alvarenga    COMPLICATIONS: None    DRAINS: Hairston catheter    PREOPERATIVE DIAGNOSIS:  Prostate cancer (HCC)  Staging form: Prostate, AJCC 8th Edition  - Clinical stage from 10/8/2021: Stage IIB (cT1c, cN0, cM0, PSA: 4.1, Grade Group: 2) - Signed by Kirk DELANEY M.D. on 10/8/2021  Histopathologic type: Adenocarcinoma, NOS  Stage prefix: Initial diagnosis  Prostate specific antigen (PSA) range: Less than 10  [place] primary pattern: 3  Sarah secondary pattern: 4  Sarah score: 7  Histologic grading system: 5 grade system  Bilateral cancer: Yes  Number of biopsy cores examined: 12  Number of biopsy cores positive: 6  Location of positive needle core biopsies: Both sides      POSTOPERATIVE DIAGNOSIS:  Same    OPERATION: Trans perineal ultrasound-guided implantation of radioactive Cesium 131 seeds.    INDICATION: 71 y.o. old male with unfavorable intermediate risk prostate cancer.  Sarah score 7 Preimplant PSA of 4.1    INTRAOPERATIVE FINDINGS: Prostate Volume 38.6, Rx Dose 9500 cGy, D90 intraoperative 115%, V100 rectum 0.03 cc    DESCRIPTION OF PROCEDURE:  The patient was brought into the operating room for insertion of Cs131 seeds. After anesthesia, patient was placed in the dorsal lithotomy position, the rectum was checked to ensure no stool was present. Hairston catheter was inserted into the bladder and clamped  .  A transrectal ultrasound probe was introduced into the patient’s rectum and the prostate was measured using planimetry. The prostate volume was determined to be 38.6.cc. The length of the prostate was then determined longitudinally at the anterior mid and posterior aspect of the gland. The perineum was then prepped and draped in the usual sterile fashion. A 17-gauge needles were placed through a special perineal template under ultrasound guidance into the  prostate gland. Each of these needle placements and each of the following needle placements the transrectal ultrasound probe was used to guide the needles and determine the proper position and depth. Care was taken to avoid the patient’s bladder urethra and rectal mucosa.    16 needles were placed around the periphery of the prostate gland.  48 seeds were deposited through these needles using a Adarsh applicator. The needles were then removed and new needles were placed in the interior of the prostate gland. A total of 8 needles were placed.  25 seeds were deposited through these needles into the prostate gland. The number of seeds per needle as well as the placement of the needle was determined by real time ultrasound-based dosimetry. The activity per seed was 2.79 millicuries. The total number seeds implanted was 73. The total activity of the implant was 204 millicuries.    Fluoroscopy was used to check the seed position. A retrograde cystogram was performed after all the seeds and been placed and no stray seeds were found within the bladder or urethra. A survey was performed in the room and patient post-procedure. The activity in the distance of 1 m was 2.5 millirem per hour.    3 gold fiducial markers placed to aid in the placement of external beam radiotherapy fields.    After seed implant completed, the space OAR procedure commenced. The space OAR gel preparation was mixed as  recommended and left to settle for a few minutes to allow microbubbles to dissipate. Meanwhile, under ultrasound guidance a 15 cm 18-gauge needle was inserted through the rectourethralis muscle and the needle tip advanced into the perirectal fat inferior to the prostate all the using transperineal approach and with side for transrectal ultrasound guidance. The needle position was confirmed both in the sagittal axial fields. Saline was used to hydrodissect the space between the Denonvilliers' fascia and anterior rectal wall.  Proper location was confirmed an adequate space was created with hydrodissection.     With the needle tip in the mid gland, the axial field was viewed to confirm the needle was not in the prostate or the rectal wall (movement of the needle tip without corresponding movement of the prostate or rectal wall will confirm perirectal placement). While maintaining the desired position aspiration was done to ensure that the needle was not in vascular space. The assembled space OAR delivery system was then attached to the 18-gauge needle.     Under ultrasound guidance, a smooth continuous injection technique was used to dispense the SpaceOAR gel into the space between the prostate and rectum. The entire syringe content was injected without stopping. Optimal visualization of the needle during hydrogel administration was maintained at all times.     No suspected penetration or compromise of the rectal wall occurred.    The rest of the operating room was surveyed including table, Hairston bag, laundry and trash containers. No stray sources of radiation were found.    The patient tolerated the procedure well and was transferred to recovery without incident. He’ll return for follow-up in the radiation department postop day 1. A post implant CT will be performed for confirmation of dosimetry.    The patient is aware of radiation precautions to be followed 2 months post implant.  He is aware of supplemental external beam radiotherapy approximately 2 months post implant.    Kirk DELANEY M.D.  Electronically signed by: Kirk DELANEY M.D., 12/16/2021 9:24 AM  [phone]

## 2021-12-16 NOTE — ANESTHESIA POSTPROCEDURE EVALUATION
Patient: Gary Hedrick    Procedure Summary     Date: 12/16/21 Room / Location: [facility] ROOM 24 / SURGERY SAME DAY [facility]    Anesthesia Start: 0730 Anesthesia Stop: 0936    Procedure: BRACHYTHERAPY - PROSTATE SEED AND HYDROGEL SPACER (N/A Perineum) Diagnosis: (PROSTATE CANCER)    Surgeons: Kirk DELANEY M.D. Responsible Provider: Rock Alvarenga M.D.    Anesthesia Type: general ASA Status: 2          Final Anesthesia Type: general  Last vitals  BP   Blood Pressure : 149/61    Temp   36.4 °C (97.6 °F)    Pulse   88   Resp   18    SpO2   92 %      Anesthesia Post Evaluation    Patient location during evaluation: PACU  Patient participation: complete - patient participated  Level of consciousness: awake and alert    Airway patency: patent  Anesthetic complications: no  Cardiovascular status: hemodynamically stable  Respiratory status: acceptable  Hydration status: euvolemic    PONV: none          There were no known complications for this encounter.     Nurse Pain Score: 0 (NPRS)

## 2021-12-16 NOTE — ANESTHESIA PREPROCEDURE EVALUATION
Case: [id number] Date/Time: 12/16/21 0715    Procedure: BRACHYTHERAPY - PROSTATE SEED AND HYDROGEL SPACER    Pre-op diagnosis: PROSTATE CANCER    Location: [facility] ROOM 24 / SURGERY SAME DAY [facility]    Surgeons: Kirk DELANEY M.D.          Relevant Problems   CARDIAC   (positive) Other secondary hypertension      ENDO   (positive) Hypothyroidism       Physical Exam    Airway   Mallampati: II  TM distance: >3 FB  Neck ROM: full       Cardiovascular - normal exam  Rhythm: regular  Rate: normal  (-) murmur     Dental - normal exam           Pulmonary - normal exam  Breath sounds clear to auscultation     Abdominal    Neurological - normal exam                 Anesthesia Plan    ASA 2       Plan - general       Airway plan will be LMA          Induction: intravenous    Postoperative Plan: Postoperative administration of opioids is intended.    Pertinent diagnostic labs and testing reviewed    Informed Consent:    Anesthetic plan and risks discussed with patient.    Use of blood products discussed with: patient whom consented to blood products.

## 2021-12-17 ENCOUNTER — HOSPITAL ENCOUNTER (OUTPATIENT)
Dept: RADIATION ONCOLOGY | Facility: MEDICAL CENTER | Age: 71
End: 2021-12-17

## 2021-12-17 PROCEDURE — 77290 THER RAD SIMULAJ FIELD CPLX: CPT | Performed by: RADIOLOGY

## 2021-12-17 PROCEDURE — 77290 THER RAD SIMULAJ FIELD CPLX: CPT | Mod: 26 | Performed by: RADIOLOGY

## 2021-12-17 NOTE — RADIATION PLANNING NOTES
DATE OF SERVICE: 12/17/2021    DIAGNOSIS:  Prostate cancer (HCC)  Staging form: Prostate, AJCC 8th Edition  - Clinical stage from 10/8/2021: Stage IIB (cT1c, cN0, cM0, PSA: 4.1, Grade Group: 2) - Signed by Kirk DELANEY M.D. on 10/8/2021  Histopathologic type: Adenocarcinoma, NOS  Stage prefix: Initial diagnosis  Prostate specific antigen (PSA) range: Less than 10  [place] primary pattern: 3  Sarah secondary pattern: 4  Sarah score: 7  Histologic grading system: 5 grade system  Bilateral cancer: Yes  Number of biopsy cores examined: 12  Number of biopsy cores positive: 6  Location of positive needle core biopsies: Both sides       DATE OF SERVICE: 12/17/2021    TYPE OF SIMULATION: Pelvis post seed dosimetry    GOAL OF TREATMENT:   [x] Curative  [] Palliative  [] Oligometastatic    CONTRAST:    [] IV Contrast*  [] Small Bowel  [] Rectal  [] Urethral              POSITION:    [x]  Supine  [] Prone with belly board    COMPLEX:  [] Complex Blocking   []Arcs  [] Custom Blocks  [] >3 Sites    PROCEDURE: Patient positioned on CT table in Vac-Cayla immobilization device with or without belly board depending on position. CT acquired thorough the entire volume of interest.  Images reviewed and exported to treatment planning system.    I have personally reviewed the relevant data, performed the target localization, and determined all relevant factors for this patient’s simulation.    *Omnipaque 80 -100cc IVP in conjunction with 500cc NS

## 2021-12-21 ENCOUNTER — TELEPHONE (OUTPATIENT)
Dept: HEALTH INFORMATION MANAGEMENT | Facility: OTHER | Age: 71
End: 2021-12-21

## 2021-12-21 LAB
CHEMOTHERAPY INFUSION START DATE: NORMAL
CHEMOTHERAPY RECORDS: 95
CHEMOTHERAPY RECORDS: 9500
CHEMOTHERAPY RECORDS: NORMAL
DATE 1ST CHEMO CANCER: NORMAL
RAD ONC ARIA COURSE LAST TREATMENT DATE: NORMAL
RAD ONC ARIA COURSE TREATMENT ELAPSED DAYS: NORMAL
RAD ONC ARIA REFERENCE POINT DOSAGE GIVEN TO DATE: 95
RAD ONC ARIA REFERENCE POINT ID: NORMAL
RAD ONC ARIA REFERENCE POINT SESSION DOSAGE GIVEN: 95

## 2021-12-22 ENCOUNTER — TELEPHONE (OUTPATIENT)
Dept: HEMATOLOGY ONCOLOGY | Facility: MEDICAL CENTER | Age: 71
End: 2021-12-22

## 2021-12-22 DIAGNOSIS — C90.00 MULTIPLE MYELOMA NOT HAVING ACHIEVED REMISSION (HCC): ICD-10-CM

## 2021-12-22 DIAGNOSIS — D80.1 HYPOGAMMAGLOBULINEMIA (HCC): ICD-10-CM

## 2021-12-22 RX ORDER — LENALIDOMIDE 25 MG/1
CAPSULE ORAL
Qty: 14 CAPSULE | Refills: 0 | Status: SHIPPED | OUTPATIENT
Start: 2021-12-22 | End: 2022-01-10 | Stop reason: SDUPTHER

## 2021-12-23 ENCOUNTER — OUTPATIENT INFUSION SERVICES (OUTPATIENT)
Dept: ONCOLOGY | Facility: MEDICAL CENTER | Age: 71
End: 2021-12-23
Attending: INTERNAL MEDICINE
Payer: MEDICARE

## 2021-12-23 VITALS
RESPIRATION RATE: 17 BRPM | HEART RATE: 71 BPM | BODY MASS INDEX: 27.37 KG/M2 | DIASTOLIC BLOOD PRESSURE: 60 MMHG | WEIGHT: 174.38 LBS | HEIGHT: 67 IN | SYSTOLIC BLOOD PRESSURE: 140 MMHG | TEMPERATURE: 98.4 F | OXYGEN SATURATION: 96 %

## 2021-12-23 DIAGNOSIS — C90.00 MULTIPLE MYELOMA NOT HAVING ACHIEVED REMISSION (HCC): ICD-10-CM

## 2021-12-23 LAB
ALBUMIN SERPL BCP-MCNC: 4.1 G/DL (ref 3.2–4.9)
ALBUMIN/GLOB SERPL: 1.9 G/DL
ALP SERPL-CCNC: 69 U/L (ref 30–99)
ALT SERPL-CCNC: 49 U/L (ref 2–50)
ANION GAP SERPL CALC-SCNC: 11 MMOL/L (ref 7–16)
ANISOCYTOSIS BLD QL SMEAR: ABNORMAL
AST SERPL-CCNC: 35 U/L (ref 12–45)
BASOPHILS # BLD AUTO: 1.3 % (ref 0–1.8)
BASOPHILS # BLD: 0.05 K/UL (ref 0–0.12)
BILIRUB SERPL-MCNC: 0.5 MG/DL (ref 0.1–1.5)
BUN SERPL-MCNC: 19 MG/DL (ref 8–22)
CALCIUM SERPL-MCNC: 9.6 MG/DL (ref 8.5–10.5)
CHLORIDE SERPL-SCNC: 102 MMOL/L (ref 96–112)
CO2 SERPL-SCNC: 27 MMOL/L (ref 20–33)
COMMENT 1642: NORMAL
CREAT SERPL-MCNC: 0.84 MG/DL (ref 0.5–1.4)
EOSINOPHIL # BLD AUTO: 0.1 K/UL (ref 0–0.51)
EOSINOPHIL NFR BLD: 2.7 % (ref 0–6.9)
ERYTHROCYTE [DISTWIDTH] IN BLOOD BY AUTOMATED COUNT: 67.1 FL (ref 35.9–50)
GLOBULIN SER CALC-MCNC: 2.2 G/DL (ref 1.9–3.5)
GLUCOSE SERPL-MCNC: 134 MG/DL (ref 65–99)
HCT VFR BLD AUTO: 37.6 % (ref 42–52)
HGB BLD-MCNC: 12.7 G/DL (ref 14–18)
IMM GRANULOCYTES # BLD AUTO: 0.01 K/UL (ref 0–0.11)
IMM GRANULOCYTES NFR BLD AUTO: 0.3 % (ref 0–0.9)
LYMPHOCYTES # BLD AUTO: 0.65 K/UL (ref 1–4.8)
LYMPHOCYTES NFR BLD: 17.5 % (ref 22–41)
MACROCYTES BLD QL SMEAR: ABNORMAL
MCH RBC QN AUTO: 35.8 PG (ref 27–33)
MCHC RBC AUTO-ENTMCNC: 33.8 G/DL (ref 33.7–35.3)
MCV RBC AUTO: 105.9 FL (ref 81.4–97.8)
MICROCYTES BLD QL SMEAR: ABNORMAL
MONOCYTES # BLD AUTO: 0.32 K/UL (ref 0–0.85)
MONOCYTES NFR BLD AUTO: 8.6 % (ref 0–13.4)
MORPHOLOGY BLD-IMP: NORMAL
NEUTROPHILS # BLD AUTO: 2.58 K/UL (ref 1.82–7.42)
NEUTROPHILS NFR BLD: 69.6 % (ref 44–72)
NRBC # BLD AUTO: 0 K/UL
NRBC BLD-RTO: 0 /100 WBC
OVALOCYTES BLD QL SMEAR: NORMAL
PLATELET # BLD AUTO: 263 K/UL (ref 164–446)
PLATELET BLD QL SMEAR: NORMAL
PMV BLD AUTO: 10 FL (ref 9–12.9)
POIKILOCYTOSIS BLD QL SMEAR: NORMAL
POTASSIUM SERPL-SCNC: 4.6 MMOL/L (ref 3.6–5.5)
PROT SERPL-MCNC: 6.3 G/DL (ref 6–8.2)
RBC # BLD AUTO: 3.55 M/UL (ref 4.7–6.1)
RBC BLD AUTO: PRESENT
SODIUM SERPL-SCNC: 140 MMOL/L (ref 135–145)
WBC # BLD AUTO: 3.7 K/UL (ref 4.8–10.8)

## 2021-12-23 PROCEDURE — 96401 CHEMO ANTI-NEOPL SQ/IM: CPT

## 2021-12-23 PROCEDURE — 80053 COMPREHEN METABOLIC PANEL: CPT

## 2021-12-23 PROCEDURE — 85025 COMPLETE CBC W/AUTO DIFF WBC: CPT

## 2021-12-23 PROCEDURE — 700111 HCHG RX REV CODE 636 W/ 250 OVERRIDE (IP): Performed by: INTERNAL MEDICINE

## 2021-12-23 RX ADMIN — BORTEZOMIB 2.5 MG: 3.5 INJECTION, POWDER, LYOPHILIZED, FOR SOLUTION INTRAVENOUS; SUBCUTANEOUS at 16:55

## 2021-12-23 ASSESSMENT — FIBROSIS 4 INDEX: FIB4 SCORE: 2.07

## 2021-12-23 NOTE — PROGRESS NOTES
"Pharmacy Chemotherapy Verification  Patient Name: Gary Hedrick  Dx: MM    Cycle 6 Day 1  Previous treatment = C5D15 on 12/9/21    Regimen and Dosing Reference  Bortezomib 1.3 mg/m2 subcutaneous on    -MD dosing on Days 1, 8, 15 of a 21 day cycle  Lenalidomide 25 mg PO daily on Days 1-14  Dexamethasone 20 mg PO daily on Days 1-2, 4-5, 8-9, 11-12 OR 40 mg PO on Days 1, 8, and 15  21 day cycle for 3-4 cycles OR until disease progression or unacceptable toxicity  NCCN Guidelines for Multiple Myeloma.V.1.2022  Amadeo P, et al. Blood. 2010;116(5):679-86  Alfaro S, et al/ Blood. 2012;119(19):4375-82  John M, et al. J Clin Oncol. 2014;32(25):2712-7    Allergies:Grass pollen(k-o-r-t-swt jose), Pet dander [cat hair extract], [place] meal, Milk [dairy food allergy], [place], and [place] oil  /60   Pulse 71   Temp 36.9 °C (98.4 °F) (Temporal)   Resp 17   Ht 1.7 m (5' 6.93\")   Wt 79.1 kg (174 lb 6.1 oz)   SpO2 96%   BMI 27.37 kg/m²   Body surface area is 1.93 meters squared.    Labs 12/23/21  ANC~ 2580 Plt = 263k   Hgb = 12.7     SCr = 0.84 mg/dL CrCl ~ 92.1 mL/min   LFT's = WNLs  TBili = 0.5     Bortezomib (Velcade) 1.3 mg/m2 x 1.93 m2 = 2.509 mg   <10% difference, OK to treat with final dose = 2.5 mg subcutaneous    Kenney Birch, PharmD    "

## 2021-12-23 NOTE — PROGRESS NOTES
"Pharmacy Chemotherapy Calculation:    Dx: MM    Protocol: RVd     *Dosing Reference*  Bortezomib 1.3 mg/m2 subcutaneous on Days 1, 4, 8, 11              -MD dosing on Days 1, 8, 15 of a 21 day cycle  Lenalidomide 25 mg PO daily on Days 1-14  Dexamethasone 20 mg PO daily on Days 1-2, 4-5, 8-9, 11-12 OR 40 mg PO on Days 1, 8, and 15  21 day cycle for 3-4 cycles OR until disease progression or unacceptable toxicity  NCCN Guidelines for Multiple Myeloma.V.1.2022  Amadeo P, et al. Blood. 2010;116(5):679-86  Dominic S, et al/ Blood. 2012;119(19):9157-00    Allergies:  Grass pollen(k-o-r-t-swt jose), Pet dander [cat hair extract], Milk [dairy food allergy], [place], and [place] oil     /60   Pulse 71   Temp 36.9 °C (98.4 °F) (Temporal)   Resp 17   Ht 1.7 m (5' 6.93\")   Wt 79.1 kg (174 lb 6.1 oz)   SpO2 96%   BMI 27.37 kg/m²  Body surface area is 1.93 meters squared.     Labs 12/23/21:  ANC~ 2580 Plt = 263k   Hgb = 12.7     SCr = 0.84 mg/dL CrCl ~ 93 mL/min   LFT's = WNL TBili = 0.5     Drug Order   (Drug name, dose, route, IV Fluid & volume, frequency, number of doses) Cycle 6 Day 1  Previous treatment: C5D15 on 12/9/21   Medication = Bortezomib (Velcade)  Base Dose = 1.3 mg/m2  Calc Dose: Base Dose x 1.93 m2 = 2.509 mg  Final Dose = 2.5 mg  Route = Subcutaneous  Conc. = 2.5 mg/mL  Fluid & Volume = 1 mL in syringe  Administration = ABDOMEN or THIGH          <10% difference, okay to treat with final dose     By my signature below, I confirm this process was performed independently with the BSA and all final chemotherapy dosing calculations congruent. I have reviewed the above chemotherapy order and that my calculation of the final dose and BSA (when applicable) corroborate those calculations of the  pharmacist. Discrepancies of 10% or greater in the written dose have been addressed and documented within the [facility] Progress notes.    Nick Williamson, PharmD  "

## 2021-12-23 NOTE — PROGRESS NOTES
[facility] into [facility] for Day 1 / Cycle 6 of Velcade / Labs for Multiple myeloma not having achieved remission. Pt denied having any new or acute complaints today, reports tolerating past treatments well. Lab drawn from L AC with 25 x 3/4 gauge needle, well tolerated, gauze and coban applied to site. Pt given Velcade as prescribed in R L abdomen tolerated well, denied having any complaints during or after injection, gauze and paper tape applied to site. Discharge home to self care in [facility]. Appointment confirm for the next treatment.

## 2021-12-24 NOTE — PROGRESS NOTES
Chemotherapy Verification - SECONDARY RN       Height = 170 cm  Weight = 79.1 kg  BSA = 1.93 m2       Medication: Velcade  Dose: 1.3 mg/m2  Calculated Dose: 2.509 mg                             (In mg/m2, AUC, mg/kg)       I confirm that this process was performed independently.

## 2021-12-30 ENCOUNTER — OUTPATIENT INFUSION SERVICES (OUTPATIENT)
Dept: ONCOLOGY | Facility: MEDICAL CENTER | Age: 71
End: 2021-12-30
Attending: INTERNAL MEDICINE
Payer: MEDICARE

## 2021-12-30 VITALS
HEIGHT: 67 IN | OXYGEN SATURATION: 98 % | TEMPERATURE: 97 F | BODY MASS INDEX: 26.92 KG/M2 | SYSTOLIC BLOOD PRESSURE: 147 MMHG | WEIGHT: 171.52 LBS | DIASTOLIC BLOOD PRESSURE: 69 MMHG | RESPIRATION RATE: 18 BRPM | HEART RATE: 72 BPM

## 2021-12-30 DIAGNOSIS — C90.00 MULTIPLE MYELOMA NOT HAVING ACHIEVED REMISSION (HCC): ICD-10-CM

## 2021-12-30 LAB
ANISOCYTOSIS BLD QL SMEAR: ABNORMAL
BASOPHILS # BLD AUTO: 0.8 % (ref 0–1.8)
BASOPHILS # BLD: 0.03 K/UL (ref 0–0.12)
COMMENT 1642: NORMAL
EOSINOPHIL # BLD AUTO: 0.19 K/UL (ref 0–0.51)
EOSINOPHIL NFR BLD: 5.3 % (ref 0–6.9)
ERYTHROCYTE [DISTWIDTH] IN BLOOD BY AUTOMATED COUNT: 65.7 FL (ref 35.9–50)
HCT VFR BLD AUTO: 36.5 % (ref 42–52)
HGB BLD-MCNC: 12.6 G/DL (ref 14–18)
IMM GRANULOCYTES # BLD AUTO: 0.01 K/UL (ref 0–0.11)
IMM GRANULOCYTES NFR BLD AUTO: 0.3 % (ref 0–0.9)
LYMPHOCYTES # BLD AUTO: 0.67 K/UL (ref 1–4.8)
LYMPHOCYTES NFR BLD: 18.7 % (ref 22–41)
MACROCYTES BLD QL SMEAR: ABNORMAL
MCH RBC QN AUTO: 36.1 PG (ref 27–33)
MCHC RBC AUTO-ENTMCNC: 34.5 G/DL (ref 33.7–35.3)
MCV RBC AUTO: 104.6 FL (ref 81.4–97.8)
MONOCYTES # BLD AUTO: 0.7 K/UL (ref 0–0.85)
MONOCYTES NFR BLD AUTO: 19.6 % (ref 0–13.4)
MORPHOLOGY BLD-IMP: NORMAL
NEUTROPHILS # BLD AUTO: 1.98 K/UL (ref 1.82–7.42)
NEUTROPHILS NFR BLD: 55.3 % (ref 44–72)
NRBC # BLD AUTO: 0 K/UL
NRBC BLD-RTO: 0 /100 WBC
PLATELET # BLD AUTO: 168 K/UL (ref 164–446)
PLATELET BLD QL SMEAR: NORMAL
PMV BLD AUTO: 11 FL (ref 9–12.9)
RBC # BLD AUTO: 3.49 M/UL (ref 4.7–6.1)
RBC BLD AUTO: PRESENT
WBC # BLD AUTO: 3.6 K/UL (ref 4.8–10.8)

## 2021-12-30 PROCEDURE — 77295 3-D RADIOTHERAPY PLAN: CPT | Mod: 26 | Performed by: RADIOLOGY

## 2021-12-30 PROCEDURE — 77300 RADIATION THERAPY DOSE PLAN: CPT | Mod: 26 | Performed by: RADIOLOGY

## 2021-12-30 PROCEDURE — 96401 CHEMO ANTI-NEOPL SQ/IM: CPT

## 2021-12-30 PROCEDURE — 700111 HCHG RX REV CODE 636 W/ 250 OVERRIDE (IP): Performed by: INTERNAL MEDICINE

## 2021-12-30 PROCEDURE — 77295 3-D RADIOTHERAPY PLAN: CPT | Performed by: RADIOLOGY

## 2021-12-30 PROCEDURE — 85025 COMPLETE CBC W/AUTO DIFF WBC: CPT

## 2021-12-30 PROCEDURE — 77300 RADIATION THERAPY DOSE PLAN: CPT | Performed by: RADIOLOGY

## 2021-12-30 RX ADMIN — BORTEZOMIB 2.5 MG: 3.5 INJECTION, POWDER, LYOPHILIZED, FOR SOLUTION INTRAVENOUS; SUBCUTANEOUS at 16:31

## 2021-12-30 ASSESSMENT — FIBROSIS 4 INDEX: FIB4 SCORE: 1.31

## 2021-12-30 NOTE — PROGRESS NOTES
"Pharmacy Chemotherapy Verification  Patient Name: Gary Hedrick  Dx: MM    Cycle 6 Day 8  Previous treatment = C6D1 on 12/23/21    Regimen and Dosing Reference  Bortezomib 1.3 mg/m2 subcutaneous on    -MD dosing on Days 1, 8, 15 of a 21 day cycle  Lenalidomide 25 mg PO daily on Days 1-14  Dexamethasone 20 mg PO daily on Days 1-2, 4-5, 8-9, 11-12 OR 40 mg PO on Days 1, 8, and 15  21 day cycle for 3-4 cycles OR until disease progression or unacceptable toxicity  NCCN Guidelines for Multiple Myeloma.V.1.2022  Amadeo P, et al. Blood. 2010;116(5):679-86  Alfaro S, et al/ Blood. 2012;119(19):4375-82  John M, et al. J Clin Oncol. 2014;32(25):2712-7    Allergies:Grass pollen(k-o-r-t-swt jose), Pet dander [cat hair extract], [place] meal, Milk [dairy food allergy], [place], and [place] oil  /69   Pulse 72   Temp 36.1 °C (97 °F) (Temporal)   Resp 18   Ht 1.7 m (5' 6.93\")   Wt 77.8 kg (171 lb 8.3 oz)   SpO2 98%   BMI 26.92 kg/m²   Body surface area is 1.92 meters squared.    Labs 12/30/21  ANC~ 1980 Plt = 168k   Hgb = 12.6       Bortezomib (Velcade) 1.3 mg/m2 x 1.92 m2 = 2.496 mg   <10% difference, OK to treat with final dose = 2.5 mg subcutaneous    Kenney Birch, PharmD    "

## 2021-12-30 NOTE — PROGRESS NOTES
Pt presented to infusion center for Day 8, Cycle 6 velcade injection. Labs drawn as ordered from [facility] with 23G butterfly needle, gauze and coban dressing placed. Velcade injection given subcutaneously as ordered in LLQ of abdomen. Gary tolerated injection without issue, denies having further needs at this time. Has next appt, discharged home to self care.

## 2021-12-30 NOTE — PROGRESS NOTES
Chemotherapy Verification - PRIMARY RN      Height = 170 cm  Weight = 77.8 kg  BSA = 1.92 m^2       Medication: bortezomib  Dose: 1.3 mg/m^2  Calculated Dose: 2.496 mg                             (In mg/m2, AUC, mg/kg)           I confirm this process was performed independently with the BSA and all final chemotherapy dosing calculations congruent.  Any discrepancies of 10% or greater have been addressed with the chemotherapy pharmacist. The resolution of the discrepancy has been documented in the EPIC progress notes.

## 2021-12-30 NOTE — PROGRESS NOTES
"Pharmacy Chemotherapy Calculation:    Dx: MM    Protocol: RVd     *Dosing Reference*  Bortezomib 1.3 mg/m2 subcutaneous on Days 1, 4, 8, 11              -MD dosing on Days 1, 8, 15 of a 21 day cycle  Lenalidomide 25 mg PO daily on Days 1-14  Dexamethasone 20 mg PO daily on Days 1-2, 4-5, 8-9, 11-12 OR 40 mg PO on Days 1, 8, and 15  21 day cycle for 3-4 cycles OR until disease progression or unacceptable toxicity  NCCN Guidelines for Multiple Myeloma.V.1.2022  Amadeo P, et al. Blood. 2010;116(5):679-86  Dominic S, et al/ Blood. 2012;119(19):8706-82    Allergies:  Grass pollen(k-o-r-t-swt jose), Pet dander [cat hair extract], Milk [dairy food allergy], S[place]brush, and [place] oil     /69   Pulse 72   Temp 36.1 °C (97 °F) (Temporal)   Resp 18   Ht 1.7 m (5' 6.93\")   Wt 77.8 kg (171 lb 8.3 oz)   SpO2 98%   BMI 26.92 kg/m²  Body surface area is 1.92 meters squared.     Labs 12/30/21:  ANC~ 1980 Plt = 168k   Hgb = 12.6       Drug Order   (Drug name, dose, route, IV Fluid & volume, frequency, number of doses) Cycle 6 Day 8  Previous treatment: C6D1 on 12/23/21   Medication = Bortezomib (Velcade)  Base Dose = 1.3 mg/m2  Calc Dose: Base Dose x 1.92 m2 = 2.496 mg  Final Dose = 2.5 mg  Route = Subcutaneous  Conc. = 2.5 mg/mL  Fluid & Volume = 1 mL in syringe  Administration = ABDOMEN or THIGH          <10% difference, okay to treat with final dose     By my signature below, I confirm this process was performed independently with the BSA and all final chemotherapy dosing calculations congruent. I have reviewed the above chemotherapy order and that my calculation of the final dose and BSA (when applicable) corroborate those calculations of the  pharmacist. Discrepancies of 10% or greater in the written dose have been addressed and documented within the [facility] Progress notes.    Nick Williamson, PharmD  "

## 2021-12-31 NOTE — PROGRESS NOTES
Chemotherapy Verification - SECONDARY RN       Height = 170 cm  Weight = 77.8 kg  BSA = 1.92 m2       Medication: Velcade  Dose: 1.3 mg/m2  Calculated Dose: 2.496 mg                             (In mg/m2, AUC, mg/kg)       I confirm that this process was performed independently.

## 2022-01-05 ENCOUNTER — TELEPHONE (OUTPATIENT)
Dept: HEMATOLOGY ONCOLOGY | Facility: MEDICAL CENTER | Age: 72
End: 2022-01-05

## 2022-01-05 DIAGNOSIS — G47.01 INSOMNIA DUE TO MEDICAL CONDITION: ICD-10-CM

## 2022-01-05 RX ORDER — ZOLPIDEM TARTRATE 10 MG/1
10 TABLET ORAL NIGHTLY PRN
Qty: 30 TABLET | Refills: 3 | Status: SHIPPED | OUTPATIENT
Start: 2022-01-05 | End: 2022-02-04

## 2022-01-05 NOTE — PROGRESS NOTES
Refill request for Ambien received. Patient known to [facility] and being treated for multiple myeloma currently receiving chemotherapy. He is managed by Dr. Aaron. Rx for Ambien written by Dr. Aaron 10 mg PO QHS, 30 tabs with 3 refills on 9/9/21 per [place] PDMP.     Refill will be provided to patient. He is continued to be monitored in clinic and next will be seen next week in the clinic for chemo follow up with Dr. Aaron.    VERONICA Chambers  [facility] Oncology office

## 2022-01-05 NOTE — TELEPHONE ENCOUNTER
Patient called stating he is in need of a refill of Ambien. He states we filled his last Rx, can we refill for him?

## 2022-01-06 ENCOUNTER — OUTPATIENT INFUSION SERVICES (OUTPATIENT)
Dept: ONCOLOGY | Facility: MEDICAL CENTER | Age: 72
End: 2022-01-06
Attending: INTERNAL MEDICINE
Payer: MEDICARE

## 2022-01-06 VITALS
BODY MASS INDEX: 26.64 KG/M2 | TEMPERATURE: 98.4 F | RESPIRATION RATE: 17 BRPM | HEIGHT: 67 IN | DIASTOLIC BLOOD PRESSURE: 98 MMHG | HEART RATE: 74 BPM | OXYGEN SATURATION: 98 % | WEIGHT: 169.75 LBS | SYSTOLIC BLOOD PRESSURE: 137 MMHG

## 2022-01-06 DIAGNOSIS — C90.00 MULTIPLE MYELOMA NOT HAVING ACHIEVED REMISSION (HCC): ICD-10-CM

## 2022-01-06 LAB
BASOPHILS # BLD AUTO: 0.8 % (ref 0–1.8)
BASOPHILS # BLD: 0.03 K/UL (ref 0–0.12)
EOSINOPHIL # BLD AUTO: 0.07 K/UL (ref 0–0.51)
EOSINOPHIL NFR BLD: 1.9 % (ref 0–6.9)
ERYTHROCYTE [DISTWIDTH] IN BLOOD BY AUTOMATED COUNT: 63.5 FL (ref 35.9–50)
HCT VFR BLD AUTO: 35.5 % (ref 42–52)
HGB BLD-MCNC: 12.6 G/DL (ref 14–18)
IMM GRANULOCYTES # BLD AUTO: 0 K/UL (ref 0–0.11)
IMM GRANULOCYTES NFR BLD AUTO: 0 % (ref 0–0.9)
LYMPHOCYTES # BLD AUTO: 0.75 K/UL (ref 1–4.8)
LYMPHOCYTES NFR BLD: 20.5 % (ref 22–41)
MCH RBC QN AUTO: 37.2 PG (ref 27–33)
MCHC RBC AUTO-ENTMCNC: 35.5 G/DL (ref 33.7–35.3)
MCV RBC AUTO: 104.7 FL (ref 81.4–97.8)
MONOCYTES # BLD AUTO: 0.89 K/UL (ref 0–0.85)
MONOCYTES NFR BLD AUTO: 24.3 % (ref 0–13.4)
NEUTROPHILS # BLD AUTO: 1.92 K/UL (ref 1.82–7.42)
NEUTROPHILS NFR BLD: 52.5 % (ref 44–72)
NRBC # BLD AUTO: 0 K/UL
NRBC BLD-RTO: 0 /100 WBC
PLATELET # BLD AUTO: 120 K/UL (ref 164–446)
PMV BLD AUTO: 10.1 FL (ref 9–12.9)
RBC # BLD AUTO: 3.39 M/UL (ref 4.7–6.1)
WBC # BLD AUTO: 3.7 K/UL (ref 4.8–10.8)

## 2022-01-06 PROCEDURE — 83521 IG LIGHT CHAINS FREE EACH: CPT

## 2022-01-06 PROCEDURE — 96401 CHEMO ANTI-NEOPL SQ/IM: CPT

## 2022-01-06 PROCEDURE — 85025 COMPLETE CBC W/AUTO DIFF WBC: CPT

## 2022-01-06 PROCEDURE — 700111 HCHG RX REV CODE 636 W/ 250 OVERRIDE (IP): Performed by: INTERNAL MEDICINE

## 2022-01-06 RX ADMIN — BORTEZOMIB 2.48 MG: 3.5 INJECTION, POWDER, LYOPHILIZED, FOR SOLUTION INTRAVENOUS; SUBCUTANEOUS at 16:31

## 2022-01-06 ASSESSMENT — FIBROSIS 4 INDEX: FIB4 SCORE: 2.05

## 2022-01-06 NOTE — PROGRESS NOTES
"Pharmacy Chemotherapy Calculation:    Dx: MM    Protocol: RVd     *Dosing Reference*  Bortezomib 1.3 mg/m2 subcutaneous on Days 1, 4, 8, 11              -MD dosing on Days 1, 8, 15 of a 21 day cycle  Lenalidomide 25 mg PO daily on Days 1-14  Dexamethasone 20 mg PO daily on Days 1-2, 4-5, 8-9, 11-12 OR 40 mg PO on Days 1, 8, and 15  21 day cycle for 3-4 cycles OR until disease progression or unacceptable toxicity  NCCN Guidelines for Multiple Myeloma.V.1.2022  Amadeo P, et al. Blood. 2010;116(5):679-86  Dominic S, et al/ Blood. 2012;119(19):7975-52    Allergies:  Grass pollen(k-o-r-t-swt jose), Pet dander [cat hair extract], Milk [dairy food allergy], [place], and [place] oil     /98   Pulse 74   Temp 36.9 °C (98.4 °F) (Temporal)   Resp 17   Ht 1.7 m (5' 6.93\")   Wt 77 kg (169 lb 12.1 oz)   SpO2 98%   BMI 26.64 kg/m²  Body surface area is 1.91 meters squared.     Labs 1/6/22:  ANC~ 1920 Plt = 120k   Hgb = 12.6       Drug Order   (Drug name, dose, route, IV Fluid & volume, frequency, number of doses) Cycle 6 Day 15  Previous treatment: C6D15 on 12/30/21   Medication = Bortezomib (Velcade)  Base Dose = 1.3 mg/m2  Calc Dose: Base Dose x 1.91 m2 = 2.483 mg  Final Dose = 2.48 mg  Route = Subcutaneous  Conc. = 2.5 mg/mL  Fluid & Volume = 0.99 mL in syringe  Administration = ABDOMEN or THIGH          <10% difference, okay to treat with final dose     By my signature below, I confirm this process was performed independently with the BSA and all final chemotherapy dosing calculations congruent. I have reviewed the above chemotherapy order and that my calculation of the final dose and BSA (when applicable) corroborate those calculations of the  pharmacist. Discrepancies of 10% or greater in the written dose have been addressed and documented within the [facility] Progress notes.    Nick Williamson, PharmD  "

## 2022-01-06 NOTE — PROGRESS NOTES
"Pharmacy Chemotherapy Verification  Patient Name: Gary Hedrick  Dx: MM    Cycle 6 Day 15  Previous treatment = C6D8 on 12/30/21    Regimen and Dosing Reference  Bortezomib 1.3 mg/m2 subcutaneous on    -MD dosing on Days 1, 8, 15 of a 21 day cycle  Lenalidomide 25 mg PO daily on Days 1-14  Dexamethasone 20 mg PO daily on Days 1-2, 4-5, 8-9, 11-12 OR 40 mg PO on Days 1, 8, and 15  21 day cycle for 3-4 cycles OR until disease progression or unacceptable toxicity  NCCN Guidelines for Multiple Myeloma.V.1.2022  Amadeo P, et al. Blood. 2010;116(5):679-86  Alfaro S, et al/ Blood. 2012;119(19):4375-82  John M, et al. J Clin Oncol. 2014;32(25):2712-7    Allergies:Grass pollen(k-o-r-t-swt jose), Pet dander [cat hair extract], [place] meal, Milk [dairy food allergy], [place], and [place] oil  /98   Pulse 74   Temp 36.9 °C (98.4 °F) (Temporal)   Resp 17   Ht 1.7 m (5' 6.93\")   Wt 77 kg (169 lb 12.1 oz)   SpO2 98%   BMI 26.64 kg/m²   Body surface area is 1.91 meters squared.    Labs 1/6/22  ANC~ 1920 Plt = 120k   Hgb = 12.6       Bortezomib (Velcade) 1.3 mg/m2 x 1.91 m2 = 2.483 mg   <10% difference, OK to treat with final dose = 2.48 mg subcutaneous    Kenney Birch, PharmD    "

## 2022-01-07 ENCOUNTER — TELEPHONE (OUTPATIENT)
Dept: HEMATOLOGY ONCOLOGY | Facility: MEDICAL CENTER | Age: 72
End: 2022-01-07

## 2022-01-07 DIAGNOSIS — C90.00 MULTIPLE MYELOMA NOT HAVING ACHIEVED REMISSION (HCC): ICD-10-CM

## 2022-01-07 DIAGNOSIS — C61 PROSTATE CANCER (HCC): ICD-10-CM

## 2022-01-07 RX ORDER — ONDANSETRON 4 MG/1
4 TABLET, FILM COATED ORAL EVERY 6 HOURS PRN
Qty: 120 TABLET | Refills: 0 | Status: SHIPPED | OUTPATIENT
Start: 2022-01-07 | End: 2022-02-06

## 2022-01-07 NOTE — PROGRESS NOTES
Patient to [facility] for Velcade injection. Lab drawn by venipuncture in right upper arm. Patient meets parameters for Velcade. Velcade injected into right lower abdomen. Patient has future appointments. Patient to home in care of self.

## 2022-01-07 NOTE — PROGRESS NOTES
Chemotherapy Verification - PRIMARY RN      Height = 170 cm  Weight = 77 kg  BSA = 1.91 m^2       Medication: bortezomib (VELCADE)  Dose: 1.3 mg/m2  Calculated Dose: 2.48 mg                            (In mg/m2, AUC, mg/kg)       I confirm this process was performed independently with the BSA and all final chemotherapy dosing calculations congruent.  Any discrepancies of 10% or greater have been addressed with the chemotherapy pharmacist. The resolution of the discrepancy has been documented in the EPIC progress notes.

## 2022-01-07 NOTE — PROGRESS NOTES
Chemotherapy Verification - PRIMARY RN      Height = 1.7m  Weight = 77kg  BSA = 1.91m2       Medication: bortezomib  Dose: 1.3mg/m2  Calculated Dose: 2.48mg                             (In mg/m2, AUC, mg/kg)       I confirm this process was performed independently with the BSA and all final chemotherapy dosing calculations congruent.  Any discrepancies of 10% or greater have been addressed with the chemotherapy pharmacist. The resolution of the discrepancy has been documented in the EPIC progress notes.

## 2022-01-08 NOTE — TELEPHONE ENCOUNTER
----- Message from Leanna Lynn sent at 1/7/2022  2:16 PM PST -----  Regarding: Would like prescription for nausea please  Would like to know if he can get a prescription for nausea please?

## 2022-01-10 ENCOUNTER — TELEPHONE (OUTPATIENT)
Dept: HEMATOLOGY ONCOLOGY | Facility: MEDICAL CENTER | Age: 72
End: 2022-01-10

## 2022-01-10 DIAGNOSIS — C90.00 MULTIPLE MYELOMA NOT HAVING ACHIEVED REMISSION (HCC): ICD-10-CM

## 2022-01-10 DIAGNOSIS — D80.1 HYPOGAMMAGLOBULINEMIA (HCC): ICD-10-CM

## 2022-01-10 RX ORDER — LENALIDOMIDE 25 MG/1
CAPSULE ORAL
Qty: 14 CAPSULE | Refills: 0 | Status: SHIPPED | OUTPATIENT
Start: 2022-01-10 | End: 2022-01-31 | Stop reason: SDUPTHER

## 2022-01-11 LAB
KAPPA LC FREE SER-MCNC: 4.05 MG/L (ref 3.3–19.4)
KAPPA LC FREE/LAMBDA FREE SER NEPH: 0.01 {RATIO} (ref 0.26–1.65)
LAMBDA LC FREE SERPL-MCNC: 383.6 MG/L (ref 5.71–26.3)

## 2022-01-11 RX ORDER — 0.9 % SODIUM CHLORIDE 0.9 %
10 VIAL (ML) INJECTION PRN
Status: CANCELLED | OUTPATIENT
Start: 2022-01-20

## 2022-01-11 RX ORDER — PROCHLORPERAZINE MALEATE 10 MG
10 TABLET ORAL EVERY 6 HOURS PRN
Status: CANCELLED | OUTPATIENT
Start: 2022-01-27

## 2022-01-11 RX ORDER — 0.9 % SODIUM CHLORIDE 0.9 %
3 VIAL (ML) INJECTION PRN
Status: CANCELLED | OUTPATIENT
Start: 2022-01-13

## 2022-01-11 RX ORDER — SODIUM CHLORIDE 9 MG/ML
INJECTION, SOLUTION INTRAVENOUS CONTINUOUS
Status: CANCELLED | OUTPATIENT
Start: 2022-01-13

## 2022-01-11 RX ORDER — SODIUM CHLORIDE 9 MG/ML
INJECTION, SOLUTION INTRAVENOUS CONTINUOUS
Status: CANCELLED | OUTPATIENT
Start: 2022-01-20

## 2022-01-11 RX ORDER — ONDANSETRON 2 MG/ML
4 INJECTION INTRAMUSCULAR; INTRAVENOUS PRN
Status: CANCELLED | OUTPATIENT
Start: 2022-01-20

## 2022-01-11 RX ORDER — 0.9 % SODIUM CHLORIDE 0.9 %
VIAL (ML) INJECTION PRN
Status: CANCELLED | OUTPATIENT
Start: 2022-01-13

## 2022-01-11 RX ORDER — HEPARIN SODIUM (PORCINE) LOCK FLUSH IV SOLN 100 UNIT/ML 100 UNIT/ML
500 SOLUTION INTRAVENOUS PRN
Status: CANCELLED | OUTPATIENT
Start: 2022-01-13

## 2022-01-11 RX ORDER — 0.9 % SODIUM CHLORIDE 0.9 %
3 VIAL (ML) INJECTION PRN
Status: CANCELLED | OUTPATIENT
Start: 2022-01-27

## 2022-01-11 RX ORDER — 0.9 % SODIUM CHLORIDE 0.9 %
10 VIAL (ML) INJECTION PRN
Status: CANCELLED | OUTPATIENT
Start: 2022-01-13

## 2022-01-11 RX ORDER — SODIUM CHLORIDE 9 MG/ML
INJECTION, SOLUTION INTRAVENOUS CONTINUOUS
Status: CANCELLED | OUTPATIENT
Start: 2022-01-27

## 2022-01-11 RX ORDER — ONDANSETRON 8 MG/1
8 TABLET, ORALLY DISINTEGRATING ORAL PRN
Status: CANCELLED | OUTPATIENT
Start: 2022-01-13

## 2022-01-11 RX ORDER — ONDANSETRON 8 MG/1
8 TABLET, ORALLY DISINTEGRATING ORAL PRN
Status: CANCELLED | OUTPATIENT
Start: 2022-01-27

## 2022-01-11 RX ORDER — 0.9 % SODIUM CHLORIDE 0.9 %
10 VIAL (ML) INJECTION PRN
Status: CANCELLED | OUTPATIENT
Start: 2022-01-27

## 2022-01-11 RX ORDER — HEPARIN SODIUM (PORCINE) LOCK FLUSH IV SOLN 100 UNIT/ML 100 UNIT/ML
500 SOLUTION INTRAVENOUS PRN
Status: CANCELLED | OUTPATIENT
Start: 2022-01-27

## 2022-01-11 RX ORDER — 0.9 % SODIUM CHLORIDE 0.9 %
3 VIAL (ML) INJECTION PRN
Status: CANCELLED | OUTPATIENT
Start: 2022-01-20

## 2022-01-11 RX ORDER — PROCHLORPERAZINE MALEATE 10 MG
10 TABLET ORAL EVERY 6 HOURS PRN
Status: CANCELLED | OUTPATIENT
Start: 2022-01-20

## 2022-01-11 RX ORDER — PROCHLORPERAZINE MALEATE 10 MG
10 TABLET ORAL EVERY 6 HOURS PRN
Status: CANCELLED | OUTPATIENT
Start: 2022-01-13

## 2022-01-11 RX ORDER — 0.9 % SODIUM CHLORIDE 0.9 %
VIAL (ML) INJECTION PRN
Status: CANCELLED | OUTPATIENT
Start: 2022-01-27

## 2022-01-11 RX ORDER — 0.9 % SODIUM CHLORIDE 0.9 %
VIAL (ML) INJECTION PRN
Status: CANCELLED | OUTPATIENT
Start: 2022-01-20

## 2022-01-11 RX ORDER — HEPARIN SODIUM (PORCINE) LOCK FLUSH IV SOLN 100 UNIT/ML 100 UNIT/ML
500 SOLUTION INTRAVENOUS PRN
Status: CANCELLED | OUTPATIENT
Start: 2022-01-20

## 2022-01-11 RX ORDER — ONDANSETRON 2 MG/ML
4 INJECTION INTRAMUSCULAR; INTRAVENOUS PRN
Status: CANCELLED | OUTPATIENT
Start: 2022-01-27

## 2022-01-11 RX ORDER — ONDANSETRON 2 MG/ML
4 INJECTION INTRAMUSCULAR; INTRAVENOUS PRN
Status: CANCELLED | OUTPATIENT
Start: 2022-01-13

## 2022-01-11 RX ORDER — ONDANSETRON 8 MG/1
8 TABLET, ORALLY DISINTEGRATING ORAL PRN
Status: CANCELLED | OUTPATIENT
Start: 2022-01-20

## 2022-01-11 NOTE — PROGRESS NOTES
01/14/22    Subjective    Chief Complaint:  Follow up lambda light chain myeloma    HPI:  71 male on RVD for lambda light chain myeloma. Also has prostate cancer undergoing XRT by Dr. Isaac, on Orgovix. Light chains seem to go up and down but are much lower than prior to chemotherapy. Had intersitital XRT to prostate - hasn't urinated normally since. A lot of discomfort. Scheduled to start external beam in a couple of weeks. He is on cycle 7 of RVD. Needs referral for consult for stem cell TX.     ROS:    Constitutional: No weight loss  Skin: No rash or jaundice  HENT: No change in eyesight or hearing  Cardiovascular:No chest pain or arrythmia  Respiratory:No cough or SOB  GI:No nausea, vomiting, diarrhea, constipation  :No dysuria or frequency  Musculoskeletal:No bone or joint pain  Neuro:No sx's of neuropathy  Psych: No complaints    PMH:      Allergies   Allergen Reactions   • Grass Pollen(K-O-R-T-Swt [place]) Shortness of Breath   • Pet Dander [Cat Hair Extract] Shortness of Breath and Unspecified   • Milk [Dairy Food Allergy] Unspecified   • [place] Unspecified   • [place] (Diagnostic) Shortness of Breath       Past Medical History:   Diagnosis Date   • Breath shortness    • Cancer (HCC)    • Disorder of thyroid     hypothyroid   • High cholesterol    • Light chain myeloma (HCC) 2021   • Prostate cancer (HCC)    • Psychiatric problem     depression        Past Surgical History:   Procedure Laterality Date   • BRACHY THERAPY N/A 12/16/2021    Procedure: BRACHYTHERAPY - PROSTATE SEED AND HYDROGEL SPACER;  Surgeon: Kirk DELANEY M.D.;  Location: SURGERY SAME DAY [facility];  Service: Urology   • OK DX BONE MARROW ASPIRATIONS Left 7/23/2021    Procedure: ASPIRATION, BONE MARROW - DURANT;  Surgeon: Hair Okeefe M.D.;  Location: ENDOSCOPY [facility];  Service: Orthopedics   • OK DX BONE MARROW BIOPSIES Left 7/23/2021    Procedure: BIOPSY, BONE MARROW, USING NEEDLE OR TROCAR;  Surgeon: Hair Okeefe M.D.;   Location: [facility];  Service: Orthopedics   • WRIST FUSION Left         Medications:    Current Outpatient Medications on File Prior to Visit   Medication Sig Dispense Refill   • lenalidomide 25 MG Cap Take 25 mg (1 cap) by mouth on Days 1 - 14 of each 21-day cycle (2 weeks ON & 1 week OFF) 14 Capsule 0   • ondansetron (ZOFRAN) 4 MG Tab tablet Take 1 Tablet by mouth every 6 hours as needed for Nausea/Vomiting for up to 30 days. 120 Tablet 0   • zolpidem (AMBIEN) 10 MG Tab Take 1 Tablet by mouth at bedtime as needed for Sleep for up to 30 days. 30 Tablet 3   • ORGOVYX 120 MG Tab Take 120 mg by mouth every day for 30 days. 30 Tablet 3   • tamsulosin (FLOMAX) 0.4 MG capsule TAKE 2 CAPSULES BY MOUTH 30 MINUTES AFTER DINNER. TAKE 1 CAPSULE BY MOUTH FOR A WEEK THEN INCREASE TO 2 CAPSULES 60 Capsule 5   • Magnesium Hydroxide (MILK OF MAGNESIA PO) Take  by mouth as needed.     • VITAMIN D PO Take 8,000 Units by mouth.     • VITAMIN K PO Take  by mouth. 25mg PO bid     • aspirin EC (ECOTRIN) 81 MG Tablet Delayed Response Take 81 mg by mouth every day.     • dexamethasone (DECADRON) 4 MG Tab Take 10 pills once a week 40 tablet 6   • valACYclovir (VALTREX) 500 MG Tab Take 500 mg by mouth 2 times a day. No known stop date, TWICE DAILY     • FLUoxetine (PROZAC) 20 MG Cap Take 1 capsule by mouth every day. 90 capsule 4   • Omega-3 Fatty Acids (FISH OIL) 1000 MG Cap capsule Take 1,000 mg by mouth 2 times a day.     • levothyroxine (SYNTHROID) 100 MCG Tab Take 1 tablet by mouth Every morning on an empty stomach. 100 tablet 3   • simvastatin (ZOCOR) 20 MG Tab Take 1 tablet by mouth every evening. 100 tablet 3     No current facility-administered medications on file prior to visit.       Social History     Tobacco Use   • Smoking status: Former Smoker     Years: 20.00     Quit date:      Years since quittin.0   • Smokeless tobacco: Never Used   Substance Use Topics   • Alcohol use: Never        Family History  "  Problem Relation Age of Onset   • Cancer Neg Hx         Objective    Vitals:    /74   Pulse 78   Temp 36.6 °C (97.8 °F) (Temporal)   Resp 16   Ht 1.685 m (5' 6.34\")   Wt 78.8 kg (173 lb 9.8 oz)   SpO2 97%   BMI 27.74 kg/m²     Physical Exam:    Appears well-developed and well-nourished. No distress.    Head -  Normocephalic .   Eyes - Pupils are equal.. Conjunctivae normal. No scleral icterus.   Ears - normal hearing   Neurological -   Alert and oriented.  Skin - . No rash noted. Not diaphoretic. No erythema. No pallor. No jaundice   Psychiatric -  Normal mood and affect.    Labs:    Results for JAMES GRANT (MRN [id number])    Ref. Range 12/23/2021 15:35 12/30/2021 15:08 1/6/2022 15:51   WBC Latest Ref Range: 4.8 - 10.8 K/uL 3.7 (L) 3.6 (L) 3.7 (L)   RBC Latest Ref Range: 4.70 - 6.10 M/uL 3.55 (L) 3.49 (L) 3.39 (L)   Hemoglobin Latest Ref Range: 14.0 - 18.0 g/dL 12.7 (L) 12.6 (L) 12.6 (L)   Hematocrit Latest Ref Range: 42.0 - 52.0 % 37.6 (L) 36.5 (L) 35.5 (L)   MCV Latest Ref Range: 81.4 - 97.8 fL 105.9 (H) 104.6 (H) 104.7 (H)   MCH Latest Ref Range: 27.0 - 33.0 pg 35.8 (H) 36.1 (H) 37.2 (H)   MCHC Latest Ref Range: 33.7 - 35.3 g/dL 33.8 34.5 35.5 (H)   RDW Latest Ref Range: 35.9 - 50.0 fL 67.1 (H) 65.7 (H) 63.5 (H)   Platelet Count Latest Ref Range: 164 - 446 K/uL 263 168 120 (L)   Results for JAMES GRANT (MRN [id number])    Ref. Range 10/28/2021 13:46 11/18/2021 15:08 12/9/2021 15:59 12/10/2021 12:59 1/6/2022 15:51   Free Kappa Light Chains Latest Ref Range: 3.30 - 19.40 mg/L 5.24 4.95 4.52  4.05   Free Lambda Light Chains Latest Ref Range: 5.71 - 26.30 mg/L 491.27 (H) 329.63 (H) 356.42 (H)  383.60 (H)         Assessment    Imp:    Visit Diagnosis:    1. Multiple myeloma not having achieved remission (HCC)     2. Prostate cancer (HCC)         Plan:  Check on insurance authorization for the transplant  Continue RVD for now  Consider change to daratumumab if light chains do not come down " further    Gary Aaron M.D.

## 2022-01-13 ENCOUNTER — HOSPITAL ENCOUNTER (OUTPATIENT)
Dept: RADIATION ONCOLOGY | Facility: MEDICAL CENTER | Age: 72
End: 2022-01-31
Attending: RADIOLOGY | Admitting: RADIOLOGY
Payer: MEDICARE

## 2022-01-13 ENCOUNTER — OUTPATIENT INFUSION SERVICES (OUTPATIENT)
Dept: ONCOLOGY | Facility: MEDICAL CENTER | Age: 72
End: 2022-01-13
Attending: INTERNAL MEDICINE
Payer: MEDICARE

## 2022-01-13 VITALS
HEIGHT: 66 IN | OXYGEN SATURATION: 95 % | WEIGHT: 172.62 LBS | RESPIRATION RATE: 18 BRPM | DIASTOLIC BLOOD PRESSURE: 69 MMHG | SYSTOLIC BLOOD PRESSURE: 135 MMHG | BODY MASS INDEX: 27.74 KG/M2 | TEMPERATURE: 97.7 F | HEART RATE: 75 BPM

## 2022-01-13 VITALS
OXYGEN SATURATION: 93 % | RESPIRATION RATE: 16 BRPM | DIASTOLIC BLOOD PRESSURE: 51 MMHG | TEMPERATURE: 97.3 F | WEIGHT: 171 LBS | BODY MASS INDEX: 26.84 KG/M2 | SYSTOLIC BLOOD PRESSURE: 111 MMHG | HEART RATE: 79 BPM

## 2022-01-13 DIAGNOSIS — C61 PROSTATE CANCER (HCC): ICD-10-CM

## 2022-01-13 DIAGNOSIS — C90.00 MULTIPLE MYELOMA NOT HAVING ACHIEVED REMISSION (HCC): ICD-10-CM

## 2022-01-13 LAB
ALBUMIN SERPL BCP-MCNC: 4 G/DL (ref 3.2–4.9)
ALBUMIN/GLOB SERPL: 2.1 G/DL
ALP SERPL-CCNC: 77 U/L (ref 30–99)
ALT SERPL-CCNC: 47 U/L (ref 2–50)
ANION GAP SERPL CALC-SCNC: 10 MMOL/L (ref 7–16)
AST SERPL-CCNC: 28 U/L (ref 12–45)
BASOPHILS # BLD AUTO: 0.8 % (ref 0–1.8)
BASOPHILS # BLD: 0.02 K/UL (ref 0–0.12)
BILIRUB SERPL-MCNC: 0.3 MG/DL (ref 0.1–1.5)
BUN SERPL-MCNC: 20 MG/DL (ref 8–22)
CALCIUM SERPL-MCNC: 9.2 MG/DL (ref 8.5–10.5)
CHLORIDE SERPL-SCNC: 102 MMOL/L (ref 96–112)
CO2 SERPL-SCNC: 26 MMOL/L (ref 20–33)
CREAT SERPL-MCNC: 0.94 MG/DL (ref 0.5–1.4)
EOSINOPHIL # BLD AUTO: 0.14 K/UL (ref 0–0.51)
EOSINOPHIL NFR BLD: 5.4 % (ref 0–6.9)
ERYTHROCYTE [DISTWIDTH] IN BLOOD BY AUTOMATED COUNT: 65.9 FL (ref 35.9–50)
GLOBULIN SER CALC-MCNC: 1.9 G/DL (ref 1.9–3.5)
GLUCOSE SERPL-MCNC: 90 MG/DL (ref 65–99)
HCT VFR BLD AUTO: 34.3 % (ref 42–52)
HGB BLD-MCNC: 11.6 G/DL (ref 14–18)
IMM GRANULOCYTES # BLD AUTO: 0.01 K/UL (ref 0–0.11)
IMM GRANULOCYTES NFR BLD AUTO: 0.4 % (ref 0–0.9)
LYMPHOCYTES # BLD AUTO: 0.53 K/UL (ref 1–4.8)
LYMPHOCYTES NFR BLD: 20.3 % (ref 22–41)
MCH RBC QN AUTO: 36.3 PG (ref 27–33)
MCHC RBC AUTO-ENTMCNC: 33.8 G/DL (ref 33.7–35.3)
MCV RBC AUTO: 107.2 FL (ref 81.4–97.8)
MONOCYTES # BLD AUTO: 0.33 K/UL (ref 0–0.85)
MONOCYTES NFR BLD AUTO: 12.6 % (ref 0–13.4)
NEUTROPHILS # BLD AUTO: 1.58 K/UL (ref 1.82–7.42)
NEUTROPHILS NFR BLD: 60.5 % (ref 44–72)
NRBC # BLD AUTO: 0 K/UL
NRBC BLD-RTO: 0 /100 WBC
PLATELET # BLD AUTO: 149 K/UL (ref 164–446)
PMV BLD AUTO: 10.9 FL (ref 9–12.9)
POTASSIUM SERPL-SCNC: 4.5 MMOL/L (ref 3.6–5.5)
PROT SERPL-MCNC: 5.9 G/DL (ref 6–8.2)
RBC # BLD AUTO: 3.2 M/UL (ref 4.7–6.1)
SODIUM SERPL-SCNC: 138 MMOL/L (ref 135–145)
WBC # BLD AUTO: 2.6 K/UL (ref 4.8–10.8)

## 2022-01-13 PROCEDURE — 700111 HCHG RX REV CODE 636 W/ 250 OVERRIDE (IP): Performed by: NURSE PRACTITIONER

## 2022-01-13 PROCEDURE — 85025 COMPLETE CBC W/AUTO DIFF WBC: CPT

## 2022-01-13 PROCEDURE — 80053 COMPREHEN METABOLIC PANEL: CPT

## 2022-01-13 PROCEDURE — 96401 CHEMO ANTI-NEOPL SQ/IM: CPT

## 2022-01-13 PROCEDURE — 99212 OFFICE O/P EST SF 10 MIN: CPT | Performed by: RADIOLOGY

## 2022-01-13 RX ADMIN — BORTEZOMIB 2.48 MG: 3.5 INJECTION, POWDER, LYOPHILIZED, FOR SOLUTION INTRAVENOUS; SUBCUTANEOUS at 16:10

## 2022-01-13 ASSESSMENT — FIBROSIS 4 INDEX
FIB4 SCORE: 2.875
FIB4 SCORE: 2.875

## 2022-01-13 ASSESSMENT — PAIN SCALES - GENERAL: PAINLEVEL: 1=MINIMAL PAIN

## 2022-01-13 NOTE — PROGRESS NOTES
Gary is here for Day 1, Cycle 7 bortezomib (VELCADE). Labs drawn peripherally using 23g butterfly to megan KIMBLE; gauze/coban applied to site. Lab results reviewed and within parameters to proceed with treatment. bortezomib (VELCADE) given per MAR to ELMER vides. Next appointment scheduled. Discharged to self care; no apparent distress noted.

## 2022-01-13 NOTE — PROGRESS NOTES
"Pharmacy Chemotherapy Verification  Patient Name: Gary Hedrick  Dx: MM    Cycle 7 Day 1  Previous treatment = C6D15 on 1/6/22    Regimen and Dosing Reference  Bortezomib 1.3 mg/m2 subcutaneous on    -MD dosing on Days 1, 8, 15 of a 21 day cycle  Lenalidomide 25 mg PO daily on Days 1-14  Dexamethasone 20 mg PO daily on Days 1-2, 4-5, 8-9, 11-12 OR 40 mg PO on Days 1, 8, and 15  21 day cycle for 3-4 cycles OR until disease progression or unacceptable toxicity  NCCN Guidelines for Multiple Myeloma.V.1.2022  Amadeo P, et al. Blood. 2010;116(5):679-86  Alfaro S, et al/ Blood. 2012;119(19):4375-82  John M, et al. J Clin Oncol. 2014;32(25):2712-7    Allergies:Grass pollen(k-o-r-t-swt jose), Pet dander [cat hair extract], [place] meal, Milk [dairy food allergy], [place], and [place] oil  /69   Pulse 75   Temp 36.5 °C (97.7 °F) (Temporal)   Resp 18   Ht 1.685 m (5' 6.34\")   Wt 78.3 kg (172 lb 9.9 oz)   SpO2 95%   BMI 27.58 kg/m²   Body surface area is 1.91 meters squared.    Labs 1/13/22  ANC~ 1580 Plt = 149k   Hgb = 11.6     SCr = 0.94 mg/dL CrCl ~ 81.4 mL/min   LFT's = WNLs  TBili = 0.3     Bortezomib (Velcade) 1.3 mg/m2 x 1.91 m2 = 2.483 mg   <10% difference, OK to treat with final dose = 2.48 mg subcutaneous    Kenney Birch, PharmD    "

## 2022-01-13 NOTE — CT SIMULATION
PATIENT NAME Gary Hedrick   PRIMARY PHYSICIAN CharuElda [id number]   REFERRING PHYSICIAN Kirk Isaac M.* 1950     Prostate cancer (HCC)  Staging form: Prostate, AJCC 8th Edition  - Clinical stage from 10/8/2021: Stage IIB (cT1c, cN0, cM0, PSA: 4.1, Grade Group: 2) - Signed by Kirk DELANEY M.D. on 10/8/2021  Histopathologic type: Adenocarcinoma, NOS  Stage prefix: Initial diagnosis  Prostate specific antigen (PSA) range: Less than 10  [place] primary pattern: 3  Sarah secondary pattern: 4  Saarh score: 7  Histologic grading system: 5 grade system  Bilateral cancer: Yes  Number of biopsy cores examined: 12  Number of biopsy cores positive: 6  Location of positive needle core biopsies: Both sides         Treatment Planning CT Simulation      Order Questions     Question Answer Comment    Is this for a new course of treatment? No     Is this an Addendum? No     Implanted Device/Pacemaker No     Simulation Status Initial     Planned Start Date 2/7/2022     Treatment Site Pelvis     Laterality None     Treatment Technique IMRT     CT Technique 3D     Slice Thickness 3mm     Scan Extent Pelvis     Treatment Device(s) Vac Cayla Save for start of EBRT    Treatment Marker Fiducial     Patient Attire Gown     Patient Position Supine     Patient Orientation Head First     Arm Position On Chest     Bladder No preference     Treatment Machine No preference     Image Guidance      Treatment Planning Image Fusion Other post seed CT    Other Orders Weekly Physics Check

## 2022-01-13 NOTE — NON-PROVIDER
Patient was seen today in clinic with Dr. Isaac for follow up.  Vitals signs and weight were obtained and pain assessment was completed.  Allergies and medications were reviewed with the patient.      Vitals/Pain:  Vitals:    01/13/22 1042   BP: 111/51   Pulse: 79   Resp: 16   Temp: 36.3 °C (97.3 °F)   SpO2: 93%   Weight: 77.6 kg (171 lb)   Pain Score: 1=Minimal Pain (sore with urination)        Allergies:   Grass pollen(k-o-r-t-swt jose), Pet dander [cat hair extract], Milk [dairy food allergy], [place], and [place] (diagnostic)    Current Medications:  Current Outpatient Medications   Medication Sig Dispense Refill   • lenalidomide 25 MG Cap Take 25 mg (1 cap) by mouth on Days 1 - 14 of each 21-day cycle (2 weeks ON & 1 week OFF) 14 Capsule 0   • ondansetron (ZOFRAN) 4 MG Tab tablet Take 1 Tablet by mouth every 6 hours as needed for Nausea/Vomiting for up to 30 days. 120 Tablet 0   • zolpidem (AMBIEN) 10 MG Tab Take 1 Tablet by mouth at bedtime as needed for Sleep for up to 30 days. 30 Tablet 3   • ORGOVYX 120 MG Tab Take 120 mg by mouth every day for 30 days. 30 Tablet 3   • tamsulosin (FLOMAX) 0.4 MG capsule TAKE 2 CAPSULES BY MOUTH 30 MINUTES AFTER DINNER. TAKE 1 CAPSULE BY MOUTH FOR A WEEK THEN INCREASE TO 2 CAPSULES 60 Capsule 5   • Magnesium Hydroxide (MILK OF MAGNESIA PO) Take  by mouth as needed.     • VITAMIN D PO Take 8,000 Units by mouth.     • VITAMIN K PO Take  by mouth. 25mg PO bid     • aspirin EC (ECOTRIN) 81 MG Tablet Delayed Response Take 81 mg by mouth every day.     • dexamethasone (DECADRON) 4 MG Tab Take 10 pills once a week 40 tablet 6   • valACYclovir (VALTREX) 500 MG Tab Take 500 mg by mouth 2 times a day. No known stop date, TWICE DAILY     • FLUoxetine (PROZAC) 20 MG Cap Take 1 capsule by mouth every day. 90 capsule 4   • Omega-3 Fatty Acids (FISH OIL) 1000 MG Cap capsule Take 1,000 mg by mouth 2 times a day.     • levothyroxine (SYNTHROID) 100 MCG Tab Take 1 tablet by mouth Every  morning on an empty stomach. 100 tablet 3   • simvastatin (ZOCOR) 20 MG Tab Take 1 tablet by mouth every evening. 100 tablet 3     No current facility-administered medications for this encounter.         PCP:  Charu Cash R.N.

## 2022-01-13 NOTE — CT SIMULATION
PATIENT NAME Gary Hedrick   PRIMARY PHYSICIAN CharuElda [id number]   REFERRING PHYSICIAN Kirk Isaac M.* 1950     Prostate cancer (HCC)  Staging form: Prostate, AJCC 8th Edition  - Clinical stage from 10/8/2021: Stage IIB (cT1c, cN0, cM0, PSA: 4.1, Grade Group: 2) - Signed by Kirk DELANEY M.D. on 10/8/2021  Histopathologic type: Adenocarcinoma, NOS  Stage prefix: Initial diagnosis  Prostate specific antigen (PSA) range: Less than 10  [place] primary pattern: 3  Sarah secondary pattern: 4  Sarah score: 7  Histologic grading system: 5 grade system  Bilateral cancer: Yes  Number of biopsy cores examined: 12  Number of biopsy cores positive: 6  Location of positive needle core biopsies: Both sides         Treatment Planning CT Simulation      Order Questions     Question Answer Comment    Is this for a new course of treatment? No     Is this an Addendum? No     Implanted Device/Pacemaker No     Simulation Status Initial     Planned Start Date 2/7/2022     Treatment Site Pelvis     Laterality None     Treatment Technique IMRT     CT Technique 3D     Slice Thickness 3mm     Scan Extent Pelvis     Treatment Device(s) Vac Cayla Save for start of EBRT    Treatment Marker Fiducial     Patient Attire Gown     Patient Position Supine     Patient Orientation Head First     Arm Position On Chest     Bladder No preference     Treatment Machine No preference     Image Guidance      Treatment Planning Image Fusion Other post seed CT    Other Orders Weekly Physics Check

## 2022-01-13 NOTE — PROGRESS NOTES
"Pharmacy Chemotherapy Verification    Dx: MM    Protocol: RVd     *Dosing Reference*  Bortezomib 1.3 mg/m2 subcutaneous on Days 1, 4, 8, 11              -MD dosing on Days 1, 8, 15 of a 21 day cycle  Lenalidomide 25 mg PO daily on Days 1-14  Dexamethasone 20 mg PO daily on Days 1-2, 4-5, 8-9, 11-12 OR 40 mg PO on Days 1, 8, and 15  21 day cycle for 3-4 cycles OR until disease progression or unacceptable toxicity  NCCN Guidelines for Multiple Myeloma.V.1.2022  Amadeo P, et al. Blood. 2010;116(5):679-86  Dominic S, et al/ Blood. 2012;119(19):0225-85    Allergies:  Grass pollen(k-o-r-t-swt jose), Pet dander [cat hair extract], Milk [dairy food allergy], [place], and [place] oil     /69   Pulse 75   Temp 36.5 °C (97.7 °F) (Temporal)   Resp 18   Ht 1.685 m (5' 6.34\")   Wt 78.3 kg (172 lb 9.9 oz)   SpO2 95%   BMI 27.58 kg/m²  Body surface area is 1.91 meters squared.     Labs 1/13/22  [id number] Hgb 11.6 Plt 149k   SCr 0.94 CrCl 81.4 mL/min   AST/ALT/AP = 28/47/77 Tbili = 0.3    Drug Order   (Drug name, dose, route, IV Fluid & volume, frequency, number of doses) Cycle 7 Day 1  Previous treatment: C6D15 on 1/6/22   Medication = Bortezomib (Velcade)  Base Dose = 1.3 mg/m2  Calc Dose: Base Dose x 1.91 m2 = 2.483 mg  Final Dose = 2.48 mg  Route = Subcutaneous  Conc. = 2.5 mg/mL  Fluid & Volume = 0.99 mL in syringe  Administration = ABDOMEN or THIGH          <10% difference, okay to treat with final dose     By my signature below, I confirm this process was performed independently with the BSA and all final chemotherapy dosing calculations congruent. I have reviewed the above chemotherapy order and that my calculation of the final dose and BSA (when applicable) corroborate those calculations of the  pharmacist. Discrepancies of 10% or greater in the written dose have been addressed and documented within the [facility] Progress notes.    Jeri Yoder, PharmD, BCOP  "

## 2022-01-13 NOTE — PROGRESS NOTES
Chemotherapy Verification - SECONDARY RN       Height = 168.5 cm  Weight = 78.3 kg  BSA = 1.91 m2       Medication: Velcade  Dose: 1.3 mg/m2  Calculated Dose: 2.483 mg                             (In mg/m2, AUC, mg/kg)       I confirm that this process was performed independently.

## 2022-01-13 NOTE — PROGRESS NOTES
Chemotherapy Verification - PRIMARY RN      Height = 168.5 cm  Weight = 78.3 kg  BSA = 1.91 m^2       Medication: bortezomib (VELCADE)  Dose: 1.3 mg/m^2  Calculated Dose: 2.483 mg                             (In mg/m2, AUC, mg/kg)     I confirm this process was performed independently with the BSA and all final chemotherapy dosing calculations congruent.  Any discrepancies of 10% or greater have been addressed with the chemotherapy pharmacist. The resolution of the discrepancy has been documented in the EPIC progress notes.

## 2022-01-13 NOTE — PROGRESS NOTES
RADIATION ONCOLOGY FOLLOW-UP    Patient name:  Gary Hedrick    Primary Physician:  Elda Box M.D. MRN: [id number]  [facility]: [id number]   Referring physician:  Kirk Isaac M.* : 1950, 71 y.o.     DATE OF SERVICE: 2022    IDENTIFICATION:   A 71 y.o. male with   Prostate cancer (HCC)  Staging form: Prostate, AJCC 8th Edition  - Clinical stage from 10/8/2021: Stage IIB (cT1c, cN0, cM0, PSA: 4.1, Grade Group: 2) - Signed by Kirk DELANEY M.D. on 10/8/2021  Histopathologic type: Adenocarcinoma, NOS  Stage prefix: Initial diagnosis  Prostate specific antigen (PSA) range: Less than 10  [place] primary pattern: 3  [place] secondary pattern: 4  [place] score: 7  Histologic grading system: 5 grade system  Bilateral cancer: Yes  Number of biopsy cores examined: 12  Number of biopsy cores positive: 6  Location of positive needle core biopsies: Both sides      RADIATION SUMMARY:  Radiation Therapy Episodes     Active Episodes     Radiation Therapy: Brachytherapy (2021) , partial dose, 9500 cGy Rx, D 90, 96.21%               Radiation Treatments       Plan Last Treated On Elapsed Days Fractions Treated Prescribed Fraction Dose (cGy) Prescribed Total Dose (cGy)    LDR tracking 2021 0 @ [id number] 1 of 1 9,500 9,500                Reference Point Last Treated On Elapsed Days Most Recent Session Dose (cGy) Total Dose (cGy)    LDR tracking 2021 0 @ [id number] 9,500 9,500                      HISTORY OF PRESENT ILLNESS:   Subjective    70 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  He is presently on RVD having completed 3 cycles.  He also has a past medical history significant for hypogonadism and has been on testosterone during replacement therapy until recently.     He states his PSAs have fluctuated in the 1-2 range.  More recently PSA was noted to be 4.10.  He  underwent an MRI on 6/24/2021.  MRI demonstrated 57 cc gland without suspicious lesion.     He was referred by his primary care physician for urologic work-up.  He saw Dr. Lazar and underwent transrectal ultrasound directed biopsies of the prostate on 7/15/2021.  Volume was noted to be 52 cc.  12 core biopsy prostate obtained demonstrating [place] 3+4 disease involving the right base and right base lateral.  Sarah 3+3 disease noted right mid lateral right apex lateral left mid and left mid lateral.  In all 6 out of 12 cores were involved with prostate cancer.     June 2021 he was evaluated for macrocytosis without anemia.  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  Bone marrow biopsy demonstrates 66 to 70% monoclonal plasma cells.  PET/CT, FDG, 8/19/2021 showed no evidence of metastatic disease.  RVD started and completed x3.     Current complaints mild fatigue.  He does report urinary frequency, intermittency, weak stream, incomplete emptying and nocturia 1-2 times per night.  Current IPSS score 17-18.  Quality-of-life score 3.     12/02/21  Preop visit for upcoming cesium 131 low-dose rate seed implant.  He has been on Orgovyx and Flomax.  Reports significant improvement in his urinary function.  His IPSS score is decreased 6 from 18.  Quality-of-life score is 2.  Is experiencing some hot flashes.  But overall feels good.  Also continues on his myeloma therapy.     INTERVAL HISTORY:  He is currently approximately 30 days post cesium 131 seed implant.  Is experiencing urinary symptoms as expected.  IPSS score 24 with a quality score 4.  He is on Flomax 0.8 mg daily.  On Orgovyx 120 mg daily.  CURRENT MEDICATIONS:  Current Outpatient Medications   Medication Sig Dispense Refill   • lenalidomide 25 MG Cap Take 25 mg (1 cap) by mouth on Days 1 - 14 of each 21-day cycle (2 weeks ON & 1 week OFF) 14 Capsule 0   • ondansetron (ZOFRAN) 4 MG Tab tablet Take 1 Tablet by  mouth every 6 hours as needed for Nausea/Vomiting for up to 30 days. 120 Tablet 0   • zolpidem (AMBIEN) 10 MG Tab Take 1 Tablet by mouth at bedtime as needed for Sleep for up to 30 days. 30 Tablet 3   • ORGOVYX 120 MG Tab Take 120 mg by mouth every day for 30 days. 30 Tablet 3   • tamsulosin (FLOMAX) 0.4 MG capsule TAKE 2 CAPSULES BY MOUTH 30 MINUTES AFTER DINNER. TAKE 1 CAPSULE BY MOUTH FOR A WEEK THEN INCREASE TO 2 CAPSULES 60 Capsule 5   • Magnesium Hydroxide (MILK OF MAGNESIA PO) Take  by mouth as needed.     • VITAMIN D PO Take 8,000 Units by mouth.     • VITAMIN K PO Take  by mouth. 25mg PO bid     • aspirin EC (ECOTRIN) 81 MG Tablet Delayed Response Take 81 mg by mouth every day.     • dexamethasone (DECADRON) 4 MG Tab Take 10 pills once a week 40 tablet 6   • valACYclovir (VALTREX) 500 MG Tab Take 500 mg by mouth 2 times a day. No known stop date, TWICE DAILY     • FLUoxetine (PROZAC) 20 MG Cap Take 1 capsule by mouth every day. 90 capsule 4   • Omega-3 Fatty Acids (FISH OIL) 1000 MG Cap capsule Take 1,000 mg by mouth 2 times a day.     • levothyroxine (SYNTHROID) 100 MCG Tab Take 1 tablet by mouth Every morning on an empty stomach. 100 tablet 3   • simvastatin (ZOCOR) 20 MG Tab Take 1 tablet by mouth every evening. 100 tablet 3     No current facility-administered medications for this encounter.       ALLERGIES:  Grass pollen(k-o-r-t-swt jose), Pet dander [cat hair extract], Milk [dairy food allergy], [place]brush, and [place] (diagnostic)    PHYSICAL EXAM:   ECOG PERFORMANCE STATUS:  No flowsheet data found.  /51   Pulse 79   Temp 36.3 °C (97.3 °F)   Resp 16   Wt 77.6 kg (171 lb)   SpO2 93%   BMI 26.84 kg/m²   Physical Exam  Vitals and nursing note reviewed.   Constitutional:       General: He is not in acute distress.     Appearance: He is well-developed.   HENT:      Head: Normocephalic.   Skin:     General: Skin is warm and dry.      Findings: No erythema.   Neurological:      Mental Status: He  is alert and oriented to person, place, and time.   Psychiatric:         Behavior: Behavior normal.         Thought Content: Thought content normal.         Judgment: Judgment normal.           RECTAL: Deferred secondary to persistent regular tachycardia for 60 days post implant (12/16/2021)      INTERNATIONAL PROSTATE SYMPTOM SCORE (I-PSS):  I-PSS Review 10/8/2021 12/2/2021   Incomplete Emptying- How often have you had the sensation of not emptying your bladder? 4 1   How often have you had to urinate less than every tow hours? 3 1   Intermittency- How often have you found you stopped and started again several times when you urinated? 3 1   Urgency- How often have you found it difficult to postpone urination? 1 1   Weak Stream- How often have you had a weak urinary stream? 4 1   Straining- How often have you had to strain to start urination? 1 0   Nocturia- How many times did you typically get up at night to urinate? 2 1   Score: 18 6       QUALITY OF LIFE DUE TO URINARY SYMPTOMS:   If you were to spend the rest of your life with your urinary condition just the way it is now, how would you feel about that? 4 = Mostly dissatisfied    SEXUAL HEALTH INVENTORY FOR MEN (EULALIO):   EULALIO Total 10/8/2021 12/2/2021   How do you rate your confidence that you could get and keep an erection? 1 0   When you had erections with sexual stimulation, how often were your erections hard enough for penetration (entering your partner)? 1 0   During sexual intercourse, how often were you able to maintain you erection after you had penetrated (entered) your partner? 1 0   During Sexual intercourse, how difficult was it to maintain your erection to completion of intercourse? 1 0   When you attampted sexual intercourse, how often was it satisfactory for you? 1 0   Total: 5 0       LABORATORY DATA:   Lab Results   Component Value Date/Time    WBC 3.7 (L) 01/06/2022 03:51 PM    RBC 3.39 (L) 01/06/2022 03:51 PM    HEMOGLOBIN 12.6 (L) 01/06/2022  03:51 PM    HEMATOCRIT 35.5 (L) 01/06/2022 03:51 PM    .7 (H) 01/06/2022 03:51 PM    MCH 37.2 (H) 01/06/2022 03:51 PM    MCHC 35.5 (H) 01/06/2022 03:51 PM    RDW 63.5 (H) 01/06/2022 03:51 PM    PLATELETCT 120 (L) 01/06/2022 03:51 PM    MPV 10.1 01/06/2022 03:51 PM    NEUTSPOLYS 52.50 01/06/2022 03:51 PM    LYMPHOCYTES 20.50 (L) 01/06/2022 03:51 PM    MONOCYTES 24.30 (H) 01/06/2022 03:51 PM    EOSINOPHILS 1.90 01/06/2022 03:51 PM    BASOPHILS 0.80 01/06/2022 03:51 PM    ANISOCYTOSIS 1+ 12/30/2021 03:08 PM      Lab Results   Component Value Date/Time    SODIUM 140 12/23/2021 03:35 PM    POTASSIUM 4.6 12/23/2021 03:35 PM    CHLORIDE 102 12/23/2021 03:35 PM    CO2 27 12/23/2021 03:35 PM    GLUCOSE 134 (H) 12/23/2021 03:35 PM    BUN 19 12/23/2021 03:35 PM    CREATININE 0.84 12/23/2021 03:35 PM       Lab Results   Component Value Date/Time    PSATOTAL 4.10 (H) 04/12/2021 08:04 AM    PSATOTAL 4.10 (H) 04/12/2021 08:01 AM       RADIOLOGY DATA:  DX-PELVIS-1 OR 2 VIEWS    Result Date: 12/16/2021 12/16/2021 8:38 AM HISTORY/REASON FOR EXAM:  Brachytherapy seed placement by Dr. Isaac. TECHNIQUE/EXAM DESCRIPTION:  Portable fluoroscopy for less than one hour in OR, FINDINGS: The portable fluoroscopy unit was obligated to the procedure for less than one hour.  Actual fluoroscopy time was 5 seconds.     Portable fluoroscopy as described.    DX-PORTABLE FLUOROSCOPY < 1 HOUR    Result Date: 12/16/2021 12/16/2021 8:38 AM HISTORY/REASON FOR EXAM:  Brachytherapy TECHNIQUE/EXAM DESCRIPTION AND NUMBER OF VIEWS: Portable fluoroscopy for less than one hour in OR. FINDINGS: The portable fluoroscopy unit was obligated to the procedure for less than one hour. Actual fluoro time was 5 seconds.     Portable fluoroscopy utilized for 5 seconds. INTERPRETING LOCATION: [address] [place] [address] [place], [address]      IMPRESSION:    A 71 y.o. with   Prostate cancer (HCC)  Staging form: Prostate, AJCC 8th Edition  - Clinical stage from 10/8/2021: Stage IIB  (cT1c, cN0, cM0, PSA: 4.1, Grade Group: 2) - Signed by Kirk DELANEY M.D. on 10/8/2021  Histopathologic type: Adenocarcinoma, NOS  Stage prefix: Initial diagnosis  Prostate specific antigen (PSA) range: Less than 10  [place] primary pattern: 3  [place] secondary pattern: 4  [place] score: 7  Histologic grading system: 5 grade system  Bilateral cancer: Yes  Number of biopsy cores examined: 12  Number of biopsy cores positive: 6  Location of positive needle core biopsies: Both sides        RECOMMENDATIONS:   Reassured him.  Urinary symptoms he is experiencing is completely expected and should improve with time.  We discussed the need for supplemental external beam radiotherapy to the pelvis post implant.  We reviewed the technical aspects benefits risks.  He understands and wishes to proceed.  He will return for simulation on January 28 with treatment anticipated to start February 7.    Thank you for the opportunity to participate in his care.  If any questions or comments, please do not hesitate in calling.    No orders of the defined types were placed in this encounter.

## 2022-01-14 ENCOUNTER — OFFICE VISIT (OUTPATIENT)
Dept: HEMATOLOGY ONCOLOGY | Facility: MEDICAL CENTER | Age: 72
End: 2022-01-14
Payer: MEDICARE

## 2022-01-14 VITALS
SYSTOLIC BLOOD PRESSURE: 152 MMHG | HEIGHT: 66 IN | BODY MASS INDEX: 27.9 KG/M2 | OXYGEN SATURATION: 97 % | RESPIRATION RATE: 16 BRPM | TEMPERATURE: 97.8 F | WEIGHT: 173.61 LBS | DIASTOLIC BLOOD PRESSURE: 74 MMHG | HEART RATE: 78 BPM

## 2022-01-14 DIAGNOSIS — C90.00 MULTIPLE MYELOMA NOT HAVING ACHIEVED REMISSION (HCC): ICD-10-CM

## 2022-01-14 DIAGNOSIS — C61 PROSTATE CANCER (HCC): ICD-10-CM

## 2022-01-14 PROCEDURE — 99214 OFFICE O/P EST MOD 30 MIN: CPT | Performed by: INTERNAL MEDICINE

## 2022-01-14 RX ORDER — 0.9 % SODIUM CHLORIDE 0.9 %
10 VIAL (ML) INJECTION PRN
Status: CANCELLED | OUTPATIENT
Start: 2022-02-17

## 2022-01-14 RX ORDER — ONDANSETRON 2 MG/ML
4 INJECTION INTRAMUSCULAR; INTRAVENOUS PRN
Status: CANCELLED | OUTPATIENT
Start: 2022-02-17

## 2022-01-14 RX ORDER — PROCHLORPERAZINE MALEATE 10 MG
10 TABLET ORAL EVERY 6 HOURS PRN
Status: CANCELLED | OUTPATIENT
Start: 2022-02-10

## 2022-01-14 RX ORDER — 0.9 % SODIUM CHLORIDE 0.9 %
10 VIAL (ML) INJECTION PRN
Status: CANCELLED | OUTPATIENT
Start: 2022-02-10

## 2022-01-14 RX ORDER — HEPARIN SODIUM (PORCINE) LOCK FLUSH IV SOLN 100 UNIT/ML 100 UNIT/ML
500 SOLUTION INTRAVENOUS PRN
Status: CANCELLED | OUTPATIENT
Start: 2022-02-10

## 2022-01-14 RX ORDER — PROCHLORPERAZINE MALEATE 10 MG
10 TABLET ORAL EVERY 6 HOURS PRN
Status: CANCELLED | OUTPATIENT
Start: 2022-02-17

## 2022-01-14 RX ORDER — 0.9 % SODIUM CHLORIDE 0.9 %
3 VIAL (ML) INJECTION PRN
Status: CANCELLED | OUTPATIENT
Start: 2022-02-02

## 2022-01-14 RX ORDER — 0.9 % SODIUM CHLORIDE 0.9 %
VIAL (ML) INJECTION PRN
Status: CANCELLED | OUTPATIENT
Start: 2022-02-10

## 2022-01-14 RX ORDER — 0.9 % SODIUM CHLORIDE 0.9 %
VIAL (ML) INJECTION PRN
Status: CANCELLED | OUTPATIENT
Start: 2022-02-03

## 2022-01-14 RX ORDER — ONDANSETRON 8 MG/1
8 TABLET, ORALLY DISINTEGRATING ORAL PRN
Status: CANCELLED | OUTPATIENT
Start: 2022-02-17

## 2022-01-14 RX ORDER — ONDANSETRON 8 MG/1
8 TABLET, ORALLY DISINTEGRATING ORAL PRN
Status: CANCELLED | OUTPATIENT
Start: 2022-02-03

## 2022-01-14 RX ORDER — HEPARIN SODIUM (PORCINE) LOCK FLUSH IV SOLN 100 UNIT/ML 100 UNIT/ML
500 SOLUTION INTRAVENOUS PRN
Status: CANCELLED | OUTPATIENT
Start: 2022-02-03

## 2022-01-14 RX ORDER — ONDANSETRON 2 MG/ML
4 INJECTION INTRAMUSCULAR; INTRAVENOUS PRN
Status: CANCELLED | OUTPATIENT
Start: 2022-02-03

## 2022-01-14 RX ORDER — 0.9 % SODIUM CHLORIDE 0.9 %
10 VIAL (ML) INJECTION PRN
Status: CANCELLED | OUTPATIENT
Start: 2022-02-02

## 2022-01-14 RX ORDER — SODIUM CHLORIDE 9 MG/ML
INJECTION, SOLUTION INTRAVENOUS CONTINUOUS
Status: CANCELLED | OUTPATIENT
Start: 2022-02-17

## 2022-01-14 RX ORDER — PROCHLORPERAZINE MALEATE 10 MG
10 TABLET ORAL EVERY 6 HOURS PRN
Status: CANCELLED | OUTPATIENT
Start: 2022-02-03

## 2022-01-14 RX ORDER — 0.9 % SODIUM CHLORIDE 0.9 %
3 VIAL (ML) INJECTION PRN
Status: CANCELLED | OUTPATIENT
Start: 2022-02-10

## 2022-01-14 RX ORDER — 0.9 % SODIUM CHLORIDE 0.9 %
VIAL (ML) INJECTION PRN
Status: CANCELLED | OUTPATIENT
Start: 2022-02-02

## 2022-01-14 RX ORDER — ONDANSETRON 8 MG/1
8 TABLET, ORALLY DISINTEGRATING ORAL PRN
Status: CANCELLED | OUTPATIENT
Start: 2022-02-10

## 2022-01-14 RX ORDER — SODIUM CHLORIDE 9 MG/ML
INJECTION, SOLUTION INTRAVENOUS CONTINUOUS
Status: CANCELLED | OUTPATIENT
Start: 2022-02-03

## 2022-01-14 RX ORDER — HEPARIN SODIUM (PORCINE) LOCK FLUSH IV SOLN 100 UNIT/ML 100 UNIT/ML
500 SOLUTION INTRAVENOUS PRN
Status: CANCELLED | OUTPATIENT
Start: 2022-02-02

## 2022-01-14 RX ORDER — 0.9 % SODIUM CHLORIDE 0.9 %
10 VIAL (ML) INJECTION PRN
Status: CANCELLED | OUTPATIENT
Start: 2022-02-03

## 2022-01-14 RX ORDER — 0.9 % SODIUM CHLORIDE 0.9 %
VIAL (ML) INJECTION PRN
Status: CANCELLED | OUTPATIENT
Start: 2022-02-17

## 2022-01-14 RX ORDER — ONDANSETRON 2 MG/ML
4 INJECTION INTRAMUSCULAR; INTRAVENOUS PRN
Status: CANCELLED | OUTPATIENT
Start: 2022-02-10

## 2022-01-14 RX ORDER — SODIUM CHLORIDE 9 MG/ML
INJECTION, SOLUTION INTRAVENOUS CONTINUOUS
Status: CANCELLED | OUTPATIENT
Start: 2022-02-10

## 2022-01-14 RX ORDER — 0.9 % SODIUM CHLORIDE 0.9 %
3 VIAL (ML) INJECTION PRN
Status: CANCELLED | OUTPATIENT
Start: 2022-02-17

## 2022-01-14 RX ORDER — 0.9 % SODIUM CHLORIDE 0.9 %
3 VIAL (ML) INJECTION PRN
Status: CANCELLED | OUTPATIENT
Start: 2022-02-03

## 2022-01-14 RX ORDER — HEPARIN SODIUM (PORCINE) LOCK FLUSH IV SOLN 100 UNIT/ML 100 UNIT/ML
500 SOLUTION INTRAVENOUS PRN
Status: CANCELLED | OUTPATIENT
Start: 2022-02-17

## 2022-01-14 ASSESSMENT — FIBROSIS 4 INDEX: FIB4 SCORE: 1.89

## 2022-01-20 ENCOUNTER — OUTPATIENT INFUSION SERVICES (OUTPATIENT)
Dept: ONCOLOGY | Facility: MEDICAL CENTER | Age: 72
End: 2022-01-20
Attending: INTERNAL MEDICINE
Payer: MEDICARE

## 2022-01-20 VITALS
DIASTOLIC BLOOD PRESSURE: 61 MMHG | SYSTOLIC BLOOD PRESSURE: 144 MMHG | RESPIRATION RATE: 18 BRPM | TEMPERATURE: 97.1 F | HEIGHT: 67 IN | OXYGEN SATURATION: 96 % | WEIGHT: 172.18 LBS | HEART RATE: 79 BPM | BODY MASS INDEX: 27.02 KG/M2

## 2022-01-20 DIAGNOSIS — C90.00 MULTIPLE MYELOMA NOT HAVING ACHIEVED REMISSION (HCC): ICD-10-CM

## 2022-01-20 LAB
BASOPHILS # BLD AUTO: 0.6 % (ref 0–1.8)
BASOPHILS # BLD: 0.02 K/UL (ref 0–0.12)
EOSINOPHIL # BLD AUTO: 0.15 K/UL (ref 0–0.51)
EOSINOPHIL NFR BLD: 4.3 % (ref 0–6.9)
ERYTHROCYTE [DISTWIDTH] IN BLOOD BY AUTOMATED COUNT: 60.2 FL (ref 35.9–50)
HCT VFR BLD AUTO: 37.2 % (ref 42–52)
HGB BLD-MCNC: 13 G/DL (ref 14–18)
IMM GRANULOCYTES # BLD AUTO: 0.01 K/UL (ref 0–0.11)
IMM GRANULOCYTES NFR BLD AUTO: 0.3 % (ref 0–0.9)
LYMPHOCYTES # BLD AUTO: 0.55 K/UL (ref 1–4.8)
LYMPHOCYTES NFR BLD: 15.9 % (ref 22–41)
MCH RBC QN AUTO: 37.1 PG (ref 27–33)
MCHC RBC AUTO-ENTMCNC: 34.9 G/DL (ref 33.7–35.3)
MCV RBC AUTO: 106.3 FL (ref 81.4–97.8)
MONOCYTES # BLD AUTO: 0.55 K/UL (ref 0–0.85)
MONOCYTES NFR BLD AUTO: 15.9 % (ref 0–13.4)
NEUTROPHILS # BLD AUTO: 2.18 K/UL (ref 1.82–7.42)
NEUTROPHILS NFR BLD: 63 % (ref 44–72)
NRBC # BLD AUTO: 0 K/UL
NRBC BLD-RTO: 0 /100 WBC
PLATELET # BLD AUTO: 126 K/UL (ref 164–446)
PMV BLD AUTO: 10.3 FL (ref 9–12.9)
RBC # BLD AUTO: 3.5 M/UL (ref 4.7–6.1)
WBC # BLD AUTO: 3.5 K/UL (ref 4.8–10.8)

## 2022-01-20 PROCEDURE — 85025 COMPLETE CBC W/AUTO DIFF WBC: CPT

## 2022-01-20 PROCEDURE — 700111 HCHG RX REV CODE 636 W/ 250 OVERRIDE (IP): Performed by: NURSE PRACTITIONER

## 2022-01-20 PROCEDURE — 96401 CHEMO ANTI-NEOPL SQ/IM: CPT

## 2022-01-20 RX ADMIN — BORTEZOMIB 2.48 MG: 3.5 INJECTION, POWDER, LYOPHILIZED, FOR SOLUTION INTRAVENOUS; SUBCUTANEOUS at 12:45

## 2022-01-20 ASSESSMENT — FIBROSIS 4 INDEX: FIB4 SCORE: 1.89

## 2022-01-20 NOTE — PROGRESS NOTES
Gary is here for Day 8, Cycle 7 bortezomib (VELCADE). Labs drawn peripherally using 23g butterfly to right AC; gauze/coban applied to site. Lab results reviewed and within parameters to proceed with treatment. bortezomib (VELCADE) given per MAR to VIDHI vides. Next appointment scheduled. Discharged to self care; no apparent distress noted.

## 2022-01-20 NOTE — PROGRESS NOTES
"Pharmacy Chemotherapy Verification    Dx: MM    Protocol: RVd     *Dosing Reference*  Bortezomib 1.3 mg/m2 subcutaneous on Days 1, 4, 8, 11              -MD dosing on Days 1, 8, 15 of a 21 day cycle  Lenalidomide 25 mg PO daily on Days 1-14  Dexamethasone 20 mg PO daily on Days 1-2, 4-5, 8-9, 11-12 OR 40 mg PO on Days 1, 8, and 15  21 day cycle for 3-4 cycles OR until disease progression or unacceptable toxicity  NCCN Guidelines for Multiple Myeloma.V.1.2022  Amadeo P, et al. Blood. 2010;116(5):679-86  Dominic S, et al/ Blood. 2012;119(19):9072-82    Allergies:  Grass pollen(k-o-r-t-swt jose), Pet dander [cat hair extract], Milk [dairy food allergy], [place]brush, and [place] oil     /61   Pulse 79   Temp 36.2 °C (97.1 °F) (Temporal)   Resp 18   Ht 1.69 m (5' 6.54\")   Wt 78.1 kg (172 lb 2.9 oz)   SpO2 96%   BMI 27.35 kg/m²  Body surface area is 1.91 meters squared.     Labs from 1/20/22 reviewed- results within treatment parameters     Drug Order   (Drug name, dose, route, IV Fluid & volume, frequency, number of doses) Cycle 7 Day 8  Previous treatment: C7D1 on 1/13/22   Medication = Bortezomib (Velcade)  Base Dose = 1.3 mg/m2  Calc Dose: Base Dose x 1.91 m2 = 2.5 mg  Final Dose = 2.48 mg  Route = Subcutaneous  Conc. = 2.5 mg/mL  Fluid & Volume = 0.99 mL in syringe  Administration = ABDOMEN or THIGH          <10% difference, okay to treat with final dose     By my signature below, I confirm this process was performed independently with the BSA and all final chemotherapy dosing calculations congruent. I have reviewed the above chemotherapy order and that my calculation of the final dose and BSA (when applicable) corroborate those calculations of the  pharmacist. Discrepancies of 10% or greater in the written dose have been addressed and documented within the [facility] Progress notes.    Candido Gooden, PharmD,   "

## 2022-01-20 NOTE — PROGRESS NOTES
Chemotherapy Verification - PRIMARY RN      Height = 169 cm  Weight = 78.1 kg  BSA = 1.91 m^2       Medication: bortezomib (VELCADE)  Dose: 1.3 mg/m^2  Calculated Dose: 2.483 mg                             (In mg/m2, AUC, mg/kg)     I confirm this process was performed independently with the BSA and all final chemotherapy dosing calculations congruent.  Any discrepancies of 10% or greater have been addressed with the chemotherapy pharmacist. The resolution of the discrepancy has been documented in the EPIC progress notes.

## 2022-01-20 NOTE — PROGRESS NOTES
"Pharmacy Chemotherapy Verification  Patient Name: Gary Hedrick  Dx: MM    Cycle 7 Day 8  Previous treatment = C7D1 on 1/13/22    Regimen and Dosing Reference  Bortezomib 1.3 mg/m2 subcutaneous on    -MD dosing on Days 1, 8, 15 of a 21 day cycle  Lenalidomide 25 mg PO daily on Days 1-14  Dexamethasone 20 mg PO daily on Days 1-2, 4-5, 8-9, 11-12 OR 40 mg PO on Days 1, 8, and 15  21 day cycle for 3-4 cycles OR until disease progression or unacceptable toxicity  NCCN Guidelines for Multiple Myeloma.V.1.2022  Amadeo P, et al. Blood. 2010;116(5):679-86  Alfaro S, et al/ Blood. 2012;119(19):4375-82  John M, et al. J Clin Oncol. 2014;32(25):2712-7    Allergies:Grass pollen(k-o-r-t-swt jose), Pet dander [cat hair extract], [place] meal, Milk [dairy food allergy], [place], and [place] oil  /61   Pulse 79   Temp 36.2 °C (97.1 °F) (Temporal)   Resp 18   Ht 1.69 m (5' 6.54\")   Wt 78.1 kg (172 lb 2.9 oz)   SpO2 96%   BMI 27.35 kg/m²   Body surface area is 1.91 meters squared.    Labs 1/20/22  ANC~ 2180 Plt = 126k   Hgb = 13       Bortezomib (Velcade) 1.3 mg/m2 x 1.91 m2 = 2.483 mg   <10% difference, OK to treat with final dose = 2.48 mg subcutaneous    Kenney Birch, PharmD    "

## 2022-01-20 NOTE — PROGRESS NOTES
Chemotherapy Verification - SECONDARY RN       Height = 169 cm  Weight = 78.1 kg  BSA = 1.91 m2       Medication: Velcade  Dose: 1.3 mg/m2  Calculated Dose: 2.483 mg                             (In mg/m2, AUC, mg/kg)       I confirm that this process was performed independently.

## 2022-01-21 ENCOUNTER — TELEPHONE (OUTPATIENT)
Dept: HEMATOLOGY ONCOLOGY | Facility: MEDICAL CENTER | Age: 72
End: 2022-01-21

## 2022-01-21 NOTE — TELEPHONE ENCOUNTER
Left detailed voicemail to let patient know about upcoming appointment with Dr. Aaron. Also let the patient know that Dr. Zabala from [facility] office would be calling to set up a consultation for the stem cell and if he had any further questions to give us a call.

## 2022-01-27 ENCOUNTER — OUTPATIENT INFUSION SERVICES (OUTPATIENT)
Dept: ONCOLOGY | Facility: MEDICAL CENTER | Age: 72
End: 2022-01-27
Attending: INTERNAL MEDICINE
Payer: MEDICARE

## 2022-01-27 VITALS
BODY MASS INDEX: 26.89 KG/M2 | HEIGHT: 67 IN | HEART RATE: 75 BPM | WEIGHT: 171.3 LBS | SYSTOLIC BLOOD PRESSURE: 153 MMHG | TEMPERATURE: 97.8 F | DIASTOLIC BLOOD PRESSURE: 60 MMHG | OXYGEN SATURATION: 98 % | RESPIRATION RATE: 17 BRPM

## 2022-01-27 DIAGNOSIS — C90.00 MULTIPLE MYELOMA NOT HAVING ACHIEVED REMISSION (HCC): ICD-10-CM

## 2022-01-27 LAB
BASOPHILS # BLD AUTO: 0.7 % (ref 0–1.8)
BASOPHILS # BLD: 0.03 K/UL (ref 0–0.12)
EOSINOPHIL # BLD AUTO: 0.05 K/UL (ref 0–0.51)
EOSINOPHIL NFR BLD: 1.2 % (ref 0–6.9)
ERYTHROCYTE [DISTWIDTH] IN BLOOD BY AUTOMATED COUNT: 60.6 FL (ref 35.9–50)
HCT VFR BLD AUTO: 36.7 % (ref 42–52)
HGB BLD-MCNC: 12.8 G/DL (ref 14–18)
IMM GRANULOCYTES # BLD AUTO: 0.01 K/UL (ref 0–0.11)
IMM GRANULOCYTES NFR BLD AUTO: 0.2 % (ref 0–0.9)
LYMPHOCYTES # BLD AUTO: 0.67 K/UL (ref 1–4.8)
LYMPHOCYTES NFR BLD: 15.8 % (ref 22–41)
MCH RBC QN AUTO: 37.1 PG (ref 27–33)
MCHC RBC AUTO-ENTMCNC: 34.9 G/DL (ref 33.7–35.3)
MCV RBC AUTO: 106.4 FL (ref 81.4–97.8)
MONOCYTES # BLD AUTO: 0.93 K/UL (ref 0–0.85)
MONOCYTES NFR BLD AUTO: 21.9 % (ref 0–13.4)
NEUTROPHILS # BLD AUTO: 2.56 K/UL (ref 1.82–7.42)
NEUTROPHILS NFR BLD: 60.2 % (ref 44–72)
NRBC # BLD AUTO: 0 K/UL
NRBC BLD-RTO: 0 /100 WBC
PLATELET # BLD AUTO: 130 K/UL (ref 164–446)
PMV BLD AUTO: 10.2 FL (ref 9–12.9)
RBC # BLD AUTO: 3.45 M/UL (ref 4.7–6.1)
WBC # BLD AUTO: 4.3 K/UL (ref 4.8–10.8)

## 2022-01-27 PROCEDURE — 700111 HCHG RX REV CODE 636 W/ 250 OVERRIDE (IP): Performed by: NURSE PRACTITIONER

## 2022-01-27 PROCEDURE — 83521 IG LIGHT CHAINS FREE EACH: CPT

## 2022-01-27 PROCEDURE — 85025 COMPLETE CBC W/AUTO DIFF WBC: CPT

## 2022-01-27 PROCEDURE — 96401 CHEMO ANTI-NEOPL SQ/IM: CPT

## 2022-01-27 RX ADMIN — BORTEZOMIB 2.48 MG: 3.5 INJECTION, POWDER, LYOPHILIZED, FOR SOLUTION INTRAVENOUS; SUBCUTANEOUS at 12:37

## 2022-01-27 ASSESSMENT — FIBROSIS 4 INDEX: FIB4 SCORE: 2.24

## 2022-01-27 NOTE — PROGRESS NOTES
Chemotherapy Verification - PRIMARY RN      Height = 169 cm  Weight = 77.7 kg  BSA = 1.91 m2       Medication: Velcade  Dose: 1.3 mg/m2  Calculated Dose: 2.483 mg                             (In mg/m2, AUC, mg/kg)     M    I confirm this process was performed independently with the BSA and all final chemotherapy dosing calculations congruent.  Any discrepancies of 10% or greater have been addressed with the chemotherapy pharmacist. The resolution of the discrepancy has been documented in the EPIC progress notes.

## 2022-01-27 NOTE — PROGRESS NOTES
"Pharmacy Chemotherapy Verification    Dx: MM    Protocol: RVd     *Dosing Reference*  Bortezomib 1.3 mg/m2 subcutaneous on Days 1, 4, 8, 11              -MD dosing on Days 1, 8, 15 of a 21 day cycle  Lenalidomide 25 mg PO daily on Days 1-14  Dexamethasone 20 mg PO daily on Days 1-2, 4-5, 8-9, 11-12 OR 40 mg PO on Days 1, 8, and 15  21 day cycle for 3-4 cycles OR until disease progression or unacceptable toxicity  NCCN Guidelines for Multiple Myeloma.V.1.2022  Amadeo P, et al. Blood. 2010;116(5):679-86  Dominic S, et al/ Blood. 2012;119(19):9895-27    Allergies:  Grass pollen(k-o-r-t-swt jose), Pet dander [cat hair extract], Milk [dairy food allergy], [place], and [place] oil     /60   Pulse 75   Temp 36.6 °C (97.8 °F) (Temporal)   Resp 17   Ht 1.69 m (5' 6.54\")   Wt 77.7 kg (171 lb 4.8 oz)   SpO2 98%   BMI 27.21 kg/m²  Body surface area is 1.91 meters squared.     Labs 1/27/22:  ANC~ 2560 Plt = 130k   Hgb = 12.8       Drug Order   (Drug name, dose, route, IV Fluid & volume, frequency, number of doses) Cycle 7 Day 15  Previous treatment: C7D8 on 1/20/22   Medication = Bortezomib (Velcade)  Base Dose = 1.3 mg/m2  Calc Dose: Base Dose x 1.91 m2 = 2.483 mg  Final Dose = 2.48 mg  Route = Subcutaneous  Conc. = 2.5 mg/mL  Fluid & Volume = 0.99 mL in syringe  Administration = ABDOMEN or THIGH          <10% difference, okay to treat with final dose     By my signature below, I confirm this process was performed independently with the BSA and all final chemotherapy dosing calculations congruent. I have reviewed the above chemotherapy order and that my calculation of the final dose and BSA (when applicable) corroborate those calculations of the  pharmacist. Discrepancies of 10% or greater in the written dose have been addressed and documented within the [facility] Progress notes.    Nick Williamson, PharmD,   "

## 2022-01-27 NOTE — PROGRESS NOTES
Gary presents to [facility] for D15/ C7 Velcade injection.  Gary voices no complaints.  Labs collected and reviewed, parameters met for treatment today.  Velcade administered per MAR to left lower quadrant abdomen SQ, adhesive dressing applied.  Gary tolerated well.  Discharged in NAD, copy of schedule provided.

## 2022-01-27 NOTE — PROGRESS NOTES
"Pharmacy Chemotherapy Verification  Patient Name: Gary Hedrick  Dx: MM    Cycle 7 Day 15  Previous treatment = C7D8 on 1/20/22    Regimen and Dosing Reference  Bortezomib 1.3 mg/m2 subcutaneous on    -MD dosing on Days 1, 8, 15 of a 21 day cycle  Lenalidomide 25 mg PO daily on Days 1-14  Dexamethasone 20 mg PO daily on Days 1-2, 4-5, 8-9, 11-12 OR 40 mg PO on Days 1, 8, and 15  21 day cycle for 3-4 cycles OR until disease progression or unacceptable toxicity  NCCN Guidelines for Multiple Myeloma.V.1.2022  Amadeo P, et al. Blood. 2010;116(5):679-86  Alfaro S, et al/ Blood. 2012;119(19):4375-82  John M, et al. J Clin Oncol. 2014;32(25):2712-7    Allergies:Grass pollen(k-o-r-t-swt jose), Pet dander [cat hair extract], [place] meal, Milk [dairy food allergy], [place], and [place] oil  /60   Pulse 75   Temp 36.6 °C (97.8 °F) (Temporal)   Resp 17   Ht 1.69 m (5' 6.54\")   Wt 77.7 kg (171 lb 4.8 oz)   SpO2 98%   BMI 27.21 kg/m²   Body surface area is 1.91 meters squared.    Labs 1/27/22  ANC~ 2560 Plt = 130k   Hgb = 12.8       Bortezomib (Velcade) 1.3 mg/m2 x 1.91 m2 = 2.483 mg   <10% difference, OK to treat with final dose = 2.48 mg subcutaneous    Kenney Birch, PharmD    "

## 2022-01-27 NOTE — PROGRESS NOTES
Chemotherapy Verification - SECONDARY RN       Height = 169 cm  Weight = 77.7 kg  BSA = 1.91 m2       Medication: Velcade  Dose: 1.3 mg/m2  Calculated Dose: 2.48 mg                             (In mg/m2, AUC, mg/kg)         I confirm that this process was performed independently.

## 2022-01-28 ENCOUNTER — HOSPITAL ENCOUNTER (OUTPATIENT)
Dept: RADIATION ONCOLOGY | Facility: MEDICAL CENTER | Age: 72
End: 2022-01-28
Payer: MEDICARE

## 2022-01-28 ENCOUNTER — HOSPITAL ENCOUNTER (OUTPATIENT)
Dept: RADIATION ONCOLOGY | Facility: MEDICAL CENTER | Age: 72
End: 2022-01-28

## 2022-01-28 LAB
KAPPA LC FREE SER-MCNC: 4.21 MG/L (ref 3.3–19.4)
KAPPA LC FREE/LAMBDA FREE SER NEPH: 0.01 {RATIO} (ref 0.26–1.65)
LAMBDA LC FREE SERPL-MCNC: 336 MG/L (ref 5.71–26.3)

## 2022-01-28 PROCEDURE — 77290 THER RAD SIMULAJ FIELD CPLX: CPT | Performed by: RADIOLOGY

## 2022-01-31 ENCOUNTER — TELEPHONE (OUTPATIENT)
Dept: HEMATOLOGY ONCOLOGY | Facility: MEDICAL CENTER | Age: 72
End: 2022-01-31

## 2022-01-31 DIAGNOSIS — C90.00 MULTIPLE MYELOMA NOT HAVING ACHIEVED REMISSION (HCC): ICD-10-CM

## 2022-01-31 DIAGNOSIS — D80.1 HYPOGAMMAGLOBULINEMIA (HCC): ICD-10-CM

## 2022-01-31 RX ORDER — LENALIDOMIDE 25 MG/1
CAPSULE ORAL
Qty: 14 CAPSULE | Refills: 0 | Status: SHIPPED | OUTPATIENT
Start: 2022-01-31 | End: 2022-03-01 | Stop reason: SDUPTHER

## 2022-01-31 NOTE — TELEPHONE ENCOUNTER
Left voice message for patient call back.    Patient returned my phone call.  Contacted patient to inform him that his lambda light chain levels have trended back down now at 336.  Previously was 383.  Patient was very happy with these results.    Also discussed with patient as to whether he has heard from [facility] in the transplant department yet, and he confirmed that he has not heard anything from  [facility] at this time.    Patient also informed that he is starting 28 days of radiation therapy for his prostate cancer on Monday, 2/7/2022.    Results for JAMES GRANT (MRN [id number])    Ref. Range 11/18/2021 15:08 12/9/2021 15:59 1/6/2022 15:51 1/27/2022 11:55   Free Kappa Light Chains Latest Ref Range: 3.30 - 19.40 mg/L 4.95 4.52 4.05 4.21   Free Lambda Light Chains Latest Ref Range: 5.71 - 26.30 mg/L 329.63 (H) 356.42 (H) 383.60 (H) 336.00 (H)   Kappa-Lambda Ratio Latest Ref Range: 0.26 - 1.65  0.02 (L) 0.01 (L) 0.01 (L) 0.01 (L)

## 2022-02-01 ENCOUNTER — HOSPITAL ENCOUNTER (OUTPATIENT)
Dept: RADIATION ONCOLOGY | Facility: MEDICAL CENTER | Age: 72
End: 2022-02-28
Attending: RADIOLOGY | Admitting: RADIOLOGY
Payer: MEDICARE

## 2022-02-02 PROCEDURE — 77338 DESIGN MLC DEVICE FOR IMRT: CPT | Mod: 26 | Performed by: RADIOLOGY

## 2022-02-02 PROCEDURE — 77300 RADIATION THERAPY DOSE PLAN: CPT | Mod: 26 | Performed by: RADIOLOGY

## 2022-02-02 PROCEDURE — 77338 DESIGN MLC DEVICE FOR IMRT: CPT | Performed by: RADIOLOGY

## 2022-02-02 PROCEDURE — 77301 RADIOTHERAPY DOSE PLAN IMRT: CPT | Performed by: RADIOLOGY

## 2022-02-02 PROCEDURE — 77300 RADIATION THERAPY DOSE PLAN: CPT | Performed by: RADIOLOGY

## 2022-02-02 PROCEDURE — 77301 RADIOTHERAPY DOSE PLAN IMRT: CPT | Mod: 26 | Performed by: RADIOLOGY

## 2022-02-03 ENCOUNTER — OUTPATIENT INFUSION SERVICES (OUTPATIENT)
Dept: ONCOLOGY | Facility: MEDICAL CENTER | Age: 72
End: 2022-02-03
Attending: INTERNAL MEDICINE
Payer: MEDICARE

## 2022-02-03 VITALS
WEIGHT: 173.28 LBS | BODY MASS INDEX: 27.2 KG/M2 | OXYGEN SATURATION: 98 % | TEMPERATURE: 98 F | SYSTOLIC BLOOD PRESSURE: 132 MMHG | DIASTOLIC BLOOD PRESSURE: 72 MMHG | HEIGHT: 67 IN | RESPIRATION RATE: 18 BRPM | HEART RATE: 74 BPM

## 2022-02-03 DIAGNOSIS — C90.00 MULTIPLE MYELOMA NOT HAVING ACHIEVED REMISSION (HCC): ICD-10-CM

## 2022-02-03 LAB
ALBUMIN SERPL BCP-MCNC: 4.2 G/DL (ref 3.2–4.9)
ALBUMIN/GLOB SERPL: 2.6 G/DL
ALP SERPL-CCNC: 62 U/L (ref 30–99)
ALT SERPL-CCNC: 29 U/L (ref 2–50)
ANION GAP SERPL CALC-SCNC: 12 MMOL/L (ref 7–16)
AST SERPL-CCNC: 21 U/L (ref 12–45)
BASOPHILS # BLD AUTO: 0.7 % (ref 0–1.8)
BASOPHILS # BLD: 0.02 K/UL (ref 0–0.12)
BILIRUB SERPL-MCNC: 0.6 MG/DL (ref 0.1–1.5)
BUN SERPL-MCNC: 19 MG/DL (ref 8–22)
CALCIUM SERPL-MCNC: 9 MG/DL (ref 8.5–10.5)
CHEMOTHERAPY INFUSION START DATE: NORMAL
CHEMOTHERAPY INFUSION STOP DATE: NORMAL
CHEMOTHERAPY RECORDS: 95
CHEMOTHERAPY RECORDS: 9500
CHEMOTHERAPY RECORDS: NORMAL
CHLORIDE SERPL-SCNC: 101 MMOL/L (ref 96–112)
CO2 SERPL-SCNC: 24 MMOL/L (ref 20–33)
CREAT SERPL-MCNC: 1.78 MG/DL (ref 0.5–1.4)
DATE 1ST CHEMO CANCER: NORMAL
EOSINOPHIL # BLD AUTO: 0.11 K/UL (ref 0–0.51)
EOSINOPHIL NFR BLD: 3.6 % (ref 0–6.9)
ERYTHROCYTE [DISTWIDTH] IN BLOOD BY AUTOMATED COUNT: 59.5 FL (ref 35.9–50)
GLOBULIN SER CALC-MCNC: 1.6 G/DL (ref 1.9–3.5)
GLUCOSE SERPL-MCNC: 89 MG/DL (ref 65–99)
HCT VFR BLD AUTO: 35 % (ref 42–52)
HGB BLD-MCNC: 12.3 G/DL (ref 14–18)
IMM GRANULOCYTES # BLD AUTO: 0.01 K/UL (ref 0–0.11)
IMM GRANULOCYTES NFR BLD AUTO: 0.3 % (ref 0–0.9)
LYMPHOCYTES # BLD AUTO: 0.46 K/UL (ref 1–4.8)
LYMPHOCYTES NFR BLD: 15.2 % (ref 22–41)
MCH RBC QN AUTO: 37.3 PG (ref 27–33)
MCHC RBC AUTO-ENTMCNC: 35.1 G/DL (ref 33.7–35.3)
MCV RBC AUTO: 106.1 FL (ref 81.4–97.8)
MONOCYTES # BLD AUTO: 0.31 K/UL (ref 0–0.85)
MONOCYTES NFR BLD AUTO: 10.3 % (ref 0–13.4)
NEUTROPHILS # BLD AUTO: 2.11 K/UL (ref 1.82–7.42)
NEUTROPHILS NFR BLD: 69.9 % (ref 44–72)
NRBC # BLD AUTO: 0 K/UL
NRBC BLD-RTO: 0 /100 WBC
PLATELET # BLD AUTO: 171 K/UL (ref 164–446)
PMV BLD AUTO: 10.6 FL (ref 9–12.9)
POTASSIUM SERPL-SCNC: 4.2 MMOL/L (ref 3.6–5.5)
PROT SERPL-MCNC: 5.8 G/DL (ref 6–8.2)
RAD ONC ARIA COURSE LAST TREATMENT DATE: NORMAL
RAD ONC ARIA COURSE TREATMENT ELAPSED DAYS: NORMAL
RAD ONC ARIA REFERENCE POINT DOSAGE GIVEN TO DATE: 95
RAD ONC ARIA REFERENCE POINT ID: NORMAL
RBC # BLD AUTO: 3.3 M/UL (ref 4.7–6.1)
SODIUM SERPL-SCNC: 137 MMOL/L (ref 135–145)
WBC # BLD AUTO: 3 K/UL (ref 4.8–10.8)

## 2022-02-03 PROCEDURE — 700111 HCHG RX REV CODE 636 W/ 250 OVERRIDE (IP): Performed by: INTERNAL MEDICINE

## 2022-02-03 PROCEDURE — 80053 COMPREHEN METABOLIC PANEL: CPT

## 2022-02-03 PROCEDURE — 85025 COMPLETE CBC W/AUTO DIFF WBC: CPT

## 2022-02-03 PROCEDURE — 96401 CHEMO ANTI-NEOPL SQ/IM: CPT

## 2022-02-03 RX ADMIN — BORTEZOMIB 2.5 MG: 3.5 INJECTION, POWDER, LYOPHILIZED, FOR SOLUTION INTRAVENOUS; SUBCUTANEOUS at 16:58

## 2022-02-03 ASSESSMENT — FIBROSIS 4 INDEX: FIB4 SCORE: 2.17

## 2022-02-03 NOTE — PROGRESS NOTES
"Pharmacy Chemotherapy Verification    Dx: MM    Protocol: RVd     *Dosing Reference*  Bortezomib 1.3 mg/m2 subcutaneous on Days 1, 4, 8, 11              -MD dosing on Days 1, 8, 15 of a 21 day cycle  Lenalidomide 25 mg PO daily on Days 1-14  Dexamethasone 20 mg PO daily on Days 1-2, 4-5, 8-9, 11-12 OR 40 mg PO on Days 1, 8, and 15  21 day cycle for 3-4 cycles OR until disease progression or unacceptable toxicity  NCCN Guidelines for Multiple Myeloma.V.1.2022  Amadeo P, et al. Blood. 2010;116(5):679-86  Dominic S, et al/ Blood. 2012;119(19):1083-03    Allergies:  Grass pollen(k-o-r-t-swt jose), Pet dander [cat hair extract], Milk [dairy food allergy], [place], and [place] oil     /72   Pulse 74   Temp 36.7 °C (98 °F) (Temporal)   Resp 18   Ht 1.69 m (5' 6.54\")   Wt 78.6 kg (173 lb 4.5 oz)   SpO2 98%   BMI 27.52 kg/m²  Body surface area is 1.92 meters squared.     Labs 2/3/22:  ANC~ 2110 Plt = 171k   Hgb = 12.3     SCr = 1.78 mg/dL CrCl ~ 44 mL/min   LFT's = WNL TBili = 0.6     Drug Order   (Drug name, dose, route, IV Fluid & volume, frequency, number of doses) Cycle 8 Day 1  Previous treatment: C8D15 on 1/27/22   Medication = Bortezomib (Velcade)  Base Dose = 1.3 mg/m2  Calc Dose: Base Dose x 1.92 m2 = 2.496 mg  Final Dose = 2.5 mg  Route = Subcutaneous  Conc. = 2.5 mg/mL  Fluid & Volume =1 mL in syringe  Administration = ABDOMEN or THIGH          <10% difference, okay to treat with final dose     By my signature below, I confirm this process was performed independently with the BSA and all final chemotherapy dosing calculations congruent. I have reviewed the above chemotherapy order and that my calculation of the final dose and BSA (when applicable) corroborate those calculations of the  pharmacist. Discrepancies of 10% or greater in the written dose have been addressed and documented within the [facility] Progress notes.    Nick Williamson, PharmD,   " 13-Dec-2021 07:34

## 2022-02-03 NOTE — PROGRESS NOTES
"Pharmacy Chemotherapy Verification  Patient Name: Gary Hedrick  Dx: MM    Cycle 8 Day 1  Previous treatment = C7D15 on 1/27/22    Regimen and Dosing Reference  Bortezomib 1.3 mg/m2 subcutaneous on    -MD dosing on Days 1, 8, 15 of a 21 day cycle  Lenalidomide 25 mg PO daily on Days 1-14  Dexamethasone 20 mg PO daily on Days 1-2, 4-5, 8-9, 11-12 OR 40 mg PO on Days 1, 8, and 15  21 day cycle for 3-4 cycles OR until disease progression or unacceptable toxicity  NCCN Guidelines for Multiple Myeloma.V.1.2022  Amadeo P, et al. Blood. 2010;116(5):679-86  Alfaro S, et al/ Blood. 2012;119(19):4375-82  John M, et al. J Clin Oncol. 2014;32(25):2712-7    Allergies:Grass pollen(k-o-r-t-swt jose), Pet dander [cat hair extract], [place] meal, Milk [dairy food allergy], [place], and [place] oil  /72   Pulse 74   Temp 36.7 °C (98 °F) (Temporal)   Resp 18   Ht 1.69 m (5' 6.54\")   Wt 78.6 kg (173 lb 4.5 oz)   SpO2 98%   BMI 27.52 kg/m²   Body surface area is 1.92 meters squared.    Labs 2/3/22  ANC~ 2110 Plt = 171k   Hgb = 12.3     SCr = 1.78 mg/dL CrCl ~ 43.1 mL/min   LFT's = WNLs  TBili = 0.6     Bortezomib (Velcade) 1.3 mg/m2 x 1.92 m2 = 2.496 mg   <10% difference, OK to treat with final dose = 2.5 mg subcutaneous    Kenney Birch, PharmD    "

## 2022-02-04 NOTE — PROGRESS NOTES
Pt presented to infusion for C8D1 Velcade. Pt denied any s/s of infection or open wounds. Labs drawn as ordered from [facility] with 25G butterfly on 1st attempt, gauze drsg placed. Pt met parameters for tx. Renal function significantly decreased, discussed with pt the importance of hydration, he expressed understanding and reported he has not been hydrating well this week. Velcade given in abdomen without issue, bandaid placed. Pt has next appt, printout given, left on foot to self care.

## 2022-02-04 NOTE — PROGRESS NOTES
Chemotherapy Verification - SECONDARY RN   C8 D1      Height = 1.69 m  Weight = 78.6 kg  BSA = 1.92 m2       Medication: bortezomib  Dose: 1.3 mg/m2  Calculated Dose: 2.5 mg                             (In mg/m2, AUC, mg/kg)       I confirm that this process was performed independently.

## 2022-02-04 NOTE — PROGRESS NOTES
Chemotherapy Verification - PRIMARY RN      Height = 169 cm  Weight = 78.6 kg  BSA = 1.92 m^2       Medication: Bortezomib  Dose: 1.3 mg/m^2  Calculated Dose: 2.496 mg                              (In mg/m2, AUC, mg/kg)         I confirm this process was performed independently with the BSA and all final chemotherapy dosing calculations congruent.  Any discrepancies of 10% or greater have been addressed with the chemotherapy pharmacist. The resolution of the discrepancy has been documented in the EPIC progress notes.

## 2022-02-07 ENCOUNTER — HOSPITAL ENCOUNTER (OUTPATIENT)
Dept: RADIATION ONCOLOGY | Facility: MEDICAL CENTER | Age: 72
End: 2022-02-07
Payer: MEDICARE

## 2022-02-07 LAB
CHEMOTHERAPY INFUSION START DATE: NORMAL
CHEMOTHERAPY RECORDS: 1.8
CHEMOTHERAPY RECORDS: 5040
CHEMOTHERAPY RECORDS: NORMAL
CHEMOTHERAPY RX CANCER: NORMAL
DATE 1ST CHEMO CANCER: NORMAL
RAD ONC ARIA COURSE LAST TREATMENT DATE: NORMAL
RAD ONC ARIA COURSE TREATMENT ELAPSED DAYS: NORMAL
RAD ONC ARIA REFERENCE POINT DOSAGE GIVEN TO DATE: 1.8
RAD ONC ARIA REFERENCE POINT DOSAGE GIVEN TO DATE: 1.89
RAD ONC ARIA REFERENCE POINT ID: NORMAL
RAD ONC ARIA REFERENCE POINT ID: NORMAL
RAD ONC ARIA REFERENCE POINT SESSION DOSAGE GIVEN: 1.8
RAD ONC ARIA REFERENCE POINT SESSION DOSAGE GIVEN: 1.89

## 2022-02-07 PROCEDURE — 77280 THER RAD SIMULAJ FIELD SMPL: CPT | Performed by: RADIOLOGY

## 2022-02-07 PROCEDURE — 77385 HCHG IMRT DELIVERY SIMPLE: CPT | Performed by: RADIOLOGY

## 2022-02-08 ENCOUNTER — HOSPITAL ENCOUNTER (OUTPATIENT)
Dept: RADIATION ONCOLOGY | Facility: MEDICAL CENTER | Age: 72
End: 2022-02-08
Payer: MEDICARE

## 2022-02-08 LAB
CHEMOTHERAPY INFUSION START DATE: NORMAL
CHEMOTHERAPY RECORDS: 1.8
CHEMOTHERAPY RECORDS: 5040
CHEMOTHERAPY RECORDS: NORMAL
CHEMOTHERAPY RX CANCER: NORMAL
DATE 1ST CHEMO CANCER: NORMAL
RAD ONC ARIA COURSE LAST TREATMENT DATE: NORMAL
RAD ONC ARIA COURSE TREATMENT ELAPSED DAYS: NORMAL
RAD ONC ARIA REFERENCE POINT DOSAGE GIVEN TO DATE: 3.6
RAD ONC ARIA REFERENCE POINT DOSAGE GIVEN TO DATE: 3.78
RAD ONC ARIA REFERENCE POINT ID: NORMAL
RAD ONC ARIA REFERENCE POINT ID: NORMAL
RAD ONC ARIA REFERENCE POINT SESSION DOSAGE GIVEN: 1.8
RAD ONC ARIA REFERENCE POINT SESSION DOSAGE GIVEN: 1.89

## 2022-02-08 PROCEDURE — 77385 HCHG IMRT DELIVERY SIMPLE: CPT | Performed by: RADIOLOGY

## 2022-02-08 PROCEDURE — 77014 PR CT GUIDANCE PLACEMENT RAD THERAPY FIELDS: CPT | Mod: 26 | Performed by: RADIOLOGY

## 2022-02-09 ENCOUNTER — HOSPITAL ENCOUNTER (OUTPATIENT)
Dept: RADIATION ONCOLOGY | Facility: MEDICAL CENTER | Age: 72
End: 2022-02-09
Payer: MEDICARE

## 2022-02-09 VITALS
DIASTOLIC BLOOD PRESSURE: 79 MMHG | OXYGEN SATURATION: 96 % | SYSTOLIC BLOOD PRESSURE: 151 MMHG | WEIGHT: 172 LBS | HEART RATE: 78 BPM | BODY MASS INDEX: 27.32 KG/M2 | TEMPERATURE: 97.2 F | RESPIRATION RATE: 16 BRPM

## 2022-02-09 LAB
CHEMOTHERAPY INFUSION START DATE: NORMAL
CHEMOTHERAPY RECORDS: 1.8
CHEMOTHERAPY RECORDS: 5040
CHEMOTHERAPY RECORDS: NORMAL
CHEMOTHERAPY RX CANCER: NORMAL
DATE 1ST CHEMO CANCER: NORMAL
RAD ONC ARIA COURSE LAST TREATMENT DATE: NORMAL
RAD ONC ARIA COURSE TREATMENT ELAPSED DAYS: NORMAL
RAD ONC ARIA REFERENCE POINT DOSAGE GIVEN TO DATE: 5.4
RAD ONC ARIA REFERENCE POINT DOSAGE GIVEN TO DATE: 5.67
RAD ONC ARIA REFERENCE POINT ID: NORMAL
RAD ONC ARIA REFERENCE POINT ID: NORMAL
RAD ONC ARIA REFERENCE POINT SESSION DOSAGE GIVEN: 1.8
RAD ONC ARIA REFERENCE POINT SESSION DOSAGE GIVEN: 1.89

## 2022-02-09 PROCEDURE — 77336 RADIATION PHYSICS CONSULT: CPT | Performed by: RADIOLOGY

## 2022-02-09 PROCEDURE — 77385 HCHG IMRT DELIVERY SIMPLE: CPT | Performed by: RADIOLOGY

## 2022-02-09 PROCEDURE — 77014 PR CT GUIDANCE PLACEMENT RAD THERAPY FIELDS: CPT | Mod: 26 | Performed by: RADIOLOGY

## 2022-02-09 RX ORDER — ZOLPIDEM TARTRATE 10 MG/1
TABLET ORAL
COMMUNITY
Start: 2022-02-04 | End: 2022-05-04

## 2022-02-09 ASSESSMENT — FIBROSIS 4 INDEX: FIB4 SCORE: 1.57

## 2022-02-09 ASSESSMENT — PAIN SCALES - GENERAL: PAINLEVEL: NO PAIN

## 2022-02-09 NOTE — ON TREATMENT VISIT
"  ON TREATMENT  NOTE  RADIATION ONCOLOGY DEPARTMENT    Patient name:  Gary Hedrick    Primary Physician:  Elda Box M.D. MRN: [id number]  CSN: [id number]   Referring physician:  Kirk Isaac M.* : 1950, 71 y.o.     ENCOUNTER DATE:  2022      DIAGNOSIS:  Prostate cancer (HCC)  Staging form: Prostate, AJCC 8th Edition  - Clinical stage from 10/8/2021: Stage IIB (cT1c, cN0, cM0, PSA: 4.1, Grade Group: 2) - Signed by Kirk DELANEY M.D. on 10/8/2021  Histopathologic type: Adenocarcinoma, NOS  Stage prefix: Initial diagnosis  Prostate specific antigen (PSA) range: Less than 10  [place] primary pattern: 3  [place] secondary pattern: 4  [place] score: 7  Histologic grading system: 5 grade system  Bilateral cancer: Yes  Number of biopsy cores examined: 12  Number of biopsy cores positive: 6  Location of positive needle core biopsies: Both sides      TREATMENT SUMMARY:  Course First Treatment Date 2022  Course Last Treatment Date 2022  Radiation Treatments     Active   Plans   Pelvis   Most recent treatment: Dose planned: 180 cGy (fraction 3 of 28 on 2022)   Total: Dose planned: 5,040 cGy   Elapsed Days: 2 @ [id number]         Historical   Plans   LDR tracking   Most recent treatment: Dose planned: 9,500 cGy (fraction 1 of 1 on 2021)   Total: Dose planned: 9,500 cGy   Elapsed Days: 0 @ [id number]                     SUBJECTIVE:  Mild to Moderate LUTS. On Orgovyx. IPSS 20/3    VITAL SIGNS:  Vitals 2022 2/3/2022 2022 2022 2022 2022 2022   SYSTOLIC 151 132 153 144 152 135 111   DIASTOLIC 79 72 60 61 74 69 51   PULSE 78 74 75 79 78 75 79   TEMPERATURE 97.2 98 97.8 97.1 97.8 97.7 97.3   RESPIRATIONS 16 18 17 18 16 18 16   WEIGHT 172 173.28 171.3 172.18 173.61 172.62 171   HEIGHT - 5' 6.535\" 5' 6.535\" 5' 6.535\" 5' 6.339\" \" -   BMI 27.32 kg/m2 27.52 kg/m2 27.21 kg/m2 27.35 kg/m2 27.74 kg/m2 27.58 kg/m2 26.84 kg/m2   SPO2 96 98 " 98 96 97 95 93     KPS: 100, Fully active, able to carry on all pre-disease performed without restriction (ECOG equivalent 0)  Encounter Vitals  Temperature: 36.2 °C (97.2 °F)  Blood Pressure : 151/79  Pulse: 78  Respiration: 16  Pulse Oximetry: 96 %  Weight: 78 kg (172 lb)  Pain Score: No pain  Pain Assessment 2/9/2022 1/13/2022 12/2/2021 10/8/2021 10/7/2021   Pain Score 0 1 0 0 0   Some recent data might be hidden          PHYSICAL EXAM:  Physical Exam  Vitals and nursing note reviewed.   Constitutional:       General: He is not in acute distress.     Appearance: He is well-developed.   HENT:      Head: Normocephalic.   Skin:     General: Skin is warm and dry.      Findings: No erythema.   Neurological:      Mental Status: He is alert and oriented to person, place, and time.   Psychiatric:         Behavior: Behavior normal.         Thought Content: Thought content normal.         Judgment: Judgment normal.          Toxicity Assessment 2/9/2022   Toxicity Assessment Male Pelvis   Fatigue (lethargy, malaise, asthenia) None   Radiation Dermatitis None   Anorexia None   Colitis None   Constipation Requiring stool softener or dietary modification   Dehydration None   Diarrhea w/o Colostomy Increase of <4 stools/day over pre-treatment   Flatulence None   Nausea Able to eat   Proctitis None   Vomiting None   RT - Pain due to RT None   Tumor Pain (onset or exacerbation of tumor pain due to treatment) None   Dysuria (painful urination) None   Urinary Frequency Increase in frequency up to 2x normal   Urinary Urgency Present   Bladder Spasms Absent   Incontinence None   Urinary Retention Hesitency or dribbling, but no significant residual urine and/or retention occuring during the immediate postoperative period       CURRENT MEDICATIONS:    Current Outpatient Medications:   •  lenalidomide 25 MG Cap, Take 25 mg (1 cap) by mouth on Days 1 - 14 of each 21-day cycle (2 weeks ON & 1 week OFF), Disp: 14 Capsule, Rfl: 0  •   tamsulosin (FLOMAX) 0.4 MG capsule, TAKE 2 CAPSULES BY MOUTH 30 MINUTES AFTER DINNER. TAKE 1 CAPSULE BY MOUTH FOR A WEEK THEN INCREASE TO 2 CAPSULES, Disp: 60 Capsule, Rfl: 5  •  Magnesium Hydroxide (MILK OF MAGNESIA PO), Take  by mouth as needed., Disp: , Rfl:   •  VITAMIN D PO, Take 8,000 Units by mouth., Disp: , Rfl:   •  VITAMIN K PO, Take  by mouth. 25mg PO bid (Patient not taking: Reported on 1/27/2022), Disp: , Rfl:   •  aspirin EC (ECOTRIN) 81 MG Tablet Delayed Response, Take 81 mg by mouth every day., Disp: , Rfl:   •  dexamethasone (DECADRON) 4 MG Tab, Take 10 pills once a week, Disp: 40 tablet, Rfl: 6  •  valACYclovir (VALTREX) 500 MG Tab, Take 500 mg by mouth 2 times a day. No known stop date, TWICE DAILY, Disp: , Rfl:   •  FLUoxetine (PROZAC) 20 MG Cap, Take 1 capsule by mouth every day., Disp: 90 capsule, Rfl: 4  •  Omega-3 Fatty Acids (FISH OIL) 1000 MG Cap capsule, Take 1,000 mg by mouth 2 times a day., Disp: , Rfl:   •  levothyroxine (SYNTHROID) 100 MCG Tab, Take 1 tablet by mouth Every morning on an empty stomach., Disp: 100 tablet, Rfl: 3  •  simvastatin (ZOCOR) 20 MG Tab, Take 1 tablet by mouth every evening., Disp: 100 tablet, Rfl: 3    LABORATORY DATA:   Lab Results   Component Value Date/Time    SODIUM 137 02/03/2022 03:55 PM    POTASSIUM 4.2 02/03/2022 03:55 PM    CHLORIDE 101 02/03/2022 03:55 PM    CO2 24 02/03/2022 03:55 PM    GLUCOSE 89 02/03/2022 03:55 PM    BUN 19 02/03/2022 03:55 PM    CREATININE 1.78 (H) 02/03/2022 03:55 PM       Lab Results   Component Value Date/Time    WBC 3.0 (L) 02/03/2022 03:55 PM    RBC 3.30 (L) 02/03/2022 03:55 PM    HEMOGLOBIN 12.3 (L) 02/03/2022 03:55 PM    HEMATOCRIT 35.0 (L) 02/03/2022 03:55 PM    .1 (H) 02/03/2022 03:55 PM    MCH 37.3 (H) 02/03/2022 03:55 PM    MCHC 35.1 02/03/2022 03:55 PM    PLATELETCT 171 02/03/2022 03:55 PM         RADIOLOGY DATA:  DX-PELVIS-1 OR 2 VIEWS    Result Date: 12/16/2021  Portable fluoroscopy as  described.    DX-PORTABLE FLUOROSCOPY < 1 HOUR    Result Date: 12/16/2021  Portable fluoroscopy utilized for 5 seconds. INTERPRETING LOCATION: [address] [place] [address], [id number]      IMPRESSION:  Cancer Staging  Prostate cancer (HCC)  Staging form: Prostate, AJCC 8th Edition  - Clinical stage from 10/8/2021: Stage IIB (cT1c, cN0, cM0, PSA: 4.1, Grade Group: 2) - Signed by Kirk DELANEY M.D. on 10/8/2021      PLAN:  No change in treatment plan    Disposition:  Treatment plan and imaging reviewed. Questions answered. Continue therapy outlined.     Kirk DELANEY M.D.    No orders of the defined types were placed in this encounter.

## 2022-02-10 ENCOUNTER — HOSPITAL ENCOUNTER (OUTPATIENT)
Dept: RADIATION ONCOLOGY | Facility: MEDICAL CENTER | Age: 72
End: 2022-02-10
Payer: MEDICARE

## 2022-02-10 ENCOUNTER — OUTPATIENT INFUSION SERVICES (OUTPATIENT)
Dept: ONCOLOGY | Facility: MEDICAL CENTER | Age: 72
End: 2022-02-10
Attending: INTERNAL MEDICINE
Payer: MEDICARE

## 2022-02-10 VITALS
HEIGHT: 67 IN | TEMPERATURE: 97.6 F | DIASTOLIC BLOOD PRESSURE: 74 MMHG | SYSTOLIC BLOOD PRESSURE: 143 MMHG | RESPIRATION RATE: 18 BRPM | HEART RATE: 89 BPM | WEIGHT: 173.28 LBS | BODY MASS INDEX: 27.2 KG/M2 | OXYGEN SATURATION: 98 %

## 2022-02-10 DIAGNOSIS — C90.00 MULTIPLE MYELOMA NOT HAVING ACHIEVED REMISSION (HCC): ICD-10-CM

## 2022-02-10 LAB
BASOPHILS # BLD AUTO: 0.3 % (ref 0–1.8)
BASOPHILS # BLD: 0.01 K/UL (ref 0–0.12)
CHEMOTHERAPY INFUSION START DATE: NORMAL
CHEMOTHERAPY RECORDS: 1.8
CHEMOTHERAPY RECORDS: 5040
CHEMOTHERAPY RECORDS: NORMAL
CHEMOTHERAPY RX CANCER: NORMAL
DATE 1ST CHEMO CANCER: NORMAL
EOSINOPHIL # BLD AUTO: 0.04 K/UL (ref 0–0.51)
EOSINOPHIL NFR BLD: 1.1 % (ref 0–6.9)
ERYTHROCYTE [DISTWIDTH] IN BLOOD BY AUTOMATED COUNT: 59.5 FL (ref 35.9–50)
HCT VFR BLD AUTO: 34.7 % (ref 42–52)
HGB BLD-MCNC: 12.5 G/DL (ref 14–18)
IMM GRANULOCYTES # BLD AUTO: 0.01 K/UL (ref 0–0.11)
IMM GRANULOCYTES NFR BLD AUTO: 0.3 % (ref 0–0.9)
LYMPHOCYTES # BLD AUTO: 0.38 K/UL (ref 1–4.8)
LYMPHOCYTES NFR BLD: 10.8 % (ref 22–41)
MCH RBC QN AUTO: 39.1 PG (ref 27–33)
MCHC RBC AUTO-ENTMCNC: 36 G/DL (ref 33.7–35.3)
MCV RBC AUTO: 108.4 FL (ref 81.4–97.8)
MONOCYTES # BLD AUTO: 0.49 K/UL (ref 0–0.85)
MONOCYTES NFR BLD AUTO: 14 % (ref 0–13.4)
NEUTROPHILS # BLD AUTO: 2.58 K/UL (ref 1.82–7.42)
NEUTROPHILS NFR BLD: 73.5 % (ref 44–72)
NRBC # BLD AUTO: 0 K/UL
NRBC BLD-RTO: 0 /100 WBC
PLATELET # BLD AUTO: 124 K/UL (ref 164–446)
PMV BLD AUTO: 10.9 FL (ref 9–12.9)
RAD ONC ARIA COURSE LAST TREATMENT DATE: NORMAL
RAD ONC ARIA COURSE TREATMENT ELAPSED DAYS: NORMAL
RAD ONC ARIA REFERENCE POINT DOSAGE GIVEN TO DATE: 7.2
RAD ONC ARIA REFERENCE POINT DOSAGE GIVEN TO DATE: 7.56
RAD ONC ARIA REFERENCE POINT ID: NORMAL
RAD ONC ARIA REFERENCE POINT ID: NORMAL
RAD ONC ARIA REFERENCE POINT SESSION DOSAGE GIVEN: 1.8
RAD ONC ARIA REFERENCE POINT SESSION DOSAGE GIVEN: 1.89
RBC # BLD AUTO: 3.2 M/UL (ref 4.7–6.1)
WBC # BLD AUTO: 3.5 K/UL (ref 4.8–10.8)

## 2022-02-10 PROCEDURE — 700111 HCHG RX REV CODE 636 W/ 250 OVERRIDE (IP): Performed by: INTERNAL MEDICINE

## 2022-02-10 PROCEDURE — 85025 COMPLETE CBC W/AUTO DIFF WBC: CPT

## 2022-02-10 PROCEDURE — 77385 HCHG IMRT DELIVERY SIMPLE: CPT | Performed by: RADIOLOGY

## 2022-02-10 PROCEDURE — 77014 PR CT GUIDANCE PLACEMENT RAD THERAPY FIELDS: CPT | Mod: 26 | Performed by: RADIOLOGY

## 2022-02-10 PROCEDURE — 96401 CHEMO ANTI-NEOPL SQ/IM: CPT

## 2022-02-10 RX ADMIN — BORTEZOMIB 2.5 MG: 3.5 INJECTION, POWDER, LYOPHILIZED, FOR SOLUTION INTRAVENOUS; SUBCUTANEOUS at 14:55

## 2022-02-10 ASSESSMENT — FIBROSIS 4 INDEX: FIB4 SCORE: 1.57

## 2022-02-10 NOTE — PROGRESS NOTES
Patient arrived ambulatory to IS for C8D8 Velcade.  Patient denies any active infections.  Labs drawn from right a/c and within parameters to treat.  Velcade given to left abdomen, patient tolerated well.  Confirmed next appointment and patient ambulated out of clinic in no apparent distress.

## 2022-02-10 NOTE — PROGRESS NOTES
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 Still trying to get appointment at [facility] - allegedly 4/7/22. 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 He is on cycle 8 of RVD.     ROS:    Constitutional: No weight loss  Skin: No rash or jaundice  HENT: No change in eyesight or hearing  Cardiovascular:No chest pain or arrythmia  Respiratory:No cough or SOB  GI:No nausea, vomiting, diarrhea, constipation  :No dysuria or frequency  Musculoskeletal:No bone or joint pain  Neuro:See PI  Psych: No complaints    PMH:      Allergies   Allergen Reactions   • Grass Pollen(K-O-R-T-Swt [place]) Shortness of Breath   • Pet Dander [Cat Hair Extract] Shortness of Breath and Unspecified   • [place] Unspecified   • [place] (Diagnostic) Shortness of Breath       Past Medical History:   Diagnosis Date   • Breath shortness    • Cancer (HCC)    • Disorder of thyroid     hypothyroid   • High cholesterol    • Light chain myeloma (HCC) 2021   • Prostate cancer (HCC)    • Psychiatric problem     depression        Past Surgical History:   Procedure Laterality Date   • BRACHY THERAPY N/A 12/16/2021    Procedure: BRACHYTHERAPY - PROSTATE SEED AND HYDROGEL SPACER;  Surgeon: Kirk DELANEY M.D.;  Location: SURGERY SAME DAY [facility];  Service: Urology   • [place] DX BONE MARROW ASPIRATIONS Left 7/23/2021    Procedure: ASPIRATION, BONE MARROW - DURANT;  Surgeon: Hair Okeefe M.D.;  Location: ENDOSCOPY [facility];  Service: Orthopedics   • [place] DX BONE MARROW BIOPSIES Left 7/23/2021    Procedure: BIOPSY, BONE MARROW, USING NEEDLE OR TROCAR;  Surgeon: Hair Okeefe M.D.;  Location: ENDOSCOPY [facility];  Service: Orthopedics   • WRIST FUSION Left         Medications:    Current Outpatient Medications on File Prior to Visit   Medication Sig Dispense Refill   •  "dexamethasone (DECADRON) 4 MG Tab TAKE 10 TABS ONCE A WEEK 40 Tablet 6   • zolpidem (AMBIEN) 10 MG Tab      • lenalidomide 25 MG Cap Take 25 mg (1 cap) by mouth on Days 1 - 14 of each 21-day cycle (2 weeks ON & 1 week OFF) 14 Capsule 0   • tamsulosin (FLOMAX) 0.4 MG capsule TAKE 2 CAPSULES BY MOUTH 30 MINUTES AFTER DINNER. TAKE 1 CAPSULE BY MOUTH FOR A WEEK THEN INCREASE TO 2 CAPSULES 60 Capsule 5   • Magnesium Hydroxide (MILK OF MAGNESIA PO) Take  by mouth as needed.     • VITAMIN D PO Take 8,000 Units by mouth.     • VITAMIN K PO Take  by mouth. 25mg PO bid     • aspirin EC (ECOTRIN) 81 MG Tablet Delayed Response Take 81 mg by mouth every day.     • valACYclovir (VALTREX) 500 MG Tab Take 500 mg by mouth 2 times a day. No known stop date, TWICE DAILY     • FLUoxetine (PROZAC) 20 MG Cap Take 1 capsule by mouth every day. 90 capsule 4   • Omega-3 Fatty Acids (FISH OIL) 1000 MG Cap capsule Take 1,000 mg by mouth 2 times a day.     • levothyroxine (SYNTHROID) 100 MCG Tab Take 1 tablet by mouth Every morning on an empty stomach. 100 tablet 3   • simvastatin (ZOCOR) 20 MG Tab Take 1 tablet by mouth every evening. 100 tablet 3     No current facility-administered medications on file prior to visit.       Social History     Tobacco Use   • Smoking status: Former Smoker     Years: 20.00     Quit date:      Years since quittin.1   • Smokeless tobacco: Never Used   Substance Use Topics   • Alcohol use: Never        Family History   Problem Relation Age of Onset   • Cancer Neg Hx         Objective    Vitals:    /64   Pulse 74   Temp 37 °C (98.6 °F) (Temporal)   Resp 16   Ht 1.69 m (5' 6.54\")   Wt 79 kg (174 lb 2.6 oz)   SpO2 98%   BMI 27.66 kg/m²     Physical Exam:    Appears well-developed and well-nourished. No distress.    Head -  Normocephalic .   Eyes - Pupils are equal. Conjunctivae normal. No scleral icterus.   Ears - normal hearing  Neurological -   Alert and oriented   Skin - . No rash noted. Not " diaphoretic. No erythema. No pallor. No jaundice   Psychiatric -  Normal mood and affect.    Labs:    Results for JAMES GRANT (MRN [id number]) as of 2/17/2022 10:38   Ref. Range 1/27/2022 11:55 2/3/2022 15:55 2/10/2022 13:52   WBC Latest Ref Range: 4.8 - 10.8 K/uL 4.3 (L) 3.0 (L) 3.5 (L)   RBC Latest Ref Range: 4.70 - 6.10 M/uL 3.45 (L) 3.30 (L) 3.20 (L)   Hemoglobin Latest Ref Range: 14.0 - 18.0 g/dL 12.8 (L) 12.3 (L) 12.5 (L)   Hematocrit Latest Ref Range: 42.0 - 52.0 % 36.7 (L) 35.0 (L) 34.7 (L)   MCV Latest Ref Range: 81.4 - 97.8 fL 106.4 (H) 106.1 (H) 108.4 (H)   MCH Latest Ref Range: 27.0 - 33.0 pg 37.1 (H) 37.3 (H) 39.1 (H)   MCHC Latest Ref Range: 33.7 - 35.3 g/dL 34.9 35.1 36.0 (H)   RDW Latest Ref Range: 35.9 - 50.0 fL 60.6 (H) 59.5 (H) 59.5 (H)   Platelet Count Latest Ref Range: 164 - 446 K/uL 130 (L) 171 124 (L)   Results for JAMES GRANT (MRN [id number])    Ref. Range 11/18/2021 15:08 12/9/2021 15:59 12/10/2021 12:59 1/6/2022 15:51 1/27/2022 11:55   Free Kappa Light Chains Latest Ref Range: 3.30 - 19.40 mg/L 4.95 4.52  4.05 4.21   Free Lambda Light Chains Latest Ref Range: 5.71 - 26.30 mg/L 329.63 (H) 356.42 (H)  383.60 (H) 336.00 (H)         Assessment    Imp:    Visit Diagnosis:    1. Multiple myeloma not having achieved remission (HCC)     2. Prostate cancer (HCC)         Plan:  Discussed stem cell transplant  Discussed possible change to [place]  Chemo today  See me in a month    James Aaron M.D.

## 2022-02-10 NOTE — PROGRESS NOTES
"Pharmacy Chemotherapy Verification  Patient Name: Gary Hedrick  Dx: MM    Cycle 8 Day 8  Previous treatment = C8D1 on 2/3/22    Regimen and Dosing Reference  Bortezomib 1.3 mg/m2 subcutaneous on    -MD dosing on Days 1, 8, 15 of a 21 day cycle  Lenalidomide 25 mg PO daily on Days 1-14  Dexamethasone 20 mg PO daily on Days 1-2, 4-5, 8-9, 11-12 OR 40 mg PO on Days 1, 8, and 15  21 day cycle for 3-4 cycles OR until disease progression or unacceptable toxicity  NCCN Guidelines for Multiple Myeloma.V.1.2022  Amadeo P, et al. Blood. 2010;116(5):679-86  Alfaro S, et al/ Blood. 2012;119(19):4375-82  John M, et al. J Clin Oncol. 2014;32(25):2712-7    Allergies:Grass pollen(k-o-r-t-swt jose), Pet dander [cat hair extract], [place] meal, Milk [dairy food allergy], [place], and [place] oil  /74   Pulse 89   Temp 36.4 °C (97.6 °F) (Temporal)   Resp 18   Ht 1.69 m (5' 6.54\") Comment: Simultaneous filing. User may not have seen previous data.  Wt 78.6 kg (173 lb 4.5 oz) Comment: Simultaneous filing. User may not have seen previous data.  SpO2 98%   BMI 27.52 kg/m²   Body surface area is 1.92 meters squared.    Labs 2/10/22  ANC~ 2580 Plt = 124k   Hgb = 12.5       Bortezomib (Velcade) 1.3 mg/m2 x 1.92 m2 = 2.496 mg   <10% difference, OK to treat with final dose = 2.5 mg subcutaneous    Kenney Birch, PharmD    "

## 2022-02-10 NOTE — PROGRESS NOTES
"Pharmacy Chemotherapy Verification    Dx: MM    Protocol: RVd     Bortezomib 1.3 mg/m2 subcutaneous on Days 1, 4, 8, 11              -MD dosing on Days 1, 8, 15 of a 21 day cycle  Lenalidomide 25 mg PO daily on Days 1-14  Dexamethasone 20 mg PO daily on Days 1-2, 4-5, 8-9, 11-12 OR 40 mg PO on Days 1, 8, and 15  21 day cycle for 3-4 cycles OR until disease progression or unacceptable toxicity  NCCN Guidelines for Multiple Myeloma.V.1.2022  Amadeo P, et al. Blood. 2010;116(5):679-86  Dominic S, et al/ Blood. 2012;119(19):1061-82    Allergies:  Grass pollen(k-o-r-t-swt jose), Pet dander [cat hair extract], Milk [dairy food allergy], [place], and [place] oil     /74   Pulse 89   Temp 36.4 °C (97.6 °F) (Temporal)   Resp 18   Ht 1.69 m (5' 6.54\") Comment: Simultaneous filing. User may not have seen previous data.  Wt 78.6 kg (173 lb 4.5 oz) Comment: Simultaneous filing. User may not have seen previous data.  SpO2 98%   BMI 27.52 kg/m²  Body surface area is 1.92 meters squared.     Labs 2/10/22  ANC~2580 Hgb 12.5 Plt 124k    Labs 2/3/22  SCr 1.78 CrCl 43.1 mL/min   AST/ALT/AP = 21/29/62 Tbili 0.6    Drug Order   (Drug name, dose, route, IV Fluid & volume, frequency, number of doses) Cycle 8 Day 8  Previous treatment: C8D1 2/3/22   Medication = Bortezomib (Velcade)  Base Dose = 1.3 mg/m2  Calc Dose: Base Dose x 1.92 m2 = 2.496 mg  Final Dose = 2.5 mg  Route = Subcutaneous  Conc. = 2.5 mg/mL  Fluid & Volume = 1 mL in syringe  Administration = ABDOMEN or THIGH          <10% difference, okay to treat with final dose     By my signature below, I confirm this process was performed independently with the BSA and all final chemotherapy dosing calculations congruent. I have reviewed the above chemotherapy order and that my calculation of the final dose and BSA (when applicable) corroborate those calculations of the  pharmacist. Discrepancies of 10% or greater in the written dose have been addressed and " documented within the EPIC Progress notes.    Jeri Yoder, PharmD, BCOP

## 2022-02-10 NOTE — PROGRESS NOTES
Chemotherapy Verification - SECONDARY RN       Height = 169 cm  Weight = 78.6 kg  BSA = 1.92 m2       Medication: Velcade  Dose: 1.3 mg/m2  Calculated Dose: 2.496 mg                             (In mg/m2, AUC, mg/kg)       I confirm that this process was performed independently.

## 2022-02-10 NOTE — PROGRESS NOTES
Chemotherapy Verification - PRIMARY RN      Height = 169 cm  Weight = 78.6 kg  BSA = 1.92 m2       Medication: Velcade  Dose: 1.3 mg/m2  Calculated Dose: 2.5 mg                             (In mg/m2, AUC, mg/kg)         I confirm this process was performed independently with the BSA and all final chemotherapy dosing calculations congruent.  Any discrepancies of 10% or greater have been addressed with the chemotherapy pharmacist. The resolution of the discrepancy has been documented in the EPIC progress notes.

## 2022-02-11 ENCOUNTER — HOSPITAL ENCOUNTER (OUTPATIENT)
Dept: RADIATION ONCOLOGY | Facility: MEDICAL CENTER | Age: 72
End: 2022-02-11
Payer: MEDICARE

## 2022-02-11 DIAGNOSIS — C90.00 MULTIPLE MYELOMA NOT HAVING ACHIEVED REMISSION (HCC): ICD-10-CM

## 2022-02-11 LAB
CHEMOTHERAPY INFUSION START DATE: NORMAL
CHEMOTHERAPY RECORDS: 1.8
CHEMOTHERAPY RECORDS: 5040
CHEMOTHERAPY RECORDS: NORMAL
CHEMOTHERAPY RX CANCER: NORMAL
DATE 1ST CHEMO CANCER: NORMAL
RAD ONC ARIA COURSE LAST TREATMENT DATE: NORMAL
RAD ONC ARIA COURSE TREATMENT ELAPSED DAYS: NORMAL
RAD ONC ARIA REFERENCE POINT DOSAGE GIVEN TO DATE: 9
RAD ONC ARIA REFERENCE POINT DOSAGE GIVEN TO DATE: 9.45
RAD ONC ARIA REFERENCE POINT ID: NORMAL
RAD ONC ARIA REFERENCE POINT ID: NORMAL
RAD ONC ARIA REFERENCE POINT SESSION DOSAGE GIVEN: 1.8
RAD ONC ARIA REFERENCE POINT SESSION DOSAGE GIVEN: 1.89

## 2022-02-11 PROCEDURE — 77427 RADIATION TX MANAGEMENT X5: CPT | Performed by: RADIOLOGY

## 2022-02-11 PROCEDURE — 77014 PR CT GUIDANCE PLACEMENT RAD THERAPY FIELDS: CPT | Mod: 26 | Performed by: RADIOLOGY

## 2022-02-11 PROCEDURE — 77385 HCHG IMRT DELIVERY SIMPLE: CPT | Performed by: RADIOLOGY

## 2022-02-11 NOTE — TELEPHONE ENCOUNTER
Requested Prescriptions     Pending Prescriptions Disp Refills   • dexamethasone (DECADRON) 4 MG Tab [Pharmacy Med Name: DEXAMETHASONE 4 MG TABLET] 40 Tablet 6     Sig: TAKE 10 TABS ONCE A WEEK       Received request via: Pharmacy    Was the patient seen in the last year in this department? Yes    Does the patient have an active prescription (recently filled or refills available) for medication(s) requested? No

## 2022-02-14 ENCOUNTER — HOSPITAL ENCOUNTER (OUTPATIENT)
Dept: RADIATION ONCOLOGY | Facility: MEDICAL CENTER | Age: 72
End: 2022-02-14
Payer: MEDICARE

## 2022-02-14 LAB
CHEMOTHERAPY INFUSION START DATE: NORMAL
CHEMOTHERAPY RECORDS: 1.8
CHEMOTHERAPY RECORDS: 5040
CHEMOTHERAPY RECORDS: NORMAL
CHEMOTHERAPY RX CANCER: NORMAL
DATE 1ST CHEMO CANCER: NORMAL
RAD ONC ARIA COURSE LAST TREATMENT DATE: NORMAL
RAD ONC ARIA COURSE TREATMENT ELAPSED DAYS: NORMAL
RAD ONC ARIA REFERENCE POINT DOSAGE GIVEN TO DATE: 10.8
RAD ONC ARIA REFERENCE POINT DOSAGE GIVEN TO DATE: 11.34
RAD ONC ARIA REFERENCE POINT ID: NORMAL
RAD ONC ARIA REFERENCE POINT ID: NORMAL
RAD ONC ARIA REFERENCE POINT SESSION DOSAGE GIVEN: 1.8
RAD ONC ARIA REFERENCE POINT SESSION DOSAGE GIVEN: 1.89

## 2022-02-14 PROCEDURE — 77014 PR CT GUIDANCE PLACEMENT RAD THERAPY FIELDS: CPT | Mod: 26 | Performed by: RADIOLOGY

## 2022-02-14 PROCEDURE — 77385 HCHG IMRT DELIVERY SIMPLE: CPT | Performed by: RADIOLOGY

## 2022-02-15 ENCOUNTER — HOSPITAL ENCOUNTER (OUTPATIENT)
Dept: RADIATION ONCOLOGY | Facility: MEDICAL CENTER | Age: 72
End: 2022-02-15
Payer: MEDICARE

## 2022-02-15 LAB
CHEMOTHERAPY INFUSION START DATE: NORMAL
CHEMOTHERAPY RECORDS: 1.8
CHEMOTHERAPY RECORDS: 5040
CHEMOTHERAPY RECORDS: NORMAL
CHEMOTHERAPY RX CANCER: NORMAL
DATE 1ST CHEMO CANCER: NORMAL
RAD ONC ARIA COURSE LAST TREATMENT DATE: NORMAL
RAD ONC ARIA COURSE TREATMENT ELAPSED DAYS: NORMAL
RAD ONC ARIA REFERENCE POINT DOSAGE GIVEN TO DATE: 12.6
RAD ONC ARIA REFERENCE POINT DOSAGE GIVEN TO DATE: 13.23
RAD ONC ARIA REFERENCE POINT ID: NORMAL
RAD ONC ARIA REFERENCE POINT ID: NORMAL
RAD ONC ARIA REFERENCE POINT SESSION DOSAGE GIVEN: 1.8
RAD ONC ARIA REFERENCE POINT SESSION DOSAGE GIVEN: 1.89

## 2022-02-15 PROCEDURE — 77014 PR CT GUIDANCE PLACEMENT RAD THERAPY FIELDS: CPT | Mod: 26 | Performed by: RADIOLOGY

## 2022-02-15 PROCEDURE — 77385 HCHG IMRT DELIVERY SIMPLE: CPT | Performed by: RADIOLOGY

## 2022-02-15 RX ORDER — DEXAMETHASONE 4 MG/1
TABLET ORAL
Qty: 40 TABLET | Refills: 6 | Status: SHIPPED | OUTPATIENT
Start: 2022-02-15 | End: 2022-04-08

## 2022-02-16 ENCOUNTER — HOSPITAL ENCOUNTER (OUTPATIENT)
Dept: RADIATION ONCOLOGY | Facility: MEDICAL CENTER | Age: 72
End: 2022-02-16
Payer: MEDICARE

## 2022-02-16 VITALS
SYSTOLIC BLOOD PRESSURE: 157 MMHG | TEMPERATURE: 97.9 F | DIASTOLIC BLOOD PRESSURE: 70 MMHG | OXYGEN SATURATION: 96 % | RESPIRATION RATE: 16 BRPM | WEIGHT: 172 LBS | HEART RATE: 74 BPM | BODY MASS INDEX: 27.32 KG/M2

## 2022-02-16 LAB
CHEMOTHERAPY INFUSION START DATE: NORMAL
CHEMOTHERAPY RECORDS: 1.8
CHEMOTHERAPY RECORDS: 5040
CHEMOTHERAPY RECORDS: NORMAL
CHEMOTHERAPY RX CANCER: NORMAL
DATE 1ST CHEMO CANCER: NORMAL
RAD ONC ARIA COURSE LAST TREATMENT DATE: NORMAL
RAD ONC ARIA COURSE TREATMENT ELAPSED DAYS: NORMAL
RAD ONC ARIA REFERENCE POINT DOSAGE GIVEN TO DATE: 14.4
RAD ONC ARIA REFERENCE POINT DOSAGE GIVEN TO DATE: 15.12
RAD ONC ARIA REFERENCE POINT ID: NORMAL
RAD ONC ARIA REFERENCE POINT ID: NORMAL
RAD ONC ARIA REFERENCE POINT SESSION DOSAGE GIVEN: 1.8
RAD ONC ARIA REFERENCE POINT SESSION DOSAGE GIVEN: 1.89

## 2022-02-16 PROCEDURE — 77014 PR CT GUIDANCE PLACEMENT RAD THERAPY FIELDS: CPT | Mod: 26 | Performed by: RADIOLOGY

## 2022-02-16 PROCEDURE — 77336 RADIATION PHYSICS CONSULT: CPT | Performed by: RADIOLOGY

## 2022-02-16 PROCEDURE — 77385 HCHG IMRT DELIVERY SIMPLE: CPT | Performed by: RADIOLOGY

## 2022-02-16 ASSESSMENT — FIBROSIS 4 INDEX: FIB4 SCORE: 2.17

## 2022-02-16 ASSESSMENT — PAIN SCALES - GENERAL: PAINLEVEL: NO PAIN

## 2022-02-16 NOTE — ON TREATMENT VISIT
"  ON TREATMENT  NOTE  RADIATION ONCOLOGY DEPARTMENT    Patient name:  Gary Hedrick    Primary Physician:  Elda Box M.D. MRN: [id number]  CSN: [id number]   Referring physician:  Kirk Isaac M.* : 1950, 71 y.o.     ENCOUNTER DATE:  2022      DIAGNOSIS:  Prostate cancer (HCC)  Staging form: Prostate, AJCC 8th Edition  - Clinical stage from 10/8/2021: Stage IIB (cT1c, cN0, cM0, PSA: 4.1, Grade Group: 2) - Signed by Kirk DELANEY M.D. on 10/8/2021  Histopathologic type: Adenocarcinoma, NOS  Stage prefix: Initial diagnosis  Prostate specific antigen (PSA) range: Less than 10  [place] primary pattern: 3  Sarah secondary pattern: 4  Sarah score: 7  Histologic grading system: 5 grade system  Bilateral cancer: Yes  Number of biopsy cores examined: 12  Number of biopsy cores positive: 6  Location of positive needle core biopsies: Both sides      TREATMENT SUMMARY:  Course First Treatment Date 2022  Course Last Treatment Date 2022  Radiation Treatments     Active   Plans   Pelvis   Most recent treatment: Dose planned: 180 cGy (fraction 8 of 28 on 2022)   Total: Dose planned: 5,040 cGy   Elapsed Days: 9 @ [id number]         Historical   Plans   LDR tracking   Most recent treatment: Dose planned: 9,500 cGy (fraction 1 of 1 on 2021)   Total: Dose planned: 9,500 cGy   Elapsed Days: 0 @ [id number]                     SUBJECTIVE:  Mild LUTS.     VITAL SIGNS:  Vitals 2022 2/10/2022 2022 2/3/2022 2022 2022 2022   SYSTOLIC 157 143 151 132 153 144 152   DIASTOLIC 70 74 79 72 60 61 74   PULSE 74 89 78 74 75 79 78   TEMPERATURE 97.9 97.6 97.2 98 97.8 97.1 97.8   RESPIRATIONS 16 18 16 18 17 18 16   WEIGHT 172 173.28 172 173.28 171.3 172.18 173.61   HEIGHT - 5' 6.535\" - 5' 6.535\" 5' 6.535\" 5' 6.535\" 5' 6.339\"   BMI 27.32 kg/m2 27.52 kg/m2 27.32 kg/m2 27.52 kg/m2 27.21 kg/m2 27.35 kg/m2 27.74 kg/m2   SPO2 96 98 96 98 98 96 97     KPS: 70, " Cares for self; unable to carry on normal activity or to do active work (ECOG equivalent 1)  Encounter Vitals  Temperature: 36.6 °C (97.9 °F)  Blood Pressure : 157/70  Pulse: 74  Respiration: 16  Pulse Oximetry: 96 %  Weight: 78 kg (172 lb)  Pain Score: No pain  Pain Assessment 2/16/2022 2/9/2022 1/13/2022 12/2/2021 10/8/2021   Pain Score 0 0 1 0 0   Some recent data might be hidden          PHYSICAL EXAM:  Physical Exam  Vitals and nursing note reviewed.   Constitutional:       General: He is not in acute distress.     Appearance: He is well-developed.   HENT:      Head: Normocephalic.   Skin:     General: Skin is warm and dry.      Findings: No erythema.   Neurological:      Mental Status: He is alert and oriented to person, place, and time.   Psychiatric:         Behavior: Behavior normal.         Thought Content: Thought content normal.         Judgment: Judgment normal.          Toxicity Assessment 2/16/2022 2/9/2022   Toxicity Assessment Male Pelvis Male Pelvis   Fatigue (lethargy, malaise, asthenia) Moderate (e.g., decrease in performance status by 1 ECOG level or 20% Karnofsky) or causing difficulty performing some activities None   Radiation Dermatitis Faint erythema or dry desquamation None   Anorexia None None   Colitis None None   Constipation None Requiring stool softener or dietary modification   Dehydration None None   Diarrhea w/o Colostomy Increase of 4-6 stools/day, or nocternal stools Increase of <4 stools/day over pre-treatment   Flatulence None None   Nausea Able to eat Able to eat   Proctitis None None   Vomiting None None   RT - Pain due to RT None None   Tumor Pain (onset or exacerbation of tumor pain due to treatment) None None   Dysuria (painful urination) None None   Urinary Frequency Increase in frequency up to 2x normal Increase in frequency up to 2x normal   Urinary Urgency Present Present   Bladder Spasms Mild symptoms, not requiring intervention Absent   Incontinence None None    Urinary Retention Hesitency or dribbling, but no significant residual urine and/or retention occuring during the immediate postoperative period Hesitency or dribbling, but no significant residual urine and/or retention occuring during the immediate postoperative period       CURRENT MEDICATIONS:    Current Outpatient Medications:   •  dexamethasone (DECADRON) 4 MG Tab, TAKE 10 TABS ONCE A WEEK, Disp: 40 Tablet, Rfl: 6  •  zolpidem (AMBIEN) 10 MG Tab, , Disp: , Rfl:   •  lenalidomide 25 MG Cap, Take 25 mg (1 cap) by mouth on Days 1 - 14 of each 21-day cycle (2 weeks ON & 1 week OFF), Disp: 14 Capsule, Rfl: 0  •  tamsulosin (FLOMAX) 0.4 MG capsule, TAKE 2 CAPSULES BY MOUTH 30 MINUTES AFTER DINNER. TAKE 1 CAPSULE BY MOUTH FOR A WEEK THEN INCREASE TO 2 CAPSULES, Disp: 60 Capsule, Rfl: 5  •  Magnesium Hydroxide (MILK OF MAGNESIA PO), Take  by mouth as needed., Disp: , Rfl:   •  VITAMIN D PO, Take 8,000 Units by mouth., Disp: , Rfl:   •  VITAMIN K PO, Take  by mouth. 25mg PO bid (Patient not taking: Reported on 1/27/2022), Disp: , Rfl:   •  aspirin EC (ECOTRIN) 81 MG Tablet Delayed Response, Take 81 mg by mouth every day., Disp: , Rfl:   •  valACYclovir (VALTREX) 500 MG Tab, Take 500 mg by mouth 2 times a day. No known stop date, TWICE DAILY, Disp: , Rfl:   •  FLUoxetine (PROZAC) 20 MG Cap, Take 1 capsule by mouth every day., Disp: 90 capsule, Rfl: 4  •  Omega-3 Fatty Acids (FISH OIL) 1000 MG Cap capsule, Take 1,000 mg by mouth 2 times a day., Disp: , Rfl:   •  levothyroxine (SYNTHROID) 100 MCG Tab, Take 1 tablet by mouth Every morning on an empty stomach., Disp: 100 tablet, Rfl: 3  •  simvastatin (ZOCOR) 20 MG Tab, Take 1 tablet by mouth every evening., Disp: 100 tablet, Rfl: 3    LABORATORY DATA:   Lab Results   Component Value Date/Time    SODIUM 137 02/03/2022 03:55 PM    POTASSIUM 4.2 02/03/2022 03:55 PM    CHLORIDE 101 02/03/2022 03:55 PM    CO2 24 02/03/2022 03:55 PM    GLUCOSE 89 02/03/2022 03:55 PM    BUN 19  02/03/2022 03:55 PM    CREATININE 1.78 (H) 02/03/2022 03:55 PM       Lab Results   Component Value Date/Time    WBC 3.5 (L) 02/10/2022 01:52 PM    RBC 3.20 (L) 02/10/2022 01:52 PM    HEMOGLOBIN 12.5 (L) 02/10/2022 01:52 PM    HEMATOCRIT 34.7 (L) 02/10/2022 01:52 PM    .4 (H) 02/10/2022 01:52 PM    MCH 39.1 (H) 02/10/2022 01:52 PM    MCHC 36.0 (H) 02/10/2022 01:52 PM    PLATELETCT 124 (L) 02/10/2022 01:52 PM         RADIOLOGY DATA:  No results found.    IMPRESSION:  Cancer Staging  Prostate cancer (HCC)  Staging form: Prostate, AJCC 8th Edition  - Clinical stage from 10/8/2021: Stage IIB (cT1c, cN0, cM0, PSA: 4.1, Grade Group: 2) - Signed by Kirk DELANEY M.D. on 10/8/2021      PLAN:  No change in treatment plan    Disposition:  Treatment plan and imaging reviewed. Questions answered. Continue therapy outlined.     Kirk DELANEY M.D.    No orders of the defined types were placed in this encounter.

## 2022-02-17 ENCOUNTER — OFFICE VISIT (OUTPATIENT)
Dept: HEMATOLOGY ONCOLOGY | Facility: MEDICAL CENTER | Age: 72
End: 2022-02-17
Payer: MEDICARE

## 2022-02-17 ENCOUNTER — HOSPITAL ENCOUNTER (OUTPATIENT)
Dept: RADIATION ONCOLOGY | Facility: MEDICAL CENTER | Age: 72
End: 2022-02-17

## 2022-02-17 ENCOUNTER — OUTPATIENT INFUSION SERVICES (OUTPATIENT)
Dept: ONCOLOGY | Facility: MEDICAL CENTER | Age: 72
End: 2022-02-17
Attending: INTERNAL MEDICINE
Payer: MEDICARE

## 2022-02-17 VITALS
HEIGHT: 67 IN | DIASTOLIC BLOOD PRESSURE: 69 MMHG | RESPIRATION RATE: 18 BRPM | HEART RATE: 85 BPM | SYSTOLIC BLOOD PRESSURE: 144 MMHG | WEIGHT: 173.28 LBS | OXYGEN SATURATION: 96 % | BODY MASS INDEX: 27.2 KG/M2 | TEMPERATURE: 97.5 F

## 2022-02-17 VITALS
TEMPERATURE: 98.6 F | HEART RATE: 74 BPM | BODY MASS INDEX: 27.34 KG/M2 | SYSTOLIC BLOOD PRESSURE: 128 MMHG | HEIGHT: 67 IN | WEIGHT: 174.16 LBS | DIASTOLIC BLOOD PRESSURE: 64 MMHG | OXYGEN SATURATION: 98 % | RESPIRATION RATE: 16 BRPM

## 2022-02-17 DIAGNOSIS — C90.00 MULTIPLE MYELOMA NOT HAVING ACHIEVED REMISSION (HCC): ICD-10-CM

## 2022-02-17 DIAGNOSIS — C61 PROSTATE CANCER (HCC): ICD-10-CM

## 2022-02-17 LAB
BASOPHILS # BLD AUTO: 0.8 % (ref 0–1.8)
BASOPHILS # BLD: 0.02 K/UL (ref 0–0.12)
CHEMOTHERAPY INFUSION START DATE: NORMAL
CHEMOTHERAPY RECORDS: 1.8
CHEMOTHERAPY RECORDS: 5040
CHEMOTHERAPY RECORDS: NORMAL
CHEMOTHERAPY RX CANCER: NORMAL
DATE 1ST CHEMO CANCER: NORMAL
EOSINOPHIL # BLD AUTO: 0.07 K/UL (ref 0–0.51)
EOSINOPHIL NFR BLD: 2.8 % (ref 0–6.9)
ERYTHROCYTE [DISTWIDTH] IN BLOOD BY AUTOMATED COUNT: 55 FL (ref 35.9–50)
HCT VFR BLD AUTO: 34.1 % (ref 42–52)
HGB BLD-MCNC: 12.5 G/DL (ref 14–18)
IMM GRANULOCYTES # BLD AUTO: 0.01 K/UL (ref 0–0.11)
IMM GRANULOCYTES NFR BLD AUTO: 0.4 % (ref 0–0.9)
LYMPHOCYTES # BLD AUTO: 0.38 K/UL (ref 1–4.8)
LYMPHOCYTES NFR BLD: 15 % (ref 22–41)
MCH RBC QN AUTO: 39.1 PG (ref 27–33)
MCHC RBC AUTO-ENTMCNC: 36.7 G/DL (ref 33.7–35.3)
MCV RBC AUTO: 106.6 FL (ref 81.4–97.8)
MONOCYTES # BLD AUTO: 0.61 K/UL (ref 0–0.85)
MONOCYTES NFR BLD AUTO: 24 % (ref 0–13.4)
NEUTROPHILS # BLD AUTO: 1.45 K/UL (ref 1.82–7.42)
NEUTROPHILS NFR BLD: 57 % (ref 44–72)
NRBC # BLD AUTO: 0 K/UL
NRBC BLD-RTO: 0 /100 WBC
PLATELET # BLD AUTO: 106 K/UL (ref 164–446)
PMV BLD AUTO: 10 FL (ref 9–12.9)
RAD ONC ARIA COURSE LAST TREATMENT DATE: NORMAL
RAD ONC ARIA COURSE TREATMENT ELAPSED DAYS: NORMAL
RAD ONC ARIA REFERENCE POINT DOSAGE GIVEN TO DATE: 16.2
RAD ONC ARIA REFERENCE POINT DOSAGE GIVEN TO DATE: 17.01
RAD ONC ARIA REFERENCE POINT ID: NORMAL
RAD ONC ARIA REFERENCE POINT ID: NORMAL
RAD ONC ARIA REFERENCE POINT SESSION DOSAGE GIVEN: 1.8
RAD ONC ARIA REFERENCE POINT SESSION DOSAGE GIVEN: 1.89
RBC # BLD AUTO: 3.2 M/UL (ref 4.7–6.1)
WBC # BLD AUTO: 2.5 K/UL (ref 4.8–10.8)

## 2022-02-17 PROCEDURE — 700111 HCHG RX REV CODE 636 W/ 250 OVERRIDE (IP): Performed by: INTERNAL MEDICINE

## 2022-02-17 PROCEDURE — 96401 CHEMO ANTI-NEOPL SQ/IM: CPT

## 2022-02-17 PROCEDURE — 99213 OFFICE O/P EST LOW 20 MIN: CPT | Performed by: INTERNAL MEDICINE

## 2022-02-17 PROCEDURE — 77385 HCHG IMRT DELIVERY SIMPLE: CPT | Performed by: RADIOLOGY

## 2022-02-17 PROCEDURE — 83521 IG LIGHT CHAINS FREE EACH: CPT

## 2022-02-17 PROCEDURE — 85025 COMPLETE CBC W/AUTO DIFF WBC: CPT

## 2022-02-17 PROCEDURE — 77014 PR CT GUIDANCE PLACEMENT RAD THERAPY FIELDS: CPT | Mod: 26 | Performed by: RADIOLOGY

## 2022-02-17 RX ORDER — HEPARIN SODIUM (PORCINE) LOCK FLUSH IV SOLN 100 UNIT/ML 100 UNIT/ML
500 SOLUTION INTRAVENOUS PRN
Status: CANCELLED | OUTPATIENT
Start: 2022-02-24

## 2022-02-17 RX ORDER — 0.9 % SODIUM CHLORIDE 0.9 %
3 VIAL (ML) INJECTION PRN
Status: CANCELLED | OUTPATIENT
Start: 2022-02-24

## 2022-02-17 RX ORDER — ONDANSETRON 8 MG/1
8 TABLET, ORALLY DISINTEGRATING ORAL PRN
Status: CANCELLED | OUTPATIENT
Start: 2022-02-24

## 2022-02-17 RX ORDER — 0.9 % SODIUM CHLORIDE 0.9 %
VIAL (ML) INJECTION PRN
Status: CANCELLED | OUTPATIENT
Start: 2022-02-24

## 2022-02-17 RX ORDER — SODIUM CHLORIDE 9 MG/ML
INJECTION, SOLUTION INTRAVENOUS CONTINUOUS
Status: CANCELLED | OUTPATIENT
Start: 2022-02-24

## 2022-02-17 RX ORDER — PROCHLORPERAZINE MALEATE 10 MG
10 TABLET ORAL EVERY 6 HOURS PRN
Status: CANCELLED | OUTPATIENT
Start: 2022-02-24

## 2022-02-17 RX ORDER — 0.9 % SODIUM CHLORIDE 0.9 %
10 VIAL (ML) INJECTION PRN
Status: CANCELLED | OUTPATIENT
Start: 2022-02-24

## 2022-02-17 RX ORDER — ONDANSETRON 2 MG/ML
4 INJECTION INTRAMUSCULAR; INTRAVENOUS PRN
Status: CANCELLED | OUTPATIENT
Start: 2022-02-24

## 2022-02-17 RX ADMIN — BORTEZOMIB 2.5 MG: 3.5 INJECTION, POWDER, LYOPHILIZED, FOR SOLUTION INTRAVENOUS; SUBCUTANEOUS at 14:50

## 2022-02-17 ASSESSMENT — FIBROSIS 4 INDEX
FIB4 SCORE: 2.17
FIB4 SCORE: 2.17

## 2022-02-17 NOTE — PROGRESS NOTES
"Pharmacy Chemotherapy Verification  Patient Name: Gary Hedrick  Dx: MM    Cycle 8 Day 15  Previous treatment = C8D8 on 2/10/22    Regimen and Dosing Reference  Bortezomib 1.3 mg/m2 subcutaneous on    -MD dosing on Days 1, 8, 15 of a 21 day cycle  Lenalidomide 25 mg PO daily on Days 1-14  Dexamethasone 20 mg PO daily on Days 1-2, 4-5, 8-9, 11-12 OR 40 mg PO on Days 1, 8, and 15  21 day cycle for 3-4 cycles OR until disease progression or unacceptable toxicity  NCCN Guidelines for Multiple Myeloma.V.1.2022  Amadeo P, et al. Blood. 2010;116(5):679-86  Alfaro S, et al/ Blood. 2012;119(19):4375-82  John M, et al. J Clin Oncol. 2014;32(25):2712-7    Allergies:Grass pollen(k-o-r-t-swt jose), Pet dander [cat hair extract], [place] meal, Milk [dairy food allergy], Sagebrush, and [place] oil  /69   Pulse 85   Temp 36.4 °C (97.5 °F) (Temporal)   Resp 18   Ht 1.7 m (5' 6.93\")   Wt 78.6 kg (173 lb 4.5 oz)   SpO2 96%   BMI 27.20 kg/m²   Body surface area is 1.93 meters squared.    Labs 2/17/22  ANC~ 1450 Plt = 106k   Hgb = 12.5       Bortezomib (Velcade) 1.3 mg/m2 x 1.93 m2 = 2.509 mg   <10% difference, OK to treat with final dose = 2.5 mg subcutaneous    Kenney Birch, PharmD    "

## 2022-02-17 NOTE — PROGRESS NOTES
into [facility] for Day 15 / Cycle 8 of Velcade / Labs for Multiple myeloma not having achieved remission. Pt denied having any new or acute complaints today, reports tolerating past treatments well. Lab drawn from L AC with 25 x 3/4 gauge needle, well tolerated, gauze and coban applied to site. Pt given Velcade as prescribed in R Lower abdomen tolerated well, denied having any complaints during or after injection, gauze and paper tape applied to site. Discharge home to self care in [facility]. Appointment confirm for the next treatment.

## 2022-02-17 NOTE — PROGRESS NOTES
"Pharmacy Chemotherapy Verification    Dx: MM    Protocol: RVd     Bortezomib 1.3 mg/m2 subcutaneous on Days 1, 4, 8, 11              -MD dosing on Days 1, 8, 15 of a 21 day cycle  Lenalidomide 25 mg PO daily on Days 1-14  Dexamethasone 20 mg PO daily on Days 1-2, 4-5, 8-9, 11-12 OR 40 mg PO on Days 1, 8, and 15  21 day cycle for 3-4 cycles OR until disease progression or unacceptable toxicity  NCCN Guidelines for Multiple Myeloma.V.1.2022  Amadeo P, et al. Blood. 2010;116(5):679-86  Dominic S, et al/ Blood. 2012;119(19):8961-82    Allergies:  Grass pollen(k-o-r-t-swt jose), Pet dander [cat hair extract], Milk [dairy food allergy], S[place]brush, and [place] oil     /69   Pulse 85   Temp 36.4 °C (97.5 °F) (Temporal)   Resp 18   Ht 1.7 m (5' 6.93\")   Wt 78.6 kg (173 lb 4.5 oz)   SpO2 96%   BMI 27.20 kg/m²  Body surface area is 1.93 meters squared.     Labs 2/17/22  ANC~ 1450 Plt = 106k   Hgb = 12.5       Drug Order   (Drug name, dose, route, IV Fluid & volume, frequency, number of doses) Cycle 8 Day 15  Previous treatment: C8D8 on 2/10/22   Medication = Bortezomib (Velcade)  Base Dose = 1.3 mg/m2  Calc Dose: Base Dose x 1.93 m2 = 2.509 mg  Final Dose = 2.5 mg  Route = Subcutaneous  Conc. = 2.5 mg/mL  Fluid & Volume = 1 mL in syringe  Administration = ABDOMEN or THIGH          <10% difference, okay to treat with final dose     By my signature below, I confirm this process was performed independently with the BSA and all final chemotherapy dosing calculations congruent. I have reviewed the above chemotherapy order and that my calculation of the final dose and BSA (when applicable) corroborate those calculations of the  pharmacist. Discrepancies of 10% or greater in the written dose have been addressed and documented within the [facility] Progress notes.    Nick Williamson, PharmD  "

## 2022-02-17 NOTE — PROGRESS NOTES
Chemotherapy Verification - SECONDARY RN       Height = 170 cm  Weight = 78.6 kg  BSA = 1.93 m2       Medication: Velcade  Dose: 1.3 mg/m2  Calculated Dose: 2.509 mg                             (In mg/m2, AUC, mg/kg)       I confirm that this process was performed independently.

## 2022-02-17 NOTE — PROGRESS NOTES
Chemotherapy Verification - PRIMARY RN      Height = 66.93 in  Weight = 173 lb  BSA = 1.93 m2       Medication: Velcade  Dose: 1.3 mg/m2  Calculated Dose: 2,509 mg                             (In mg/m2, AUC, mg/kg)     I confirm this process was performed independently with the BSA and all final chemotherapy dosing calculations congruent.  Any discrepancies of 10% or greater have been addressed with the chemotherapy pharmacist. The resolution of the discrepancy has been documented in the EPIC progress notes.

## 2022-02-18 ENCOUNTER — HOSPITAL ENCOUNTER (OUTPATIENT)
Dept: RADIATION ONCOLOGY | Facility: MEDICAL CENTER | Age: 72
End: 2022-02-18
Payer: MEDICARE

## 2022-02-18 LAB
CHEMOTHERAPY INFUSION START DATE: NORMAL
CHEMOTHERAPY RECORDS: 1.8
CHEMOTHERAPY RECORDS: 5040
CHEMOTHERAPY RECORDS: NORMAL
CHEMOTHERAPY RX CANCER: NORMAL
DATE 1ST CHEMO CANCER: NORMAL
RAD ONC ARIA COURSE LAST TREATMENT DATE: NORMAL
RAD ONC ARIA COURSE TREATMENT ELAPSED DAYS: NORMAL
RAD ONC ARIA REFERENCE POINT DOSAGE GIVEN TO DATE: 18
RAD ONC ARIA REFERENCE POINT DOSAGE GIVEN TO DATE: 18.9
RAD ONC ARIA REFERENCE POINT ID: NORMAL
RAD ONC ARIA REFERENCE POINT ID: NORMAL
RAD ONC ARIA REFERENCE POINT SESSION DOSAGE GIVEN: 1.8
RAD ONC ARIA REFERENCE POINT SESSION DOSAGE GIVEN: 1.89

## 2022-02-18 PROCEDURE — 77385 HCHG IMRT DELIVERY SIMPLE: CPT | Performed by: RADIOLOGY

## 2022-02-18 PROCEDURE — 77014 PR CT GUIDANCE PLACEMENT RAD THERAPY FIELDS: CPT | Mod: 26 | Performed by: RADIOLOGY

## 2022-02-18 PROCEDURE — 77427 RADIATION TX MANAGEMENT X5: CPT | Performed by: RADIOLOGY

## 2022-02-19 LAB
KAPPA LC FREE SER-MCNC: 4.47 MG/L (ref 3.3–19.4)
KAPPA LC FREE/LAMBDA FREE SER NEPH: 0.02 {RATIO} (ref 0.26–1.65)
LAMBDA LC FREE SERPL-MCNC: 214.61 MG/L (ref 5.71–26.3)

## 2022-02-22 ENCOUNTER — HOSPITAL ENCOUNTER (OUTPATIENT)
Dept: RADIATION ONCOLOGY | Facility: MEDICAL CENTER | Age: 72
End: 2022-02-22
Payer: MEDICARE

## 2022-02-22 LAB
CHEMOTHERAPY INFUSION START DATE: NORMAL
CHEMOTHERAPY RECORDS: 1.8
CHEMOTHERAPY RECORDS: 5040
CHEMOTHERAPY RECORDS: NORMAL
CHEMOTHERAPY RX CANCER: NORMAL
DATE 1ST CHEMO CANCER: NORMAL
RAD ONC ARIA COURSE LAST TREATMENT DATE: NORMAL
RAD ONC ARIA COURSE TREATMENT ELAPSED DAYS: NORMAL
RAD ONC ARIA REFERENCE POINT DOSAGE GIVEN TO DATE: 19.8
RAD ONC ARIA REFERENCE POINT DOSAGE GIVEN TO DATE: 20.79
RAD ONC ARIA REFERENCE POINT ID: NORMAL
RAD ONC ARIA REFERENCE POINT ID: NORMAL
RAD ONC ARIA REFERENCE POINT SESSION DOSAGE GIVEN: 1.8
RAD ONC ARIA REFERENCE POINT SESSION DOSAGE GIVEN: 1.89

## 2022-02-22 PROCEDURE — 77014 PR CT GUIDANCE PLACEMENT RAD THERAPY FIELDS: CPT | Mod: 26 | Performed by: RADIOLOGY

## 2022-02-22 PROCEDURE — 77385 HCHG IMRT DELIVERY SIMPLE: CPT | Performed by: RADIOLOGY

## 2022-02-23 ENCOUNTER — HOSPITAL ENCOUNTER (OUTPATIENT)
Dept: RADIATION ONCOLOGY | Facility: MEDICAL CENTER | Age: 72
End: 2022-02-23
Payer: MEDICARE

## 2022-02-23 VITALS
WEIGHT: 170 LBS | DIASTOLIC BLOOD PRESSURE: 68 MMHG | SYSTOLIC BLOOD PRESSURE: 149 MMHG | TEMPERATURE: 97 F | OXYGEN SATURATION: 93 % | HEART RATE: 77 BPM | BODY MASS INDEX: 26.68 KG/M2

## 2022-02-23 DIAGNOSIS — C61 PROSTATE CANCER (HCC): ICD-10-CM

## 2022-02-23 DIAGNOSIS — C90.00 MULTIPLE MYELOMA NOT HAVING ACHIEVED REMISSION (HCC): ICD-10-CM

## 2022-02-23 LAB
CHEMOTHERAPY INFUSION START DATE: NORMAL
CHEMOTHERAPY RECORDS: 1.8
CHEMOTHERAPY RECORDS: 5040
CHEMOTHERAPY RECORDS: NORMAL
CHEMOTHERAPY RX CANCER: NORMAL
DATE 1ST CHEMO CANCER: NORMAL
RAD ONC ARIA COURSE LAST TREATMENT DATE: NORMAL
RAD ONC ARIA COURSE TREATMENT ELAPSED DAYS: NORMAL
RAD ONC ARIA REFERENCE POINT DOSAGE GIVEN TO DATE: 21.6
RAD ONC ARIA REFERENCE POINT DOSAGE GIVEN TO DATE: 22.68
RAD ONC ARIA REFERENCE POINT ID: NORMAL
RAD ONC ARIA REFERENCE POINT ID: NORMAL
RAD ONC ARIA REFERENCE POINT SESSION DOSAGE GIVEN: 1.8
RAD ONC ARIA REFERENCE POINT SESSION DOSAGE GIVEN: 1.89

## 2022-02-23 PROCEDURE — 77385 HCHG IMRT DELIVERY SIMPLE: CPT | Performed by: RADIOLOGY

## 2022-02-23 PROCEDURE — 77014 PR CT GUIDANCE PLACEMENT RAD THERAPY FIELDS: CPT | Mod: 26 | Performed by: RADIOLOGY

## 2022-02-23 ASSESSMENT — FIBROSIS 4 INDEX: FIB4 SCORE: 2.54

## 2022-02-23 ASSESSMENT — PAIN SCALES - GENERAL: PAINLEVEL: NO PAIN

## 2022-02-23 NOTE — ON TREATMENT VISIT
"  ON TREATMENT  NOTE  RADIATION ONCOLOGY DEPARTMENT    Patient name:  Gary Hedrick    Primary Physician:  Elda Box M.D. MRN: [id number]  CSN: [id number]   Referring physician:  Kirk Isaac M.* : 1950, 71 y.o.     ENCOUNTER DATE:  2022      DIAGNOSIS:  Prostate cancer (HCC)  Staging form: Prostate, AJCC 8th Edition  - Clinical stage from 10/8/2021: Stage IIB (cT1c, cN0, cM0, PSA: 4.1, Grade Group: 2) - Signed by Kirk DELANEY M.D. on 10/8/2021  Histopathologic type: Adenocarcinoma, NOS  Stage prefix: Initial diagnosis  Prostate specific antigen (PSA) range: Less than 10  [place] primary pattern: 3  [place] secondary pattern: 4  Sarah score: 7  Histologic grading system: 5 grade system  Bilateral cancer: Yes  Number of biopsy cores examined: 12  Number of biopsy cores positive: 6  Location of positive needle core biopsies: Both sides      TREATMENT SUMMARY:  Course First Treatment Date 2022  Course Last Treatment Date 2022  Radiation Treatments     Active   Plans   Pelvis   Most recent treatment: Dose planned: 180 cGy (fraction 12 of 28 on 2022)   Total: Dose planned: 5,040 cGy   Elapsed Days: 16 @ [id number]         Historical   Plans   LDR tracking   Most recent treatment: Dose planned: 9,500 cGy (fraction 1 of 1 on 2021)   Total: Dose planned: 9,500 cGy   Elapsed Days: 0 @ [id number]                     SUBJECTIVE:  Diarrhea. IPSS16/2    VITAL SIGNS:  Vitals 2022 2022 2022 2022 2/10/2022 2022 2/3/2022   SYSTOLIC 149 128 144 157 143 151 132   DIASTOLIC 68 64 69 70 74 79 72   PULSE 77 74 85 74 89 78 74   TEMPERATURE 97 98.6 97.5 97.9 97.6 97.2 98   RESPIRATIONS - 16 18 16 18 16 18   WEIGHT 170 174.16 173.28 172 173.28 172 173.28   HEIGHT - 5' 6.535\" 5' 6.929\" - 5' 6.535\" - 5' 6.535\"   BMI 26.68 kg/m2 27.66 kg/m2 27.2 kg/m2 27.32 kg/m2 27.52 kg/m2 27.32 kg/m2 27.52 kg/m2   SPO2 93 98 96 96 98 96 98     KPS: 80, Normal " activity with effort; some signs or symptoms of disease (ECOG equivalent 1)  Encounter Vitals  Temperature: 36.1 °C (97 °F)  Temp src: Temporal  Blood Pressure : 149/68  Pulse: 77  Pulse Oximetry: 93 %  Weight: 77.1 kg (170 lb)  Pain Score: No pain  Pain Assessment 2/23/2022 2/16/2022 2/9/2022 1/13/2022 12/2/2021   Pain Score 0 0 0 1 0   Some recent data might be hidden          PHYSICAL EXAM:  Physical Exam  Vitals and nursing note reviewed.   Constitutional:       General: He is not in acute distress.     Appearance: He is well-developed.   HENT:      Head: Normocephalic.   Skin:     General: Skin is warm and dry.      Findings: No erythema.   Neurological:      Mental Status: He is alert and oriented to person, place, and time.   Psychiatric:         Behavior: Behavior normal.         Thought Content: Thought content normal.         Judgment: Judgment normal.          Toxicity Assessment 2/23/2022 2/16/2022 2/9/2022   Toxicity Assessment Male Pelvis Male Pelvis Male Pelvis   Fatigue (lethargy, malaise, asthenia) Severe (e.g., decrease in performance status by 2 or more ECOG levels or 40% Karnofsky) or loss of ability to perform some activities Moderate (e.g., decrease in performance status by 1 ECOG level or 20% Karnofsky) or causing difficulty performing some activities None   Radiation Dermatitis None Faint erythema or dry desquamation None   Anorexia None None None   Colitis None None None   Constipation None None Requiring stool softener or dietary modification   Dehydration None None None   Diarrhea w/o Colostomy Increase of <4 stools/day over pre-treatment Increase of 4-6 stools/day, or nocternal stools Increase of <4 stools/day over pre-treatment   Flatulence None None None   Nausea Able to eat Able to eat Able to eat   Proctitis None None None   Vomiting None None None   RT - Pain due to RT None None None   Tumor Pain (onset or exacerbation of tumor pain due to treatment) None None None   Dysuria (painful  urination) None None None   Urinary Frequency Normal Increase in frequency up to 2x normal Increase in frequency up to 2x normal   Urinary Urgency None Present Present   Bladder Spasms Absent Mild symptoms, not requiring intervention Absent   Incontinence None None None   Urinary Retention Hesitency or dribbling, but no significant residual urine and/or retention occuring during the immediate postoperative period Hesitency or dribbling, but no significant residual urine and/or retention occuring during the immediate postoperative period Hesitency or dribbling, but no significant residual urine and/or retention occuring during the immediate postoperative period       CURRENT MEDICATIONS:    Current Outpatient Medications:   •  dexamethasone (DECADRON) 4 MG Tab, TAKE 10 TABS ONCE A WEEK, Disp: 40 Tablet, Rfl: 6  •  zolpidem (AMBIEN) 10 MG Tab, , Disp: , Rfl:   •  lenalidomide 25 MG Cap, Take 25 mg (1 cap) by mouth on Days 1 - 14 of each 21-day cycle (2 weeks ON & 1 week OFF), Disp: 14 Capsule, Rfl: 0  •  tamsulosin (FLOMAX) 0.4 MG capsule, TAKE 2 CAPSULES BY MOUTH 30 MINUTES AFTER DINNER. TAKE 1 CAPSULE BY MOUTH FOR A WEEK THEN INCREASE TO 2 CAPSULES, Disp: 60 Capsule, Rfl: 5  •  Magnesium Hydroxide (MILK OF MAGNESIA PO), Take  by mouth as needed., Disp: , Rfl:   •  VITAMIN D PO, Take 8,000 Units by mouth., Disp: , Rfl:   •  aspirin EC (ECOTRIN) 81 MG Tablet Delayed Response, Take 81 mg by mouth every day., Disp: , Rfl:   •  valACYclovir (VALTREX) 500 MG Tab, Take 500 mg by mouth 2 times a day. No known stop date, TWICE DAILY, Disp: , Rfl:   •  FLUoxetine (PROZAC) 20 MG Cap, Take 1 capsule by mouth every day., Disp: 90 capsule, Rfl: 4  •  Omega-3 Fatty Acids (FISH OIL) 1000 MG Cap capsule, Take 1,000 mg by mouth 2 times a day., Disp: , Rfl:   •  levothyroxine (SYNTHROID) 100 MCG Tab, Take 1 tablet by mouth Every morning on an empty stomach., Disp: 100 tablet, Rfl: 3  •  simvastatin (ZOCOR) 20 MG Tab, Take 1 tablet by  mouth every evening., Disp: 100 tablet, Rfl: 3    LABORATORY DATA:   Lab Results   Component Value Date/Time    SODIUM 137 02/03/2022 03:55 PM    POTASSIUM 4.2 02/03/2022 03:55 PM    CHLORIDE 101 02/03/2022 03:55 PM    CO2 24 02/03/2022 03:55 PM    GLUCOSE 89 02/03/2022 03:55 PM    BUN 19 02/03/2022 03:55 PM    CREATININE 1.78 (H) 02/03/2022 03:55 PM       Lab Results   Component Value Date/Time    WBC 2.5 (LL) 02/17/2022 01:55 PM    RBC 3.20 (L) 02/17/2022 01:55 PM    HEMOGLOBIN 12.5 (L) 02/17/2022 01:55 PM    HEMATOCRIT 34.1 (L) 02/17/2022 01:55 PM    .6 (H) 02/17/2022 01:55 PM    MCH 39.1 (H) 02/17/2022 01:55 PM    MCHC 36.7 (H) 02/17/2022 01:55 PM    PLATELETCT 106 (L) 02/17/2022 01:55 PM         RADIOLOGY DATA:  No results found.    IMPRESSION:  Cancer Staging  Prostate cancer (HCC)  Staging form: Prostate, AJCC 8th Edition  - Clinical stage from 10/8/2021: Stage IIB (cT1c, cN0, cM0, PSA: 4.1, Grade Group: 2) - Signed by Kirk DELANEY M.D. on 10/8/2021      PLAN:  No change in treatment plan    Disposition:  Treatment plan and imaging reviewed. Questions answered. Continue therapy outlined.     Kirk DELANEY M.D.    Orders Placed This Encounter   • Comp Metabolic Panel

## 2022-02-24 ENCOUNTER — HOSPITAL ENCOUNTER (OUTPATIENT)
Dept: RADIATION ONCOLOGY | Facility: MEDICAL CENTER | Age: 72
End: 2022-02-24

## 2022-02-24 ENCOUNTER — OUTPATIENT INFUSION SERVICES (OUTPATIENT)
Dept: ONCOLOGY | Facility: MEDICAL CENTER | Age: 72
End: 2022-02-24
Attending: INTERNAL MEDICINE
Payer: MEDICARE

## 2022-02-24 VITALS
HEART RATE: 76 BPM | BODY MASS INDEX: 27.51 KG/M2 | RESPIRATION RATE: 18 BRPM | SYSTOLIC BLOOD PRESSURE: 153 MMHG | DIASTOLIC BLOOD PRESSURE: 71 MMHG | WEIGHT: 175.27 LBS | HEIGHT: 67 IN | TEMPERATURE: 97.1 F | OXYGEN SATURATION: 97 %

## 2022-02-24 DIAGNOSIS — C90.00 MULTIPLE MYELOMA NOT HAVING ACHIEVED REMISSION (HCC): ICD-10-CM

## 2022-02-24 LAB
ALBUMIN SERPL BCP-MCNC: 4.1 G/DL (ref 3.2–4.9)
ALBUMIN/GLOB SERPL: 2.4 G/DL
ALP SERPL-CCNC: 62 U/L (ref 30–99)
ALT SERPL-CCNC: 49 U/L (ref 2–50)
ANION GAP SERPL CALC-SCNC: 11 MMOL/L (ref 7–16)
AST SERPL-CCNC: 26 U/L (ref 12–45)
BASOPHILS # BLD AUTO: 0.6 % (ref 0–1.8)
BASOPHILS # BLD: 0.01 K/UL (ref 0–0.12)
BILIRUB SERPL-MCNC: 0.5 MG/DL (ref 0.1–1.5)
BUN SERPL-MCNC: 13 MG/DL (ref 8–22)
CALCIUM SERPL-MCNC: 9.1 MG/DL (ref 8.5–10.5)
CHEMOTHERAPY INFUSION START DATE: NORMAL
CHEMOTHERAPY RECORDS: 1.8
CHEMOTHERAPY RECORDS: 5040
CHEMOTHERAPY RECORDS: NORMAL
CHEMOTHERAPY RX CANCER: NORMAL
CHLORIDE SERPL-SCNC: 103 MMOL/L (ref 96–112)
CO2 SERPL-SCNC: 26 MMOL/L (ref 20–33)
CREAT SERPL-MCNC: 0.87 MG/DL (ref 0.5–1.4)
DATE 1ST CHEMO CANCER: NORMAL
EOSINOPHIL # BLD AUTO: 0.06 K/UL (ref 0–0.51)
EOSINOPHIL NFR BLD: 3.7 % (ref 0–6.9)
ERYTHROCYTE [DISTWIDTH] IN BLOOD BY AUTOMATED COUNT: 57.5 FL (ref 35.9–50)
GLOBULIN SER CALC-MCNC: 1.7 G/DL (ref 1.9–3.5)
GLUCOSE SERPL-MCNC: 97 MG/DL (ref 65–99)
HCT VFR BLD AUTO: 34 % (ref 42–52)
HGB BLD-MCNC: 12.1 G/DL (ref 14–18)
IMM GRANULOCYTES # BLD AUTO: 0.01 K/UL (ref 0–0.11)
IMM GRANULOCYTES NFR BLD AUTO: 0.6 % (ref 0–0.9)
LYMPHOCYTES # BLD AUTO: 0.24 K/UL (ref 1–4.8)
LYMPHOCYTES NFR BLD: 14.9 % (ref 22–41)
MCH RBC QN AUTO: 38.1 PG (ref 27–33)
MCHC RBC AUTO-ENTMCNC: 35.6 G/DL (ref 33.7–35.3)
MCV RBC AUTO: 106.9 FL (ref 81.4–97.8)
MONOCYTES # BLD AUTO: 0.21 K/UL (ref 0–0.85)
MONOCYTES NFR BLD AUTO: 13 % (ref 0–13.4)
NEUTROPHILS # BLD AUTO: 1.08 K/UL (ref 1.82–7.42)
NEUTROPHILS NFR BLD: 67.2 % (ref 44–72)
NRBC # BLD AUTO: 0 K/UL
NRBC BLD-RTO: 0 /100 WBC
PLATELET # BLD AUTO: 139 K/UL (ref 164–446)
PMV BLD AUTO: 10.4 FL (ref 9–12.9)
POTASSIUM SERPL-SCNC: 3.5 MMOL/L (ref 3.6–5.5)
PROT SERPL-MCNC: 5.8 G/DL (ref 6–8.2)
RAD ONC ARIA COURSE LAST TREATMENT DATE: NORMAL
RAD ONC ARIA COURSE TREATMENT ELAPSED DAYS: NORMAL
RAD ONC ARIA REFERENCE POINT DOSAGE GIVEN TO DATE: 23.4
RAD ONC ARIA REFERENCE POINT DOSAGE GIVEN TO DATE: 24.57
RAD ONC ARIA REFERENCE POINT ID: NORMAL
RAD ONC ARIA REFERENCE POINT ID: NORMAL
RAD ONC ARIA REFERENCE POINT SESSION DOSAGE GIVEN: 1.8
RAD ONC ARIA REFERENCE POINT SESSION DOSAGE GIVEN: 1.89
RBC # BLD AUTO: 3.18 M/UL (ref 4.7–6.1)
SODIUM SERPL-SCNC: 140 MMOL/L (ref 135–145)
WBC # BLD AUTO: 1.6 K/UL (ref 4.8–10.8)

## 2022-02-24 PROCEDURE — 80053 COMPREHEN METABOLIC PANEL: CPT

## 2022-02-24 PROCEDURE — 96401 CHEMO ANTI-NEOPL SQ/IM: CPT

## 2022-02-24 PROCEDURE — 77336 RADIATION PHYSICS CONSULT: CPT | Performed by: RADIOLOGY

## 2022-02-24 PROCEDURE — 700111 HCHG RX REV CODE 636 W/ 250 OVERRIDE (IP): Performed by: NURSE PRACTITIONER

## 2022-02-24 PROCEDURE — 700102 HCHG RX REV CODE 250 W/ 637 OVERRIDE(OP): Performed by: INTERNAL MEDICINE

## 2022-02-24 PROCEDURE — 77385 HCHG IMRT DELIVERY SIMPLE: CPT | Performed by: RADIOLOGY

## 2022-02-24 PROCEDURE — A9270 NON-COVERED ITEM OR SERVICE: HCPCS | Performed by: INTERNAL MEDICINE

## 2022-02-24 PROCEDURE — 85025 COMPLETE CBC W/AUTO DIFF WBC: CPT

## 2022-02-24 PROCEDURE — 77014 PR CT GUIDANCE PLACEMENT RAD THERAPY FIELDS: CPT | Mod: 26 | Performed by: RADIOLOGY

## 2022-02-24 RX ORDER — PROCHLORPERAZINE MALEATE 10 MG
10 TABLET ORAL EVERY 6 HOURS PRN
Status: CANCELLED | OUTPATIENT
Start: 2022-02-25

## 2022-02-24 RX ORDER — ONDANSETRON 2 MG/ML
4 INJECTION INTRAMUSCULAR; INTRAVENOUS PRN
Status: CANCELLED | OUTPATIENT
Start: 2022-03-04

## 2022-02-24 RX ORDER — ONDANSETRON 8 MG/1
8 TABLET, ORALLY DISINTEGRATING ORAL PRN
Status: CANCELLED | OUTPATIENT
Start: 2022-03-04

## 2022-02-24 RX ORDER — HEPARIN SODIUM (PORCINE) LOCK FLUSH IV SOLN 100 UNIT/ML 100 UNIT/ML
500 SOLUTION INTRAVENOUS PRN
Status: DISCONTINUED | OUTPATIENT
Start: 2022-02-24 | End: 2022-02-24 | Stop reason: HOSPADM

## 2022-02-24 RX ORDER — HEPARIN SODIUM (PORCINE) LOCK FLUSH IV SOLN 100 UNIT/ML 100 UNIT/ML
500 SOLUTION INTRAVENOUS PRN
Status: CANCELLED | OUTPATIENT
Start: 2022-02-25

## 2022-02-24 RX ORDER — HEPARIN SODIUM (PORCINE) LOCK FLUSH IV SOLN 100 UNIT/ML 100 UNIT/ML
500 SOLUTION INTRAVENOUS PRN
Status: CANCELLED | OUTPATIENT
Start: 2022-02-24

## 2022-02-24 RX ORDER — SODIUM CHLORIDE 9 MG/ML
INJECTION, SOLUTION INTRAVENOUS CONTINUOUS
Status: CANCELLED | OUTPATIENT
Start: 2022-03-11

## 2022-02-24 RX ORDER — SODIUM CHLORIDE 9 MG/ML
INJECTION, SOLUTION INTRAVENOUS CONTINUOUS
Status: CANCELLED | OUTPATIENT
Start: 2022-03-04

## 2022-02-24 RX ORDER — 0.9 % SODIUM CHLORIDE 0.9 %
10 VIAL (ML) INJECTION PRN
Status: CANCELLED | OUTPATIENT
Start: 2022-02-24

## 2022-02-24 RX ORDER — PROCHLORPERAZINE MALEATE 10 MG
10 TABLET ORAL EVERY 6 HOURS PRN
Status: CANCELLED | OUTPATIENT
Start: 2022-03-11

## 2022-02-24 RX ORDER — 0.9 % SODIUM CHLORIDE 0.9 %
10 VIAL (ML) INJECTION PRN
Status: CANCELLED | OUTPATIENT
Start: 2022-02-25

## 2022-02-24 RX ORDER — ONDANSETRON 2 MG/ML
4 INJECTION INTRAMUSCULAR; INTRAVENOUS PRN
Status: CANCELLED | OUTPATIENT
Start: 2022-02-25

## 2022-02-24 RX ORDER — 0.9 % SODIUM CHLORIDE 0.9 %
VIAL (ML) INJECTION PRN
Status: CANCELLED | OUTPATIENT
Start: 2022-02-24

## 2022-02-24 RX ORDER — ONDANSETRON 8 MG/1
8 TABLET, ORALLY DISINTEGRATING ORAL PRN
Status: CANCELLED | OUTPATIENT
Start: 2022-03-11

## 2022-02-24 RX ORDER — 0.9 % SODIUM CHLORIDE 0.9 %
10 VIAL (ML) INJECTION PRN
Status: DISCONTINUED | OUTPATIENT
Start: 2022-02-24 | End: 2022-02-24 | Stop reason: HOSPADM

## 2022-02-24 RX ORDER — 0.9 % SODIUM CHLORIDE 0.9 %
10 VIAL (ML) INJECTION PRN
Status: CANCELLED | OUTPATIENT
Start: 2022-03-04

## 2022-02-24 RX ORDER — 0.9 % SODIUM CHLORIDE 0.9 %
3 VIAL (ML) INJECTION PRN
Status: CANCELLED | OUTPATIENT
Start: 2022-02-25

## 2022-02-24 RX ORDER — 0.9 % SODIUM CHLORIDE 0.9 %
VIAL (ML) INJECTION PRN
Status: CANCELLED | OUTPATIENT
Start: 2022-03-11

## 2022-02-24 RX ORDER — 0.9 % SODIUM CHLORIDE 0.9 %
VIAL (ML) INJECTION PRN
Status: CANCELLED | OUTPATIENT
Start: 2022-03-04

## 2022-02-24 RX ORDER — POTASSIUM CHLORIDE 20 MEQ/1
40 TABLET, EXTENDED RELEASE ORAL ONCE
Status: COMPLETED | OUTPATIENT
Start: 2022-02-24 | End: 2022-02-24

## 2022-02-24 RX ORDER — 0.9 % SODIUM CHLORIDE 0.9 %
3 VIAL (ML) INJECTION PRN
Status: CANCELLED | OUTPATIENT
Start: 2022-02-24

## 2022-02-24 RX ORDER — 0.9 % SODIUM CHLORIDE 0.9 %
3 VIAL (ML) INJECTION PRN
Status: CANCELLED | OUTPATIENT
Start: 2022-03-11

## 2022-02-24 RX ORDER — 0.9 % SODIUM CHLORIDE 0.9 %
10 VIAL (ML) INJECTION PRN
Status: CANCELLED | OUTPATIENT
Start: 2022-03-11

## 2022-02-24 RX ORDER — PROCHLORPERAZINE MALEATE 10 MG
10 TABLET ORAL EVERY 6 HOURS PRN
Status: CANCELLED | OUTPATIENT
Start: 2022-03-04

## 2022-02-24 RX ORDER — 0.9 % SODIUM CHLORIDE 0.9 %
VIAL (ML) INJECTION PRN
Status: DISCONTINUED | OUTPATIENT
Start: 2022-02-24 | End: 2022-02-24 | Stop reason: HOSPADM

## 2022-02-24 RX ORDER — ONDANSETRON 8 MG/1
8 TABLET, ORALLY DISINTEGRATING ORAL PRN
Status: CANCELLED | OUTPATIENT
Start: 2022-02-25

## 2022-02-24 RX ORDER — ONDANSETRON 2 MG/ML
4 INJECTION INTRAMUSCULAR; INTRAVENOUS PRN
Status: CANCELLED | OUTPATIENT
Start: 2022-03-11

## 2022-02-24 RX ORDER — HEPARIN SODIUM (PORCINE) LOCK FLUSH IV SOLN 100 UNIT/ML 100 UNIT/ML
500 SOLUTION INTRAVENOUS PRN
Status: CANCELLED | OUTPATIENT
Start: 2022-03-04

## 2022-02-24 RX ORDER — 0.9 % SODIUM CHLORIDE 0.9 %
VIAL (ML) INJECTION PRN
Status: CANCELLED | OUTPATIENT
Start: 2022-02-25

## 2022-02-24 RX ORDER — SODIUM CHLORIDE 9 MG/ML
INJECTION, SOLUTION INTRAVENOUS CONTINUOUS
Status: CANCELLED | OUTPATIENT
Start: 2022-02-25

## 2022-02-24 RX ORDER — HEPARIN SODIUM (PORCINE) LOCK FLUSH IV SOLN 100 UNIT/ML 100 UNIT/ML
500 SOLUTION INTRAVENOUS PRN
Status: CANCELLED | OUTPATIENT
Start: 2022-03-11

## 2022-02-24 RX ORDER — 0.9 % SODIUM CHLORIDE 0.9 %
3 VIAL (ML) INJECTION PRN
Status: DISCONTINUED | OUTPATIENT
Start: 2022-02-24 | End: 2022-02-24 | Stop reason: HOSPADM

## 2022-02-24 RX ORDER — 0.9 % SODIUM CHLORIDE 0.9 %
3 VIAL (ML) INJECTION PRN
Status: CANCELLED | OUTPATIENT
Start: 2022-03-04

## 2022-02-24 RX ADMIN — BORTEZOMIB 2.5 MG: 3.5 INJECTION, POWDER, LYOPHILIZED, FOR SOLUTION INTRAVENOUS; SUBCUTANEOUS at 13:35

## 2022-02-24 RX ADMIN — POTASSIUM CHLORIDE 40 MEQ: 1500 TABLET, EXTENDED RELEASE ORAL at 13:18

## 2022-02-24 ASSESSMENT — FIBROSIS 4 INDEX: FIB4 SCORE: 2.54

## 2022-02-24 NOTE — PROGRESS NOTES
Chemotherapy Verification - SECONDARY RN       Height = 169 cm  Weight = 79.5 kg  BSA = 1.93 m2       Medication: Velcade  Dose: 1.3 mg/m2  Calculated Dose: 2.509 mg                             (In mg/m2, AUC, mg/kg)       I confirm that this process was performed independently.

## 2022-02-24 NOTE — PROGRESS NOTES
"Pharmacy Chemotherapy Verification    Dx: Multiple Myeloma     Protocol: RVd     Bortezomib 1.3 mg/m2 subcutaneous on Days 1, 4, 8, 11              -MD dosing on Days 1, 8, 15 of a 21 day cycle  Lenalidomide 25 mg PO daily on Days 1-14  Dexamethasone 20 mg PO daily on Days 1-2, 4-5, 8-9, 11-12 OR 40 mg PO on Days 1, 8, and 15  21 day cycle for 3-4 cycles OR until disease progression or unacceptable toxicity  NCCN Guidelines for Multiple Myeloma.V.1.2022  Amadeo P, et al. Blood. 2010;116(5):679-86  Dominic S, et al/ Blood. 2012;119(19):1866-82    Allergies:  Grass pollen(k-o-r-t-swt jose), Pet dander [cat hair extract], Milk [dairy food allergy], Sagebrush, and [place] oil     /71   Pulse 76   Temp 36.2 °C (97.1 °F) (Temporal)   Resp 18   Ht 1.69 m (5' 6.54\")   Wt 79.5 kg (175 lb 4.3 oz)   SpO2 97%   BMI 27.84 kg/m²  Body surface area is 1.93 meters squared.     Labs 2/24/22:  ANC~ 1080 Plt = 139k   Hgb = 12.1     SCr = 0.87 mg/dL CrCl ~ 90 mL/min   AST/ALT/AP = WNL TBili = 0.5         Drug Order   (Drug name, dose, route, IV Fluid & volume, frequency, number of doses) Cycle 9 Day 1  Previous treatment: C8D15 on 2/17/22   Medication = Bortezomib (Velcade)  Base Dose = 1.3 mg/m2  Calc Dose: Base Dose x 1.93 m2 = 2.509 mg  Final Dose = 2.5 mg  Route = Subcutaneous  Conc. = 2.5 mg/mL  Fluid & Volume = 1 mL in syringe  Administration = ABDOMEN or THIGH          <10% difference, okay to treat with final dose     By my signature below, I confirm this process was performed independently with the BSA and all final chemotherapy dosing calculations congruent. I have reviewed the above chemotherapy order and that my calculation of the final dose and BSA (when applicable) corroborate those calculations of the  pharmacist. Discrepancies of 10% or greater in the written dose have been addressed and documented within the [facility] Progress notes.    Melva Gomez, PharmD  "

## 2022-02-24 NOTE — PROGRESS NOTES
Chemotherapy Verification - PRIMARY RN      Height = 1.69m  Weight = 79.5kg  BSA = 1.93m2       Medication: Velcade 2.48 mg  Dose:1.3mg/m2  Calculated Dose: 2.509 mg                            (In mg/m2, AUC, mg/kg)         I confirm this process was performed independently with the BSA and all final chemotherapy dosing calculations congruent.  Any discrepancies of 10% or greater have been addressed with the chemotherapy pharmacist. The resolution of the discrepancy has been documented in the EPIC progress notes.

## 2022-02-24 NOTE — PROGRESS NOTES
Additional days added to TP  Dr. Aaron to follow up w/ Dr. Rosa at [facility] re stem cell transplant

## 2022-02-24 NOTE — PROGRESS NOTES
Reason for visit: C9D1 Velcade for multiple myeloma     Reports Revlemid started today and will take dexamethasone later    Focused assessment:    HEENT:Occasional dizziness  Respiratory: Denies dyspnea, denies cough  Cardiac:Denies chest pain or palpitations  GI:Denies N,V. Reports ongoing diarrhea with at least 4 episodes daily. He states that Pepto Bismol is helping.   :Denies changes with urine output  Skin:Denies skin issues  Neuro: AOX4, Neuropathy on BLE- described to be less today. Does not affect his acitvity  Musculoskeletal:Denies joint pain  Denies fevers, chills, bleeding or any other discomfort.     Reprots feeling fatigued. He is also getting XRT daily for his prostate cancer. He has not tried other medications for his diarrhea at this time in addition to pepto bismol. He is trying to keep up with his PO hdyration.     Labs: CBC and CMP drawn today. Labs within parameters. Low potassium and diarrhea reported to Vanita MARTIN. Replaced per protocol.     Hemoglobin 12.1 (L)   Platelet Count 139 (L)   Neutrophils (Absolute) 1.08 (L)     Total Bilirubin 0.5     Potassium 3.5 (L)     Lab results reviewed with patient. He prefers oral potassium. Tolerated injection well. He will take dexamethasone later today and continue with his Revlemid. Recommended to start imodium for diarrhea, potassium rich food and BRAT diet. He is to report if his symptom worsens. Left clinic without distress.     RTC: 3/3 for C9D15

## 2022-02-24 NOTE — PROGRESS NOTES
"Pharmacy Chemotherapy Verification  Patient Name: Gayr Hedrick  Dx: MM    Cycle 9, Day 1  Previous treatment = C8D15 on 2/17/22    Regimen: Velcade   *Dosing Reference*  Bortezomib 1.3 mg/m2 subcutaneous on    -MD dosing on Days 1, 8, 15 of a 21 day cycle  Lenalidomide 25 mg PO daily on Days 1-14  Dexamethasone 20 mg PO daily on Days 1-2, 4-5, 8-9, 11-12 OR 40 mg PO on Days 1, 8, and 15  21 day cycle for 3-4 cycles OR until disease progression or unacceptable toxicity  NCCN Guidelines for Multiple Myeloma.V.1.2022  Amadeo P, et al. Blood. 2010;116(5):679-86  Alfaro S, et al/ Blood. 2012;119(19):4375-82  John M, et al. J Clin Oncol. 2014;32(25):2712-7    Allergies:Grass pollen(k-o-r-t-swt jose), Pet dander [cat hair extract], [place] meal, Milk [dairy food allergy], [place], and [place] oil  /71   Pulse 76   Temp 36.2 °C (97.1 °F) (Temporal)   Resp 18   Ht 1.69 m (5' 6.54\")   Wt 79.5 kg (175 lb 4.3 oz)   SpO2 97%   BMI 27.84 kg/m²   Body surface area is 1.93 meters squared.    All labs (2/24/22) within treatment plan parameters.      Bortezomib (Velcade) 1.3 mg/m2 x 1.93 m2 = 2.509 mg   <10% difference, OK to treat with final dose = 2.5 mg subcutaneous      Macarena Allison, PharmD  "

## 2022-02-25 ENCOUNTER — HOSPITAL ENCOUNTER (OUTPATIENT)
Dept: RADIATION ONCOLOGY | Facility: MEDICAL CENTER | Age: 72
End: 2022-02-25
Payer: MEDICARE

## 2022-02-25 LAB
CHEMOTHERAPY INFUSION START DATE: NORMAL
CHEMOTHERAPY RECORDS: 1.8
CHEMOTHERAPY RECORDS: 5040
CHEMOTHERAPY RECORDS: NORMAL
CHEMOTHERAPY RX CANCER: NORMAL
DATE 1ST CHEMO CANCER: NORMAL
RAD ONC ARIA COURSE LAST TREATMENT DATE: NORMAL
RAD ONC ARIA COURSE TREATMENT ELAPSED DAYS: NORMAL
RAD ONC ARIA REFERENCE POINT DOSAGE GIVEN TO DATE: 25.2
RAD ONC ARIA REFERENCE POINT DOSAGE GIVEN TO DATE: 26.46
RAD ONC ARIA REFERENCE POINT ID: NORMAL
RAD ONC ARIA REFERENCE POINT ID: NORMAL
RAD ONC ARIA REFERENCE POINT SESSION DOSAGE GIVEN: 1.8
RAD ONC ARIA REFERENCE POINT SESSION DOSAGE GIVEN: 1.89

## 2022-02-25 PROCEDURE — 77014 PR CT GUIDANCE PLACEMENT RAD THERAPY FIELDS: CPT | Mod: 26 | Performed by: RADIOLOGY

## 2022-02-25 PROCEDURE — 77385 HCHG IMRT DELIVERY SIMPLE: CPT | Performed by: RADIOLOGY

## 2022-02-28 ENCOUNTER — HOSPITAL ENCOUNTER (OUTPATIENT)
Dept: RADIATION ONCOLOGY | Facility: MEDICAL CENTER | Age: 72
End: 2022-02-28
Payer: MEDICARE

## 2022-02-28 LAB
CHEMOTHERAPY INFUSION START DATE: NORMAL
CHEMOTHERAPY RECORDS: 1.8
CHEMOTHERAPY RECORDS: 5040
CHEMOTHERAPY RECORDS: NORMAL
CHEMOTHERAPY RX CANCER: NORMAL
DATE 1ST CHEMO CANCER: NORMAL
RAD ONC ARIA COURSE LAST TREATMENT DATE: NORMAL
RAD ONC ARIA COURSE TREATMENT ELAPSED DAYS: NORMAL
RAD ONC ARIA REFERENCE POINT DOSAGE GIVEN TO DATE: 27
RAD ONC ARIA REFERENCE POINT DOSAGE GIVEN TO DATE: 28.35
RAD ONC ARIA REFERENCE POINT ID: NORMAL
RAD ONC ARIA REFERENCE POINT ID: NORMAL
RAD ONC ARIA REFERENCE POINT SESSION DOSAGE GIVEN: 1.8
RAD ONC ARIA REFERENCE POINT SESSION DOSAGE GIVEN: 1.89

## 2022-02-28 PROCEDURE — 77014 PR CT GUIDANCE PLACEMENT RAD THERAPY FIELDS: CPT | Mod: 26 | Performed by: RADIOLOGY

## 2022-02-28 PROCEDURE — 77427 RADIATION TX MANAGEMENT X5: CPT | Performed by: RADIOLOGY

## 2022-02-28 PROCEDURE — 77385 HCHG IMRT DELIVERY SIMPLE: CPT | Performed by: RADIOLOGY

## 2022-03-01 ENCOUNTER — HOSPITAL ENCOUNTER (OUTPATIENT)
Dept: RADIATION ONCOLOGY | Facility: MEDICAL CENTER | Age: 72
End: 2022-03-01

## 2022-03-01 ENCOUNTER — HOSPITAL ENCOUNTER (OUTPATIENT)
Dept: RADIATION ONCOLOGY | Facility: MEDICAL CENTER | Age: 72
End: 2022-03-31
Attending: RADIOLOGY | Admitting: RADIOLOGY
Payer: MEDICARE

## 2022-03-01 ENCOUNTER — TELEPHONE (OUTPATIENT)
Dept: HEMATOLOGY ONCOLOGY | Facility: MEDICAL CENTER | Age: 72
End: 2022-03-01
Payer: MEDICARE

## 2022-03-01 DIAGNOSIS — C90.00 MULTIPLE MYELOMA NOT HAVING ACHIEVED REMISSION (HCC): ICD-10-CM

## 2022-03-01 DIAGNOSIS — D80.1 HYPOGAMMAGLOBULINEMIA (HCC): ICD-10-CM

## 2022-03-01 LAB
CHEMOTHERAPY INFUSION START DATE: NORMAL
CHEMOTHERAPY RECORDS: 1.8
CHEMOTHERAPY RECORDS: 5040
CHEMOTHERAPY RECORDS: NORMAL
CHEMOTHERAPY RX CANCER: NORMAL
DATE 1ST CHEMO CANCER: NORMAL
RAD ONC ARIA COURSE LAST TREATMENT DATE: NORMAL
RAD ONC ARIA COURSE TREATMENT ELAPSED DAYS: NORMAL
RAD ONC ARIA REFERENCE POINT DOSAGE GIVEN TO DATE: 28.8
RAD ONC ARIA REFERENCE POINT DOSAGE GIVEN TO DATE: 30.24
RAD ONC ARIA REFERENCE POINT ID: NORMAL
RAD ONC ARIA REFERENCE POINT ID: NORMAL
RAD ONC ARIA REFERENCE POINT SESSION DOSAGE GIVEN: 1.8
RAD ONC ARIA REFERENCE POINT SESSION DOSAGE GIVEN: 1.89

## 2022-03-01 PROCEDURE — 77385 HCHG IMRT DELIVERY SIMPLE: CPT | Performed by: RADIOLOGY

## 2022-03-01 PROCEDURE — 77014 PR CT GUIDANCE PLACEMENT RAD THERAPY FIELDS: CPT | Mod: 26 | Performed by: RADIOLOGY

## 2022-03-01 RX ORDER — LENALIDOMIDE 25 MG/1
CAPSULE ORAL
Qty: 14 CAPSULE | Refills: 0 | Status: SHIPPED | OUTPATIENT
Start: 2022-03-01 | End: 2022-03-18 | Stop reason: SDUPTHER

## 2022-03-02 ENCOUNTER — HOSPITAL ENCOUNTER (OUTPATIENT)
Dept: RADIATION ONCOLOGY | Facility: MEDICAL CENTER | Age: 72
End: 2022-03-02
Payer: MEDICARE

## 2022-03-02 VITALS
WEIGHT: 169 LBS | BODY MASS INDEX: 26.84 KG/M2 | HEART RATE: 83 BPM | DIASTOLIC BLOOD PRESSURE: 64 MMHG | TEMPERATURE: 97 F | OXYGEN SATURATION: 96 % | SYSTOLIC BLOOD PRESSURE: 130 MMHG

## 2022-03-02 LAB
CHEMOTHERAPY INFUSION START DATE: NORMAL
CHEMOTHERAPY RECORDS: 1.8
CHEMOTHERAPY RECORDS: 5040
CHEMOTHERAPY RECORDS: NORMAL
CHEMOTHERAPY RX CANCER: NORMAL
DATE 1ST CHEMO CANCER: NORMAL
RAD ONC ARIA COURSE LAST TREATMENT DATE: NORMAL
RAD ONC ARIA COURSE TREATMENT ELAPSED DAYS: NORMAL
RAD ONC ARIA REFERENCE POINT DOSAGE GIVEN TO DATE: 30.6
RAD ONC ARIA REFERENCE POINT DOSAGE GIVEN TO DATE: 32.13
RAD ONC ARIA REFERENCE POINT ID: NORMAL
RAD ONC ARIA REFERENCE POINT ID: NORMAL
RAD ONC ARIA REFERENCE POINT SESSION DOSAGE GIVEN: 1.8
RAD ONC ARIA REFERENCE POINT SESSION DOSAGE GIVEN: 1.89

## 2022-03-02 PROCEDURE — 77014 PR CT GUIDANCE PLACEMENT RAD THERAPY FIELDS: CPT | Mod: 26 | Performed by: RADIOLOGY

## 2022-03-02 PROCEDURE — 77385 HCHG IMRT DELIVERY SIMPLE: CPT | Performed by: RADIOLOGY

## 2022-03-02 ASSESSMENT — FIBROSIS 4 INDEX: FIB4 SCORE: 1.84

## 2022-03-02 ASSESSMENT — PAIN SCALES - GENERAL: PAINLEVEL: NO PAIN

## 2022-03-02 NOTE — ON TREATMENT VISIT
"  ON TREATMENT  NOTE  RADIATION ONCOLOGY DEPARTMENT    Patient name:  Gary Hedrick    Primary Physician:  Elda Box M.D. MRN: [id number]  CSN: [id number]   Referring physician:  Kirk Isaac M.* : 1950, 71 y.o.     ENCOUNTER DATE:  3/2/2022      DIAGNOSIS:  Prostate cancer (HCC)  Staging form: Prostate, AJCC 8th Edition  - Clinical stage from 10/8/2021: Stage IIB (cT1c, cN0, cM0, PSA: 4.1, Grade Group: 2) - Signed by Kirk DELANEY M.D. on 10/8/2021  Histopathologic type: Adenocarcinoma, NOS  Stage prefix: Initial diagnosis  Prostate specific antigen (PSA) range: Less than 10  [place] primary pattern: 3  Sarah secondary pattern: 4  [place] score: 7  Histologic grading system: 5 grade system  Bilateral cancer: Yes  Number of biopsy cores examined: 12  Number of biopsy cores positive: 6  Location of positive needle core biopsies: Both sides      TREATMENT SUMMARY:  Course First Treatment Date 2022  Course Last Treatment Date 2022  Radiation Treatments     Active   Plans   Pelvis   Most recent treatment: Dose planned: 180 cGy (fraction 17 of 28 on 3/2/2022)   Total: Dose planned: 5,040 cGy   Elapsed Days: 23 @ [id number]         Historical   Plans   LDR tracking   Most recent treatment: Dose planned: 9,500 cGy (fraction 1 of 1 on 2021)   Total: Dose planned: 9,500 cGy   Elapsed Days: 0 @ [id number]                     SUBJECTIVE:  Nausea. IPSS 20/4. Diarrhea.    VITAL SIGNS:  Vitals 3/2/2022 2022 2022 2022 2022 2022 2/10/2022   SYSTOLIC 130 153 149 128 144 157 143   DIASTOLIC 64 71 68 64 69 70 74   PULSE 83 76 77 74 85 74 89   TEMPERATURE 97 97.1 97 98.6 97.5 97.9 97.6   RESPIRATIONS - 18 - 16 18 16 18   WEIGHT 169 175.27 170 174.16 173.28 172 173.28   HEIGHT - 5' 6.535\" - 5' 6.535\" 5' 6.929\" - 5' 6.535\"   BMI 26.84 kg/m2 27.84 kg/m2 26.68 kg/m2 27.66 kg/m2 27.2 kg/m2 27.32 kg/m2 27.52 kg/m2   SPO2 96 97 93 98 96 96 98     KPS: 70, " Cares for self; unable to carry on normal activity or to do active work (ECOG equivalent 1)  Encounter Vitals  Temperature: 36.1 °C (97 °F)  Temp src: Temporal  Blood Pressure : 130/64  Pulse: 83  Pulse Oximetry: 96 %  Weight: 76.7 kg (169 lb)  Pain Score: No pain  Pain Assessment 3/2/2022 2/23/2022 2/16/2022 2/9/2022 1/13/2022 12/2/2021   Pain Score 0 0 0 0 1 0   Some recent data might be hidden          PHYSICAL EXAM:  Physical Exam  Vitals and nursing note reviewed.   Constitutional:       General: He is not in acute distress.     Appearance: He is well-developed.   HENT:      Head: Normocephalic.   Skin:     General: Skin is warm and dry.      Findings: No erythema.   Neurological:      Mental Status: He is alert and oriented to person, place, and time.   Psychiatric:         Behavior: Behavior normal.         Thought Content: Thought content normal.         Judgment: Judgment normal.          Toxicity Assessment 3/2/2022 2/23/2022 2/16/2022 2/9/2022   Toxicity Assessment Male Pelvis Male Pelvis Male Pelvis Male Pelvis   Fatigue (lethargy, malaise, asthenia) Increased fatigue over baseline, but not altering normal activities Severe (e.g., decrease in performance status by 2 or more ECOG levels or 40% Karnofsky) or loss of ability to perform some activities Moderate (e.g., decrease in performance status by 1 ECOG level or 20% Karnofsky) or causing difficulty performing some activities None   Radiation Dermatitis None None Faint erythema or dry desquamation None   Anorexia None None None None   Colitis None None None None   Constipation None None None Requiring stool softener or dietary modification   Dehydration None None None None   Diarrhea w/o Colostomy Increase of <4 stools/day over pre-treatment Increase of <4 stools/day over pre-treatment Increase of 4-6 stools/day, or nocternal stools Increase of <4 stools/day over pre-treatment   Flatulence None None None None   Nausea Oral intake significantly decreased  Able to eat Able to eat Able to eat   Proctitis None None None None   Vomiting None None None None   RT - Pain due to RT None None None None   Tumor Pain (onset or exacerbation of tumor pain due to treatment) None None None None   Dysuria (painful urination) None None None None   Urinary Frequency Normal Normal Increase in frequency up to 2x normal Increase in frequency up to 2x normal   Urinary Urgency None None Present Present   Bladder Spasms Absent Absent Mild symptoms, not requiring intervention Absent   Incontinence None None None None   Urinary Retention Hesitency or dribbling, but no significant residual urine and/or retention occuring during the immediate postoperative period Hesitency or dribbling, but no significant residual urine and/or retention occuring during the immediate postoperative period Hesitency or dribbling, but no significant residual urine and/or retention occuring during the immediate postoperative period Hesitency or dribbling, but no significant residual urine and/or retention occuring during the immediate postoperative period       CURRENT MEDICATIONS:    Current Outpatient Medications:   •  lenalidomide 25 MG Cap, Take 25 mg (1 cap) by mouth on Days 1 - 14 of each 21-day cycle (2 weeks ON & 1 week OFF), Disp: 14 Capsule, Rfl: 0  •  dexamethasone (DECADRON) 4 MG Tab, TAKE 10 TABS ONCE A WEEK, Disp: 40 Tablet, Rfl: 6  •  zolpidem (AMBIEN) 10 MG Tab, , Disp: , Rfl:   •  tamsulosin (FLOMAX) 0.4 MG capsule, TAKE 2 CAPSULES BY MOUTH 30 MINUTES AFTER DINNER. TAKE 1 CAPSULE BY MOUTH FOR A WEEK THEN INCREASE TO 2 CAPSULES, Disp: 60 Capsule, Rfl: 5  •  Magnesium Hydroxide (MILK OF MAGNESIA PO), Take  by mouth as needed., Disp: , Rfl:   •  VITAMIN D PO, Take 8,000 Units by mouth., Disp: , Rfl:   •  aspirin EC (ECOTRIN) 81 MG Tablet Delayed Response, Take 81 mg by mouth every day., Disp: , Rfl:   •  valACYclovir (VALTREX) 500 MG Tab, Take 500 mg by mouth 2 times a day. No known stop date, TWICE  DAILY, Disp: , Rfl:   •  FLUoxetine (PROZAC) 20 MG Cap, Take 1 capsule by mouth every day., Disp: 90 capsule, Rfl: 4  •  Omega-3 Fatty Acids (FISH OIL) 1000 MG Cap capsule, Take 1,000 mg by mouth 2 times a day., Disp: , Rfl:   •  levothyroxine (SYNTHROID) 100 MCG Tab, Take 1 tablet by mouth Every morning on an empty stomach., Disp: 100 tablet, Rfl: 3  •  simvastatin (ZOCOR) 20 MG Tab, Take 1 tablet by mouth every evening., Disp: 100 tablet, Rfl: 3    LABORATORY DATA:   Lab Results   Component Value Date/Time    SODIUM 140 02/24/2022 11:58 AM    POTASSIUM 3.5 (L) 02/24/2022 11:58 AM    CHLORIDE 103 02/24/2022 11:58 AM    CO2 26 02/24/2022 11:58 AM    GLUCOSE 97 02/24/2022 11:58 AM    BUN 13 02/24/2022 11:58 AM    CREATININE 0.87 02/24/2022 11:58 AM       Lab Results   Component Value Date/Time    WBC 1.6 (LL) 02/24/2022 11:58 AM    RBC 3.18 (L) 02/24/2022 11:58 AM    HEMOGLOBIN 12.1 (L) 02/24/2022 11:58 AM    HEMATOCRIT 34.0 (L) 02/24/2022 11:58 AM    .9 (H) 02/24/2022 11:58 AM    MCH 38.1 (H) 02/24/2022 11:58 AM    MCHC 35.6 (H) 02/24/2022 11:58 AM    PLATELETCT 139 (L) 02/24/2022 11:58 AM         RADIOLOGY DATA:  No results found.    IMPRESSION:  Cancer Staging  Prostate cancer (HCC)  Staging form: Prostate, AJCC 8th Edition  - Clinical stage from 10/8/2021: Stage IIB (cT1c, cN0, cM0, PSA: 4.1, Grade Group: 2) - Signed by Kirk DELANEY M.D. on 10/8/2021      PLAN:  No change in treatment plan    Disposition:  Treatment plan and imaging reviewed. Questions answered. Continue therapy outlined.     Kirk DELANEY M.D.    No orders of the defined types were placed in this encounter.

## 2022-03-03 ENCOUNTER — HOSPITAL ENCOUNTER (OUTPATIENT)
Dept: RADIATION ONCOLOGY | Facility: MEDICAL CENTER | Age: 72
End: 2022-03-03

## 2022-03-03 ENCOUNTER — OUTPATIENT INFUSION SERVICES (OUTPATIENT)
Dept: ONCOLOGY | Facility: MEDICAL CENTER | Age: 72
End: 2022-03-03
Attending: INTERNAL MEDICINE
Payer: MEDICARE

## 2022-03-03 VITALS
SYSTOLIC BLOOD PRESSURE: 138 MMHG | DIASTOLIC BLOOD PRESSURE: 38 MMHG | OXYGEN SATURATION: 98 % | BODY MASS INDEX: 26.89 KG/M2 | WEIGHT: 171.3 LBS | RESPIRATION RATE: 18 BRPM | TEMPERATURE: 98 F | HEIGHT: 67 IN | HEART RATE: 86 BPM

## 2022-03-03 DIAGNOSIS — C90.00 MULTIPLE MYELOMA NOT HAVING ACHIEVED REMISSION (HCC): ICD-10-CM

## 2022-03-03 LAB
BASOPHILS # BLD AUTO: 0.3 % (ref 0–1.8)
BASOPHILS # BLD: 0.01 K/UL (ref 0–0.12)
CHEMOTHERAPY INFUSION START DATE: NORMAL
CHEMOTHERAPY RECORDS: 1.8
CHEMOTHERAPY RECORDS: 5040
CHEMOTHERAPY RECORDS: NORMAL
CHEMOTHERAPY RX CANCER: NORMAL
DATE 1ST CHEMO CANCER: NORMAL
EOSINOPHIL # BLD AUTO: 0.1 K/UL (ref 0–0.51)
EOSINOPHIL NFR BLD: 3.4 % (ref 0–6.9)
ERYTHROCYTE [DISTWIDTH] IN BLOOD BY AUTOMATED COUNT: 56.1 FL (ref 35.9–50)
HCT VFR BLD AUTO: 33.5 % (ref 42–52)
HGB BLD-MCNC: 12 G/DL (ref 14–18)
IMM GRANULOCYTES # BLD AUTO: 0.02 K/UL (ref 0–0.11)
IMM GRANULOCYTES NFR BLD AUTO: 0.7 % (ref 0–0.9)
LYMPHOCYTES # BLD AUTO: 0.21 K/UL (ref 1–4.8)
LYMPHOCYTES NFR BLD: 7.2 % (ref 22–41)
MCH RBC QN AUTO: 38.5 PG (ref 27–33)
MCHC RBC AUTO-ENTMCNC: 35.8 G/DL (ref 33.7–35.3)
MCV RBC AUTO: 107.4 FL (ref 81.4–97.8)
MONOCYTES # BLD AUTO: 0.62 K/UL (ref 0–0.85)
MONOCYTES NFR BLD AUTO: 21.2 % (ref 0–13.4)
NEUTROPHILS # BLD AUTO: 1.96 K/UL (ref 1.82–7.42)
NEUTROPHILS NFR BLD: 67.2 % (ref 44–72)
NRBC # BLD AUTO: 0 K/UL
NRBC BLD-RTO: 0 /100 WBC
PLATELET # BLD AUTO: 112 K/UL (ref 164–446)
PMV BLD AUTO: 10.7 FL (ref 9–12.9)
RAD ONC ARIA COURSE LAST TREATMENT DATE: NORMAL
RAD ONC ARIA COURSE TREATMENT ELAPSED DAYS: NORMAL
RAD ONC ARIA REFERENCE POINT DOSAGE GIVEN TO DATE: 32.4
RAD ONC ARIA REFERENCE POINT DOSAGE GIVEN TO DATE: 34.03
RAD ONC ARIA REFERENCE POINT ID: NORMAL
RAD ONC ARIA REFERENCE POINT ID: NORMAL
RAD ONC ARIA REFERENCE POINT SESSION DOSAGE GIVEN: 1.8
RAD ONC ARIA REFERENCE POINT SESSION DOSAGE GIVEN: 1.89
RBC # BLD AUTO: 3.12 M/UL (ref 4.7–6.1)
WBC # BLD AUTO: 2.9 K/UL (ref 4.8–10.8)

## 2022-03-03 PROCEDURE — 77336 RADIATION PHYSICS CONSULT: CPT | Performed by: RADIOLOGY

## 2022-03-03 PROCEDURE — 77385 HCHG IMRT DELIVERY SIMPLE: CPT | Performed by: RADIOLOGY

## 2022-03-03 PROCEDURE — 96401 CHEMO ANTI-NEOPL SQ/IM: CPT

## 2022-03-03 PROCEDURE — 700111 HCHG RX REV CODE 636 W/ 250 OVERRIDE (IP): Performed by: NURSE PRACTITIONER

## 2022-03-03 PROCEDURE — 85025 COMPLETE CBC W/AUTO DIFF WBC: CPT

## 2022-03-03 PROCEDURE — 77014 PR CT GUIDANCE PLACEMENT RAD THERAPY FIELDS: CPT | Mod: 26 | Performed by: RADIOLOGY

## 2022-03-03 RX ADMIN — BORTEZOMIB 2.48 MG: 3.5 INJECTION, POWDER, LYOPHILIZED, FOR SOLUTION INTRAVENOUS; SUBCUTANEOUS at 16:39

## 2022-03-03 ASSESSMENT — FIBROSIS 4 INDEX: FIB4 SCORE: 1.84

## 2022-03-03 NOTE — PROGRESS NOTES
"Pharmacy Chemotherapy Verification  Patient Name: Gary Hedrick  Dx: MM    Cycle 9, Day 8  Previous treatment = C9D1 on 2/24/22    Regimen: Velcade   *Dosing Reference*  Bortezomib 1.3 mg/m2 subcutaneous on    -MD dosing on Days 1, 8, 15 of a 21 day cycle  Lenalidomide 25 mg PO daily on Days 1-14  Dexamethasone 20 mg PO daily on Days 1-2, 4-5, 8-9, 11-12 OR 40 mg PO on Days 1, 8, and 15  21 day cycle for 3-4 cycles OR until disease progression or unacceptable toxicity  NCCN Guidelines for Multiple Myeloma.V.1.2022  Amadeo P, et al. Blood. 2010;116(5):679-86  Alfaro S, et al/ Blood. 2012;119(19):4375-82  John M, et al. J Clin Oncol. 2014;32(25):2712-7    Allergies:Grass pollen(k-o-r-t-swt jose), Pet dander [cat hair extract], [place] meal, Milk [dairy food allergy], [place], and [place] oil  BP (!) 138/38   Pulse 86   Temp 36.7 °C (98 °F) (Temporal)   Resp 18   Ht 1.69 m (5' 6.54\")   Wt 77.7 kg (171 lb 4.8 oz)   SpO2 98%   BMI 27.21 kg/m²   Body surface area is 1.91 meters squared.    All labs (3/3/22) within treatment plan parameters.      Bortezomib (Velcade) 1.3 mg/m2 x 1.91 m2 = 2.48 mg   <10% difference, OK to treat with final dose = 2.48 mg subcutaneous      Macarena Allison, PharmD  "

## 2022-03-03 NOTE — PROGRESS NOTES
"Pharmacy Chemotherapy Verification    Dx: Multiple Myeloma     Protocol: RVd     Bortezomib 1.3 mg/m2 subcutaneous on Days 1, 4, 8, 11              -MD dosing on Days 1, 8, 15 of a 21 day cycle  Lenalidomide 25 mg PO daily on Days 1-14  Dexamethasone 20 mg PO daily on Days 1-2, 4-5, 8-9, 11-12 OR 40 mg PO on Days 1, 8, and 15  21 day cycle for 3-4 cycles OR until disease progression or unacceptable toxicity  NCCN Guidelines for Multiple Myeloma.V.1.2022  Amadeo P, et al. Blood. 2010;116(5):679-86  Dominic S, et al/ Blood. 2012;119(19):8503-82    Allergies:  Grass pollen(k-o-r-t-swt jose), Pet dander [cat hair extract], Milk [dairy food allergy], [place], and [place] oil     BP (!) 138/38   Pulse 86   Temp 36.7 °C (98 °F) (Temporal)   Resp 18   Ht 1.69 m (5' 6.54\")   Wt 77.7 kg (171 lb 4.8 oz)   SpO2 98%   BMI 27.21 kg/m²  Body surface area is 1.91 meters squared.     Labs 3/3/22  ANC~ 1960 Plt = 112k   Hgb = 12       Drug Order   (Drug name, dose, route, IV Fluid & volume, frequency, number of doses) Cycle 9 Day 8  Previous treatment: C9D1 on 2/24/22   Medication = Bortezomib (Velcade)  Base Dose = 1.3 mg/m2  Calc Dose: Base Dose x 1.91 m2 = 2.483 mg  Final Dose = 2.48 mg  Route = Subcutaneous  Conc. = 2.5 mg/mL  Fluid & Volume = 0.99 mL in syringe  Administration = ABDOMEN or THIGH          <10% difference, okay to treat with final dose   By my signature below, I confirm this process was performed independently with the BSA and all final chemotherapy dosing calculations congruent. I have reviewed the above chemotherapy order and that my calculation of the final dose and BSA (when applicable) corroborate those calculations of the  pharmacist. Discrepancies of 10% or greater in the written dose have been addressed and documented within the [facility] Progress notes.    Kenney Birch, PharmD  "

## 2022-03-04 ENCOUNTER — HOSPITAL ENCOUNTER (OUTPATIENT)
Dept: RADIATION ONCOLOGY | Facility: MEDICAL CENTER | Age: 72
End: 2022-03-04
Payer: MEDICARE

## 2022-03-04 LAB
CHEMOTHERAPY INFUSION START DATE: NORMAL
CHEMOTHERAPY RECORDS: 1.8
CHEMOTHERAPY RECORDS: 5040
CHEMOTHERAPY RECORDS: NORMAL
CHEMOTHERAPY RX CANCER: NORMAL
DATE 1ST CHEMO CANCER: NORMAL
RAD ONC ARIA COURSE LAST TREATMENT DATE: NORMAL
RAD ONC ARIA COURSE TREATMENT ELAPSED DAYS: NORMAL
RAD ONC ARIA REFERENCE POINT DOSAGE GIVEN TO DATE: 34.2
RAD ONC ARIA REFERENCE POINT DOSAGE GIVEN TO DATE: 35.92
RAD ONC ARIA REFERENCE POINT ID: NORMAL
RAD ONC ARIA REFERENCE POINT ID: NORMAL
RAD ONC ARIA REFERENCE POINT SESSION DOSAGE GIVEN: 1.8
RAD ONC ARIA REFERENCE POINT SESSION DOSAGE GIVEN: 1.89

## 2022-03-04 PROCEDURE — 77385 HCHG IMRT DELIVERY SIMPLE: CPT | Performed by: RADIOLOGY

## 2022-03-04 PROCEDURE — 77014 PR CT GUIDANCE PLACEMENT RAD THERAPY FIELDS: CPT | Mod: 26 | Performed by: RADIOLOGY

## 2022-03-04 NOTE — PROGRESS NOTES
Chemotherapy Verification - PRIMARY RN      Height = 1.69m  Weight = 77.7kg  BSA = 1.91m^2      Medication: bortezomib (VELCADE) Dose: 1.3mg/m^2  Calculated Dose: 2.483mg                            (In mg/m2, AUC, mg/kg)         I confirm this process was performed independently with the BSA and all final chemotherapy dosing calculations congruent.  Any discrepancies of 10% or greater have been addressed with the chemotherapy pharmacist. The resolution of the discrepancy has been documented in the EPIC progress notes.

## 2022-03-04 NOTE — PROGRESS NOTES
[facility] for [facility] and Velcade injection. Labs drawn via 23g butterfly needle to right AC, gauze and coban to site. Labs verified and within parameters for treatment. Velcade injection given to right lower abdomen. Next appointment confirmed. Patient discharged to self care in no apparent distress.

## 2022-03-04 NOTE — PROGRESS NOTES
Chemotherapy Verification - SECONDARY RN       Height = 169 cm  Weight = 77.7 kg  BSA = 1.91 m^2       Medication: bortezomib (VELCADE)  Dose: 1.3 mg/m^2  Calculated Dose: 2.483 mg                             (In mg/m2, AUC, mg/kg)     I confirm that this process was performed independently.

## 2022-03-07 ENCOUNTER — HOSPITAL ENCOUNTER (OUTPATIENT)
Dept: RADIATION ONCOLOGY | Facility: MEDICAL CENTER | Age: 72
End: 2022-03-07
Payer: MEDICARE

## 2022-03-07 LAB
CHEMOTHERAPY INFUSION START DATE: NORMAL
CHEMOTHERAPY RECORDS: 1.8
CHEMOTHERAPY RECORDS: 5040
CHEMOTHERAPY RECORDS: NORMAL
CHEMOTHERAPY RX CANCER: NORMAL
DATE 1ST CHEMO CANCER: NORMAL
RAD ONC ARIA COURSE LAST TREATMENT DATE: NORMAL
RAD ONC ARIA COURSE TREATMENT ELAPSED DAYS: NORMAL
RAD ONC ARIA REFERENCE POINT DOSAGE GIVEN TO DATE: 36
RAD ONC ARIA REFERENCE POINT DOSAGE GIVEN TO DATE: 37.81
RAD ONC ARIA REFERENCE POINT ID: NORMAL
RAD ONC ARIA REFERENCE POINT ID: NORMAL
RAD ONC ARIA REFERENCE POINT SESSION DOSAGE GIVEN: 1.8
RAD ONC ARIA REFERENCE POINT SESSION DOSAGE GIVEN: 1.89

## 2022-03-07 PROCEDURE — 77385 HCHG IMRT DELIVERY SIMPLE: CPT | Performed by: RADIOLOGY

## 2022-03-07 PROCEDURE — 77427 RADIATION TX MANAGEMENT X5: CPT | Performed by: RADIOLOGY

## 2022-03-07 PROCEDURE — 77014 PR CT GUIDANCE PLACEMENT RAD THERAPY FIELDS: CPT | Mod: 26 | Performed by: RADIOLOGY

## 2022-03-08 ENCOUNTER — HOSPITAL ENCOUNTER (OUTPATIENT)
Dept: RADIATION ONCOLOGY | Facility: MEDICAL CENTER | Age: 72
End: 2022-03-08
Payer: MEDICARE

## 2022-03-08 LAB
CHEMOTHERAPY INFUSION START DATE: NORMAL
CHEMOTHERAPY RECORDS: 1.8
CHEMOTHERAPY RECORDS: 5040
CHEMOTHERAPY RECORDS: NORMAL
CHEMOTHERAPY RX CANCER: NORMAL
DATE 1ST CHEMO CANCER: NORMAL
RAD ONC ARIA COURSE LAST TREATMENT DATE: NORMAL
RAD ONC ARIA COURSE TREATMENT ELAPSED DAYS: NORMAL
RAD ONC ARIA REFERENCE POINT DOSAGE GIVEN TO DATE: 37.8
RAD ONC ARIA REFERENCE POINT DOSAGE GIVEN TO DATE: 39.7
RAD ONC ARIA REFERENCE POINT ID: NORMAL
RAD ONC ARIA REFERENCE POINT ID: NORMAL
RAD ONC ARIA REFERENCE POINT SESSION DOSAGE GIVEN: 1.8
RAD ONC ARIA REFERENCE POINT SESSION DOSAGE GIVEN: 1.89

## 2022-03-08 PROCEDURE — 77385 HCHG IMRT DELIVERY SIMPLE: CPT | Performed by: RADIOLOGY

## 2022-03-08 PROCEDURE — 77014 PR CT GUIDANCE PLACEMENT RAD THERAPY FIELDS: CPT | Mod: 26 | Performed by: RADIOLOGY

## 2022-03-09 ENCOUNTER — HOSPITAL ENCOUNTER (OUTPATIENT)
Dept: RADIATION ONCOLOGY | Facility: MEDICAL CENTER | Age: 72
End: 2022-03-09
Payer: MEDICARE

## 2022-03-09 VITALS
TEMPERATURE: 96.9 F | OXYGEN SATURATION: 94 % | BODY MASS INDEX: 27 KG/M2 | WEIGHT: 170 LBS | DIASTOLIC BLOOD PRESSURE: 60 MMHG | SYSTOLIC BLOOD PRESSURE: 137 MMHG | HEART RATE: 94 BPM

## 2022-03-09 LAB
CHEMOTHERAPY INFUSION START DATE: NORMAL
CHEMOTHERAPY RECORDS: 1.8
CHEMOTHERAPY RECORDS: 5040
CHEMOTHERAPY RECORDS: NORMAL
CHEMOTHERAPY RX CANCER: NORMAL
DATE 1ST CHEMO CANCER: NORMAL
RAD ONC ARIA COURSE LAST TREATMENT DATE: NORMAL
RAD ONC ARIA COURSE TREATMENT ELAPSED DAYS: NORMAL
RAD ONC ARIA REFERENCE POINT DOSAGE GIVEN TO DATE: 39.6
RAD ONC ARIA REFERENCE POINT DOSAGE GIVEN TO DATE: 41.59
RAD ONC ARIA REFERENCE POINT ID: NORMAL
RAD ONC ARIA REFERENCE POINT ID: NORMAL
RAD ONC ARIA REFERENCE POINT SESSION DOSAGE GIVEN: 1.8
RAD ONC ARIA REFERENCE POINT SESSION DOSAGE GIVEN: 1.89

## 2022-03-09 PROCEDURE — 77014 PR CT GUIDANCE PLACEMENT RAD THERAPY FIELDS: CPT | Mod: 26 | Performed by: RADIOLOGY

## 2022-03-09 PROCEDURE — 77385 HCHG IMRT DELIVERY SIMPLE: CPT | Performed by: RADIOLOGY

## 2022-03-09 ASSESSMENT — FIBROSIS 4 INDEX: FIB4 SCORE: 2.29

## 2022-03-09 NOTE — ON TREATMENT VISIT
"  ON TREATMENT  NOTE  RADIATION ONCOLOGY DEPARTMENT    Patient name:  Gary Hedrick    Primary Physician:  Elda Box M.D. MRN: [id number]  CSN: [id number]   Referring physician:  Kirk Isaac M.*   : 1950, 71 y.o.     ENCOUNTER DATE:  3/9/2022      DIAGNOSIS:  Prostate cancer (HCC)  Staging form: Prostate, AJCC 8th Edition  - Clinical stage from 10/8/2021: Stage IIB (cT1c, cN0, cM0, PSA: 4.1, Grade Group: 2) - Signed by Kirk DELANEY M.D. on 10/8/2021  Histopathologic type: Adenocarcinoma, NOS  Stage prefix: Initial diagnosis  Prostate specific antigen (PSA) range: Less than 10  [place] primary pattern: 3  Sarah secondary pattern: 4  [place] score: 7  Histologic grading system: 5 grade system  Bilateral cancer: Yes  Number of biopsy cores examined: 12  Number of biopsy cores positive: 6  Location of positive needle core biopsies: Both sides      TREATMENT SUMMARY:  Course First Treatment Date 2022  Course Last Treatment Date 2022  Radiation Treatments     Active   Plans   Pelvis   Most recent treatment: Dose planned: 180 cGy (fraction 22 of 28 on 3/9/2022)   Total: Dose planned: 5,040 cGy   Elapsed Days: 30 @ [id number]         Historical   Plans   LDR tracking   Most recent treatment: Dose planned: 9,500 cGy (fraction 1 of 1 on 2021)   Total: Dose planned: 9,500 cGy   Elapsed Days: 0 @ [id number]                     SUBJECTIVE:  Nausea. Zofran not working. 21/3    VITAL SIGNS:  Vitals 3/9/2022 3/3/2022 3/2/2022 2022 2022 2022 2022   SYSTOLIC 137 138 130 153 149 128 144   DIASTOLIC 60 38 64 71 68 64 69   PULSE 94 86 83 76 77 74 85   TEMPERATURE 96.9 98 97 97.1 97 98.6 97.5   RESPIRATIONS - 18 - 18 - 16 18   WEIGHT 170 171.3 169 175.27 170 174.16 173.28   HEIGHT - 5' 6.535\" - 5' 6.535\" - 5' 6.535\" 5' 6.929\"   BMI 27 kg/m2 27.21 kg/m2 26.84 kg/m2 27.84 kg/m2 26.68 kg/m2 27.66 kg/m2 27.2 kg/m2   SPO2 94 98 96 97 93 98 96     KPS: 80, " Normal activity with effort; some signs or symptoms of disease (ECOG equivalent 1)  Encounter Vitals  Temperature: 36.1 °C (96.9 °F)  Temp src: Temporal  Blood Pressure : 137/60  Pulse: 94  Pulse Oximetry: 94 %  Weight: 77.1 kg (170 lb)  Pain Assessment 3/2/2022 2/23/2022 2/16/2022 2/9/2022 1/13/2022 12/2/2021   Pain Score 0 0 0 0 1 0   Some recent data might be hidden          PHYSICAL EXAM:  Physical Exam  Vitals and nursing note reviewed.   Constitutional:       General: He is not in acute distress.     Appearance: He is well-developed.   HENT:      Head: Normocephalic.   Skin:     General: Skin is warm and dry.      Findings: No erythema.   Neurological:      Mental Status: He is alert and oriented to person, place, and time.   Psychiatric:         Behavior: Behavior normal.         Thought Content: Thought content normal.         Judgment: Judgment normal.          Toxicity Assessment 3/9/2022 3/2/2022 2/23/2022 2/16/2022 2/9/2022   Toxicity Assessment Male Pelvis Male Pelvis Male Pelvis Male Pelvis Male Pelvis   Fatigue (lethargy, malaise, asthenia) Increased fatigue over baseline, but not altering normal activities Increased fatigue over baseline, but not altering normal activities Severe (e.g., decrease in performance status by 2 or more ECOG levels or 40% Karnofsky) or loss of ability to perform some activities Moderate (e.g., decrease in performance status by 1 ECOG level or 20% Karnofsky) or causing difficulty performing some activities None   Radiation Dermatitis None None None Faint erythema or dry desquamation None   Anorexia Loss of appetite None None None None   Colitis None None None None None   Constipation Requiring stool softener or dietary modification None None None Requiring stool softener or dietary modification   Dehydration None None None None None   Diarrhea w/o Colostomy Increase of <4 stools/day over pre-treatment Increase of <4 stools/day over pre-treatment Increase of <4 stools/day  over pre-treatment Increase of 4-6 stools/day, or nocternal stools Increase of <4 stools/day over pre-treatment   Flatulence None None None None None   Nausea Able to eat Oral intake significantly decreased Able to eat Able to eat Able to eat   Proctitis None None None None None   Vomiting None None None None None   RT - Pain due to RT None None None None None   Tumor Pain (onset or exacerbation of tumor pain due to treatment) None None None None None   Dysuria (painful urination) None None None None None   Urinary Frequency Increase in frequency up to 2x normal Normal Normal Increase in frequency up to 2x normal Increase in frequency up to 2x normal   Urinary Urgency Present None None Present Present   Bladder Spasms Absent Absent Absent Mild symptoms, not requiring intervention Absent   Incontinence None None None None None   Urinary Retention Hesitency or dribbling, but no significant residual urine and/or retention occuring during the immediate postoperative period Hesitency or dribbling, but no significant residual urine and/or retention occuring during the immediate postoperative period Hesitency or dribbling, but no significant residual urine and/or retention occuring during the immediate postoperative period Hesitency or dribbling, but no significant residual urine and/or retention occuring during the immediate postoperative period Hesitency or dribbling, but no significant residual urine and/or retention occuring during the immediate postoperative period       CURRENT MEDICATIONS:    Current Outpatient Medications:   •  lenalidomide 25 MG Cap, Take 25 mg (1 cap) by mouth on Days 1 - 14 of each 21-day cycle (2 weeks ON & 1 week OFF), Disp: 14 Capsule, Rfl: 0  •  dexamethasone (DECADRON) 4 MG Tab, TAKE 10 TABS ONCE A WEEK, Disp: 40 Tablet, Rfl: 6  •  zolpidem (AMBIEN) 10 MG Tab, , Disp: , Rfl:   •  tamsulosin (FLOMAX) 0.4 MG capsule, TAKE 2 CAPSULES BY MOUTH 30 MINUTES AFTER DINNER. TAKE 1 CAPSULE BY MOUTH  FOR A WEEK THEN INCREASE TO 2 CAPSULES, Disp: 60 Capsule, Rfl: 5  •  Magnesium Hydroxide (MILK OF MAGNESIA PO), Take  by mouth as needed., Disp: , Rfl:   •  VITAMIN D PO, Take 8,000 Units by mouth., Disp: , Rfl:   •  aspirin EC (ECOTRIN) 81 MG Tablet Delayed Response, Take 81 mg by mouth every day., Disp: , Rfl:   •  valACYclovir (VALTREX) 500 MG Tab, Take 500 mg by mouth 2 times a day. No known stop date, TWICE DAILY, Disp: , Rfl:   •  FLUoxetine (PROZAC) 20 MG Cap, Take 1 capsule by mouth every day., Disp: 90 capsule, Rfl: 4  •  Omega-3 Fatty Acids (FISH OIL) 1000 MG Cap capsule, Take 1,000 mg by mouth 2 times a day., Disp: , Rfl:   •  levothyroxine (SYNTHROID) 100 MCG Tab, Take 1 tablet by mouth Every morning on an empty stomach., Disp: 100 tablet, Rfl: 3  •  simvastatin (ZOCOR) 20 MG Tab, Take 1 tablet by mouth every evening., Disp: 100 tablet, Rfl: 3    LABORATORY DATA:   Lab Results   Component Value Date/Time    SODIUM 140 02/24/2022 11:58 AM    POTASSIUM 3.5 (L) 02/24/2022 11:58 AM    CHLORIDE 103 02/24/2022 11:58 AM    CO2 26 02/24/2022 11:58 AM    GLUCOSE 97 02/24/2022 11:58 AM    BUN 13 02/24/2022 11:58 AM    CREATININE 0.87 02/24/2022 11:58 AM       Lab Results   Component Value Date/Time    WBC 2.9 (L) 03/03/2022 03:50 PM    RBC 3.12 (L) 03/03/2022 03:50 PM    HEMOGLOBIN 12.0 (L) 03/03/2022 03:50 PM    HEMATOCRIT 33.5 (L) 03/03/2022 03:50 PM    .4 (H) 03/03/2022 03:50 PM    MCH 38.5 (H) 03/03/2022 03:50 PM    MCHC 35.8 (H) 03/03/2022 03:50 PM    PLATELETCT 112 (L) 03/03/2022 03:50 PM         RADIOLOGY DATA:  No results found.    IMPRESSION:  Cancer Staging  Prostate cancer (HCC)  Staging form: Prostate, AJCC 8th Edition  - Clinical stage from 10/8/2021: Stage IIB (cT1c, cN0, cM0, PSA: 4.1, Grade Group: 2) - Signed by Kirk DELANEY M.D. on 10/8/2021      PLAN:  No change in treatment plan    Disposition:  Treatment plan and imaging reviewed. Questions answered. Continue therapy outlined.      Kirk DELANEY M.D.    No orders of the defined types were placed in this encounter.

## 2022-03-10 ENCOUNTER — HOSPITAL ENCOUNTER (OUTPATIENT)
Dept: RADIATION ONCOLOGY | Facility: MEDICAL CENTER | Age: 72
End: 2022-03-10

## 2022-03-10 ENCOUNTER — OUTPATIENT INFUSION SERVICES (OUTPATIENT)
Dept: ONCOLOGY | Facility: MEDICAL CENTER | Age: 72
End: 2022-03-10
Attending: INTERNAL MEDICINE
Payer: MEDICARE

## 2022-03-10 VITALS
HEIGHT: 66 IN | BODY MASS INDEX: 27.46 KG/M2 | WEIGHT: 170.86 LBS | HEART RATE: 88 BPM | RESPIRATION RATE: 18 BRPM | DIASTOLIC BLOOD PRESSURE: 65 MMHG | TEMPERATURE: 97.2 F | SYSTOLIC BLOOD PRESSURE: 129 MMHG | OXYGEN SATURATION: 95 %

## 2022-03-10 DIAGNOSIS — C90.00 MULTIPLE MYELOMA NOT HAVING ACHIEVED REMISSION (HCC): ICD-10-CM

## 2022-03-10 LAB
BASOPHILS # BLD AUTO: 0.7 % (ref 0–1.8)
BASOPHILS # BLD: 0.02 K/UL (ref 0–0.12)
CHEMOTHERAPY INFUSION START DATE: NORMAL
CHEMOTHERAPY RECORDS: 1.8
CHEMOTHERAPY RECORDS: 5040
CHEMOTHERAPY RECORDS: NORMAL
CHEMOTHERAPY RX CANCER: NORMAL
DATE 1ST CHEMO CANCER: NORMAL
EOSINOPHIL # BLD AUTO: 0.03 K/UL (ref 0–0.51)
EOSINOPHIL NFR BLD: 1 % (ref 0–6.9)
ERYTHROCYTE [DISTWIDTH] IN BLOOD BY AUTOMATED COUNT: 54 FL (ref 35.9–50)
HCT VFR BLD AUTO: 32.5 % (ref 42–52)
HGB BLD-MCNC: 11.7 G/DL (ref 14–18)
IMM GRANULOCYTES # BLD AUTO: 0.01 K/UL (ref 0–0.11)
IMM GRANULOCYTES NFR BLD AUTO: 0.3 % (ref 0–0.9)
LYMPHOCYTES # BLD AUTO: 0.29 K/UL (ref 1–4.8)
LYMPHOCYTES NFR BLD: 9.4 % (ref 22–41)
MCH RBC QN AUTO: 38.5 PG (ref 27–33)
MCHC RBC AUTO-ENTMCNC: 36 G/DL (ref 33.7–35.3)
MCV RBC AUTO: 106.9 FL (ref 81.4–97.8)
MONOCYTES # BLD AUTO: 0.7 K/UL (ref 0–0.85)
MONOCYTES NFR BLD AUTO: 22.8 % (ref 0–13.4)
NEUTROPHILS # BLD AUTO: 2.02 K/UL (ref 1.82–7.42)
NEUTROPHILS NFR BLD: 65.8 % (ref 44–72)
NRBC # BLD AUTO: 0 K/UL
NRBC BLD-RTO: 0 /100 WBC
PLATELET # BLD AUTO: 97 K/UL (ref 164–446)
PMV BLD AUTO: 9.9 FL (ref 9–12.9)
RAD ONC ARIA COURSE LAST TREATMENT DATE: NORMAL
RAD ONC ARIA COURSE TREATMENT ELAPSED DAYS: NORMAL
RAD ONC ARIA REFERENCE POINT DOSAGE GIVEN TO DATE: 41.4
RAD ONC ARIA REFERENCE POINT DOSAGE GIVEN TO DATE: 43.48
RAD ONC ARIA REFERENCE POINT ID: NORMAL
RAD ONC ARIA REFERENCE POINT ID: NORMAL
RAD ONC ARIA REFERENCE POINT SESSION DOSAGE GIVEN: 1.8
RAD ONC ARIA REFERENCE POINT SESSION DOSAGE GIVEN: 1.89
RBC # BLD AUTO: 3.04 M/UL (ref 4.7–6.1)
WBC # BLD AUTO: 3.1 K/UL (ref 4.8–10.8)

## 2022-03-10 PROCEDURE — 96401 CHEMO ANTI-NEOPL SQ/IM: CPT

## 2022-03-10 PROCEDURE — 77336 RADIATION PHYSICS CONSULT: CPT | Performed by: RADIOLOGY

## 2022-03-10 PROCEDURE — 700111 HCHG RX REV CODE 636 W/ 250 OVERRIDE (IP): Performed by: NURSE PRACTITIONER

## 2022-03-10 PROCEDURE — 77385 HCHG IMRT DELIVERY SIMPLE: CPT | Performed by: RADIOLOGY

## 2022-03-10 PROCEDURE — 77014 PR CT GUIDANCE PLACEMENT RAD THERAPY FIELDS: CPT | Mod: 26 | Performed by: RADIOLOGY

## 2022-03-10 PROCEDURE — 83521 IG LIGHT CHAINS FREE EACH: CPT

## 2022-03-10 PROCEDURE — 85025 COMPLETE CBC W/AUTO DIFF WBC: CPT

## 2022-03-10 RX ADMIN — BORTEZOMIB 2.48 MG: 3.5 INJECTION, POWDER, LYOPHILIZED, FOR SOLUTION INTRAVENOUS; SUBCUTANEOUS at 12:22

## 2022-03-10 ASSESSMENT — FIBROSIS 4 INDEX: FIB4 SCORE: 2.29

## 2022-03-10 NOTE — PROGRESS NOTES
Chemotherapy Verification - PRIMARY RN    D15C9    Height = 168cm  Weight = 77.5kg  BSA = 1.9m^2       Medication: Bortezomib (Velcade)  Dose: 1.3mg/m^2  Calculated Dose: 2.47mg                                 I confirm this process was performed independently with the BSA and all final chemotherapy dosing calculations congruent.  Any discrepancies of 10% or greater have been addressed with the chemotherapy pharmacist. The resolution of the discrepancy has been documented in the EPIC progress notes.

## 2022-03-10 NOTE — PROGRESS NOTES
Chemotherapy Verification - SECONDARY RN       Height = 1.68m  Weight = 77.5kg  BSA = 1.9m2       Medication: bortezomib  Dose: 1.3mg/m2  Calculated Dose: 2.47mg                             (In mg/m2, AUC, mg/kg)       I confirm that this process was performed independently.

## 2022-03-10 NOTE — PROGRESS NOTES
Gary arrived ambulatory to [facility] for lab draw and Velcade injection. POC discussed with pt and he agrees with plan. Labs drawn as ordered. Pt medicated per MAR. Velcade injection given RLQ abd.  Pt tolerated treatment without s/s adverse reaction. Pt discharged to [facility] care, [facility]. Pt's next appt confirmed 3/17/2022.

## 2022-03-10 NOTE — PROGRESS NOTES
"Pharmacy Chemotherapy Verification  Patient Name: Gary Hedrick  Dx: MM  Protocol: VRd    Regimen:  Bortezomib 1.3 mg/m2 subcutaneous on    -MD dosing on Days 1, 8, 15 of a 21 day cycle  Lenalidomide 25 mg PO daily on Days 1-14  Dexamethasone 20 mg PO daily on Days 1-2, 4-5, 8-9, 11-12 OR 40 mg PO on Days 1, 8, and 15   21 day cycle for 3-4 cycles OR until disease progression or unacceptable toxicity  *Dosing Reference*  NCCN Guidelines for Multiple Myeloma.V.1.2022  Amadeo P, et al. Blood. 2010;116(5):679-86  Alfaro S, et al/ Blood. 2012;119(19):4375-82  John M, et al. J Clin Oncol. 2014;32(25):2712-7    Allergies:Grass pollen(k-o-r-t-swt jose), Pet dander [cat hair extract], [place] meal, Milk [dairy food allergy], [place], and [place] oil  /65   Pulse 88   Temp 36.2 °C (97.2 °F) (Temporal)   Resp 18   Ht 1.68 m (5' 6.14\")   Wt 77.5 kg (170 lb 13.7 oz)   SpO2 95%   BMI 27.46 kg/m²   Body surface area is 1.9 meters squared.     Labs 3/3/22  Meet treatment parameters    Drug Order   (Drug name, dose, route, IV Fluid & volume, frequency, number of doses) Cycle 9 Day 15  Previous treatment: 3/3/22   Medication = Bortezomib (Velcade)  Base Dose = 1.3 mg/m2  Calc Dose: Base Dose x 1.9 m2 = 2.47 mg  Final Dose = 2.48 mg  Route = Subcutaneous  Conc. = 2.5 mg/mL  Fluid & Volume = 0.99 mL in syringe  Administration = ABDOMEN or THIGH            Okay to treat with final dose       "

## 2022-03-10 NOTE — PROGRESS NOTES
"Pharmacy Chemotherapy Verification  Patient Name: Gary Hedrick  Dx: MM    Cycle 9, Day 15  Previous treatment = C9D8 on 3/3/22    Regimen: Velcade   *Dosing Reference*  Bortezomib 1.3 mg/m2 subcutaneous on    -MD dosing on Days 1, 8, 15 of a 21 day cycle  Lenalidomide 25 mg PO daily on Days 1-14  Dexamethasone 20 mg PO daily on Days 1-2, 4-5, 8-9, 11-12 OR 40 mg PO on Days 1, 8, and 15  21 day cycle for 3-4 cycles OR until disease progression or unacceptable toxicity  NCCN Guidelines for Multiple Myeloma.V.1.2022  Amadeo P, et al. Blood. 2010;116(5):679-86  Alfaro S, et al/ Blood. 2012;119(19):4375-82  John M, et al. J Clin Oncol. 2014;32(25):2712-7    Allergies:Grass pollen(k-o-r-t-swt jose), Pet dander [cat hair extract], [place] meal, Milk [dairy food allergy], [place], and [place] oil  /65   Pulse 88   Temp 36.2 °C (97.2 °F) (Temporal)   Resp 18   Ht 1.68 m (5' 6.14\")   Wt 77.5 kg (170 lb 13.7 oz)   SpO2 95%   BMI 27.46 kg/m²   Body surface area is 1.9 meters squared.    Labs 3/10/22:  ANC~ 2020 Plt = 97k   Hgb = 11.7       Bortezomib (Velcade) 1.3 mg/m2 x 1.9 m2 = 2.47 mg   <10% difference, okay to treat with final dose = 2.48 mg SubQ      Nick Williamson, PharmD  "

## 2022-03-11 ENCOUNTER — TELEPHONE (OUTPATIENT)
Dept: HEMATOLOGY ONCOLOGY | Facility: MEDICAL CENTER | Age: 72
End: 2022-03-11
Payer: MEDICARE

## 2022-03-11 ENCOUNTER — HOSPITAL ENCOUNTER (OUTPATIENT)
Dept: RADIATION ONCOLOGY | Facility: MEDICAL CENTER | Age: 72
End: 2022-03-11
Payer: MEDICARE

## 2022-03-11 LAB
CHEMOTHERAPY INFUSION START DATE: NORMAL
CHEMOTHERAPY RECORDS: 1.8
CHEMOTHERAPY RECORDS: 5040
CHEMOTHERAPY RECORDS: NORMAL
CHEMOTHERAPY RX CANCER: NORMAL
DATE 1ST CHEMO CANCER: NORMAL
RAD ONC ARIA COURSE LAST TREATMENT DATE: NORMAL
RAD ONC ARIA COURSE TREATMENT ELAPSED DAYS: NORMAL
RAD ONC ARIA REFERENCE POINT DOSAGE GIVEN TO DATE: 43.2
RAD ONC ARIA REFERENCE POINT DOSAGE GIVEN TO DATE: 45.37
RAD ONC ARIA REFERENCE POINT ID: NORMAL
RAD ONC ARIA REFERENCE POINT ID: NORMAL
RAD ONC ARIA REFERENCE POINT SESSION DOSAGE GIVEN: 1.8
RAD ONC ARIA REFERENCE POINT SESSION DOSAGE GIVEN: 1.89

## 2022-03-11 PROCEDURE — 77385 HCHG IMRT DELIVERY SIMPLE: CPT | Performed by: RADIOLOGY

## 2022-03-11 PROCEDURE — 77014 PR CT GUIDANCE PLACEMENT RAD THERAPY FIELDS: CPT | Mod: 26 | Performed by: RADIOLOGY

## 2022-03-11 NOTE — TELEPHONE ENCOUNTER
Pt called to see if it would be acceptable for him to miss his Chemo infusion on April 7th as he is traveling down to [facility] for a consult for stem cell. Pt also asked if we could talk to the financial counselor regarding his ins coverage for the stem cell procedure.

## 2022-03-12 LAB
KAPPA LC FREE SER-MCNC: 4.93 MG/L (ref 3.3–19.4)
KAPPA LC FREE/LAMBDA FREE SER NEPH: 0.03 {RATIO} (ref 0.26–1.65)
LAMBDA LC FREE SERPL-MCNC: 167.11 MG/L (ref 5.71–26.3)

## 2022-03-12 RX ORDER — 0.9 % SODIUM CHLORIDE 0.9 %
10 VIAL (ML) INJECTION PRN
Status: CANCELLED | OUTPATIENT
Start: 2022-03-16

## 2022-03-12 RX ORDER — 0.9 % SODIUM CHLORIDE 0.9 %
10 VIAL (ML) INJECTION PRN
Status: CANCELLED | OUTPATIENT
Start: 2022-03-23

## 2022-03-12 RX ORDER — 0.9 % SODIUM CHLORIDE 0.9 %
3 VIAL (ML) INJECTION PRN
Status: CANCELLED | OUTPATIENT
Start: 2022-03-25

## 2022-03-12 RX ORDER — 0.9 % SODIUM CHLORIDE 0.9 %
3 VIAL (ML) INJECTION PRN
Status: CANCELLED | OUTPATIENT
Start: 2022-03-23

## 2022-03-12 RX ORDER — 0.9 % SODIUM CHLORIDE 0.9 %
VIAL (ML) INJECTION PRN
Status: CANCELLED | OUTPATIENT
Start: 2022-03-25

## 2022-03-12 RX ORDER — 0.9 % SODIUM CHLORIDE 0.9 %
10 VIAL (ML) INJECTION PRN
Status: CANCELLED | OUTPATIENT
Start: 2022-03-25

## 2022-03-12 RX ORDER — 0.9 % SODIUM CHLORIDE 0.9 %
10 VIAL (ML) INJECTION PRN
Status: CANCELLED | OUTPATIENT
Start: 2022-03-15

## 2022-03-12 RX ORDER — 0.9 % SODIUM CHLORIDE 0.9 %
3 VIAL (ML) INJECTION PRN
Status: CANCELLED | OUTPATIENT
Start: 2022-03-15

## 2022-03-12 RX ORDER — 0.9 % SODIUM CHLORIDE 0.9 %
3 VIAL (ML) INJECTION PRN
Status: CANCELLED | OUTPATIENT
Start: 2022-03-16

## 2022-03-12 RX ORDER — HEPARIN SODIUM (PORCINE) LOCK FLUSH IV SOLN 100 UNIT/ML 100 UNIT/ML
500 SOLUTION INTRAVENOUS PRN
Status: CANCELLED | OUTPATIENT
Start: 2022-03-25

## 2022-03-12 RX ORDER — HEPARIN SODIUM (PORCINE) LOCK FLUSH IV SOLN 100 UNIT/ML 100 UNIT/ML
500 SOLUTION INTRAVENOUS PRN
Status: CANCELLED | OUTPATIENT
Start: 2022-03-23

## 2022-03-12 RX ORDER — HEPARIN SODIUM (PORCINE) LOCK FLUSH IV SOLN 100 UNIT/ML 100 UNIT/ML
500 SOLUTION INTRAVENOUS PRN
Status: CANCELLED | OUTPATIENT
Start: 2022-03-16

## 2022-03-12 RX ORDER — 0.9 % SODIUM CHLORIDE 0.9 %
VIAL (ML) INJECTION PRN
Status: CANCELLED | OUTPATIENT
Start: 2022-03-23

## 2022-03-12 RX ORDER — ONDANSETRON 2 MG/ML
4 INJECTION INTRAMUSCULAR; INTRAVENOUS PRN
Status: CANCELLED | OUTPATIENT
Start: 2022-03-16

## 2022-03-12 RX ORDER — 0.9 % SODIUM CHLORIDE 0.9 %
VIAL (ML) INJECTION PRN
Status: CANCELLED | OUTPATIENT
Start: 2022-03-16

## 2022-03-12 RX ORDER — ONDANSETRON 2 MG/ML
4 INJECTION INTRAMUSCULAR; INTRAVENOUS PRN
Status: CANCELLED | OUTPATIENT
Start: 2022-03-25

## 2022-03-12 RX ORDER — ONDANSETRON 8 MG/1
8 TABLET, ORALLY DISINTEGRATING ORAL PRN
Status: CANCELLED | OUTPATIENT
Start: 2022-03-25

## 2022-03-12 RX ORDER — SODIUM CHLORIDE 9 MG/ML
INJECTION, SOLUTION INTRAVENOUS CONTINUOUS
Status: CANCELLED | OUTPATIENT
Start: 2022-03-25

## 2022-03-12 RX ORDER — PROCHLORPERAZINE MALEATE 10 MG
10 TABLET ORAL EVERY 6 HOURS PRN
Status: CANCELLED | OUTPATIENT
Start: 2022-03-23

## 2022-03-12 RX ORDER — SODIUM CHLORIDE 9 MG/ML
INJECTION, SOLUTION INTRAVENOUS CONTINUOUS
Status: CANCELLED | OUTPATIENT
Start: 2022-03-23

## 2022-03-12 RX ORDER — SODIUM CHLORIDE 9 MG/ML
INJECTION, SOLUTION INTRAVENOUS CONTINUOUS
Status: CANCELLED | OUTPATIENT
Start: 2022-03-16

## 2022-03-12 RX ORDER — PROCHLORPERAZINE MALEATE 10 MG
10 TABLET ORAL EVERY 6 HOURS PRN
Status: CANCELLED | OUTPATIENT
Start: 2022-03-25

## 2022-03-12 RX ORDER — ONDANSETRON 2 MG/ML
4 INJECTION INTRAMUSCULAR; INTRAVENOUS PRN
Status: CANCELLED | OUTPATIENT
Start: 2022-03-23

## 2022-03-12 RX ORDER — ONDANSETRON 8 MG/1
8 TABLET, ORALLY DISINTEGRATING ORAL PRN
Status: CANCELLED | OUTPATIENT
Start: 2022-03-23

## 2022-03-12 RX ORDER — HEPARIN SODIUM (PORCINE) LOCK FLUSH IV SOLN 100 UNIT/ML 100 UNIT/ML
500 SOLUTION INTRAVENOUS PRN
Status: CANCELLED | OUTPATIENT
Start: 2022-03-15

## 2022-03-12 RX ORDER — PROCHLORPERAZINE MALEATE 10 MG
10 TABLET ORAL EVERY 6 HOURS PRN
Status: CANCELLED | OUTPATIENT
Start: 2022-03-16

## 2022-03-12 RX ORDER — ONDANSETRON 8 MG/1
8 TABLET, ORALLY DISINTEGRATING ORAL PRN
Status: CANCELLED | OUTPATIENT
Start: 2022-03-16

## 2022-03-12 RX ORDER — 0.9 % SODIUM CHLORIDE 0.9 %
VIAL (ML) INJECTION PRN
Status: CANCELLED | OUTPATIENT
Start: 2022-03-15

## 2022-03-14 ENCOUNTER — HOSPITAL ENCOUNTER (OUTPATIENT)
Dept: RADIATION ONCOLOGY | Facility: MEDICAL CENTER | Age: 72
End: 2022-03-14
Payer: MEDICARE

## 2022-03-14 LAB
CHEMOTHERAPY INFUSION START DATE: NORMAL
CHEMOTHERAPY RECORDS: 1.8
CHEMOTHERAPY RECORDS: 5040
CHEMOTHERAPY RECORDS: NORMAL
CHEMOTHERAPY RX CANCER: NORMAL
DATE 1ST CHEMO CANCER: NORMAL
RAD ONC ARIA COURSE LAST TREATMENT DATE: NORMAL
RAD ONC ARIA COURSE TREATMENT ELAPSED DAYS: NORMAL
RAD ONC ARIA REFERENCE POINT DOSAGE GIVEN TO DATE: 45
RAD ONC ARIA REFERENCE POINT DOSAGE GIVEN TO DATE: 47.26
RAD ONC ARIA REFERENCE POINT ID: NORMAL
RAD ONC ARIA REFERENCE POINT ID: NORMAL
RAD ONC ARIA REFERENCE POINT SESSION DOSAGE GIVEN: 1.8
RAD ONC ARIA REFERENCE POINT SESSION DOSAGE GIVEN: 1.89

## 2022-03-14 PROCEDURE — 77385 HCHG IMRT DELIVERY SIMPLE: CPT | Performed by: RADIOLOGY

## 2022-03-14 PROCEDURE — 77427 RADIATION TX MANAGEMENT X5: CPT | Performed by: RADIOLOGY

## 2022-03-14 PROCEDURE — 77014 PR CT GUIDANCE PLACEMENT RAD THERAPY FIELDS: CPT | Mod: 26 | Performed by: RADIOLOGY

## 2022-03-14 NOTE — PROGRESS NOTES
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 He has had 9 cycles of RVD. Light chains are still elevated although the trend is down. Fatigued past 2 weeks or so. XRT just ended 3/17/22. Has appt with SUSANNAH Mccord 4/7. Still on RVD. Light chains still going down. Neuropathy improving! Does have nausea and Zofran not giving relief.     ROS:    Constitutional: No weight loss. Fatigued as per PI  Skin: No rash or jaundice  HENT: No change in eyesight or hearing  Cardiovascular:No chest pain or arrythmia  Respiratory:No cough or SOB  GI:No nausea, vomiting, diarrhea, constipation  :No dysuria or frequency  Musculoskeletal:No bone or joint pain  Neuro:No sx's of neuropathy  Psych: No complaints    PMH:      Allergies   Allergen Reactions   • Grass Pollen(K-O-R-T-Swt Rodrigo) Shortness of Breath   • Pet Dander [Cat Hair Extract] Shortness of Breath and Unspecified   • [place] Unspecified   • [place] (Diagnostic) Shortness of Breath       Past Medical History:   Diagnosis Date   • Breath shortness    • Cancer (HCC)    • Disorder of thyroid     hypothyroid   • High cholesterol    • Light chain myeloma (HCC) 2021   • Prostate cancer (HCC)    • Psychiatric problem     depression        Past Surgical History:   Procedure Laterality Date   • BRACHY THERAPY N/A 12/16/2021    Procedure: BRACHYTHERAPY - PROSTATE SEED AND HYDROGEL SPACER;  Surgeon: Kirk DELANEY M.D.;  Location: SURGERY SAME DAY [facility];  Service: Urology   • UT DX BONE MARROW ASPIRATIONS Left 7/23/2021    Procedure: ASPIRATION, BONE MARROW - DURANT;  Surgeon: Hair Okeefe M.D.;  Location: ENDOSCOPY [facility];  Service: Orthopedics   • UT DX BONE MARROW BIOPSIES Left 7/23/2021    Procedure: BIOPSY, BONE MARROW, USING NEEDLE OR TROCAR;  Surgeon: Hair Okeefe M.D.;  Location: ENDOSCOPY [facility]  "OLE;  Service: Orthopedics   • WRIST FUSION Left         Medications:    Current Outpatient Medications on File Prior to Visit   Medication Sig Dispense Refill   • lenalidomide 25 MG Cap Take 25 mg (1 cap) by mouth on Days 1 - 14 of each 21-day cycle (2 weeks ON & 1 week OFF) 14 Capsule 0   • dexamethasone (DECADRON) 4 MG Tab TAKE 10 TABS ONCE A WEEK 40 Tablet 6   • zolpidem (AMBIEN) 10 MG Tab      • tamsulosin (FLOMAX) 0.4 MG capsule TAKE 2 CAPSULES BY MOUTH 30 MINUTES AFTER DINNER. TAKE 1 CAPSULE BY MOUTH FOR A WEEK THEN INCREASE TO 2 CAPSULES 60 Capsule 5   • Magnesium Hydroxide (MILK OF MAGNESIA PO) Take  by mouth as needed.     • VITAMIN D PO Take 8,000 Units by mouth.     • aspirin EC (ECOTRIN) 81 MG Tablet Delayed Response Take 81 mg by mouth every day.     • valACYclovir (VALTREX) 500 MG Tab Take 500 mg by mouth 2 times a day. No known stop date, TWICE DAILY     • FLUoxetine (PROZAC) 20 MG Cap Take 1 capsule by mouth every day. 90 capsule 4   • Omega-3 Fatty Acids (FISH OIL) 1000 MG Cap capsule Take 1,000 mg by mouth 2 times a day.     • levothyroxine (SYNTHROID) 100 MCG Tab Take 1 tablet by mouth Every morning on an empty stomach. 100 tablet 3   • simvastatin (ZOCOR) 20 MG Tab Take 1 tablet by mouth every evening. 100 tablet 3     No current facility-administered medications on file prior to visit.       Social History     Tobacco Use   • Smoking status: Former Smoker     Years: 20.00     Quit date:      Years since quittin.2   • Smokeless tobacco: Never Used   Substance Use Topics   • Alcohol use: Never        Family History   Problem Relation Age of Onset   • Cancer Neg Hx         Objective    Vitals:    /62 (BP Location: Right arm, Patient Position: Sitting, BP Cuff Size: Adult)   Pulse 98   Temp 36.5 °C (97.7 °F) (Temporal)   Resp 16   Ht 1.68 m (5' 6.14\")   Wt 77.7 kg (171 lb 3 oz)   SpO2 97%   BMI 27.51 kg/m²     Physical Exam:    Appears well-developed and well-nourished. No " distress.    Head -  Normocephalic .   Eyes - Pupils are equal. Conjunctivae normal. No scleral icterus.   Ears - normal hearing  Neurological -   Alert and oriented   Skin - Skin is warm and dry. No rash noted. Not diaphoretic. No erythema. No pallor. No jaundice   Psychiatric -  Normal mood and affect.    Labs:   Results for JAMES GRANT (MRN [id number])    Ref. Range 3/3/2022 15:50 3/10/2022 11:24 3/17/2022 11:29   WBC Latest Ref Range: 4.8 - 10.8 K/uL 2.9 (L) 3.1 (L) 1.7 (LL)   RBC Latest Ref Range: 4.70 - 6.10 M/uL 3.12 (L) 3.04 (L) 2.97 (L)   Hemoglobin Latest Ref Range: 14.0 - 18.0 g/dL 12.0 (L) 11.7 (L) 11.4 (L)   Hematocrit Latest Ref Range: 42.0 - 52.0 % 33.5 (L) 32.5 (L) 32.2 (L)   MCV Latest Ref Range: 81.4 - 97.8 fL 107.4 (H) 106.9 (H) 108.4 (H)   MCH Latest Ref Range: 27.0 - 33.0 pg 38.5 (H) 38.5 (H) 38.4 (H)   MCHC Latest Ref Range: 33.7 - 35.3 g/dL 35.8 (H) 36.0 (H) 35.4 (H)   RDW Latest Ref Range: 35.9 - 50.0 fL 56.1 (H) 54.0 (H) 57.6 (H)   Platelet Count Latest Ref Range: 164 - 446 K/uL 112 (L) 97 (L) 132 (L)   MPV Latest Ref Range: 9.0 - 12.9 fL 10.7 9.9 10.2   Neutrophils-Polys Latest Ref Range: 44.00 - 72.00 % 67.20 65.80 74.10 (H)   Neutrophils (Absolute) Latest Ref Range: 1.82 - 7.42 K/uL 1.96 2.02 1.26 (L)   Lymphocytes Latest Ref Range: 22.00 - 41.00 % 7.20 (L) 9.40 (L) 7.10 (L)   Lymphs (Absolute) Latest Ref Range: 1.00 - 4.80 K/uL 0.21 (L) 0.29 (L) 0.12 (L)   Monocytes Latest Ref Range: 0.00 - 13.40 % 21.20 (H) 22.80 (H) 13.50 (H)   Results for JAMES GRANT (MRN [id number])    Ref. Range 1/27/2022 11:55 2/17/2022 13:55 3/10/2022 11:24   Free Kappa Light Chains Latest Ref Range: 3.30 - 19.40 mg/L 4.21 4.47 4.93   Free Lambda Light Chains Latest Ref Range: 5.71 - 26.30 mg/L 336.00 (H) 214.61 (H) 167.11 (H)   Kappa-Lambda Ratio Latest Ref Range: 0.26 - 1.65  0.01 (L) 0.02 (L) 0.03 (L)       Assessment    Imp:    Visit Diagnosis:    1. Multiple myeloma not having achieved remission  (HCC)     2. Prostate cancer (HCC)       Plan:  Continue RVD for now  Try compazine for nausea  RTC after visit with Sagrario at [facility]    Gary Aaron M.D.

## 2022-03-15 ENCOUNTER — HOSPITAL ENCOUNTER (OUTPATIENT)
Dept: RADIATION ONCOLOGY | Facility: MEDICAL CENTER | Age: 72
End: 2022-03-15
Payer: MEDICARE

## 2022-03-15 LAB
CHEMOTHERAPY INFUSION START DATE: NORMAL
CHEMOTHERAPY RECORDS: 1.8
CHEMOTHERAPY RECORDS: 5040
CHEMOTHERAPY RECORDS: NORMAL
CHEMOTHERAPY RX CANCER: NORMAL
DATE 1ST CHEMO CANCER: NORMAL
RAD ONC ARIA COURSE LAST TREATMENT DATE: NORMAL
RAD ONC ARIA COURSE TREATMENT ELAPSED DAYS: NORMAL
RAD ONC ARIA REFERENCE POINT DOSAGE GIVEN TO DATE: 46.8
RAD ONC ARIA REFERENCE POINT DOSAGE GIVEN TO DATE: 49.15
RAD ONC ARIA REFERENCE POINT ID: NORMAL
RAD ONC ARIA REFERENCE POINT ID: NORMAL
RAD ONC ARIA REFERENCE POINT SESSION DOSAGE GIVEN: 1.8
RAD ONC ARIA REFERENCE POINT SESSION DOSAGE GIVEN: 1.89

## 2022-03-15 PROCEDURE — 77014 PR CT GUIDANCE PLACEMENT RAD THERAPY FIELDS: CPT | Mod: 26 | Performed by: RADIOLOGY

## 2022-03-15 PROCEDURE — 77385 HCHG IMRT DELIVERY SIMPLE: CPT | Performed by: RADIOLOGY

## 2022-03-16 ENCOUNTER — HOSPITAL ENCOUNTER (OUTPATIENT)
Dept: RADIATION ONCOLOGY | Facility: MEDICAL CENTER | Age: 72
End: 2022-03-16
Payer: MEDICARE

## 2022-03-16 VITALS
WEIGHT: 171 LBS | TEMPERATURE: 96.7 F | DIASTOLIC BLOOD PRESSURE: 60 MMHG | BODY MASS INDEX: 27.48 KG/M2 | SYSTOLIC BLOOD PRESSURE: 143 MMHG | HEART RATE: 77 BPM | OXYGEN SATURATION: 95 %

## 2022-03-16 DIAGNOSIS — C61 PROSTATE CANCER (HCC): ICD-10-CM

## 2022-03-16 LAB
CHEMOTHERAPY INFUSION START DATE: NORMAL
CHEMOTHERAPY RECORDS: 1.8
CHEMOTHERAPY RECORDS: 5040
CHEMOTHERAPY RECORDS: NORMAL
CHEMOTHERAPY RX CANCER: NORMAL
DATE 1ST CHEMO CANCER: NORMAL
RAD ONC ARIA COURSE LAST TREATMENT DATE: NORMAL
RAD ONC ARIA COURSE TREATMENT ELAPSED DAYS: NORMAL
RAD ONC ARIA REFERENCE POINT DOSAGE GIVEN TO DATE: 48.6
RAD ONC ARIA REFERENCE POINT DOSAGE GIVEN TO DATE: 51.04
RAD ONC ARIA REFERENCE POINT ID: NORMAL
RAD ONC ARIA REFERENCE POINT ID: NORMAL
RAD ONC ARIA REFERENCE POINT SESSION DOSAGE GIVEN: 1.8
RAD ONC ARIA REFERENCE POINT SESSION DOSAGE GIVEN: 1.89

## 2022-03-16 PROCEDURE — 77014 PR CT GUIDANCE PLACEMENT RAD THERAPY FIELDS: CPT | Mod: 26 | Performed by: RADIOLOGY

## 2022-03-16 PROCEDURE — 77385 HCHG IMRT DELIVERY SIMPLE: CPT | Performed by: RADIOLOGY

## 2022-03-16 ASSESSMENT — FIBROSIS 4 INDEX: FIB4 SCORE: 2.64

## 2022-03-16 ASSESSMENT — PAIN SCALES - GENERAL: PAINLEVEL: NO PAIN

## 2022-03-16 NOTE — ON TREATMENT VISIT
"  ON TREATMENT  NOTE  RADIATION ONCOLOGY DEPARTMENT    Patient name:  Gary Hedrick    Primary Physician:  Elda Box M.D. MRN: [id number]  CSN: [id number]   Referring physician:  Kirk Isaac M.*   : 1950, 71 y.o.     ENCOUNTER DATE:  3/16/2022      DIAGNOSIS:  Prostate cancer (HCC)  Staging form: Prostate, AJCC 8th Edition  - Clinical stage from 10/8/2021: Stage IIB (cT1c, cN0, cM0, PSA: 4.1, Grade Group: 2) - Signed by Kirk DELANEY M.D. on 10/8/2021  Histopathologic type: Adenocarcinoma, NOS  Stage prefix: Initial diagnosis  Prostate specific antigen (PSA) range: Less than 10  Dodson primary pattern: 3  Dodson secondary pattern: 4  Dodson score: 7  Histologic grading system: 5 grade system  Bilateral cancer: Yes  Number of biopsy cores examined: 12  Number of biopsy cores positive: 6  Location of positive needle core biopsies: Both sides      TREATMENT SUMMARY:  Course First Treatment Date 2022  Course Last Treatment Date 2022  Radiation Treatments     Active   Plans   Pelvis   Most recent treatment: Dose planned: 180 cGy (fraction 27 of 28 on 3/16/2022)   Total: Dose planned: 5,040 cGy   Elapsed Days: 37 @ [id number]         Historical   Plans   LDR tracking   Most recent treatment: Dose planned: 9,500 cGy (fraction 1 of 1 on 2021)   Total: Dose planned: 9,500 cGy   Elapsed Days: 0 @ [id number]                     SUBJECTIVE:  IPSS 16/3    VITAL SIGNS:  Vitals 3/16/2022 3/10/2022 3/9/2022 3/3/2022 3/2/2022 2022 2022   SYSTOLIC 143 129 137 138 130 153 149   DIASTOLIC 60 65 60 38 64 71 68   PULSE 77 88 94 86 83 76 77   TEMPERATURE 96.7 97.2 96.9 98 97 97.1 97   RESPIRATIONS - 18 - 18 - 18 -   WEIGHT 171 170.86 170 171.3 169 175.27 170   HEIGHT - 5' 6.142\" - 5' 6.535\" - 5' 6.535\" -   BMI 27.48 kg/m2 27.46 kg/m2 27 kg/m2 27.21 kg/m2 26.84 kg/m2 27.84 kg/m2 26.68 kg/m2   SPO2 95 95 94 98 96 97 93     KPS: 80, Normal activity with effort; some " signs or symptoms of disease (ECOG equivalent 1)  Encounter Vitals  Temperature: 35.9 °C (96.7 °F)  Temp src: Temporal  Blood Pressure : 143/60  Pulse: 77  Pulse Oximetry: 95 %  Weight: 77.6 kg (171 lb)  Pain Score: No pain  Pain Assessment 3/16/2022 3/2/2022 2/23/2022 2/16/2022 2/9/2022 1/13/2022   Pain Score 0 0 0 0 0 1   Some recent data might be hidden          PHYSICAL EXAM:  Physical Exam  Vitals and nursing note reviewed.   Constitutional:       General: He is not in acute distress.     Appearance: He is well-developed.   HENT:      Head: Normocephalic.   Skin:     General: Skin is warm and dry.      Findings: No erythema.   Neurological:      Mental Status: He is alert and oriented to person, place, and time.   Psychiatric:         Behavior: Behavior normal.         Thought Content: Thought content normal.         Judgment: Judgment normal.          Toxicity Assessment 3/16/2022 3/9/2022 3/2/2022 2/23/2022 2/16/2022 2/9/2022   Toxicity Assessment Male Pelvis Male Pelvis Male Pelvis Male Pelvis Male Pelvis Male Pelvis   Fatigue (lethargy, malaise, asthenia) None Increased fatigue over baseline, but not altering normal activities Increased fatigue over baseline, but not altering normal activities Severe (e.g., decrease in performance status by 2 or more ECOG levels or 40% Karnofsky) or loss of ability to perform some activities Moderate (e.g., decrease in performance status by 1 ECOG level or 20% Karnofsky) or causing difficulty performing some activities None   Radiation Dermatitis None None None None Faint erythema or dry desquamation None   Anorexia None Loss of appetite None None None None   Colitis None None None None None None   Constipation Requiring stool softener or dietary modification Requiring stool softener or dietary modification None None None Requiring stool softener or dietary modification   Dehydration None None None None None None   Diarrhea w/o Colostomy Increase of <4 stools/day over  pre-treatment Increase of <4 stools/day over pre-treatment Increase of <4 stools/day over pre-treatment Increase of <4 stools/day over pre-treatment Increase of 4-6 stools/day, or nocternal stools Increase of <4 stools/day over pre-treatment   Flatulence None None None None None None   Nausea Able to eat Able to eat Oral intake significantly decreased Able to eat Able to eat Able to eat   Proctitis None None None None None None   Vomiting None None None None None None   RT - Pain due to RT None None None None None None   Tumor Pain (onset or exacerbation of tumor pain due to treatment) None None None None None None   Dysuria (painful urination) None None None None None None   Urinary Frequency Increase in frequency up to 2x normal Increase in frequency up to 2x normal Normal Normal Increase in frequency up to 2x normal Increase in frequency up to 2x normal   Urinary Urgency Present Present None None Present Present   Bladder Spasms Absent Absent Absent Absent Mild symptoms, not requiring intervention Absent   Incontinence None None None None None None   Urinary Retention Hesitency or dribbling, but no significant residual urine and/or retention occuring during the immediate postoperative period Hesitency or dribbling, but no significant residual urine and/or retention occuring during the immediate postoperative period Hesitency or dribbling, but no significant residual urine and/or retention occuring during the immediate postoperative period Hesitency or dribbling, but no significant residual urine and/or retention occuring during the immediate postoperative period Hesitency or dribbling, but no significant residual urine and/or retention occuring during the immediate postoperative period Hesitency or dribbling, but no significant residual urine and/or retention occuring during the immediate postoperative period       CURRENT MEDICATIONS:    Current Outpatient Medications:   •  lenalidomide 25 MG Cap, Take 25 mg (1  cap) by mouth on Days 1 - 14 of each 21-day cycle (2 weeks ON & 1 week OFF), Disp: 14 Capsule, Rfl: 0  •  dexamethasone (DECADRON) 4 MG Tab, TAKE 10 TABS ONCE A WEEK, Disp: 40 Tablet, Rfl: 6  •  zolpidem (AMBIEN) 10 MG Tab, , Disp: , Rfl:   •  tamsulosin (FLOMAX) 0.4 MG capsule, TAKE 2 CAPSULES BY MOUTH 30 MINUTES AFTER DINNER. TAKE 1 CAPSULE BY MOUTH FOR A WEEK THEN INCREASE TO 2 CAPSULES, Disp: 60 Capsule, Rfl: 5  •  Magnesium Hydroxide (MILK OF MAGNESIA PO), Take  by mouth as needed., Disp: , Rfl:   •  VITAMIN D PO, Take 8,000 Units by mouth., Disp: , Rfl:   •  aspirin EC (ECOTRIN) 81 MG Tablet Delayed Response, Take 81 mg by mouth every day., Disp: , Rfl:   •  valACYclovir (VALTREX) 500 MG Tab, Take 500 mg by mouth 2 times a day. No known stop date, TWICE DAILY, Disp: , Rfl:   •  FLUoxetine (PROZAC) 20 MG Cap, Take 1 capsule by mouth every day., Disp: 90 capsule, Rfl: 4  •  Omega-3 Fatty Acids (FISH OIL) 1000 MG Cap capsule, Take 1,000 mg by mouth 2 times a day., Disp: , Rfl:   •  levothyroxine (SYNTHROID) 100 MCG Tab, Take 1 tablet by mouth Every morning on an empty stomach., Disp: 100 tablet, Rfl: 3  •  simvastatin (ZOCOR) 20 MG Tab, Take 1 tablet by mouth every evening., Disp: 100 tablet, Rfl: 3    LABORATORY DATA:   Lab Results   Component Value Date/Time    SODIUM 140 02/24/2022 11:58 AM    POTASSIUM 3.5 (L) 02/24/2022 11:58 AM    CHLORIDE 103 02/24/2022 11:58 AM    CO2 26 02/24/2022 11:58 AM    GLUCOSE 97 02/24/2022 11:58 AM    BUN 13 02/24/2022 11:58 AM    CREATININE 0.87 02/24/2022 11:58 AM       Lab Results   Component Value Date/Time    WBC 3.1 (L) 03/10/2022 11:24 AM    RBC 3.04 (L) 03/10/2022 11:24 AM    HEMOGLOBIN 11.7 (L) 03/10/2022 11:24 AM    HEMATOCRIT 32.5 (L) 03/10/2022 11:24 AM    .9 (H) 03/10/2022 11:24 AM    MCH 38.5 (H) 03/10/2022 11:24 AM    MCHC 36.0 (H) 03/10/2022 11:24 AM    PLATELETCT 97 (L) 03/10/2022 11:24 AM         RADIOLOGY DATA:  No results found.    IMPRESSION:  Cancer  Staging  Prostate cancer (HCC)  Staging form: Prostate, AJCC 8th Edition  - Clinical stage from 10/8/2021: Stage IIB (cT1c, cN0, cM0, PSA: 4.1, Grade Group: 2) - Signed by Kirk DELANEY M.D. on 10/8/2021      PLAN:  No change in treatment plan   RTC 8 weeks with PSA    Disposition:  Treatment plan and imaging reviewed. Questions answered. Continue therapy outlined.     Kirk DELANEY M.D.    Orders Placed This Encounter   • PROSTATE SPECIFIC AG DIAGNOSTIC

## 2022-03-17 ENCOUNTER — HOSPITAL ENCOUNTER (OUTPATIENT)
Dept: RADIATION ONCOLOGY | Facility: MEDICAL CENTER | Age: 72
End: 2022-03-17

## 2022-03-17 ENCOUNTER — OUTPATIENT INFUSION SERVICES (OUTPATIENT)
Dept: ONCOLOGY | Facility: MEDICAL CENTER | Age: 72
End: 2022-03-17
Attending: INTERNAL MEDICINE
Payer: MEDICARE

## 2022-03-17 VITALS
BODY MASS INDEX: 27.64 KG/M2 | SYSTOLIC BLOOD PRESSURE: 153 MMHG | HEIGHT: 66 IN | RESPIRATION RATE: 18 BRPM | WEIGHT: 171.96 LBS | DIASTOLIC BLOOD PRESSURE: 59 MMHG | HEART RATE: 90 BPM | TEMPERATURE: 98 F | OXYGEN SATURATION: 98 %

## 2022-03-17 DIAGNOSIS — C90.00 MULTIPLE MYELOMA NOT HAVING ACHIEVED REMISSION (HCC): ICD-10-CM

## 2022-03-17 LAB
ALBUMIN SERPL BCP-MCNC: 3.9 G/DL (ref 3.2–4.9)
ALBUMIN/GLOB SERPL: 2.2 G/DL
ALP SERPL-CCNC: 67 U/L (ref 30–99)
ALT SERPL-CCNC: 29 U/L (ref 2–50)
ANION GAP SERPL CALC-SCNC: 11 MMOL/L (ref 7–16)
AST SERPL-CCNC: 19 U/L (ref 12–45)
BASOPHILS # BLD AUTO: 0.6 % (ref 0–1.8)
BASOPHILS # BLD: 0.01 K/UL (ref 0–0.12)
BILIRUB SERPL-MCNC: 0.4 MG/DL (ref 0.1–1.5)
BUN SERPL-MCNC: 12 MG/DL (ref 8–22)
CALCIUM SERPL-MCNC: 9 MG/DL (ref 8.5–10.5)
CHEMOTHERAPY INFUSION START DATE: NORMAL
CHEMOTHERAPY RECORDS: 1.8
CHEMOTHERAPY RECORDS: 5040
CHEMOTHERAPY RECORDS: NORMAL
CHEMOTHERAPY RX CANCER: NORMAL
CHLORIDE SERPL-SCNC: 105 MMOL/L (ref 96–112)
CO2 SERPL-SCNC: 25 MMOL/L (ref 20–33)
CREAT SERPL-MCNC: 0.84 MG/DL (ref 0.5–1.4)
DATE 1ST CHEMO CANCER: NORMAL
EOSINOPHIL # BLD AUTO: 0.07 K/UL (ref 0–0.51)
EOSINOPHIL NFR BLD: 4.1 % (ref 0–6.9)
ERYTHROCYTE [DISTWIDTH] IN BLOOD BY AUTOMATED COUNT: 57.6 FL (ref 35.9–50)
GFR SERPLBLD CREATININE-BSD FMLA CKD-EPI: 94 ML/MIN/1.73 M 2
GLOBULIN SER CALC-MCNC: 1.8 G/DL (ref 1.9–3.5)
GLUCOSE SERPL-MCNC: 126 MG/DL (ref 65–99)
HCT VFR BLD AUTO: 32.2 % (ref 42–52)
HGB BLD-MCNC: 11.4 G/DL (ref 14–18)
IMM GRANULOCYTES # BLD AUTO: 0.01 K/UL (ref 0–0.11)
IMM GRANULOCYTES NFR BLD AUTO: 0.6 % (ref 0–0.9)
LYMPHOCYTES # BLD AUTO: 0.12 K/UL (ref 1–4.8)
LYMPHOCYTES NFR BLD: 7.1 % (ref 22–41)
MCH RBC QN AUTO: 38.4 PG (ref 27–33)
MCHC RBC AUTO-ENTMCNC: 35.4 G/DL (ref 33.7–35.3)
MCV RBC AUTO: 108.4 FL (ref 81.4–97.8)
MONOCYTES # BLD AUTO: 0.23 K/UL (ref 0–0.85)
MONOCYTES NFR BLD AUTO: 13.5 % (ref 0–13.4)
NEUTROPHILS # BLD AUTO: 1.26 K/UL (ref 1.82–7.42)
NEUTROPHILS NFR BLD: 74.1 % (ref 44–72)
NRBC # BLD AUTO: 0 K/UL
NRBC BLD-RTO: 0 /100 WBC
PLATELET # BLD AUTO: 132 K/UL (ref 164–446)
PMV BLD AUTO: 10.2 FL (ref 9–12.9)
POTASSIUM SERPL-SCNC: 3.1 MMOL/L (ref 3.6–5.5)
PROT SERPL-MCNC: 5.7 G/DL (ref 6–8.2)
RAD ONC ARIA COURSE LAST TREATMENT DATE: NORMAL
RAD ONC ARIA COURSE TREATMENT ELAPSED DAYS: NORMAL
RAD ONC ARIA REFERENCE POINT DOSAGE GIVEN TO DATE: 50.4
RAD ONC ARIA REFERENCE POINT DOSAGE GIVEN TO DATE: 52.93
RAD ONC ARIA REFERENCE POINT ID: NORMAL
RAD ONC ARIA REFERENCE POINT ID: NORMAL
RAD ONC ARIA REFERENCE POINT SESSION DOSAGE GIVEN: 1.8
RAD ONC ARIA REFERENCE POINT SESSION DOSAGE GIVEN: 1.89
RBC # BLD AUTO: 2.97 M/UL (ref 4.7–6.1)
SODIUM SERPL-SCNC: 141 MMOL/L (ref 135–145)
WBC # BLD AUTO: 1.7 K/UL (ref 4.8–10.8)

## 2022-03-17 PROCEDURE — 77427 RADIATION TX MANAGEMENT X5: CPT | Performed by: RADIOLOGY

## 2022-03-17 PROCEDURE — 96366 THER/PROPH/DIAG IV INF ADDON: CPT

## 2022-03-17 PROCEDURE — 96365 THER/PROPH/DIAG IV INF INIT: CPT

## 2022-03-17 PROCEDURE — 700111 HCHG RX REV CODE 636 W/ 250 OVERRIDE (IP): Performed by: INTERNAL MEDICINE

## 2022-03-17 PROCEDURE — 77385 HCHG IMRT DELIVERY SIMPLE: CPT | Performed by: RADIOLOGY

## 2022-03-17 PROCEDURE — 80053 COMPREHEN METABOLIC PANEL: CPT

## 2022-03-17 PROCEDURE — 77336 RADIATION PHYSICS CONSULT: CPT | Performed by: RADIOLOGY

## 2022-03-17 PROCEDURE — 85025 COMPLETE CBC W/AUTO DIFF WBC: CPT

## 2022-03-17 PROCEDURE — 700111 HCHG RX REV CODE 636 W/ 250 OVERRIDE (IP): Performed by: NURSE PRACTITIONER

## 2022-03-17 PROCEDURE — 700102 HCHG RX REV CODE 250 W/ 637 OVERRIDE(OP): Performed by: INTERNAL MEDICINE

## 2022-03-17 PROCEDURE — A9270 NON-COVERED ITEM OR SERVICE: HCPCS | Performed by: INTERNAL MEDICINE

## 2022-03-17 PROCEDURE — 96401 CHEMO ANTI-NEOPL SQ/IM: CPT

## 2022-03-17 RX ORDER — DEXAMETHASONE 4 MG/1
4 TABLET ORAL ONCE
Status: COMPLETED | OUTPATIENT
Start: 2022-03-17 | End: 2022-03-17

## 2022-03-17 RX ORDER — POTASSIUM CHLORIDE 7.45 MG/ML
10 INJECTION INTRAVENOUS
Status: COMPLETED | OUTPATIENT
Start: 2022-03-17 | End: 2022-03-17

## 2022-03-17 RX ADMIN — BORTEZOMIB 2.48 MG: 3.5 INJECTION, POWDER, LYOPHILIZED, FOR SOLUTION INTRAVENOUS; SUBCUTANEOUS at 13:08

## 2022-03-17 RX ADMIN — DEXAMETHASONE 4 MG: 4 TABLET ORAL at 13:17

## 2022-03-17 RX ADMIN — POTASSIUM CHLORIDE 10 MEQ: 10 INJECTION, SOLUTION INTRAVENOUS at 14:12

## 2022-03-17 RX ADMIN — POTASSIUM CHLORIDE 10 MEQ: 10 INJECTION, SOLUTION INTRAVENOUS at 13:07

## 2022-03-17 RX ADMIN — POTASSIUM CHLORIDE 10 MEQ: 10 INJECTION, SOLUTION INTRAVENOUS at 15:08

## 2022-03-17 RX ADMIN — POTASSIUM CHLORIDE 10 MEQ: 10 INJECTION, SOLUTION INTRAVENOUS at 16:11

## 2022-03-17 ASSESSMENT — FIBROSIS 4 INDEX: FIB4 SCORE: 2.64

## 2022-03-17 NOTE — PROGRESS NOTES
Chemotherapy Verification - PRIMARY RN  C10 D1      Height = 1.68 m  Weight = 78 kg  BSA = 1.91 m2       Medication: bortezomib  Dose: 1.3 mg/m2  Calculated Dose: 2.48 mg                             (In mg/m2, AUC, mg/kg)         I confirm this process was performed independently with the BSA and all final chemotherapy dosing calculations congruent.  Any discrepancies of 10% or greater have been addressed with the chemotherapy pharmacist. The resolution of the discrepancy has been documented in the EPIC progress notes.

## 2022-03-17 NOTE — PROGRESS NOTES
"Pharmacy Chemotherapy Verification  Patient Name: Gary Hedrick  Dx: MM    Cycle 10, Day 1  Previous treatment = 3/10/22    Regimen: Velcade   *Dosing Reference*  Bortezomib 1.3 mg/m2 subcutaneous on    -MD dosing on Days 1, 8, 15 of a 21 day cycle  Lenalidomide 25 mg PO daily on Days 1-14  Dexamethasone 20 mg PO daily on Days 1-2, 4-5, 8-9, 11-12 OR 40 mg PO on Days 1, 8, and 15  21 day cycle for 3-4 cycles OR until disease progression or unacceptable toxicity  NCCN Guidelines for Multiple Myeloma.V.1.2022  Amadeo P, et al. Blood. 2010;116(5):679-86  Alfaro S, et al/ Blood. 2012;119(19):4375-82  John M, et al. J Clin Oncol. 2014;32(25):2712-7    Allergies:Grass pollen(k-o-r-t-swt jose), Pet dander [cat hair extract], [place] meal, Milk [dairy food allergy], [place], and [place] oil  /59   Pulse 90   Temp 36.7 °C (98 °F) (Temporal)   Resp 18   Ht 1.68 m (5' 6.14\")   Wt 78 kg (171 lb 15.3 oz)   SpO2 98%   BMI 27.64 kg/m²   Body surface area is 1.91 meters squared.    Labs 3/17/22:  ANC~ 1260 Plt = 132k   Hgb = 11.4     SCr = 0.84 mg/dL CrCl ~ 92 mL/min   LFT's = WNL TBili = 0.4     Bortezomib (Velcade) 1.3 mg/m2 x 1.91 m2 = 2.483 mg   <10% difference, okay to treat with final dose = 2.48 mg SubQ      Nick Williamson, PharmD  "

## 2022-03-17 NOTE — PROGRESS NOTES
"Pt ambulatory to Infusion for C10 D1 of Velcade for MM.  Pt w/ no s/s of infection, pt has no complaints at this time.  Labs drawn from LAC w/ 25G butterfly, gauze bandage applied.    Melva from Lab called with critical result of WBC of 1.7 at 1150. Critical lab result read back to Melva.   Dr. Aaron notified of critical lab result at 1227.  Critical lab result read back by Dr. Aaron.    Per Dr Aaron, ok to give Velcade, please replace pt's potassium w/ IV potassium per protocol--TORB.  Pt educated on lab results, and potassium replacement.  Pt educated on why IV replacement rather than oral replacement \"like last time\"  All of pt's questions answered.  PIV placed in right posterior hand by Christin FLOREZ.  40 mEq IV potassium given over 4 hours w/ no AE.  PIV flushed and removed, gauze bandage applied.  Pt left on foot in care of self in NAD.  Pt's next appt confirmed.    "

## 2022-03-17 NOTE — PROGRESS NOTES
Chemotherapy Verification - PRIMARY RN     D1C10      Height = 168cm  Weight = 78kg  BSA = 1.91m^2       Medication: Bortezomib (Velcade)  Dose: 1.3mg/m^2  Calculated Dose: 2.483mg                              I confirm this process was performed independently with the BSA and all final chemotherapy dosing calculations congruent.  Any discrepancies of 10% or greater have been addressed with the chemotherapy pharmacist. The resolution of the discrepancy has been documented in the EPIC progress notes.

## 2022-03-17 NOTE — PROGRESS NOTES
"Pharmacy Chemotherapy Verification    Dx: MM    Protocol: VRd  *Dosing Reference*  Bortezomib 1.3 mg/m2 subcutaneous on    -MD dosing on Days 1, 8, 15 of a 21 day cycle  Lenalidomide 25 mg PO daily on Days 1-14  Dexamethasone 20 mg PO daily on Days 1-2, 4-5, 8-9, 11-12 OR 40 mg PO on Days 1, 8, and 15  21-day cycle for 3-4 cycles OR until disease progression or unacceptable toxicity  NCCN Guidelines for Multiple Myeloma.V.1.2022  Amadeo P, et al. Blood. 2010;116(5):679-86  Dominic S, et al/ Blood. 2012;119(19):4375-82  John M, et al. J Clin Oncol. 2014;32(25):2712-7    Allergies:Grass pollen(k-o-r-t-swt jose), Pet dander [cat hair extract], [place] meal, Milk [dairy food allergy], [place], and [place] oil  /59   Pulse 90   Temp 36.7 °C (98 °F) (Temporal)   Resp 18   Ht 1.68 m (5' 6.14\")   Wt 78 kg (171 lb 15.3 oz)   SpO2 98%   BMI 27.64 kg/m²   Body surface area is 1.91 meters squared.     All labs (3/17/22) within treatment plan parameters.      Drug Order   (Drug name, dose, route, IV Fluid & volume, frequency, number of doses) Cycle 10, Day 1  Previous treatment: C9D15 on 3/10/22   Medication = Bortezomib (Velcade)  Base Dose = 1.3 mg/m2  Calc Dose: Base Dose x 1.91 m2 = 2.483 mg  Final Dose = 2.48 mg  Route = Subcutaneous  Conc = 2.5 mg/mL  Fluid & Volume = 0.99 mL in syringe  Administration = ABDOMEN or THIGH            <10% difference, okay to treat with final dose   By my signature below, I confirm this process was performed independently with the BSA and all final chemotherapy dosing calculations congruent. I have reviewed the above chemotherapy order and that my calculation of the final dose and BSA (when applicable) corroborate those calculations of the  pharmacist. Discrepancies of 10% or greater in the written dose have been addressed and documented within the [facility] Progress notes.      Macarena Allison, PharmD    "

## 2022-03-18 ENCOUNTER — TELEPHONE (OUTPATIENT)
Dept: HEMATOLOGY ONCOLOGY | Facility: MEDICAL CENTER | Age: 72
End: 2022-03-18
Payer: MEDICARE

## 2022-03-18 DIAGNOSIS — C90.00 MULTIPLE MYELOMA NOT HAVING ACHIEVED REMISSION (HCC): Primary | ICD-10-CM

## 2022-03-18 DIAGNOSIS — D80.1 HYPOGAMMAGLOBULINEMIA (HCC): ICD-10-CM

## 2022-03-18 LAB
CHEMOTHERAPY INFUSION START DATE: NORMAL
CHEMOTHERAPY INFUSION STOP DATE: NORMAL
CHEMOTHERAPY RECORDS: 1.8
CHEMOTHERAPY RECORDS: 5040
CHEMOTHERAPY RECORDS: NORMAL
CHEMOTHERAPY RX CANCER: NORMAL
DATE 1ST CHEMO CANCER: NORMAL
RAD ONC ARIA COURSE LAST TREATMENT DATE: NORMAL
RAD ONC ARIA COURSE TREATMENT ELAPSED DAYS: NORMAL
RAD ONC ARIA REFERENCE POINT DOSAGE GIVEN TO DATE: 50.4
RAD ONC ARIA REFERENCE POINT DOSAGE GIVEN TO DATE: 52.93
RAD ONC ARIA REFERENCE POINT ID: NORMAL
RAD ONC ARIA REFERENCE POINT ID: NORMAL

## 2022-03-18 RX ORDER — LENALIDOMIDE 25 MG/1
CAPSULE ORAL
Qty: 14 CAPSULE | Refills: 0 | Status: SHIPPED | OUTPATIENT
Start: 2022-03-18 | End: 2022-04-06 | Stop reason: SDUPTHER

## 2022-03-21 ENCOUNTER — OFFICE VISIT (OUTPATIENT)
Dept: HEMATOLOGY ONCOLOGY | Facility: MEDICAL CENTER | Age: 72
End: 2022-03-21
Payer: MEDICARE

## 2022-03-21 VITALS
WEIGHT: 171.19 LBS | HEART RATE: 98 BPM | SYSTOLIC BLOOD PRESSURE: 124 MMHG | DIASTOLIC BLOOD PRESSURE: 62 MMHG | HEIGHT: 66 IN | OXYGEN SATURATION: 97 % | BODY MASS INDEX: 27.51 KG/M2 | RESPIRATION RATE: 16 BRPM | TEMPERATURE: 97.7 F

## 2022-03-21 DIAGNOSIS — C61 PROSTATE CANCER (HCC): ICD-10-CM

## 2022-03-21 DIAGNOSIS — C90.00 MULTIPLE MYELOMA NOT HAVING ACHIEVED REMISSION (HCC): ICD-10-CM

## 2022-03-21 PROCEDURE — 99213 OFFICE O/P EST LOW 20 MIN: CPT | Performed by: INTERNAL MEDICINE

## 2022-03-21 RX ORDER — ONDANSETRON 2 MG/ML
4 INJECTION INTRAMUSCULAR; INTRAVENOUS PRN
Status: CANCELLED | OUTPATIENT
Start: 2022-04-03

## 2022-03-21 RX ORDER — 0.9 % SODIUM CHLORIDE 0.9 %
10 VIAL (ML) INJECTION PRN
Status: CANCELLED | OUTPATIENT
Start: 2022-04-10

## 2022-03-21 RX ORDER — HEPARIN SODIUM (PORCINE) LOCK FLUSH IV SOLN 100 UNIT/ML 100 UNIT/ML
500 SOLUTION INTRAVENOUS PRN
Status: CANCELLED | OUTPATIENT
Start: 2022-04-03

## 2022-03-21 RX ORDER — PROCHLORPERAZINE MALEATE 10 MG
10 TABLET ORAL EVERY 6 HOURS PRN
Status: CANCELLED | OUTPATIENT
Start: 2022-04-10

## 2022-03-21 RX ORDER — 0.9 % SODIUM CHLORIDE 0.9 %
10 VIAL (ML) INJECTION PRN
Status: CANCELLED | OUTPATIENT
Start: 2022-04-17

## 2022-03-21 RX ORDER — ONDANSETRON 8 MG/1
8 TABLET, ORALLY DISINTEGRATING ORAL PRN
Status: CANCELLED | OUTPATIENT
Start: 2022-04-17

## 2022-03-21 RX ORDER — ONDANSETRON 8 MG/1
8 TABLET, ORALLY DISINTEGRATING ORAL PRN
Status: CANCELLED | OUTPATIENT
Start: 2022-04-03

## 2022-03-21 RX ORDER — 0.9 % SODIUM CHLORIDE 0.9 %
VIAL (ML) INJECTION PRN
Status: CANCELLED | OUTPATIENT
Start: 2022-04-02

## 2022-03-21 RX ORDER — HEPARIN SODIUM (PORCINE) LOCK FLUSH IV SOLN 100 UNIT/ML 100 UNIT/ML
500 SOLUTION INTRAVENOUS PRN
Status: CANCELLED | OUTPATIENT
Start: 2022-04-02

## 2022-03-21 RX ORDER — 0.9 % SODIUM CHLORIDE 0.9 %
3 VIAL (ML) INJECTION PRN
Status: CANCELLED | OUTPATIENT
Start: 2022-04-17

## 2022-03-21 RX ORDER — HEPARIN SODIUM (PORCINE) LOCK FLUSH IV SOLN 100 UNIT/ML 100 UNIT/ML
500 SOLUTION INTRAVENOUS PRN
Status: CANCELLED | OUTPATIENT
Start: 2022-04-17

## 2022-03-21 RX ORDER — 0.9 % SODIUM CHLORIDE 0.9 %
10 VIAL (ML) INJECTION PRN
Status: CANCELLED | OUTPATIENT
Start: 2022-04-03

## 2022-03-21 RX ORDER — ONDANSETRON 2 MG/ML
4 INJECTION INTRAMUSCULAR; INTRAVENOUS PRN
Status: CANCELLED | OUTPATIENT
Start: 2022-04-10

## 2022-03-21 RX ORDER — ONDANSETRON 2 MG/ML
4 INJECTION INTRAMUSCULAR; INTRAVENOUS PRN
Status: CANCELLED | OUTPATIENT
Start: 2022-04-17

## 2022-03-21 RX ORDER — 0.9 % SODIUM CHLORIDE 0.9 %
3 VIAL (ML) INJECTION PRN
Status: CANCELLED | OUTPATIENT
Start: 2022-04-10

## 2022-03-21 RX ORDER — SODIUM CHLORIDE 9 MG/ML
INJECTION, SOLUTION INTRAVENOUS CONTINUOUS
Status: CANCELLED | OUTPATIENT
Start: 2022-04-10

## 2022-03-21 RX ORDER — 0.9 % SODIUM CHLORIDE 0.9 %
3 VIAL (ML) INJECTION PRN
Status: CANCELLED | OUTPATIENT
Start: 2022-04-03

## 2022-03-21 RX ORDER — SODIUM CHLORIDE 9 MG/ML
INJECTION, SOLUTION INTRAVENOUS CONTINUOUS
Status: CANCELLED | OUTPATIENT
Start: 2022-04-17

## 2022-03-21 RX ORDER — ONDANSETRON 8 MG/1
8 TABLET, ORALLY DISINTEGRATING ORAL PRN
Status: CANCELLED | OUTPATIENT
Start: 2022-04-10

## 2022-03-21 RX ORDER — SODIUM CHLORIDE 9 MG/ML
INJECTION, SOLUTION INTRAVENOUS CONTINUOUS
Status: CANCELLED | OUTPATIENT
Start: 2022-04-03

## 2022-03-21 RX ORDER — 0.9 % SODIUM CHLORIDE 0.9 %
3 VIAL (ML) INJECTION PRN
Status: CANCELLED | OUTPATIENT
Start: 2022-04-02

## 2022-03-21 RX ORDER — 0.9 % SODIUM CHLORIDE 0.9 %
VIAL (ML) INJECTION PRN
Status: CANCELLED | OUTPATIENT
Start: 2022-04-03

## 2022-03-21 RX ORDER — 0.9 % SODIUM CHLORIDE 0.9 %
VIAL (ML) INJECTION PRN
Status: CANCELLED | OUTPATIENT
Start: 2022-04-17

## 2022-03-21 RX ORDER — PROCHLORPERAZINE MALEATE 10 MG
10 TABLET ORAL EVERY 6 HOURS PRN
Status: CANCELLED | OUTPATIENT
Start: 2022-04-03

## 2022-03-21 RX ORDER — HEPARIN SODIUM (PORCINE) LOCK FLUSH IV SOLN 100 UNIT/ML 100 UNIT/ML
500 SOLUTION INTRAVENOUS PRN
Status: CANCELLED | OUTPATIENT
Start: 2022-04-10

## 2022-03-21 RX ORDER — 0.9 % SODIUM CHLORIDE 0.9 %
10 VIAL (ML) INJECTION PRN
Status: CANCELLED | OUTPATIENT
Start: 2022-04-02

## 2022-03-21 RX ORDER — PROCHLORPERAZINE MALEATE 10 MG
10 TABLET ORAL EVERY 6 HOURS PRN
Qty: 30 TABLET | Refills: 3 | Status: SHIPPED | OUTPATIENT
Start: 2022-03-21 | End: 2022-04-21

## 2022-03-21 RX ORDER — 0.9 % SODIUM CHLORIDE 0.9 %
VIAL (ML) INJECTION PRN
Status: CANCELLED | OUTPATIENT
Start: 2022-04-10

## 2022-03-21 RX ORDER — PROCHLORPERAZINE MALEATE 10 MG
10 TABLET ORAL EVERY 6 HOURS PRN
Status: CANCELLED | OUTPATIENT
Start: 2022-04-17

## 2022-03-21 ASSESSMENT — PAIN SCALES - GENERAL: PAINLEVEL: NO PAIN

## 2022-03-21 ASSESSMENT — FIBROSIS 4 INDEX: FIB4 SCORE: 1.84

## 2022-03-22 NOTE — RADIATION COMPLETION NOTES
END OF TREATMENT SUMMARY    Patient name:  Gary Hedrick    Primary Physician:  Elda Box M.D. MRN: [id number]  CSN: [id number]   Referring physician:   Kerline Black  : 1950, 71 y.o.       TREATMENT SUMMARY:        Course First Treatment Date 2022    Course Last Treatment Date 2022   Course Elapsed Days 38 @ [id number]   Course Intent Curative     Radiation Therapy Episodes     Active Episodes     Radiation Therapy: Brachytherapy (2021)               Radiation Treatments       Plan Last Treated On Elapsed Days Fractions Treated Prescribed Fraction Dose (cGy) Prescribed Total Dose (cGy)    Pelvis 3/17/2022 38 @ [id number] 28 of 28 180 5,040                Reference Point Last Treated On Elapsed Days Most Recent Session Dose (cGy) Total Dose (cGy)    Pelvis 3/17/2022 38 @ [id number] -- 5,040    Pelvis CP 3/17/2022 38 @ [id number] -- 5,293                Historical Treatments (Plans: 1)    Plan Last Treated On Elapsed Days Fractions Treated Prescribed Fraction Dose (cGy) Prescribed Total Dose (cGy)   LDR tracking 2021 0 @ [id number] 1 of 1 9,500 9,500              Reference Point Last Treated On Elapsed Days Most Recent Session Dose (cGy) Total Dose (cGy)   LDR tracking 2021 0 @ [id number] -- 9,500                               STAGE:   Prostate cancer (HCC)  Staging form: Prostate, AJCC 8th Edition  - Clinical stage from 10/8/2021: Stage IIB (cT1c, cN0, cM0, PSA: 4.1, Grade Group: 2) - Signed by Kirk DELANEY M.D. on 10/8/2021  Histopathologic type: Adenocarcinoma, NOS  Stage prefix: Initial diagnosis  Prostate specific antigen (PSA) range: Less than 10  [place] primary pattern: 3  [place] secondary pattern: 4  [place] score: 7  Histologic grading system: 5 grade system  Bilateral cancer: Yes  Number of biopsy cores examined: 12  Number of biopsy cores positive: 6  Location of positive needle core biopsies: Both sides       TREATMENT  INDICATION:   Unfavorable intermediate risk prostate cancer treated with short-term androgen deprivation therapy, seed implant and supplemental external beam radiotherapy to the pelvis     CONCURRENT SYSTEMIC TREATMENT:   Orgovyx     RT COURSE DISCONTINUED EARLY:   No     PATIENT EXPERIENCE:   Toxicity Assessment 3/16/2022 3/9/2022 3/2/2022 2/23/2022 2/16/2022 2/9/2022   Toxicity Assessment Male Pelvis Male Pelvis Male Pelvis Male Pelvis Male Pelvis Male Pelvis   Fatigue (lethargy, malaise, asthenia) None Increased fatigue over baseline, but not altering normal activities Increased fatigue over baseline, but not altering normal activities Severe (e.g., decrease in performance status by 2 or more ECOG levels or 40% Karn[place]sky) or loss of ability to perform some activities Moderate (e.g., decrease in performance status by 1 ECOG level or 20% Karn[place]sky) or causing difficulty performing some activities None   Radiation Dermatitis None None None None Faint erythema or dry desquamation None   Anorexia None Loss of appetite None None None None   Colitis None None None None None None   Constipation Requiring stool softener or dietary modification Requiring stool softener or dietary modification None None None Requiring stool softener or dietary modification   Dehydration None None None None None None   Diarrhea w/o Colostomy Increase of <4 stools/day over pre-treatment Increase of <4 stools/day over pre-treatment Increase of <4 stools/day over pre-treatment Increase of <4 stools/day over pre-treatment Increase of 4-6 stools/day, or nocternal stools Increase of <4 stools/day over pre-treatment   Flatulence None None None None None None   Nausea Able to eat Able to eat Oral intake significantly decreased Able to eat Able to eat Able to eat   Proctitis None None None None None None   Vomiting None None None None None None   RT - Pain due to RT None None None None None None   Tumor Pain (onset or exacerbation of tumor pain due  to treatment) None None None None None None   Dysuria (painful urination) None None None None None None   Urinary Frequency Increase in frequency up to 2x normal Increase in frequency up to 2x normal Normal Normal Increase in frequency up to 2x normal Increase in frequency up to 2x normal   Urinary Urgency Present Present None None Present Present   Bladder Spasms Absent Absent Absent Absent Mild symptoms, not requiring intervention Absent   Incontinence None None None None None None   Urinary Retention Hesitency or dribbling, but no significant residual urine and/or retention occuring during the immediate postoperative period Hesitency or dribbling, but no significant residual urine and/or retention occuring during the immediate postoperative period Hesitency or dribbling, but no significant residual urine and/or retention occuring during the immediate postoperative period Hesitency or dribbling, but no significant residual urine and/or retention occuring during the immediate postoperative period Hesitency or dribbling, but no significant residual urine and/or retention occuring during the immediate postoperative period Hesitency or dribbling, but no significant residual urine and/or retention occuring during the immediate postoperative period        FOLLOW-UP PLAN:   8 Weeks     COMMENT:          ANATOMIC TARGET SUMMARY    ANATOMIC TARGET MODALITY TECHNIQUE     Pelvis External beam, photons IMRT            COMMENT: Urethral dose less than 45 [place]         DIAGRAMS:      DOSE VOLUME HISTOGRAMS:

## 2022-03-24 ENCOUNTER — OUTPATIENT INFUSION SERVICES (OUTPATIENT)
Dept: ONCOLOGY | Facility: MEDICAL CENTER | Age: 72
End: 2022-03-24
Attending: INTERNAL MEDICINE
Payer: MEDICARE

## 2022-03-24 VITALS
RESPIRATION RATE: 18 BRPM | DIASTOLIC BLOOD PRESSURE: 71 MMHG | BODY MASS INDEX: 28.13 KG/M2 | HEART RATE: 77 BPM | SYSTOLIC BLOOD PRESSURE: 139 MMHG | TEMPERATURE: 98.6 F | WEIGHT: 175.04 LBS | HEIGHT: 66 IN | OXYGEN SATURATION: 98 %

## 2022-03-24 DIAGNOSIS — C90.00 MULTIPLE MYELOMA NOT HAVING ACHIEVED REMISSION (HCC): ICD-10-CM

## 2022-03-24 LAB
BASOPHILS # BLD AUTO: 0.7 % (ref 0–1.8)
BASOPHILS # BLD: 0.02 K/UL (ref 0–0.12)
EOSINOPHIL # BLD AUTO: 0.09 K/UL (ref 0–0.51)
EOSINOPHIL NFR BLD: 3.2 % (ref 0–6.9)
ERYTHROCYTE [DISTWIDTH] IN BLOOD BY AUTOMATED COUNT: 54.4 FL (ref 35.9–50)
HCT VFR BLD AUTO: 31.5 % (ref 42–52)
HGB BLD-MCNC: 11.3 G/DL (ref 14–18)
IMM GRANULOCYTES # BLD AUTO: 0.01 K/UL (ref 0–0.11)
IMM GRANULOCYTES NFR BLD AUTO: 0.4 % (ref 0–0.9)
LYMPHOCYTES # BLD AUTO: 0.22 K/UL (ref 1–4.8)
LYMPHOCYTES NFR BLD: 7.8 % (ref 22–41)
MCH RBC QN AUTO: 38.6 PG (ref 27–33)
MCHC RBC AUTO-ENTMCNC: 35.9 G/DL (ref 33.7–35.3)
MCV RBC AUTO: 107.5 FL (ref 81.4–97.8)
MONOCYTES # BLD AUTO: 0.47 K/UL (ref 0–0.85)
MONOCYTES NFR BLD AUTO: 16.6 % (ref 0–13.4)
NEUTROPHILS # BLD AUTO: 2.02 K/UL (ref 1.82–7.42)
NEUTROPHILS NFR BLD: 71.3 % (ref 44–72)
NRBC # BLD AUTO: 0 K/UL
NRBC BLD-RTO: 0 /100 WBC
PLATELET # BLD AUTO: 95 K/UL (ref 164–446)
PMV BLD AUTO: 10.4 FL (ref 9–12.9)
RBC # BLD AUTO: 2.93 M/UL (ref 4.7–6.1)
WBC # BLD AUTO: 2.8 K/UL (ref 4.8–10.8)

## 2022-03-24 PROCEDURE — 85025 COMPLETE CBC W/AUTO DIFF WBC: CPT

## 2022-03-24 PROCEDURE — 96401 CHEMO ANTI-NEOPL SQ/IM: CPT

## 2022-03-24 PROCEDURE — 700111 HCHG RX REV CODE 636 W/ 250 OVERRIDE (IP): Performed by: NURSE PRACTITIONER

## 2022-03-24 RX ADMIN — BORTEZOMIB 2.5 MG: 3.5 INJECTION, POWDER, LYOPHILIZED, FOR SOLUTION INTRAVENOUS; SUBCUTANEOUS at 12:38

## 2022-03-24 ASSESSMENT — FIBROSIS 4 INDEX: FIB4 SCORE: 1.84

## 2022-03-24 NOTE — PROGRESS NOTES
Chemotherapy Verification - PRIMARY RN      Height = 168 cm  Weight = 79.4 kg  BSA = 1.92 m^2       Medication: bortezomib  Dose: 1.3 mg/m^2  Calculated Dose: 2.496 mg                             (In mg/m2, AUC, mg/kg)           I confirm this process was performed independently with the BSA and all final chemotherapy dosing calculations congruent.  Any discrepancies of 10% or greater have been addressed with the chemotherapy pharmacist. The resolution of the discrepancy has been documented in the EPIC progress notes.

## 2022-03-24 NOTE — PROGRESS NOTES
"Pharmacy Chemotherapy Verification  Patient Name: Gary Hedrick  Dx: MM  Protocol: VRd    Regimen:  Bortezomib 1.3 mg/m2 subcutaneous on    -MD dosing on Days 1, 8, 15 of a 21 day cycle  Lenalidomide 25 mg PO daily on Days 1-14  Dexamethasone 20 mg PO daily on Days 1-2, 4-5, 8-9, 11-12 OR 40 mg PO on Days 1, 8, and 15   21 day cycle for 3-4 cycles OR until disease progression or unacceptable toxicity  *Dosing Reference*  NCCN Guidelines for Multiple Myeloma.V.1.2022  Amadeo P, et al. Blood. 2010;116(5):679-86  Alfaro S, et al/ Blood. 2012;119(19):4377-82  John M, et al. J Clin Oncol. 2014;32(25):2712-7    Allergies:Grass pollen(k-o-r-t-swt jose), Pet dander [cat hair extract], [place] meal, Milk [dairy food allergy], [place], and [place] oil  /71   Pulse 77   Temp 37 °C (98.6 °F) (Temporal)   Resp 18   Ht 1.68 m (5' 6.14\")   Wt 79.4 kg (175 lb 0.7 oz)   SpO2 98%   BMI 28.13 kg/m²   Body surface area is 1.92 meters squared.     Labs 3/24/22  Meet treatment parameters    Drug Order   (Drug name, dose, route, IV Fluid & volume, frequency, number of doses) Cycle 10 Day 8  Previous treatment: 3/17/22   Medication = Bortezomib (Velcade)  Base Dose = 1.3 mg/m2  Calc Dose: Base Dose x 1.92 m2 = 2.496 mg  Final Dose = 2.5 mg  Route = Subcutaneous  [conc] = 2.5 mg/mL  Fluid & Volume = 1 mL in syringe  Administration = ABDOMEN or THIGH            Okay to treat with final dose       "

## 2022-03-24 NOTE — PROGRESS NOTES
"Pharmacy Chemotherapy Verification  Patient Name: Gary Hedrick  Dx: MM    Cycle 10, Day 8  Previous treatment = C10D1 on 3/17/22    Regimen: Velcade   *Dosing Reference*  Bortezomib 1.3 mg/m2 subcutaneous on    -MD dosing on Days 1, 8, 15 of a 21 day cycle  Lenalidomide 25 mg PO daily on Days 1-14  Dexamethasone 20 mg PO daily on Days 1-2, 4-5, 8-9, 11-12 OR 40 mg PO on Days 1, 8, and 15  21 day cycle for 3-4 cycles OR until disease progression or unacceptable toxicity  NCCN Guidelines for Multiple Myeloma.V.1.2022  Amadeo P, et al. Blood. 2010;116(5):679-86  Alfaro S, et al/ Blood. 2012;119(19):4375-82  John M, et al. J Clin Oncol. 2014;32(25):2712-7    Allergies:Grass pollen(k-o-r-t-swt jose), Pet dander [cat hair extract], [place] meal, Milk [dairy food allergy], [place], and [place] oil  /71   Pulse 77   Temp 37 °C (98.6 °F) (Temporal)   Resp 18   Ht 1.68 m (5' 6.14\")   Wt 79.4 kg (175 lb 0.7 oz)   SpO2 98%   BMI 28.13 kg/m²   Body surface area is 1.92 meters squared.    All labs (3/24/22) within treatment plan parameters.      Bortezomib (Velcade) 1.3 mg/m2 x 1.92 m2 = 2.5 mg   <10% difference, okay to treat with final dose = 2.5 mg SubQ      Macarena Allison, PharmD  "

## 2022-03-24 NOTE — PROGRESS NOTES
Gary into [facility] for Day 8/ Cycle 10 of velcade. Pt denied having any new or acute complaints today, reports tolerating past treatments well. Labs drawn as ordered via 23g butterfly needle to LAC, gauze and coban applied to site. Pt given velcade injection subcutaneously as prescribed to RLQ of abdomen, tolerated well, denied having any complaints during or after injection. Next appointment scheduled, Gary discharged home to self care.

## 2022-03-24 NOTE — PROGRESS NOTES
Chemotherapy Verification - SECONDARY RN       Height = 168 cm  Weight = 79.4 kg  BSA = 1.92 m2       Medication: Velcade  Dose: 1.3 mg/m2  Calculated Dose: 2.5 mg                             (In mg/m2, AUC, mg/kg)     I confirm that this process was performed independently.

## 2022-03-31 ENCOUNTER — OUTPATIENT INFUSION SERVICES (OUTPATIENT)
Dept: ONCOLOGY | Facility: MEDICAL CENTER | Age: 72
End: 2022-03-31
Attending: INTERNAL MEDICINE
Payer: MEDICARE

## 2022-03-31 VITALS
DIASTOLIC BLOOD PRESSURE: 64 MMHG | BODY MASS INDEX: 27.85 KG/M2 | WEIGHT: 173.28 LBS | RESPIRATION RATE: 18 BRPM | HEART RATE: 90 BPM | OXYGEN SATURATION: 94 % | TEMPERATURE: 97.5 F | HEIGHT: 66 IN | SYSTOLIC BLOOD PRESSURE: 136 MMHG

## 2022-03-31 DIAGNOSIS — C90.00 MULTIPLE MYELOMA NOT HAVING ACHIEVED REMISSION (HCC): ICD-10-CM

## 2022-03-31 LAB
BASOPHILS # BLD AUTO: 0.3 % (ref 0–1.8)
BASOPHILS # BLD: 0.01 K/UL (ref 0–0.12)
EOSINOPHIL # BLD AUTO: 0.02 K/UL (ref 0–0.51)
EOSINOPHIL NFR BLD: 0.7 % (ref 0–6.9)
ERYTHROCYTE [DISTWIDTH] IN BLOOD BY AUTOMATED COUNT: 56.6 FL (ref 35.9–50)
HCT VFR BLD AUTO: 31.1 % (ref 42–52)
HGB BLD-MCNC: 11 G/DL (ref 14–18)
IMM GRANULOCYTES # BLD AUTO: 0.01 K/UL (ref 0–0.11)
IMM GRANULOCYTES NFR BLD AUTO: 0.3 % (ref 0–0.9)
LYMPHOCYTES # BLD AUTO: 0.3 K/UL (ref 1–4.8)
LYMPHOCYTES NFR BLD: 10.3 % (ref 22–41)
MCH RBC QN AUTO: 38.6 PG (ref 27–33)
MCHC RBC AUTO-ENTMCNC: 35.4 G/DL (ref 33.7–35.3)
MCV RBC AUTO: 109.1 FL (ref 81.4–97.8)
MONOCYTES # BLD AUTO: 0.8 K/UL (ref 0–0.85)
MONOCYTES NFR BLD AUTO: 27.6 % (ref 0–13.4)
NEUTROPHILS # BLD AUTO: 1.76 K/UL (ref 1.82–7.42)
NEUTROPHILS NFR BLD: 60.8 % (ref 44–72)
NRBC # BLD AUTO: 0 K/UL
NRBC BLD-RTO: 0 /100 WBC
PLATELET # BLD AUTO: 89 K/UL (ref 164–446)
PMV BLD AUTO: 10.2 FL (ref 9–12.9)
RBC # BLD AUTO: 2.85 M/UL (ref 4.7–6.1)
WBC # BLD AUTO: 2.9 K/UL (ref 4.8–10.8)

## 2022-03-31 PROCEDURE — 85025 COMPLETE CBC W/AUTO DIFF WBC: CPT

## 2022-03-31 PROCEDURE — 96401 CHEMO ANTI-NEOPL SQ/IM: CPT

## 2022-03-31 PROCEDURE — 83521 IG LIGHT CHAINS FREE EACH: CPT | Mod: 91

## 2022-03-31 PROCEDURE — 700111 HCHG RX REV CODE 636 W/ 250 OVERRIDE (IP): Performed by: NURSE PRACTITIONER

## 2022-03-31 RX ADMIN — BORTEZOMIB 2.5 MG: 3.5 INJECTION, POWDER, LYOPHILIZED, FOR SOLUTION INTRAVENOUS; SUBCUTANEOUS at 12:31

## 2022-03-31 ASSESSMENT — FIBROSIS 4 INDEX: FIB4 SCORE: 2.56

## 2022-03-31 NOTE — PROGRESS NOTES
"Pharmacy Chemotherapy Verification  Patient Name: Gary Hedrick  Dx: MM  Protocol: VRd    Regimen:  Bortezomib 1.3 mg/m2 subcutaneous on    -MD dosing on Days 1, 8, 15 of a 21 day cycle  Lenalidomide 25 mg PO daily on Days 1-14  Dexamethasone 20 mg PO daily on Days 1-2, 4-5, 8-9, 11-12 OR 40 mg PO on Days 1, 8, and 15   21 day cycle for 3-4 cycles OR until disease progression or unacceptable toxicity  *Dosing Reference*  NCCN Guidelines for Multiple Myeloma.V.1.2022  Amadeo P, et al. Blood. 2010;116(5):679-86  Alfaro S, et al/ Blood. 2012;119(19):4375-82  John M, et al. J Clin Oncol. 2014;32(25):2712-7    Allergies:Grass pollen(k-o-r-t-swt jose), Pet dander [cat hair extract], [place] meal, Milk [dairy food allergy], [place], and [place] oil  /64   Pulse 90   Temp 36.4 °C (97.5 °F) (Temporal)   Resp 18   Ht 1.68 m (5' 6.14\")   Wt 78.6 kg (173 lb 4.5 oz)   SpO2 94%   BMI 27.85 kg/m²   Body surface area is 1.92 meters squared.     Labs 3/31/22  Meet treatment parameters    Drug Order   (Drug name, dose, route, IV Fluid & volume, frequency, number of doses) Cycle 10 Day 15  Previous treatment: 3/24/22   Medication = Bortezomib (Velcade)  Base Dose = 1.3 mg/m2  Calc Dose: Base Dose x 1.92 m2 = 2.496 mg  Final Dose = 2.5 mg  Route = Subcutaneous  [conc] = 2.5 mg/mL  Fluid & Volume = 1 mL in syringe  Administration = ABDOMEN or THIGH            < 10 % difference, ok to treat with final dose       Melva Gomez, PharmD, BCOP     "

## 2022-03-31 NOTE — PROGRESS NOTES
Chemotherapy Verification - PRIMARY RN      Height = 168 cm  Weight = 78.6 kg  BSA = 1.92 m^2       Medication: Bortezomib  Dose: 1.3 mg/m^2  Calculated Dose: 2.5 mg                             (In mg/m2, AUC, mg/kg)         I confirm this process was performed independently with the BSA and all final chemotherapy dosing calculations congruent.  Any discrepancies of 10% or greater have been addressed with the chemotherapy pharmacist. The resolution of the discrepancy has been documented in the EPIC progress notes.

## 2022-03-31 NOTE — PROGRESS NOTES
Chemotherapy Verification - SECONDARY RN       Height = 168 cm  Weight = 78.6 kg  BSA = 1.92 m2       Medication: Velcade  Dose: 1.3 mg/m2  Calculated Dose: 2.496 mg                             (In mg/m2, AUC, mg/kg)       I confirm that this process was performed independently.

## 2022-03-31 NOTE — PROGRESS NOTES
"Pharmacy Chemotherapy Verification  Patient Name: Gary Hedrick  Dx: MM    Cycle 10, Day 15  Previous treatment = C10D8 on 3/24/22    Regimen: Velcade   *Dosing Reference*  Bortezomib 1.3 mg/m2 subcutaneous on    -MD dosing on Days 1, 8, 15 of a 21 day cycle  Lenalidomide 25 mg PO daily on Days 1-14  Dexamethasone 20 mg PO daily on Days 1-2, 4-5, 8-9, 11-12 OR 40 mg PO on Days 1, 8, and 15  21 day cycle for 3-4 cycles OR until disease progression or unacceptable toxicity  NCCN Guidelines for Multiple Myeloma.V.1.2022  Amadeo P, et al. Blood. 2010;116(5):679-86  Alfaro S, et al/ Blood. 2012;119(19):4370-82  John M, et al. J Clin Oncol. 2014;32(25):2712-7    Allergies:Grass pollen(k-o-r-t-swt jose), Pet dander [cat hair extract], [place] meal, Milk [dairy food allergy], [place], and [place] oil  /64   Pulse 90   Temp 36.4 °C (97.5 °F) (Temporal)   Resp 18   Ht 1.68 m (5' 6.14\")   Wt 78.6 kg (173 lb 4.5 oz)   SpO2 94%   BMI 27.85 kg/m²   Body surface area is 1.92 meters squared.    All labs (3/31/22) within treatment plan parameters.      Bortezomib (Velcade) 1.3 mg/m2 x 1.92 m2 = 2.5 mg   <10% difference, okay to treat with final dose = 2.5 mg SubQ      Macarena Allison, PharmD  "

## 2022-04-01 NOTE — PROGRESS NOTES
Pt presented to infusion for C10D15 Velcade. He denied s/s of infection or open wounds. CBC drawn from LAC with 23G butterfly, gauze drsg placed. Pt met parameters for tx. Velcade given in abdomen, bandaid placed. Has next appt, left on foot to self care.

## 2022-04-02 LAB
KAPPA LC FREE SER-MCNC: 4.32 MG/L (ref 3.3–19.4)
KAPPA LC FREE/LAMBDA FREE SER NEPH: 0.03 {RATIO} (ref 0.26–1.65)
LAMBDA LC FREE SERPL-MCNC: 141.59 MG/L (ref 5.71–26.3)

## 2022-04-04 ENCOUNTER — TELEPHONE (OUTPATIENT)
Dept: HEMATOLOGY ONCOLOGY | Facility: MEDICAL CENTER | Age: 72
End: 2022-04-04
Payer: MEDICARE

## 2022-04-04 NOTE — TELEPHONE ENCOUNTER
Per request of Kerline MARTIN, called pt to relay recent results from myeloma labs.  Pt did not answer, but RN left detailed message stating that his labs are continuing to trend down & lambda light chains went from 167 to 141 & everything seems to be moving in the right direction.  Left contact info & business hours in case pt had any further questions and/or concerns.

## 2022-04-06 ENCOUNTER — TELEPHONE (OUTPATIENT)
Dept: HEMATOLOGY ONCOLOGY | Facility: MEDICAL CENTER | Age: 72
End: 2022-04-06
Payer: MEDICARE

## 2022-04-06 DIAGNOSIS — C90.00 MULTIPLE MYELOMA NOT HAVING ACHIEVED REMISSION (HCC): ICD-10-CM

## 2022-04-06 RX ORDER — LENALIDOMIDE 25 MG/1
CAPSULE ORAL
Qty: 14 CAPSULE | Refills: 0 | Status: SHIPPED | OUTPATIENT
Start: 2022-04-06 | End: 2022-04-21

## 2022-04-08 ENCOUNTER — TELEPHONE (OUTPATIENT)
Dept: HEMATOLOGY ONCOLOGY | Facility: MEDICAL CENTER | Age: 72
End: 2022-04-08
Payer: MEDICARE

## 2022-04-08 DIAGNOSIS — C90.00 MULTIPLE MYELOMA NOT HAVING ACHIEVED REMISSION (HCC): ICD-10-CM

## 2022-04-08 RX ORDER — ONDANSETRON 8 MG/1
8 TABLET, ORALLY DISINTEGRATING ORAL PRN
Status: CANCELLED | OUTPATIENT
Start: 2022-04-21

## 2022-04-08 RX ORDER — 0.9 % SODIUM CHLORIDE 0.9 %
3 VIAL (ML) INJECTION PRN
Status: CANCELLED | OUTPATIENT
Start: 2022-04-20

## 2022-04-08 RX ORDER — PROCHLORPERAZINE MALEATE 10 MG
10 TABLET ORAL EVERY 6 HOURS PRN
Status: CANCELLED | OUTPATIENT
Start: 2022-05-19

## 2022-04-08 RX ORDER — PROCHLORPERAZINE MALEATE 10 MG
10 TABLET ORAL EVERY 6 HOURS PRN
Status: CANCELLED | OUTPATIENT
Start: 2022-04-21

## 2022-04-08 RX ORDER — SODIUM CHLORIDE 9 MG/ML
INJECTION, SOLUTION INTRAVENOUS CONTINUOUS
Status: CANCELLED | OUTPATIENT
Start: 2022-05-05

## 2022-04-08 RX ORDER — EPINEPHRINE 1 MG/ML(1)
0.5 AMPUL (ML) INJECTION PRN
Status: CANCELLED | OUTPATIENT
Start: 2022-05-05

## 2022-04-08 RX ORDER — ONDANSETRON 8 MG/1
8 TABLET, ORALLY DISINTEGRATING ORAL PRN
Status: CANCELLED | OUTPATIENT
Start: 2022-04-28

## 2022-04-08 RX ORDER — ONDANSETRON 2 MG/ML
4 INJECTION INTRAMUSCULAR; INTRAVENOUS PRN
Status: CANCELLED | OUTPATIENT
Start: 2022-05-05

## 2022-04-08 RX ORDER — ONDANSETRON 2 MG/ML
4 INJECTION INTRAMUSCULAR; INTRAVENOUS PRN
Status: CANCELLED | OUTPATIENT
Start: 2022-05-19

## 2022-04-08 RX ORDER — HEPARIN SODIUM (PORCINE) LOCK FLUSH IV SOLN 100 UNIT/ML 100 UNIT/ML
500 SOLUTION INTRAVENOUS PRN
Status: CANCELLED | OUTPATIENT
Start: 2022-05-19

## 2022-04-08 RX ORDER — DIPHENHYDRAMINE HYDROCHLORIDE 50 MG/ML
50 INJECTION INTRAMUSCULAR; INTRAVENOUS PRN
Status: CANCELLED | OUTPATIENT
Start: 2022-05-19

## 2022-04-08 RX ORDER — DEXAMETHASONE 4 MG/1
20 TABLET ORAL DAILY
Qty: 40 TABLET | Refills: 6 | Status: CANCELLED | OUTPATIENT
Start: 2022-04-16

## 2022-04-08 RX ORDER — 0.9 % SODIUM CHLORIDE 0.9 %
10 VIAL (ML) INJECTION PRN
Status: CANCELLED | OUTPATIENT
Start: 2022-05-19

## 2022-04-08 RX ORDER — METHYLPREDNISOLONE SODIUM SUCCINATE 125 MG/2ML
125 INJECTION, POWDER, LYOPHILIZED, FOR SOLUTION INTRAMUSCULAR; INTRAVENOUS PRN
Status: CANCELLED | OUTPATIENT
Start: 2022-04-19

## 2022-04-08 RX ORDER — PROCHLORPERAZINE MALEATE 10 MG
10 TABLET ORAL EVERY 6 HOURS PRN
Status: CANCELLED | OUTPATIENT
Start: 2022-04-19

## 2022-04-08 RX ORDER — ACETAMINOPHEN 325 MG/1
650 TABLET ORAL ONCE
Status: CANCELLED | OUTPATIENT
Start: 2022-05-05 | End: 2022-05-02

## 2022-04-08 RX ORDER — ONDANSETRON 8 MG/1
8 TABLET, ORALLY DISINTEGRATING ORAL PRN
Status: CANCELLED | OUTPATIENT
Start: 2022-05-19

## 2022-04-08 RX ORDER — ACETAMINOPHEN 325 MG/1
650 TABLET ORAL ONCE
Status: CANCELLED | OUTPATIENT
Start: 2022-04-19 | End: 2022-04-19

## 2022-04-08 RX ORDER — PROCHLORPERAZINE MALEATE 10 MG
10 TABLET ORAL EVERY 6 HOURS PRN
Status: CANCELLED | OUTPATIENT
Start: 2022-04-28

## 2022-04-08 RX ORDER — 0.9 % SODIUM CHLORIDE 0.9 %
3 VIAL (ML) INJECTION PRN
Status: CANCELLED | OUTPATIENT
Start: 2022-04-28

## 2022-04-08 RX ORDER — DIPHENHYDRAMINE HYDROCHLORIDE 50 MG/ML
50 INJECTION INTRAMUSCULAR; INTRAVENOUS PRN
Status: CANCELLED | OUTPATIENT
Start: 2022-05-05

## 2022-04-08 RX ORDER — HEPARIN SODIUM (PORCINE) LOCK FLUSH IV SOLN 100 UNIT/ML 100 UNIT/ML
500 SOLUTION INTRAVENOUS PRN
Status: CANCELLED | OUTPATIENT
Start: 2022-04-28

## 2022-04-08 RX ORDER — ACYCLOVIR 400 MG/1
400 TABLET ORAL 2 TIMES DAILY
Qty: 60 TABLET | Refills: 3 | Status: CANCELLED | OUTPATIENT
Start: 2022-04-16

## 2022-04-08 RX ORDER — 0.9 % SODIUM CHLORIDE 0.9 %
10 VIAL (ML) INJECTION PRN
Status: CANCELLED | OUTPATIENT
Start: 2022-04-19

## 2022-04-08 RX ORDER — METHYLPREDNISOLONE SODIUM SUCCINATE 125 MG/2ML
125 INJECTION, POWDER, LYOPHILIZED, FOR SOLUTION INTRAMUSCULAR; INTRAVENOUS PRN
Status: CANCELLED | OUTPATIENT
Start: 2022-04-28

## 2022-04-08 RX ORDER — 0.9 % SODIUM CHLORIDE 0.9 %
3 VIAL (ML) INJECTION PRN
Status: CANCELLED | OUTPATIENT
Start: 2022-05-05

## 2022-04-08 RX ORDER — 0.9 % SODIUM CHLORIDE 0.9 %
10 VIAL (ML) INJECTION PRN
Status: CANCELLED | OUTPATIENT
Start: 2022-04-28

## 2022-04-08 RX ORDER — 0.9 % SODIUM CHLORIDE 0.9 %
3 VIAL (ML) INJECTION PRN
Status: CANCELLED | OUTPATIENT
Start: 2022-04-19

## 2022-04-08 RX ORDER — ACETAMINOPHEN 325 MG/1
650 TABLET ORAL ONCE
Status: CANCELLED | OUTPATIENT
Start: 2022-04-21 | End: 2022-04-18

## 2022-04-08 RX ORDER — DIPHENHYDRAMINE HYDROCHLORIDE 50 MG/ML
50 INJECTION INTRAMUSCULAR; INTRAVENOUS PRN
Status: CANCELLED | OUTPATIENT
Start: 2022-04-21

## 2022-04-08 RX ORDER — 0.9 % SODIUM CHLORIDE 0.9 %
VIAL (ML) INJECTION PRN
Status: CANCELLED | OUTPATIENT
Start: 2022-05-19

## 2022-04-08 RX ORDER — ONDANSETRON 4 MG/1
4 TABLET, FILM COATED ORAL EVERY 4 HOURS PRN
Qty: 30 TABLET | Refills: 6 | Status: CANCELLED | OUTPATIENT
Start: 2022-04-16

## 2022-04-08 RX ORDER — ONDANSETRON 2 MG/ML
4 INJECTION INTRAMUSCULAR; INTRAVENOUS PRN
Status: CANCELLED | OUTPATIENT
Start: 2022-04-21

## 2022-04-08 RX ORDER — 0.9 % SODIUM CHLORIDE 0.9 %
10 VIAL (ML) INJECTION PRN
Status: CANCELLED | OUTPATIENT
Start: 2022-04-21

## 2022-04-08 RX ORDER — 0.9 % SODIUM CHLORIDE 0.9 %
10 VIAL (ML) INJECTION PRN
Status: CANCELLED | OUTPATIENT
Start: 2022-04-20

## 2022-04-08 RX ORDER — 0.9 % SODIUM CHLORIDE 0.9 %
VIAL (ML) INJECTION PRN
Status: CANCELLED | OUTPATIENT
Start: 2022-04-20

## 2022-04-08 RX ORDER — EPINEPHRINE 1 MG/ML(1)
0.5 AMPUL (ML) INJECTION PRN
Status: CANCELLED | OUTPATIENT
Start: 2022-04-28

## 2022-04-08 RX ORDER — 0.9 % SODIUM CHLORIDE 0.9 %
VIAL (ML) INJECTION PRN
Status: CANCELLED | OUTPATIENT
Start: 2022-04-28

## 2022-04-08 RX ORDER — SODIUM CHLORIDE 9 MG/ML
INJECTION, SOLUTION INTRAVENOUS CONTINUOUS
Status: CANCELLED | OUTPATIENT
Start: 2022-04-28

## 2022-04-08 RX ORDER — HEPARIN SODIUM (PORCINE) LOCK FLUSH IV SOLN 100 UNIT/ML 100 UNIT/ML
500 SOLUTION INTRAVENOUS PRN
Status: CANCELLED | OUTPATIENT
Start: 2022-04-21

## 2022-04-08 RX ORDER — PROCHLORPERAZINE MALEATE 10 MG
10 TABLET ORAL EVERY 6 HOURS PRN
Status: CANCELLED | OUTPATIENT
Start: 2022-05-05

## 2022-04-08 RX ORDER — SODIUM CHLORIDE 9 MG/ML
INJECTION, SOLUTION INTRAVENOUS CONTINUOUS
Status: CANCELLED | OUTPATIENT
Start: 2022-05-19

## 2022-04-08 RX ORDER — DIPHENHYDRAMINE HYDROCHLORIDE 50 MG/ML
50 INJECTION INTRAMUSCULAR; INTRAVENOUS PRN
Status: CANCELLED | OUTPATIENT
Start: 2022-04-19

## 2022-04-08 RX ORDER — HEPARIN SODIUM (PORCINE) LOCK FLUSH IV SOLN 100 UNIT/ML 100 UNIT/ML
500 SOLUTION INTRAVENOUS PRN
Status: CANCELLED | OUTPATIENT
Start: 2022-04-20

## 2022-04-08 RX ORDER — ONDANSETRON 8 MG/1
8 TABLET, ORALLY DISINTEGRATING ORAL PRN
Status: CANCELLED | OUTPATIENT
Start: 2022-05-05

## 2022-04-08 RX ORDER — HEPARIN SODIUM (PORCINE) LOCK FLUSH IV SOLN 100 UNIT/ML 100 UNIT/ML
500 SOLUTION INTRAVENOUS PRN
Status: CANCELLED | OUTPATIENT
Start: 2022-05-05

## 2022-04-08 RX ORDER — 0.9 % SODIUM CHLORIDE 0.9 %
VIAL (ML) INJECTION PRN
Status: CANCELLED | OUTPATIENT
Start: 2022-04-19

## 2022-04-08 RX ORDER — 0.9 % SODIUM CHLORIDE 0.9 %
3 VIAL (ML) INJECTION PRN
Status: CANCELLED | OUTPATIENT
Start: 2022-05-19

## 2022-04-08 RX ORDER — METHYLPREDNISOLONE SODIUM SUCCINATE 125 MG/2ML
125 INJECTION, POWDER, LYOPHILIZED, FOR SOLUTION INTRAMUSCULAR; INTRAVENOUS PRN
Status: CANCELLED | OUTPATIENT
Start: 2022-05-19

## 2022-04-08 RX ORDER — DIPHENHYDRAMINE HYDROCHLORIDE 50 MG/ML
50 INJECTION INTRAMUSCULAR; INTRAVENOUS PRN
Status: CANCELLED | OUTPATIENT
Start: 2022-04-28

## 2022-04-08 RX ORDER — METHYLPREDNISOLONE SODIUM SUCCINATE 125 MG/2ML
125 INJECTION, POWDER, LYOPHILIZED, FOR SOLUTION INTRAMUSCULAR; INTRAVENOUS PRN
Status: CANCELLED | OUTPATIENT
Start: 2022-04-21

## 2022-04-08 RX ORDER — EPINEPHRINE 1 MG/ML(1)
0.5 AMPUL (ML) INJECTION PRN
Status: CANCELLED | OUTPATIENT
Start: 2022-05-19

## 2022-04-08 RX ORDER — SODIUM CHLORIDE 9 MG/ML
INJECTION, SOLUTION INTRAVENOUS CONTINUOUS
Status: CANCELLED | OUTPATIENT
Start: 2022-04-19

## 2022-04-08 RX ORDER — 0.9 % SODIUM CHLORIDE 0.9 %
10 VIAL (ML) INJECTION PRN
Status: CANCELLED | OUTPATIENT
Start: 2022-05-05

## 2022-04-08 RX ORDER — EPINEPHRINE 1 MG/ML(1)
0.5 AMPUL (ML) INJECTION PRN
Status: CANCELLED | OUTPATIENT
Start: 2022-04-19

## 2022-04-08 RX ORDER — HEPARIN SODIUM (PORCINE) LOCK FLUSH IV SOLN 100 UNIT/ML 100 UNIT/ML
500 SOLUTION INTRAVENOUS PRN
Status: CANCELLED | OUTPATIENT
Start: 2022-04-19

## 2022-04-08 RX ORDER — 0.9 % SODIUM CHLORIDE 0.9 %
VIAL (ML) INJECTION PRN
Status: CANCELLED | OUTPATIENT
Start: 2022-05-05

## 2022-04-08 RX ORDER — ACETAMINOPHEN 325 MG/1
650 TABLET ORAL ONCE
Status: CANCELLED | OUTPATIENT
Start: 2022-05-19 | End: 2022-05-09

## 2022-04-08 RX ORDER — 0.9 % SODIUM CHLORIDE 0.9 %
3 VIAL (ML) INJECTION PRN
Status: CANCELLED | OUTPATIENT
Start: 2022-04-21

## 2022-04-08 RX ORDER — EPINEPHRINE 1 MG/ML(1)
0.5 AMPUL (ML) INJECTION PRN
Status: CANCELLED | OUTPATIENT
Start: 2022-04-21

## 2022-04-08 RX ORDER — ONDANSETRON 8 MG/1
8 TABLET, ORALLY DISINTEGRATING ORAL PRN
Status: CANCELLED | OUTPATIENT
Start: 2022-04-19

## 2022-04-08 RX ORDER — ONDANSETRON 2 MG/ML
4 INJECTION INTRAMUSCULAR; INTRAVENOUS PRN
Status: CANCELLED | OUTPATIENT
Start: 2022-04-28

## 2022-04-08 RX ORDER — ONDANSETRON 2 MG/ML
4 INJECTION INTRAMUSCULAR; INTRAVENOUS PRN
Status: CANCELLED | OUTPATIENT
Start: 2022-04-19

## 2022-04-08 RX ORDER — SODIUM CHLORIDE 9 MG/ML
INJECTION, SOLUTION INTRAVENOUS CONTINUOUS
Status: CANCELLED | OUTPATIENT
Start: 2022-04-21

## 2022-04-08 RX ORDER — PROCHLORPERAZINE MALEATE 10 MG
10 TABLET ORAL EVERY 6 HOURS PRN
Qty: 30 TABLET | Refills: 6 | Status: CANCELLED | OUTPATIENT
Start: 2022-04-16

## 2022-04-08 RX ORDER — 0.9 % SODIUM CHLORIDE 0.9 %
VIAL (ML) INJECTION PRN
Status: CANCELLED | OUTPATIENT
Start: 2022-04-21

## 2022-04-08 RX ORDER — METHYLPREDNISOLONE SODIUM SUCCINATE 125 MG/2ML
125 INJECTION, POWDER, LYOPHILIZED, FOR SOLUTION INTRAMUSCULAR; INTRAVENOUS PRN
Status: CANCELLED | OUTPATIENT
Start: 2022-05-05

## 2022-04-08 RX ORDER — ACETAMINOPHEN 325 MG/1
650 TABLET ORAL ONCE
Status: CANCELLED | OUTPATIENT
Start: 2022-04-28 | End: 2022-04-25

## 2022-04-08 NOTE — PROGRESS NOTES
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 Saw Dr. Zabala last week who wants him switched to Daratumumab plus Pomalidomide in anticipation of autologous stem cell transplant. He was concerned that although they have been declining the light chains are very slowly responding to RVD. Discussed with atient and wife. Would do first infusion as IV slowly and then switch subsequent weeks to s.q. Daratumumab. Will need to find out from [facility] what tests they need from us pre transplant (Dr. Zabala's note not in Care Everywhere yet).     ROS:    Constitutional: No weight loss  Skin: No rash or jaundice  HENT: No change in eyesight or hearing  Cardiovascular:No chest pain or arrythmia  Respiratory:No cough or SOB  GI:No nausea, vomiting, diarrhea, constipation  :No dysuria or frequency  Musculoskeletal:No bone or joint pain  Neuro:No sx's of neuropathy  Psych: No complaints    PMH:      Allergies   Allergen Reactions   • Grass Pollen(K-O-R-T-Swt Rodrigo) Shortness of Breath   • Pet Dander [Cat Hair Extract] Shortness of Breath and Unspecified   • [place] Unspecified   • [place] (Diagnostic) Shortness of Breath       Past Medical History:   Diagnosis Date   • Breath shortness    • Cancer (HCC)    • Disorder of thyroid     hypothyroid   • High cholesterol    • Light chain myeloma (HCC) 2021   • Prostate cancer (HCC)    • Psychiatric problem     depression        Past Surgical History:   Procedure Laterality Date   • BRACHY THERAPY N/A 12/16/2021    Procedure: BRACHYTHERAPY - PROSTATE SEED AND HYDROGEL SPACER;  Surgeon: Kirk DELANEY M.D.;  Location: SURGERY SAME DAY [facility];  Service: Urology   • WA DX BONE MARROW ASPIRATIONS Left 7/23/2021    Procedure: ASPIRATION, BONE MARROW - DURANT;  Surgeon: Hair Okeefe M.D.;  Location: ENDOSCOPY [facility];  Service: Orthopedics   • [place] DX BONE MARROW BIOPSIES Left 7/23/2021    Procedure: BIOPSY, BONE  "MARROW, USING NEEDLE OR TROCAR;  Surgeon: Hair Okeefe M.D.;  Location: [facility];  Service: Orthopedics   • WRIST FUSION Left         Medications:    Current Outpatient Medications on File Prior to Visit   Medication Sig Dispense Refill   • lenalidomide 25 MG Cap Take 25 mg (1 cap) by mouth on Days 1 - 14 of each 21-day cycle (2 weeks ON & 1 week OFF) 14 Capsule 0   • prochlorperazine (COMPAZINE) 10 MG Tab Take 1 Tablet by mouth every 6 hours as needed. 30 Tablet 3   • zolpidem (AMBIEN) 10 MG Tab      • tamsulosin (FLOMAX) 0.4 MG capsule TAKE 2 CAPSULES BY MOUTH 30 MINUTES AFTER DINNER. TAKE 1 CAPSULE BY MOUTH FOR A WEEK THEN INCREASE TO 2 CAPSULES 60 Capsule 5   • Magnesium Hydroxide (MILK OF MAGNESIA PO) Take  by mouth as needed.     • VITAMIN D PO Take 8,000 Units by mouth.     • aspirin EC (ECOTRIN) 81 MG Tablet Delayed Response Take 81 mg by mouth every day.     • valACYclovir (VALTREX) 500 MG Tab Take 500 mg by mouth 2 times a day. No known stop date, TWICE DAILY     • FLUoxetine (PROZAC) 20 MG Cap Take 1 capsule by mouth every day. 90 capsule 4   • Omega-3 Fatty Acids (FISH OIL) 1000 MG Cap capsule Take 1,000 mg by mouth 2 times a day.     • levothyroxine (SYNTHROID) 100 MCG Tab Take 1 tablet by mouth Every morning on an empty stomach. 100 tablet 3   • simvastatin (ZOCOR) 20 MG Tab Take 1 tablet by mouth every evening. 100 tablet 3     No current facility-administered medications on file prior to visit.       Social History     Tobacco Use   • Smoking status: Former Smoker     Years: 20.00     Quit date:      Years since quittin.2   • Smokeless tobacco: Never Used   Substance Use Topics   • Alcohol use: Never        Family History   Problem Relation Age of Onset   • Cancer Neg Hx         Objective    Vitals:    /76 (BP Location: Left arm, Patient Position: Sitting, BP Cuff Size: Adult)   Pulse 84   Temp 36.3 °C (97.4 °F) (Temporal)   Resp 16   Ht 1.68 m (5' 6.14\")   Wt 79.5 " kg (175 lb 6 oz)   SpO2 95%   BMI 28.18 kg/m²     Physical Exam:    Appears well-developed and well-nourished. No distress.    Head -  Normocephalic .   Eyes - Pupils are equal. Conjunctivae normal. No scleral icterus.   Ears - normal hearing  Neurological -   Alert and oriented.  Skin -  No rash noted. Not diaphoretic. No erythema. No pallor. No jaundice   Psychiatric -  Normal mood and affect.    Labs:  Results for JAMES GRANT (MRN [id number])   Ref. Range 3/31/2022 11:30   WBC Latest Ref Range: 4.8 - 10.8 K/uL 2.9 (L)   RBC Latest Ref Range: 4.70 - 6.10 M/uL 2.85 (L)   Hemoglobin Latest Ref Range: 14.0 - 18.0 g/dL 11.0 (L)   Hematocrit Latest Ref Range: 42.0 - 52.0 % 31.1 (L)   MCV Latest Ref Range: 81.4 - 97.8 fL 109.1 (H)   MCH Latest Ref Range: 27.0 - 33.0 pg 38.6 (H)   MCHC Latest Ref Range: 33.7 - 35.3 g/dL 35.4 (H)   RDW Latest Ref Range: 35.9 - 50.0 fL 56.6 (H)   Platelet Count Latest Ref Range: 164 - 446 K/uL 89 (L)   MPV Latest Ref Range: 9.0 - 12.9 fL 10.2   Neutrophils-Polys Latest Ref Range: 44.00 - 72.00 % 60.80   Neutrophils (Absolute) Latest Ref Range: 1.82 - 7.42 K/uL 1.76 (L)   Lymphocytes Latest Ref Range: 22.00 - 41.00 % 10.30 (L)   Lymphs (Absolute) Latest Ref Range: 1.00 - 4.80 K/uL 0.30 (L)   Monocytes Latest Ref Range: 0.00 - 13.40 % 27.60 (H)   Results for JAMES GRANT (MRN [id number])    Ref. Range 3/17/2022 11:29   Sodium Latest Ref Range: 135 - 145 mmol/L 141   Potassium Latest Ref Range: 3.6 - 5.5 mmol/L 3.1 (L)   Chloride Latest Ref Range: 96 - 112 mmol/L 105   Co2 Latest Ref Range: 20 - 33 mmol/L 25   Anion Gap Latest Ref Range: 7.0 - 16.0  11.0   Glucose Latest Ref Range: 65 - 99 mg/dL 126 (H)   Bun Latest Ref Range: 8 - 22 mg/dL 12   Creatinine Latest Ref Range: 0.50 - 1.40 mg/dL 0.84   GFR (CKD-EPI) Latest Ref Range: >60 mL/min/1.73 m 2 94   Calcium Latest Ref Range: 8.5 - 10.5 mg/dL 9.0   AST(SGOT) Latest Ref Range: 12 - 45 U/L 19   ALT(SGPT) Latest Ref Range: 2 - 50  U/L 29   Alkaline Phosphatase Latest Ref Range: 30 - 99 U/L 67   Total Bilirubin Latest Ref Range: 0.1 - 1.5 mg/dL 0.4   Albumin Latest Ref Range: 3.2 - 4.9 g/dL 3.9   Total Protein Latest Ref Range: 6.0 - 8.2 g/dL 5.7 (L)   Globulin Latest Ref Range: 1.9 - 3.5 g/dL 1.8 (L)   A-G Ratio Latest Units: g/dL 2.2   Results for JAMES GRANT (MRN [id number])    Ref. Range 1/6/2022 15:51 1/27/2022 11:55 2/17/2022 13:55 3/10/2022 11:24 3/31/2022 11:30   Free Kappa Light Chains Latest Ref Range: 3.30 - 19.40 mg/L 4.05 4.21 4.47 4.93 4.32   Free Lambda Light Chains Latest Ref Range: 5.71 - 26.30 mg/L 383.60 (H) 336.00 (H) 214.61 (H) 167.11 (H) 141.59 (H)       Assessment    Imp:    Visit Diagnosis:    1. Multiple myeloma not having achieved remission (HCC)     2. Prostate cancer (HCC)       Plan:  See HPI. Plant is switch to daratumumab plus Pomalyst as soon as insurance auth and the oral meds are available.     James Aaron M.D.

## 2022-04-08 NOTE — TELEPHONE ENCOUNTER
"Rec'vd verbal order from Dr. Aaron that, per Dr. Stone, pt will be switching treatment plans & will be on Pomalyst instead of Revlimid.    Verbal orders are as follows-  --pomalidomide (Pomalyst) 4 mg PO on Days 1 - 21 of each 28-day cycle  --add lab of \"monoclonal protein FLC serum\" to Day 1 of each cycle  "

## 2022-04-11 ENCOUNTER — OFFICE VISIT (OUTPATIENT)
Dept: HEMATOLOGY ONCOLOGY | Facility: MEDICAL CENTER | Age: 72
End: 2022-04-11
Payer: MEDICARE

## 2022-04-11 ENCOUNTER — TELEPHONE (OUTPATIENT)
Dept: HEMATOLOGY ONCOLOGY | Facility: MEDICAL CENTER | Age: 72
End: 2022-04-11

## 2022-04-11 VITALS
HEIGHT: 66 IN | DIASTOLIC BLOOD PRESSURE: 76 MMHG | RESPIRATION RATE: 16 BRPM | SYSTOLIC BLOOD PRESSURE: 154 MMHG | BODY MASS INDEX: 28.19 KG/M2 | TEMPERATURE: 97.4 F | WEIGHT: 175.38 LBS | HEART RATE: 84 BPM | OXYGEN SATURATION: 95 %

## 2022-04-11 DIAGNOSIS — C61 PROSTATE CANCER (HCC): ICD-10-CM

## 2022-04-11 DIAGNOSIS — C90.00 MULTIPLE MYELOMA NOT HAVING ACHIEVED REMISSION (HCC): ICD-10-CM

## 2022-04-11 PROCEDURE — 99214 OFFICE O/P EST MOD 30 MIN: CPT | Performed by: INTERNAL MEDICINE

## 2022-04-11 ASSESSMENT — FIBROSIS 4 INDEX: FIB4 SCORE: 2.74

## 2022-04-11 ASSESSMENT — PAIN SCALES - GENERAL: PAINLEVEL: NO PAIN

## 2022-04-11 NOTE — TELEPHONE ENCOUNTER
Pomalyst provider-patient agreement form signed with pt today. All materials faxed to [facility] [phone] at this time. Pt familiar with high risk guidelines and had no questions at this time.

## 2022-04-12 DIAGNOSIS — C61 PROSTATE CANCER (HCC): ICD-10-CM

## 2022-04-12 DIAGNOSIS — C90.00 MULTIPLE MYELOMA NOT HAVING ACHIEVED REMISSION (HCC): Primary | ICD-10-CM

## 2022-04-12 DIAGNOSIS — C90.00 MULTIPLE MYELOMA NOT HAVING ACHIEVED REMISSION (HCC): ICD-10-CM

## 2022-04-12 NOTE — TELEPHONE ENCOUNTER
Confirmed [facility] pt-physician agreement form received, provider survey complete. RX e-script for pomalyst sent with auth number attached to [employer] by [place] ([place]):     Pomalidomide (pomalyst) 4mg PO on Days 1 - 21 of each 28 day cycle per Dr. Aaron's verbal order.     Pt insurance info, ID, provider note and facesheet faxed to [facility] [phone]

## 2022-04-14 ENCOUNTER — APPOINTMENT (OUTPATIENT)
Dept: ONCOLOGY | Facility: MEDICAL CENTER | Age: 72
End: 2022-04-14
Attending: INTERNAL MEDICINE
Payer: MEDICARE

## 2022-04-15 DIAGNOSIS — C90.00 MULTIPLE MYELOMA NOT HAVING ACHIEVED REMISSION (HCC): ICD-10-CM

## 2022-04-15 RX ORDER — 0.9 % SODIUM CHLORIDE 0.9 %
10 VIAL (ML) INJECTION PRN
Status: CANCELLED | OUTPATIENT
Start: 2022-06-17

## 2022-04-15 RX ORDER — 0.9 % SODIUM CHLORIDE 0.9 %
3 VIAL (ML) INJECTION PRN
Status: CANCELLED | OUTPATIENT
Start: 2022-06-24

## 2022-04-15 RX ORDER — 0.9 % SODIUM CHLORIDE 0.9 %
10 VIAL (ML) INJECTION PRN
Status: CANCELLED | OUTPATIENT
Start: 2022-06-24

## 2022-04-15 RX ORDER — METHYLPREDNISOLONE SODIUM SUCCINATE 125 MG/2ML
125 INJECTION, POWDER, LYOPHILIZED, FOR SOLUTION INTRAMUSCULAR; INTRAVENOUS PRN
Status: CANCELLED | OUTPATIENT
Start: 2022-06-24

## 2022-04-15 RX ORDER — SODIUM CHLORIDE 9 MG/ML
INJECTION, SOLUTION INTRAVENOUS CONTINUOUS
Status: CANCELLED | OUTPATIENT
Start: 2022-07-01

## 2022-04-15 RX ORDER — HEPARIN SODIUM (PORCINE) LOCK FLUSH IV SOLN 100 UNIT/ML 100 UNIT/ML
500 SOLUTION INTRAVENOUS PRN
Status: CANCELLED | OUTPATIENT
Start: 2022-05-25

## 2022-04-15 RX ORDER — 0.9 % SODIUM CHLORIDE 0.9 %
10 VIAL (ML) INJECTION PRN
Status: CANCELLED | OUTPATIENT
Start: 2022-05-25

## 2022-04-15 RX ORDER — ACETAMINOPHEN 325 MG/1
650 TABLET ORAL ONCE
Status: CANCELLED | OUTPATIENT
Start: 2022-06-10 | End: 2022-05-19

## 2022-04-15 RX ORDER — 0.9 % SODIUM CHLORIDE 0.9 %
VIAL (ML) INJECTION PRN
Status: CANCELLED | OUTPATIENT
Start: 2022-05-25

## 2022-04-15 RX ORDER — 0.9 % SODIUM CHLORIDE 0.9 %
10 VIAL (ML) INJECTION PRN
Status: CANCELLED | OUTPATIENT
Start: 2022-07-01

## 2022-04-15 RX ORDER — 0.9 % SODIUM CHLORIDE 0.9 %
3 VIAL (ML) INJECTION PRN
Status: CANCELLED | OUTPATIENT
Start: 2022-06-17

## 2022-04-15 RX ORDER — HEPARIN SODIUM (PORCINE) LOCK FLUSH IV SOLN 100 UNIT/ML 100 UNIT/ML
500 SOLUTION INTRAVENOUS PRN
Status: CANCELLED | OUTPATIENT
Start: 2022-06-10

## 2022-04-15 RX ORDER — DIPHENHYDRAMINE HYDROCHLORIDE 50 MG/ML
50 INJECTION INTRAMUSCULAR; INTRAVENOUS PRN
Status: CANCELLED | OUTPATIENT
Start: 2022-07-01

## 2022-04-15 RX ORDER — ONDANSETRON 8 MG/1
8 TABLET, ORALLY DISINTEGRATING ORAL PRN
Status: CANCELLED | OUTPATIENT
Start: 2022-06-10

## 2022-04-15 RX ORDER — ONDANSETRON 2 MG/ML
4 INJECTION INTRAMUSCULAR; INTRAVENOUS PRN
Status: CANCELLED | OUTPATIENT
Start: 2022-07-01

## 2022-04-15 RX ORDER — DIPHENHYDRAMINE HYDROCHLORIDE 50 MG/ML
50 INJECTION INTRAMUSCULAR; INTRAVENOUS PRN
Status: CANCELLED | OUTPATIENT
Start: 2022-06-17

## 2022-04-15 RX ORDER — DIPHENHYDRAMINE HYDROCHLORIDE 50 MG/ML
50 INJECTION INTRAMUSCULAR; INTRAVENOUS PRN
Status: CANCELLED | OUTPATIENT
Start: 2022-06-24

## 2022-04-15 RX ORDER — ACETAMINOPHEN 325 MG/1
650 TABLET ORAL ONCE
Status: CANCELLED | OUTPATIENT
Start: 2022-06-24 | End: 2022-06-02

## 2022-04-15 RX ORDER — ACETAMINOPHEN 325 MG/1
650 TABLET ORAL ONCE
Status: CANCELLED | OUTPATIENT
Start: 2022-06-17 | End: 2022-05-26

## 2022-04-15 RX ORDER — 0.9 % SODIUM CHLORIDE 0.9 %
VIAL (ML) INJECTION PRN
Status: CANCELLED | OUTPATIENT
Start: 2022-07-01

## 2022-04-15 RX ORDER — ONDANSETRON 8 MG/1
8 TABLET, ORALLY DISINTEGRATING ORAL PRN
Status: CANCELLED | OUTPATIENT
Start: 2022-07-01

## 2022-04-15 RX ORDER — HEPARIN SODIUM (PORCINE) LOCK FLUSH IV SOLN 100 UNIT/ML 100 UNIT/ML
500 SOLUTION INTRAVENOUS PRN
Status: CANCELLED | OUTPATIENT
Start: 2022-06-17

## 2022-04-15 RX ORDER — 0.9 % SODIUM CHLORIDE 0.9 %
VIAL (ML) INJECTION PRN
Status: CANCELLED | OUTPATIENT
Start: 2022-06-24

## 2022-04-15 RX ORDER — EPINEPHRINE 1 MG/ML(1)
0.5 AMPUL (ML) INJECTION PRN
Status: CANCELLED | OUTPATIENT
Start: 2022-07-01

## 2022-04-15 RX ORDER — PROCHLORPERAZINE MALEATE 10 MG
10 TABLET ORAL EVERY 6 HOURS PRN
Status: CANCELLED | OUTPATIENT
Start: 2022-07-01

## 2022-04-15 RX ORDER — HEPARIN SODIUM (PORCINE) LOCK FLUSH IV SOLN 100 UNIT/ML 100 UNIT/ML
500 SOLUTION INTRAVENOUS PRN
Status: CANCELLED | OUTPATIENT
Start: 2022-07-01

## 2022-04-15 RX ORDER — HEPARIN SODIUM (PORCINE) LOCK FLUSH IV SOLN 100 UNIT/ML 100 UNIT/ML
500 SOLUTION INTRAVENOUS PRN
Status: CANCELLED | OUTPATIENT
Start: 2022-06-24

## 2022-04-15 RX ORDER — SODIUM CHLORIDE 9 MG/ML
INJECTION, SOLUTION INTRAVENOUS CONTINUOUS
Status: CANCELLED | OUTPATIENT
Start: 2022-06-10

## 2022-04-15 RX ORDER — METHYLPREDNISOLONE SODIUM SUCCINATE 125 MG/2ML
125 INJECTION, POWDER, LYOPHILIZED, FOR SOLUTION INTRAMUSCULAR; INTRAVENOUS PRN
Status: CANCELLED | OUTPATIENT
Start: 2022-07-01

## 2022-04-15 RX ORDER — METHYLPREDNISOLONE SODIUM SUCCINATE 125 MG/2ML
125 INJECTION, POWDER, LYOPHILIZED, FOR SOLUTION INTRAMUSCULAR; INTRAVENOUS PRN
Status: CANCELLED | OUTPATIENT
Start: 2022-06-10

## 2022-04-15 RX ORDER — 0.9 % SODIUM CHLORIDE 0.9 %
VIAL (ML) INJECTION PRN
Status: CANCELLED | OUTPATIENT
Start: 2022-06-17

## 2022-04-15 RX ORDER — ACETAMINOPHEN 325 MG/1
650 TABLET ORAL ONCE
Status: CANCELLED | OUTPATIENT
Start: 2022-07-01 | End: 2022-06-09

## 2022-04-15 RX ORDER — ONDANSETRON 8 MG/1
8 TABLET, ORALLY DISINTEGRATING ORAL PRN
Status: CANCELLED | OUTPATIENT
Start: 2022-06-17

## 2022-04-15 RX ORDER — ONDANSETRON 2 MG/ML
4 INJECTION INTRAMUSCULAR; INTRAVENOUS PRN
Status: CANCELLED | OUTPATIENT
Start: 2022-06-24

## 2022-04-15 RX ORDER — ONDANSETRON 2 MG/ML
4 INJECTION INTRAMUSCULAR; INTRAVENOUS PRN
Status: CANCELLED | OUTPATIENT
Start: 2022-06-17

## 2022-04-15 RX ORDER — PROCHLORPERAZINE MALEATE 10 MG
10 TABLET ORAL EVERY 6 HOURS PRN
Status: CANCELLED | OUTPATIENT
Start: 2022-06-24

## 2022-04-15 RX ORDER — 0.9 % SODIUM CHLORIDE 0.9 %
VIAL (ML) INJECTION PRN
Status: CANCELLED | OUTPATIENT
Start: 2022-06-10

## 2022-04-15 RX ORDER — PROCHLORPERAZINE MALEATE 10 MG
10 TABLET ORAL EVERY 6 HOURS PRN
Status: CANCELLED | OUTPATIENT
Start: 2022-06-17

## 2022-04-15 RX ORDER — EPINEPHRINE 1 MG/ML(1)
0.5 AMPUL (ML) INJECTION PRN
Status: CANCELLED | OUTPATIENT
Start: 2022-06-24

## 2022-04-15 RX ORDER — 0.9 % SODIUM CHLORIDE 0.9 %
3 VIAL (ML) INJECTION PRN
Status: CANCELLED | OUTPATIENT
Start: 2022-05-25

## 2022-04-15 RX ORDER — METHYLPREDNISOLONE SODIUM SUCCINATE 125 MG/2ML
125 INJECTION, POWDER, LYOPHILIZED, FOR SOLUTION INTRAMUSCULAR; INTRAVENOUS PRN
Status: CANCELLED | OUTPATIENT
Start: 2022-06-17

## 2022-04-15 RX ORDER — 0.9 % SODIUM CHLORIDE 0.9 %
10 VIAL (ML) INJECTION PRN
Status: CANCELLED | OUTPATIENT
Start: 2022-06-10

## 2022-04-15 RX ORDER — DIPHENHYDRAMINE HYDROCHLORIDE 50 MG/ML
50 INJECTION INTRAMUSCULAR; INTRAVENOUS PRN
Status: CANCELLED | OUTPATIENT
Start: 2022-06-10

## 2022-04-15 RX ORDER — EPINEPHRINE 1 MG/ML(1)
0.5 AMPUL (ML) INJECTION PRN
Status: CANCELLED | OUTPATIENT
Start: 2022-06-17

## 2022-04-15 RX ORDER — EPINEPHRINE 1 MG/ML(1)
0.5 AMPUL (ML) INJECTION PRN
Status: CANCELLED | OUTPATIENT
Start: 2022-06-10

## 2022-04-15 RX ORDER — 0.9 % SODIUM CHLORIDE 0.9 %
3 VIAL (ML) INJECTION PRN
Status: CANCELLED | OUTPATIENT
Start: 2022-06-10

## 2022-04-15 RX ORDER — ONDANSETRON 8 MG/1
8 TABLET, ORALLY DISINTEGRATING ORAL PRN
Status: CANCELLED | OUTPATIENT
Start: 2022-06-24

## 2022-04-15 RX ORDER — 0.9 % SODIUM CHLORIDE 0.9 %
3 VIAL (ML) INJECTION PRN
Status: CANCELLED | OUTPATIENT
Start: 2022-07-01

## 2022-04-15 RX ORDER — SODIUM CHLORIDE 9 MG/ML
INJECTION, SOLUTION INTRAVENOUS CONTINUOUS
Status: CANCELLED | OUTPATIENT
Start: 2022-06-17

## 2022-04-15 RX ORDER — ONDANSETRON 2 MG/ML
4 INJECTION INTRAMUSCULAR; INTRAVENOUS PRN
Status: CANCELLED | OUTPATIENT
Start: 2022-06-10

## 2022-04-15 RX ORDER — PROCHLORPERAZINE MALEATE 10 MG
10 TABLET ORAL EVERY 6 HOURS PRN
Status: CANCELLED | OUTPATIENT
Start: 2022-06-10

## 2022-04-15 RX ORDER — SODIUM CHLORIDE 9 MG/ML
INJECTION, SOLUTION INTRAVENOUS CONTINUOUS
Status: CANCELLED | OUTPATIENT
Start: 2022-06-24

## 2022-04-19 RX ORDER — VALACYCLOVIR HYDROCHLORIDE 500 MG/1
TABLET, FILM COATED ORAL
Qty: 200 TABLET | Refills: 3 | Status: SHIPPED | OUTPATIENT
Start: 2022-04-19 | End: 2022-11-01

## 2022-04-21 ENCOUNTER — TELEPHONE (OUTPATIENT)
Dept: HEMATOLOGY ONCOLOGY | Facility: MEDICAL CENTER | Age: 72
End: 2022-04-21
Payer: MEDICARE

## 2022-04-21 ENCOUNTER — APPOINTMENT (OUTPATIENT)
Dept: ONCOLOGY | Facility: MEDICAL CENTER | Age: 72
End: 2022-04-21
Attending: INTERNAL MEDICINE
Payer: MEDICARE

## 2022-04-21 VITALS
DIASTOLIC BLOOD PRESSURE: 67 MMHG | SYSTOLIC BLOOD PRESSURE: 151 MMHG | OXYGEN SATURATION: 98 % | HEIGHT: 66 IN | WEIGHT: 176.15 LBS | HEART RATE: 76 BPM | BODY MASS INDEX: 28.31 KG/M2 | RESPIRATION RATE: 18 BRPM | TEMPERATURE: 97.6 F

## 2022-04-21 DIAGNOSIS — C90.00 MULTIPLE MYELOMA NOT HAVING ACHIEVED REMISSION (HCC): ICD-10-CM

## 2022-04-21 DIAGNOSIS — C61 PROSTATE CANCER (HCC): ICD-10-CM

## 2022-04-21 LAB
ABO GROUP BLD: NORMAL
ALBUMIN SERPL BCP-MCNC: 4.3 G/DL (ref 3.2–4.9)
ALBUMIN/GLOB SERPL: 2.7 G/DL
ALP SERPL-CCNC: 73 U/L (ref 30–99)
ALT SERPL-CCNC: 43 U/L (ref 2–50)
ANION GAP SERPL CALC-SCNC: 13 MMOL/L (ref 7–16)
AST SERPL-CCNC: 31 U/L (ref 12–45)
BASOPHILS # BLD AUTO: 2.1 % (ref 0–1.8)
BASOPHILS # BLD: 0.04 K/UL (ref 0–0.12)
BILIRUB SERPL-MCNC: 0.5 MG/DL (ref 0.1–1.5)
BLD GP AB SCN SERPL QL: NORMAL
BUN SERPL-MCNC: 19 MG/DL (ref 8–22)
CALCIUM SERPL-MCNC: 9.2 MG/DL (ref 8.5–10.5)
CHLORIDE SERPL-SCNC: 105 MMOL/L (ref 96–112)
CO2 SERPL-SCNC: 22 MMOL/L (ref 20–33)
CREAT SERPL-MCNC: 0.93 MG/DL (ref 0.5–1.4)
EOSINOPHIL # BLD AUTO: 0.1 K/UL (ref 0–0.51)
EOSINOPHIL NFR BLD: 5.2 % (ref 0–6.9)
ERYTHROCYTE [DISTWIDTH] IN BLOOD BY AUTOMATED COUNT: 55.5 FL (ref 35.9–50)
GFR SERPLBLD CREATININE-BSD FMLA CKD-EPI: 88 ML/MIN/1.73 M 2
GLOBULIN SER CALC-MCNC: 1.6 G/DL (ref 1.9–3.5)
GLUCOSE SERPL-MCNC: 106 MG/DL (ref 65–99)
HCT VFR BLD AUTO: 31.7 % (ref 42–52)
HGB BLD-MCNC: 11.4 G/DL (ref 14–18)
IMM GRANULOCYTES # BLD AUTO: 0 K/UL (ref 0–0.11)
IMM GRANULOCYTES NFR BLD AUTO: 0 % (ref 0–0.9)
LYMPHOCYTES # BLD AUTO: 0.35 K/UL (ref 1–4.8)
LYMPHOCYTES NFR BLD: 18 % (ref 22–41)
MCH RBC QN AUTO: 38.6 PG (ref 27–33)
MCHC RBC AUTO-ENTMCNC: 36 G/DL (ref 33.7–35.3)
MCV RBC AUTO: 107.5 FL (ref 81.4–97.8)
MONOCYTES # BLD AUTO: 0.43 K/UL (ref 0–0.85)
MONOCYTES NFR BLD AUTO: 22.2 % (ref 0–13.4)
NEUTROPHILS # BLD AUTO: 1.02 K/UL (ref 1.82–7.42)
NEUTROPHILS NFR BLD: 52.5 % (ref 44–72)
NRBC # BLD AUTO: 0 K/UL
NRBC BLD-RTO: 0 /100 WBC
OUTPT INFUS CBC COMMENT OICOM: ABNORMAL
PLATELET # BLD AUTO: 113 K/UL (ref 164–446)
PMV BLD AUTO: 9.5 FL (ref 9–12.9)
POTASSIUM SERPL-SCNC: 4 MMOL/L (ref 3.6–5.5)
PROT SERPL-MCNC: 5.9 G/DL (ref 6–8.2)
RBC # BLD AUTO: 2.95 M/UL (ref 4.7–6.1)
RH BLD: NORMAL
SODIUM SERPL-SCNC: 140 MMOL/L (ref 135–145)
WBC # BLD AUTO: 1.9 K/UL (ref 4.8–10.8)

## 2022-04-21 PROCEDURE — 86901 BLOOD TYPING SEROLOGIC RH(D): CPT

## 2022-04-21 PROCEDURE — 96415 CHEMO IV INFUSION ADDL HR: CPT

## 2022-04-21 PROCEDURE — 86850 RBC ANTIBODY SCREEN: CPT

## 2022-04-21 PROCEDURE — 85025 COMPLETE CBC W/AUTO DIFF WBC: CPT

## 2022-04-21 PROCEDURE — 86900 BLOOD TYPING SEROLOGIC ABO: CPT

## 2022-04-21 PROCEDURE — 700105 HCHG RX REV CODE 258: Performed by: INTERNAL MEDICINE

## 2022-04-21 PROCEDURE — 80053 COMPREHEN METABOLIC PANEL: CPT

## 2022-04-21 PROCEDURE — 96413 CHEMO IV INFUSION 1 HR: CPT

## 2022-04-21 PROCEDURE — A9270 NON-COVERED ITEM OR SERVICE: HCPCS | Performed by: INTERNAL MEDICINE

## 2022-04-21 PROCEDURE — 96376 TX/PRO/DX INJ SAME DRUG ADON: CPT

## 2022-04-21 PROCEDURE — 700111 HCHG RX REV CODE 636 W/ 250 OVERRIDE (IP): Performed by: INTERNAL MEDICINE

## 2022-04-21 PROCEDURE — 96375 TX/PRO/DX INJ NEW DRUG ADDON: CPT

## 2022-04-21 PROCEDURE — 700102 HCHG RX REV CODE 250 W/ 637 OVERRIDE(OP): Performed by: INTERNAL MEDICINE

## 2022-04-21 RX ORDER — DIPHENHYDRAMINE HYDROCHLORIDE 50 MG/ML
50 INJECTION INTRAMUSCULAR; INTRAVENOUS PRN
Status: COMPLETED | OUTPATIENT
Start: 2022-04-21 | End: 2022-04-21

## 2022-04-21 RX ORDER — METHYLPREDNISOLONE SODIUM SUCCINATE 125 MG/2ML
125 INJECTION, POWDER, LYOPHILIZED, FOR SOLUTION INTRAMUSCULAR; INTRAVENOUS PRN
Status: COMPLETED | OUTPATIENT
Start: 2022-04-21 | End: 2022-04-21

## 2022-04-21 RX ORDER — PALONOSETRON 0.05 MG/ML
0.25 INJECTION, SOLUTION INTRAVENOUS ONCE
Status: COMPLETED | OUTPATIENT
Start: 2022-04-21 | End: 2022-04-21

## 2022-04-21 RX ORDER — ONDANSETRON 2 MG/ML
4 INJECTION INTRAMUSCULAR; INTRAVENOUS PRN
Status: DISCONTINUED | OUTPATIENT
Start: 2022-04-21 | End: 2022-04-21

## 2022-04-21 RX ORDER — ACETAMINOPHEN 325 MG/1
650 TABLET ORAL ONCE
Status: COMPLETED | OUTPATIENT
Start: 2022-04-21 | End: 2022-04-21

## 2022-04-21 RX ORDER — EPINEPHRINE 1 MG/ML(1)
0.5 AMPUL (ML) INJECTION PRN
Status: DISCONTINUED | OUTPATIENT
Start: 2022-04-21 | End: 2022-04-22 | Stop reason: HOSPADM

## 2022-04-21 RX ADMIN — DARATUMUMAB 1200 MG: 100 INJECTION, SOLUTION, CONCENTRATE INTRAVENOUS at 09:30

## 2022-04-21 RX ADMIN — DEXAMETHASONE SODIUM PHOSPHATE 20 MG: 4 INJECTION, SOLUTION INTRA-ARTICULAR; INTRALESIONAL; INTRAMUSCULAR; INTRAVENOUS; SOFT TISSUE at 09:05

## 2022-04-21 RX ADMIN — PALONOSETRON HYDROCHLORIDE 0.25 MG: 0.25 INJECTION, SOLUTION INTRAVENOUS at 11:29

## 2022-04-21 RX ADMIN — ACETAMINOPHEN 650 MG: 325 TABLET ORAL at 08:45

## 2022-04-21 RX ADMIN — DIPHENHYDRAMINE HYDROCHLORIDE 25 MG: 50 INJECTION, SOLUTION INTRAMUSCULAR; INTRAVENOUS at 08:47

## 2022-04-21 RX ADMIN — METHYLPREDNISOLONE SODIUM SUCCINATE 125 MG: 125 INJECTION, POWDER, FOR SOLUTION INTRAMUSCULAR; INTRAVENOUS at 11:11

## 2022-04-21 RX ADMIN — DIPHENHYDRAMINE HYDROCHLORIDE 50 MG: 50 INJECTION INTRAMUSCULAR; INTRAVENOUS at 11:11

## 2022-04-21 ASSESSMENT — FIBROSIS 4 INDEX: FIB4 SCORE: 2.74

## 2022-04-21 NOTE — PROGRESS NOTES
Chemotherapy Verification - SECONDARY RN       Height = 1.68m  Weight = 79.9kg  BSA = 1.93m2       Medication: daratumumab  Dose: 16mg/kg  Calculated Dose: 1278mg                             (In mg/m2, AUC, mg/kg)     I confirm that this process was performed independently.

## 2022-04-21 NOTE — PROGRESS NOTES
"Pharmacy Chemotherapy Calculations Note:    Patient Name: Gary Hedrick        Dx: MM  Cycle: 1, Day 1  Previous treatment: RVD C10D15 = 3/31/22      Protocol: DPD  Daratumumab 16 mg/kg IV on Days 1, 8, 15, and 22 - plan to change to SQ after C1D1  Pomalidomide 4 mg PO daily on Days 1-21  Dexamethasone 20 mg PO on days 1-2, 8-9, 15-16, 22-23  28-day cycle for cycles 1-2  ~followed by~  Daratumumab-hyaluronidase 1800 mg SQ on Days 1 and 15   Pomalidomide 4 mg PO daily on Days 1-21  Dexamethasone 20 mg PO on days 1-2, 8-9, 15-16, 22-23  28-day cycle for cycles 3-6  ~followed by~  Daratumumab-hyaluronidase 1800 mg SQ on Day 1   Pomalidomide 4 mg PO daily on Days 1-21  Dexamethasone 20 mg PO on days 1-2, 8-9, 15-16, 22-23  28-day cycle until disease progression or unacceptable toxicity    NCCN Guidelines for Multiple Myeloma V.1.2022.  Candelaria ESPINO, et al. Blood. 2017;130(8):974-981.    Plan is to treat for one month and recheck myeloma labs in anticipation of transplant            /68   Pulse 90   Temp 36.4 °C (97.6 °F) (Temporal)   Resp 18   Ht 1.68 m (5' 6.14\")   Wt 79.9 kg (176 lb 2.4 oz)   SpO2 98%   BMI 28.31 kg/m²  Body surface area is 1.93 meters squared.  ANC~ 1020  Plt = 113 k    SCr = 0.93 mg/dL CrCl = 84 mL/min  LFT's = WNL  TBili = 0.5    Hep B negative from [facility] 4/7/22     daratumumab (Darzalex) 16 mg/kg x 79.9 kg = 1278 mg   <10% difference, ok to treat with final written dose = 1200 mg (rounded down to vial size per protocol)    Tyler Eason, PharmD, PharmD, BCPS             "

## 2022-04-21 NOTE — PROGRESS NOTES
Chemotherapy Verification - PRIMARY RN  C1 D1      Height = 1.68 m  Weight = 79.9 kg  BSA = 1.93 m2       Medication: daratumumab  Dose: 16 mg/kg  Calculated Dose: 1278.4 mg                             (In mg/m2, AUC, mg/kg)       I confirm this process was performed independently with the BSA and all final chemotherapy dosing calculations congruent.  Any discrepancies of 10% or greater have been addressed with the chemotherapy pharmacist. The resolution of the discrepancy has been documented in the EPIC progress notes.

## 2022-04-21 NOTE — PROGRESS NOTES
"Pharmacy Chemotherapy Verification  Patient Name: Gary Hedrick  Dx: MM  Protocol: Dd    Regimen:  Daratumumab 16 mg/kg IV Cycle 1, Day 1  ~then~  Daratumumab-hyaluronidase 1800 mg subQ Cycle 1, Days 8,15,22  ~then~  Daratumumab-hyaluronidase 1800 mg subQ Cycle 2, Days 1,8,15,22  ~then~  Daratumumab-hyaluronidase 1800 mg subQ Cycle 3-6 , Days 1,15  ~then~  Daratumumab-hyaluronidase 1800 mg subQ Day 1 Cycle 7 and beyond  Dexamethasone 20 mg IV or PO    Q28 day cycles  *Dosing Reference*  NCCN Guidelines Multiple Myeloma v1.2022  1. Milana K, et al. Daratumumab, lenalidomide, and dexamethasone in [place]  patients with relapsed or refractory multiple myeloma: subgroup analyses of the phase 3 POLLUX study. Blood Cancer Journal (2018)8:41   2. Mik H, et al. Ninety-minute daratumumab infusion is safe in multiple myeloma. Leukemia. 2018;32(11):5684-2617.   3. JUAN M Allen. et al. Daratumumab, Lenalidomide, and Dexamethasone for Multiple Myeloma. N Engl J Med. 2016 Oct 6;375(14):5976-4523.   4. RIA Gaona et al. Daratumumab plus pomalidomide and dexamethasone in relapsed and/or refractory multiple myeloma. Blood. 2017 Aug 24;130(8):974-981.     Allergies:Grass pollen(k-o-r-t-swt jose), Pet dander [cat hair extract], [place] meal, Milk [dairy food allergy], Sagebrush, and [place] oil  /68   Pulse 90   Temp 36.4 °C (97.6 °F) (Temporal)   Resp 18   Ht 1.68 m (5' 6.14\")   Wt 79.9 kg (176 lb 2.4 oz)   SpO2 98%   BMI 28.31 kg/m²   Body surface area is 1.93 meters squared.     Labs 4/21/22  Meet treatment parameters    Drug Order   (Drug name, dose, route, IV Fluid & volume, frequency, number of doses) Cycle 1 Day 1  Previous treatment: 3/17/22   Medication = Daratumumab   Base Dose = 16 mg/kg  Calc Dose: Base Dose x 79.9 = 1278.4 mg  Final Dose = 1200 mg  Fluid & Volume = NS 1000 mL  Route = IV Okay to treat with final dose    Rounded to vial size (within 10%) per dose rounding protocol.  Dose rounded down per " administrative directive.

## 2022-04-21 NOTE — PROGRESS NOTES
Pt ambulatory to Infusion for C1 D1 of IV daratumumab for MM.  Pt w/ no s/s of infection, pt has no complaints at this time.  PIV established in left posterior hand, brisk blood return noted, labs drawn per orders flushed per protocol.  COD done prior to therapy.  Pt lab results w/n parameters for tx today.  Pre-meds given prior to therapy.  Daratumumab infused per titration.  1105  Pt reporting itching on chest, feeling SOB and increased RR to Jerica MCDONALD RN.  Infusion stopped.  Pt given rescue meds per tx plan orders and given 500mL NS flush, pt given supplemental O2 for comfort.  Pt monitored until all sx resolved.  Chemo restarted at beginning rate per Dr Aaron.  Per Dr Aaron give pt as much as possible until 1900, which should be close to all volume, then d/c pt.  Pt's ANC barely passed parameters today, ok for pt to receive less than 100% of dose.  1700  Report given to Jerica MCDONALD RN who will finish caring for the pt.  Confirmed pt's next appt.

## 2022-04-21 NOTE — PROGRESS NOTES
Carlitos from Lab called with critical result of WBC of 1.9 at 0822. Critical lab result read back to Carlitos .   This critical lab result is within parameters established by Dr. Aaron for this patient

## 2022-04-22 ENCOUNTER — HOSPITAL ENCOUNTER (OUTPATIENT)
Dept: RADIOLOGY | Facility: MEDICAL CENTER | Age: 72
End: 2022-04-22
Attending: INTERNAL MEDICINE
Payer: MEDICARE

## 2022-04-22 ENCOUNTER — APPOINTMENT (OUTPATIENT)
Dept: ONCOLOGY | Facility: MEDICAL CENTER | Age: 72
End: 2022-04-22
Attending: INTERNAL MEDICINE
Payer: MEDICARE

## 2022-04-22 DIAGNOSIS — C90.00 MULTIPLE MYELOMA NOT HAVING ACHIEVED REMISSION (HCC): ICD-10-CM

## 2022-04-22 PROCEDURE — 78452 HT MUSCLE IMAGE SPECT MULT: CPT | Mod: 26 | Performed by: INTERNAL MEDICINE

## 2022-04-22 PROCEDURE — 700111 HCHG RX REV CODE 636 W/ 250 OVERRIDE (IP): Performed by: INTERNAL MEDICINE

## 2022-04-22 PROCEDURE — 93018 CV STRESS TEST I&R ONLY: CPT | Performed by: INTERNAL MEDICINE

## 2022-04-22 PROCEDURE — A9502 TC99M TETROFOSMIN: HCPCS

## 2022-04-22 RX ORDER — AMINOPHYLLINE 25 MG/ML
100 INJECTION, SOLUTION INTRAVENOUS
Status: DISCONTINUED | OUTPATIENT
Start: 2022-04-22 | End: 2022-04-23 | Stop reason: HOSPADM

## 2022-04-22 RX ORDER — REGADENOSON 0.08 MG/ML
0.4 INJECTION, SOLUTION INTRAVENOUS ONCE
Status: COMPLETED | OUTPATIENT
Start: 2022-04-22 | End: 2022-04-22

## 2022-04-22 RX ADMIN — REGADENOSON 0.4 MG: 0.08 INJECTION, SOLUTION INTRAVENOUS at 09:47

## 2022-04-22 NOTE — PROGRESS NOTES
Darzalex finished without issue. PIV flushed and removed, gauze drsg placed. Pt returns next week, has appt, left on foot to self care.

## 2022-04-22 NOTE — TELEPHONE ENCOUNTER
Rec'vd notification from [employer] that since pt was receiving Revlimid thru this pharm, the pt will need to qualify for R2[employer] financial assistance again specifically for Pomalyst.  New application fax'd to office for [employer] finan assist.      During interim, new Rx for Pomalyst sent to [id number] to verify if pt's insurance will cover cost of med.  Per [id number], a prior auth was needed.  Per [employer] (via CoverMyMeds) a PA is in process for Pomalyst.  RN requested PA to be expedited.  PA # [id number], pt ID# [id number], [employer] contact info for PA status is [phone].  [employer] rep stated she would send a message to PA dept to request it to be expedited, but stated she was not sure it could be since it is already in process.

## 2022-04-22 NOTE — PROCEDURES
Nursing care plan includes knowledge deficit, potential for discomfort, potential for compromised cardiac output. POC includes teaching, comfort measures and reassurance, and access to code cart, cardiology stand by and availability of rapid response team. Pt verbalizes good understanding of benefits and risks of pharmacological cardiac stress test. Informed consent obtained. Lexiscan given, pt complains of GI upset and racing heart. Baseline EKG shows NSR with no significant changes or findings throughout exam. VS stable, symptoms resolved. To waiting room, caffeinated fluids given. Nursing goals met.

## 2022-04-23 NOTE — PROGRESS NOTES
"Pharmacy Chemotherapy Calculations:    Dx: MM    Cycle 1, Day 8  Previous treatment: C1D1 on 4/28/22    Protocol: DPD  *Dosing Reference*   IV on Days 1, 8, 15, and 22 - plan to change to SQ after C1D1   -- 4/28/22: substituted with Darzalex Faspro 1800 mg fixed dose  Pomalidomide 4 mg PO daily on Days 1-21  Dexamethasone 20 mg PO on days 1-2, 8-9, 15-16, 22-23  28-day cycle for cycles 1-2  ~followed by~  Daratumumab-hyaluronidase 1800 mg SQ on Days 1 and 15   Pomalidomide 4 mg PO daily on Days 1-21  Dexamethasone 20 mg PO on days 1-2, 8-9, 15-16, 22-23  28-day cycle for cycles 3-6  ~followed by~  Daratumumab-hyaluronidase 1800 mg SQ on Day 1   Pomalidomide 4 mg PO daily on Days 1-21  Dexamethasone 20 mg PO on days 1-2, 8-9, 15-16, 22-23  28-day cycle until disease progression or unacceptable toxicity  NCCN Guidelines for Multiple Myeloma V.1.2022.  Candelaria A, et al. Blood. 2017;130(8):974-981.       /70   Pulse 74   Temp 36.3 °C (97.3 °F) (Temporal)   Resp 18   Ht 1.68 m (5' 6.14\")   Wt 79.9 kg (176 lb 2.4 oz)   SpO2 96%   BMI 28.31 kg/m²  Body surface area is 1.93 meters squared.    Hep B negative from [facility] 4/7/22    All labs (4/28/22) within treatment plan parameters.       Darzalex-hyaluronidase (Darzalex Faspro) 1800 mg fixed dose   No calculation required, okay to treat with final dose = 1800 mg SQ      Macarena Allison, PharmD  "

## 2022-04-25 NOTE — TELEPHONE ENCOUNTER
Rec'vd notification from Brissa ANDERSON that pt qualified for a alina of $11K thru [employer] (patient ID # [id number]).    Called [phone] to provide [employer] info above.  Pharm rep stated they will contact [employer] for further info needed.

## 2022-04-25 NOTE — TELEPHONE ENCOUNTER
Called [employer] to f/u regarding PA for pomalidomide.  PA was approved (PA # [id number]) from 4/22/22 thru 4/21/23.    Called [phone] to relay PA info.  Pharm rep stated that pomalidomide is ready to be shipped to pt & has a co-pay of $3348.17.  Informed pharm rep that RN will be working with pt to obtain financial assistance thru [facility] Assistance [employer] (BMSPAF).    [facility] for update on [facility]'s application.  [employer] rep stated that pt may qualify for a alina prior to qualifying for [employer] assistance.    Referral place to [facility] for assistance with alina application.

## 2022-04-26 ENCOUNTER — TELEPHONE (OUTPATIENT)
Dept: HEMATOLOGY ONCOLOGY | Facility: MEDICAL CENTER | Age: 72
End: 2022-04-26
Payer: MEDICARE

## 2022-04-26 NOTE — TELEPHONE ENCOUNTER
Confirmed with pt, Pomalyst will be received tomorrow 04/27/22.     Clarification with Dr. Aaron: Pomalyst 4mg on Days 1 thru 14 (2 weeks on and 1 week off) for this first cycle due to the fact pt was neutropenic per Dr. Aaron. Provider states will revisit schedule when refill due for next cycle.  Pt wife, Beatrice, verbalized understanding at this time. States pt will be starting his pomalyst tomorrow.

## 2022-04-27 ENCOUNTER — TELEPHONE (OUTPATIENT)
Dept: HEMATOLOGY ONCOLOGY | Facility: MEDICAL CENTER | Age: 72
End: 2022-04-27
Payer: MEDICARE

## 2022-04-27 NOTE — TELEPHONE ENCOUNTER
Pt's wife called to let us know that the pt received his medication and is starting to take it. Pt's wife also had another question but just wanted me to leave a message to give a call back and did not specify what the question was about.

## 2022-04-27 NOTE — TELEPHONE ENCOUNTER
Called pt spouse Beatrice regarding questions about pt's Pomalyst they received today. Unable to reach Beatrice; message left on pt identifiable voicemail to return RN call with business contact information.

## 2022-04-27 NOTE — TELEPHONE ENCOUNTER
Oral Chemo Compliance review for pomalidomide (Pomalyst)    Current dose:  4 mg PO on Days 1 - 14 (for 1st cycle only d/t neutropenia)    Otherwise 4 mg PO on Days 1 - 21 of each 28-day cycle

## 2022-04-27 NOTE — TELEPHONE ENCOUNTER
Rec'vd call from Alberto at [employer] regarding pending financial assitance application for pomalidomide.  Informed Alberto that pt qualified for a alina thru [employer] which pt is using for co-pay assist at this time.  Alberto stated he will place pt's application on hold for now & check in with office RN monthly in case alina runs out & pt continues with financial assist.   Gabapentin Pregnancy And Lactation Text: This medication is Pregnancy Category C and isn't considered safe during pregnancy. It is excreted in breast milk.

## 2022-04-28 ENCOUNTER — OUTPATIENT INFUSION SERVICES (OUTPATIENT)
Dept: ONCOLOGY | Facility: MEDICAL CENTER | Age: 72
End: 2022-04-28
Attending: INTERNAL MEDICINE
Payer: MEDICARE

## 2022-04-28 ENCOUNTER — PATIENT OUTREACH (OUTPATIENT)
Dept: OTHER | Facility: MEDICAL CENTER | Age: 72
End: 2022-04-28
Payer: MEDICARE

## 2022-04-28 VITALS
RESPIRATION RATE: 18 BRPM | SYSTOLIC BLOOD PRESSURE: 157 MMHG | BODY MASS INDEX: 28.31 KG/M2 | WEIGHT: 176.15 LBS | TEMPERATURE: 97.3 F | HEIGHT: 66 IN | HEART RATE: 74 BPM | DIASTOLIC BLOOD PRESSURE: 70 MMHG | OXYGEN SATURATION: 96 %

## 2022-04-28 DIAGNOSIS — C90.00 MULTIPLE MYELOMA NOT HAVING ACHIEVED REMISSION (HCC): ICD-10-CM

## 2022-04-28 LAB
BASOPHILS # BLD AUTO: 0.8 % (ref 0–1.8)
BASOPHILS # BLD: 0.02 K/UL (ref 0–0.12)
EOSINOPHIL # BLD AUTO: 0.09 K/UL (ref 0–0.51)
EOSINOPHIL NFR BLD: 3.4 % (ref 0–6.9)
ERYTHROCYTE [DISTWIDTH] IN BLOOD BY AUTOMATED COUNT: 57.5 FL (ref 35.9–50)
HCT VFR BLD AUTO: 33.7 % (ref 42–52)
HGB BLD-MCNC: 11.9 G/DL (ref 14–18)
IMM GRANULOCYTES # BLD AUTO: 0.02 K/UL (ref 0–0.11)
IMM GRANULOCYTES NFR BLD AUTO: 0.8 % (ref 0–0.9)
LYMPHOCYTES # BLD AUTO: 0.15 K/UL (ref 1–4.8)
LYMPHOCYTES NFR BLD: 5.7 % (ref 22–41)
MCH RBC QN AUTO: 38.5 PG (ref 27–33)
MCHC RBC AUTO-ENTMCNC: 35.3 G/DL (ref 33.7–35.3)
MCV RBC AUTO: 109.1 FL (ref 81.4–97.8)
MONOCYTES # BLD AUTO: 0.33 K/UL (ref 0–0.85)
MONOCYTES NFR BLD AUTO: 12.6 % (ref 0–13.4)
NEUTROPHILS # BLD AUTO: 2.01 K/UL (ref 1.82–7.42)
NEUTROPHILS NFR BLD: 76.7 % (ref 44–72)
NRBC # BLD AUTO: 0 K/UL
NRBC BLD-RTO: 0 /100 WBC
OUTPT INFUS CBC COMMENT OICOM: ABNORMAL
PLATELET # BLD AUTO: 172 K/UL (ref 164–446)
PMV BLD AUTO: 9.6 FL (ref 9–12.9)
RBC # BLD AUTO: 3.09 M/UL (ref 4.7–6.1)
WBC # BLD AUTO: 2.6 K/UL (ref 4.8–10.8)

## 2022-04-28 PROCEDURE — 96375 TX/PRO/DX INJ NEW DRUG ADDON: CPT

## 2022-04-28 PROCEDURE — 85025 COMPLETE CBC W/AUTO DIFF WBC: CPT

## 2022-04-28 PROCEDURE — 96374 THER/PROPH/DIAG INJ IV PUSH: CPT

## 2022-04-28 PROCEDURE — A9270 NON-COVERED ITEM OR SERVICE: HCPCS | Performed by: INTERNAL MEDICINE

## 2022-04-28 PROCEDURE — 96401 CHEMO ANTI-NEOPL SQ/IM: CPT

## 2022-04-28 PROCEDURE — 700111 HCHG RX REV CODE 636 W/ 250 OVERRIDE (IP): Performed by: INTERNAL MEDICINE

## 2022-04-28 PROCEDURE — 700105 HCHG RX REV CODE 258: Performed by: INTERNAL MEDICINE

## 2022-04-28 PROCEDURE — 700102 HCHG RX REV CODE 250 W/ 637 OVERRIDE(OP): Performed by: INTERNAL MEDICINE

## 2022-04-28 RX ORDER — ACETAMINOPHEN 325 MG/1
650 TABLET ORAL ONCE
Status: COMPLETED | OUTPATIENT
Start: 2022-04-28 | End: 2022-04-28

## 2022-04-28 RX ADMIN — DEXAMETHASONE SODIUM PHOSPHATE 20 MG: 4 INJECTION, SOLUTION INTRA-ARTICULAR; INTRALESIONAL; INTRAMUSCULAR; INTRAVENOUS; SOFT TISSUE at 15:27

## 2022-04-28 RX ADMIN — DIPHENHYDRAMINE HYDROCHLORIDE 25 MG: 50 INJECTION, SOLUTION INTRAMUSCULAR; INTRAVENOUS at 15:15

## 2022-04-28 RX ADMIN — ACETAMINOPHEN 650 MG: 325 TABLET ORAL at 15:15

## 2022-04-28 RX ADMIN — DARATUMUMAB AND HYALURONIDASE-FIHJ (HUMAN RECOMBINANT) 1800 MG: 1800; 30000 INJECTION SUBCUTANEOUS at 16:51

## 2022-04-28 ASSESSMENT — FIBROSIS 4 INDEX: FIB4 SCORE: 2.89

## 2022-04-28 NOTE — PROGRESS NOTES
Oncology Nurse Navigator (ONN) Helen Golden reached out to patient to follow up on new referral.  No answer.  ONN left voice message with my contact information requesting a call back at patient's convenience.

## 2022-04-28 NOTE — PROGRESS NOTES
Chemotherapy Verification - SECONDARY RN       Height = 1.68m  Weight = 79.9kg  BSA = 1.93m2       Medication: daratumumab-hyaluronidase  Dose: flat dose  Calculated Dose: 1800mg                             (In mg/m2, AUC, mg/kg)       I confirm that this process was performed independently.

## 2022-04-28 NOTE — PROGRESS NOTES
"Pharmacy Chemotherapy Calculation    Patient Name: Gary Hedrick   Dx:MM  Protocol: DPD  Daratumumab 16 mg/kg IV on Days 1, 8, 15, and 22 - plan to change to SQ after C1D1  Pomalidomide 4 mg PO daily on Days 1-21  Dexamethasone 20 mg PO on days 1-2, 8-9, 15-16, 22-23  28-day cycle for cycles 1-2  ~followed by~  Daratumumab-hyaluronidase 1800 mg SQ on Days 1 and 15   Pomalidomide 4 mg PO daily on Days 1-21  Dexamethasone 20 mg PO on days 1-2, 8-9, 15-16, 22-23  28-day cycle for cycles 3-6  ~followed by~  Daratumumab-hyaluronidase 1800 mg SQ on Day 1   Pomalidomide 4 mg PO daily on Days 1-21  Dexamethasone 20 mg PO on days 1-2, 8-9, 15-16, 22-23  28-day cycle until disease progression or unacceptable toxicity    NCCN Guidelines for Multiple Myeloma V.1.2022.  Candelaria A, et al. Blood. 2017;130(8):974-981.    Plan is to treat for one month and recheck myeloma labs in anticipation of transplant    Allergies:  Grass pollen(k-o-r-t-swt jose), Pet dander [cat hair extract], Sagebrush, and [place] (diagnostic)     /70   Pulse 74   Temp 36.3 °C (97.3 °F) (Temporal)   Resp 18   Ht 1.68 m (5' 6.14\")   Wt 79.9 kg (176 lb 2.4 oz)   SpO2 96%   BMI 28.31 kg/m²  Body surface area is 1.93 meters squared.    Hep B negative from [facility] 4/7/22    Labs 4/28/22:  ANC~ 2010 Plt = 172k   Hgb = 11.9        Drug Order   (Drug name, dose, route, IV Fluid & volume, frequency, number of doses) Cycle 1 Day 8       Previous treatment: C1D1 on 4/21/22     Medication = Daratumumab-hyaluronidase (Darzalex Faspro)  Base Dose= 1800 mg   Fixed dose, no calculation required  Final Dose = 1800 mg  Route = SubQ  Fluid & Volume = 15 mL in syringe  Admin Duration = Over 3-5 minutes          Fixed dose, no calculation required. Okay to treat with final dose.     By my signature below, I confirm this process was performed independently with the BSA and all final chemotherapy dosing calculations congruent. I have reviewed the above chemotherapy " order and that my calculation of the final dose and BSA (when applicable) corroborate those calculations of the  pharmacist. Discrepancies of 10% or greater in the written dose have been addressed and documented within the EPIC Progress notes.    Nick Williamson, PharmD

## 2022-04-28 NOTE — PROGRESS NOTES
Chemotherapy Verification - PRIMARY RN      Height = 168 cm  Weight = 79.9 kg  BSA = 1.93 m^2       Medication: Daratumumab hyaluronidase  Dose: SET DOSE  Calculated Dose: 1800 mg                             (In mg/m2, AUC, mg/kg)         I confirm this process was performed independently with the BSA and all final chemotherapy dosing calculations congruent.  Any discrepancies of 10% or greater have been addressed with the chemotherapy pharmacist. The resolution of the discrepancy has been documented in the EPIC progress notes.

## 2022-04-29 ENCOUNTER — HOSPITAL ENCOUNTER (OUTPATIENT)
Dept: RADIOLOGY | Facility: MEDICAL CENTER | Age: 72
End: 2022-04-29
Attending: INTERNAL MEDICINE
Payer: MEDICARE

## 2022-04-29 DIAGNOSIS — C90.00 MULTIPLE MYELOMA NOT HAVING ACHIEVED REMISSION (HCC): ICD-10-CM

## 2022-04-29 PROCEDURE — 71046 X-RAY EXAM CHEST 2 VIEWS: CPT

## 2022-04-29 NOTE — PROGRESS NOTES
Pt presented to infusion for C1D8 Darzalex Faspro. Pt has been educated on Faspro vs IV, has PO dex and benadryl at home in case of allergic reaction. Pt knows when to call 911 and knows he can page on call provider from [phone] if needed. Denied s/s of infection or open wounds. PIV started, CBC drawn. Pt met parameters. Pre-meds given. 1 hour soak time observed. Darzalex Faspro given in left lower abdomen over approx 6 minutes, bandage placed, pt tolerated well. PIV flushed and removed, gauze drsg placed. Has next appt, printout given, left on foot to self care.

## 2022-04-30 ENCOUNTER — HOSPITAL ENCOUNTER (OUTPATIENT)
Dept: PULMONOLOGY | Facility: MEDICAL CENTER | Age: 72
End: 2022-04-30
Attending: INTERNAL MEDICINE
Payer: MEDICARE

## 2022-04-30 DIAGNOSIS — C90.00 MULTIPLE MYELOMA NOT HAVING ACHIEVED REMISSION (HCC): ICD-10-CM

## 2022-04-30 PROCEDURE — 94726 PLETHYSMOGRAPHY LUNG VOLUMES: CPT

## 2022-04-30 PROCEDURE — 94060 EVALUATION OF WHEEZING: CPT

## 2022-04-30 PROCEDURE — 94726 PLETHYSMOGRAPHY LUNG VOLUMES: CPT | Mod: 26,GZ | Performed by: INTERNAL MEDICINE

## 2022-04-30 PROCEDURE — 94060 EVALUATION OF WHEEZING: CPT | Mod: 26,GZ | Performed by: INTERNAL MEDICINE

## 2022-04-30 PROCEDURE — 94729 DIFFUSING CAPACITY: CPT

## 2022-04-30 PROCEDURE — 94729 DIFFUSING CAPACITY: CPT | Mod: 26,GZ | Performed by: INTERNAL MEDICINE

## 2022-04-30 RX ADMIN — Medication 2.5 MG: at 09:17

## 2022-04-30 ASSESSMENT — PULMONARY FUNCTION TESTS
FVC_PREDICTED: 4
FVC_LLN: 3.34
FVC: 4.15
FEV1/FVC_PERCENT_PREDICTED: 99
FEV1/FVC: 75
FEV1: 3.19
FEV1/FVC_PERCENT_CHANGE: 3
FEV1_LLN: 2.55
FVC_PERCENT_PREDICTED: 102
FEV1/FVC_PERCENT_CHANGE: -200
FEV1_PERCENT_CHANGE: 2
FEV1/FVC_PERCENT_PREDICTED: 102
FVC_LLN: 3.34
FEV1_PERCENT_CHANGE: -1
FEV1: 3.12
FEV1/FVC_PREDICTED: 76.48
FEV1_PREDICTED: 3.05
FEV1/FVC_PERCENT_LLN: 63.86
FEV1_LLN: 2.55
FEV1_PERCENT_PREDICTED: 104
FEV1/FVC_PERCENT_PREDICTED: 101
FEV1/FVC_PERCENT_PREDICTED: 76
FEV1/FVC: 77.76
FEV1/FVC_PERCENT_LLN: 63.86
FVC: 4.1
FEV1_PERCENT_PREDICTED: 102
FEV1/FVC: 77.8
FEV1/FVC_PERCENT_PREDICTED: 98
FVC_PERCENT_PREDICTED: 103
FEV1/FVC: 75.15

## 2022-05-01 DIAGNOSIS — N40.1 BENIGN LOCALIZED PROSTATIC HYPERPLASIA WITH LOWER URINARY TRACT SYMPTOMS (LUTS): ICD-10-CM

## 2022-05-01 NOTE — PROCEDURES
DATE OF SERVICE:  04/30/2022     PULMONARY FUNCTION TEST INTERPRETATION    This exam is performed with a primary diagnosis of multiple myeloma to help establish a pre-test probability of the patient’s diagnostic findings.    The Flow Volume Loop is of sufficient quality and the volume-time graph demonstrates an adequate exhalation to provide a confident interpretation.    The FEV1/FVC ratio is normal by GOLD criteria and confidence interval data indicating an absence of irreversible obstructive lung disease (i.e. no evidence of COPD).  This is accompanied by a normal FEV1 and FVC based on predicted values. After administration of 2 puffs of albuterol, the patient did not achieve a significant bronchodilator response (12% and 200 ml in FEV1 or FVC).    Total lung capacity is within the limits of predicted values. Residual volume is elevated indicating air trapping. The DLCO is normal.    Impression:   1. No evidence of airflow obstruction   2. Air trapping   3. This test does not rule out reactive airways disease. Suggest clinical correlation.       ______________________________  MD LEBRON Ochoa/SENA/ADDISON    DD:  05/01/2022 08:37  DT:  05/01/2022 09:51    Job#:  [id number]

## 2022-05-02 ENCOUNTER — HOSPITAL ENCOUNTER (OUTPATIENT)
Dept: CARDIOLOGY | Facility: MEDICAL CENTER | Age: 72
End: 2022-05-02
Attending: INTERNAL MEDICINE
Payer: MEDICARE

## 2022-05-02 LAB — EKG IMPRESSION: NORMAL

## 2022-05-02 PROCEDURE — 93005 ELECTROCARDIOGRAM TRACING: CPT | Performed by: INTERNAL MEDICINE

## 2022-05-02 PROCEDURE — 93010 ELECTROCARDIOGRAM REPORT: CPT | Performed by: INTERNAL MEDICINE

## 2022-05-03 NOTE — PROGRESS NOTES
"Pharmacy Chemotherapy Calculation    Patient Name: Gary Herdick   Dx:MM  Protocol: DPD  Daratumumab 16 mg/kg IV on Days 1, 8, 15, and 22 - plan to change to SQ after C1D1  Pomalidomide 4 mg PO daily on Days 1-21  Dexamethasone 20 mg PO on days 1-2, 8-9, 15-16, 22-23  28-day cycle for cycles 1-2  ~followed by~  Daratumumab-hyaluronidase 1800 mg SQ on Days 1 and 15   Pomalidomide 4 mg PO daily on Days 1-21  Dexamethasone 20 mg PO on days 1-2, 8-9, 15-16, 22-23  28-day cycle for cycles 3-6  ~followed by~  Daratumumab-hyaluronidase 1800 mg SQ on Day 1   Pomalidomide 4 mg PO daily on Days 1-21  Dexamethasone 20 mg PO on days 1-2, 8-9, 15-16, 22-23  28-day cycle until disease progression or unacceptable toxicity    NCCN Guidelines for Multiple Myeloma V.1.2022.  Candelaria A, et al. Blood. 2017;130(8):974-981.    Plan is to treat for one month and recheck myeloma labs in anticipation of transplant    Allergies:  Grass pollen(k-o-r-t-swt jose), Pet dander [cat hair extract], Sagebrush, and [place] (diagnostic)     /72   Pulse 75   Temp 36.3 °C (97.3 °F) (Temporal)   Resp 18   Ht 1.68 m (5' 6.14\")   Wt 79 kg (174 lb 2.6 oz)   SpO2 98%   BMI 27.99 kg/m²  Body surface area is 1.92 meters squared.     Hep B negative from [facility] 4/7/22    Labs 5/5/22:  ANC~ 2120 Plt = 139k   Hgb = 12.1     Drug Order   (Drug name, dose, route, IV Fluid & volume, frequency, number of doses) Cycle 1 Day 15  Previous treatment: C1D8 on 4/28/22     Medication = Daratumumab-hyaluronidase (Darzalex Faspro)  Base Dose= 1800 mg   Fixed dose, no calculation required  Final Dose = 1800 mg  Route = SubQ  Fluid & Volume = 15 mL in syringe  Admin Duration = Over 3-5 minutes          Fixed dose, no calculation required. Okay to treat with final dose.     By my signature below, I confirm this process was performed independently with the BSA and all final chemotherapy dosing calculations congruent. I have reviewed the above chemotherapy order and " that my calculation of the final dose and BSA (when applicable) corroborate those calculations of the  pharmacist. Discrepancies of 10% or greater in the written dose have been addressed and documented within the EPIC Progress notes.    Candido Gooden, PharmD

## 2022-05-04 RX ORDER — TAMSULOSIN HYDROCHLORIDE 0.4 MG/1
CAPSULE ORAL
Qty: 180 CAPSULE | Refills: 1 | Status: SHIPPED | OUTPATIENT
Start: 2022-05-04 | End: 2023-01-22

## 2022-05-05 ENCOUNTER — TELEPHONE (OUTPATIENT)
Dept: HEMATOLOGY ONCOLOGY | Facility: MEDICAL CENTER | Age: 72
End: 2022-05-05
Payer: MEDICARE

## 2022-05-05 ENCOUNTER — OUTPATIENT INFUSION SERVICES (OUTPATIENT)
Dept: ONCOLOGY | Facility: MEDICAL CENTER | Age: 72
End: 2022-05-05
Attending: INTERNAL MEDICINE
Payer: MEDICARE

## 2022-05-05 VITALS
RESPIRATION RATE: 18 BRPM | OXYGEN SATURATION: 98 % | DIASTOLIC BLOOD PRESSURE: 64 MMHG | SYSTOLIC BLOOD PRESSURE: 145 MMHG | HEIGHT: 66 IN | HEART RATE: 75 BPM | BODY MASS INDEX: 27.99 KG/M2 | TEMPERATURE: 97.3 F | WEIGHT: 174.16 LBS

## 2022-05-05 DIAGNOSIS — C90.00 MULTIPLE MYELOMA NOT HAVING ACHIEVED REMISSION (HCC): ICD-10-CM

## 2022-05-05 LAB
BASOPHILS # BLD AUTO: 0 % (ref 0–1.8)
BASOPHILS # BLD: 0 K/UL (ref 0–0.12)
EOSINOPHIL # BLD AUTO: 0.09 K/UL (ref 0–0.51)
EOSINOPHIL NFR BLD: 3.5 % (ref 0–6.9)
ERYTHROCYTE [DISTWIDTH] IN BLOOD BY AUTOMATED COUNT: 56.3 FL (ref 35.9–50)
HCT VFR BLD AUTO: 35.1 % (ref 42–52)
HGB BLD-MCNC: 12.1 G/DL (ref 14–18)
IMM GRANULOCYTES # BLD AUTO: 0.03 K/UL (ref 0–0.11)
IMM GRANULOCYTES NFR BLD AUTO: 1.2 % (ref 0–0.9)
LYMPHOCYTES # BLD AUTO: 0.13 K/UL (ref 1–4.8)
LYMPHOCYTES NFR BLD: 5 % (ref 22–41)
MCH RBC QN AUTO: 38.7 PG (ref 27–33)
MCHC RBC AUTO-ENTMCNC: 34.5 G/DL (ref 33.7–35.3)
MCV RBC AUTO: 112.1 FL (ref 81.4–97.8)
MONOCYTES # BLD AUTO: 0.22 K/UL (ref 0–0.85)
MONOCYTES NFR BLD AUTO: 8.5 % (ref 0–13.4)
NEUTROPHILS # BLD AUTO: 2.12 K/UL (ref 1.82–7.42)
NEUTROPHILS NFR BLD: 81.8 % (ref 44–72)
NRBC # BLD AUTO: 0 K/UL
NRBC BLD-RTO: 0 /100 WBC
OUTPT INFUS CBC COMMENT OICOM: ABNORMAL
PLATELET # BLD AUTO: 139 K/UL (ref 164–446)
PMV BLD AUTO: 9.4 FL (ref 9–12.9)
RBC # BLD AUTO: 3.13 M/UL (ref 4.7–6.1)
WBC # BLD AUTO: 2.6 K/UL (ref 4.8–10.8)

## 2022-05-05 PROCEDURE — 85025 COMPLETE CBC W/AUTO DIFF WBC: CPT

## 2022-05-05 PROCEDURE — 96375 TX/PRO/DX INJ NEW DRUG ADDON: CPT

## 2022-05-05 PROCEDURE — 700105 HCHG RX REV CODE 258: Performed by: INTERNAL MEDICINE

## 2022-05-05 PROCEDURE — A9270 NON-COVERED ITEM OR SERVICE: HCPCS | Performed by: INTERNAL MEDICINE

## 2022-05-05 PROCEDURE — 96374 THER/PROPH/DIAG INJ IV PUSH: CPT

## 2022-05-05 PROCEDURE — 96401 CHEMO ANTI-NEOPL SQ/IM: CPT

## 2022-05-05 PROCEDURE — 700102 HCHG RX REV CODE 250 W/ 637 OVERRIDE(OP): Performed by: INTERNAL MEDICINE

## 2022-05-05 PROCEDURE — 700111 HCHG RX REV CODE 636 W/ 250 OVERRIDE (IP): Performed by: INTERNAL MEDICINE

## 2022-05-05 RX ORDER — ACETAMINOPHEN 325 MG/1
650 TABLET ORAL ONCE
Status: COMPLETED | OUTPATIENT
Start: 2022-05-05 | End: 2022-05-05

## 2022-05-05 RX ORDER — ONDANSETRON 8 MG/1
8 TABLET, ORALLY DISINTEGRATING ORAL EVERY 8 HOURS PRN
COMMUNITY
End: 2022-09-21

## 2022-05-05 RX ORDER — PROCHLORPERAZINE MALEATE 10 MG
10 TABLET ORAL EVERY 6 HOURS PRN
COMMUNITY
End: 2022-05-13

## 2022-05-05 RX ADMIN — ACETAMINOPHEN 650 MG: 325 TABLET ORAL at 14:43

## 2022-05-05 RX ADMIN — DARATUMUMAB AND HYALURONIDASE-FIHJ (HUMAN RECOMBINANT) 1800 MG: 1800; 30000 INJECTION SUBCUTANEOUS at 16:20

## 2022-05-05 RX ADMIN — DIPHENHYDRAMINE HYDROCHLORIDE 25 MG: 50 INJECTION, SOLUTION INTRAMUSCULAR; INTRAVENOUS at 14:45

## 2022-05-05 RX ADMIN — DEXAMETHASONE SODIUM PHOSPHATE 20 MG: 4 INJECTION, SOLUTION INTRA-ARTICULAR; INTRALESIONAL; INTRAMUSCULAR; INTRAVENOUS; SOFT TISSUE at 15:05

## 2022-05-05 ASSESSMENT — FIBROSIS 4 INDEX: FIB4 SCORE: 1.9

## 2022-05-05 NOTE — PROGRESS NOTES
Chemotherapy Verification - PRIMARY RN      Height = 66.14 in  Weight = 174 lb  BSA = 1.92 m2       Medication: Darzalex Faspro  Dose: 1,800 mg  Calculated Dose: 1,800 mg Fixed dose                             (In mg/m2, AUC, mg/kg)     I confirm this process was performed independently with the BSA and all final chemotherapy dosing calculations congruent.  Any discrepancies of 10% or greater have been addressed with the chemotherapy pharmacist. The resolution of the discrepancy has been documented in the EPIC progress notes.

## 2022-05-05 NOTE — PROGRESS NOTES
one into [facility] for Day 15 / Cycle 1 of Darzalex Faspro / Labs for Multiple myeloma not having achieved remission. Pt denied having any new or acute complaints today, reports tolerating past treatments well. PIV started, had + blood return flushed briskly, lab drawn. Pre med given, 1 hr later, Pt given Darzalex Faspro over 8 min. as prescribed, tolerated well, denied having any complaints during or after injection, gauze and paper tape applied to site. PIV discontinued, bleeding controlled with gauze and coban. Discharge home to self care in [facility]. Appointment confirm for next treatment.

## 2022-05-05 NOTE — TELEPHONE ENCOUNTER
Pt called today, he had flu like sx with a fever and body aches for the last few days.  He feels better today with no fever.  He is scheduled for chemo today at 1:30.  Per Dr. Aaron it is okay that he had his tx today.

## 2022-05-05 NOTE — PROGRESS NOTES
Chemotherapy Verification - SECONDARY RN       Height = 168 cm  Weight = 79 kg  BSA = 1.92 m2       Medication: Darzalex Faspro  Dose: 1,800 mg (set dose)  Calculated Dose: 1,800 mg                             (In mg/m2, AUC, mg/kg)         I confirm that this process was performed independently.

## 2022-05-05 NOTE — PROGRESS NOTES
"Pharmacy Chemotherapy Verification  Patient Name: Gary Hedrick  Dx: MM  Protocol: Dd  Cycle 1 Day 15  Previous treatment: 4/28/22    Regimen:  Daratumumab 16 mg/kg IV Cycle 1, Day 1  ~then~  Daratumumab-hyaluronidase 1800 mg subQ Cycle 1, Days 8,15,22  ~then~  Daratumumab-hyaluronidase 1800 mg subQ Cycle 2, Days 1,8,15,22  ~then~  Daratumumab-hyaluronidase 1800 mg subQ Cycle 3-6 , Days 1,15  ~then~  Daratumumab-hyaluronidase 1800 mg subQ Day 1 Cycle 7 and beyond  Dexamethasone 20 mg IV or PO    Q28 day cycles  *Dosing Reference*  NCCN Guidelines Multiple Myeloma v1.2022  1. Milana K, et al. Daratumumab, lenalidomide, and dexamethasone in [place]  patients with relapsed or refractory multiple myeloma: subgroup analyses of the phase 3 POLLUX study. Blood Cancer Journal (2018)8:41   2. Mik H, et al. Ninety-minute daratumumab infusion is safe in multiple myeloma. Leukemia. 2018;32(11):7040-1816.   3. JUAN M Allen. et al. Daratumumab, Lenalidomide, and Dexamethasone for Multiple Myeloma. N Engl J Med. 2016 Oct 6;375(14):7267-1008.   4. KENZIE Gaona. et al. Daratumumab plus pomalidomide and dexamethasone in relapsed and/or refractory multiple myeloma. Blood. 2017 Aug 24;130(8):974-981.     Allergies:Grass pollen(k-o-r-t-swt jose), Pet dander [cat hair extract], [place] meal, Milk [dairy food allergy], Sagebrush, and [place] oil  /72   Pulse 75   Temp 36.3 °C (97.3 °F) (Temporal)   Resp 18   Ht 1.68 m (5' 6.14\")   Wt 79 kg (174 lb 2.6 oz)   SpO2 98%   BMI 27.99 kg/m²   Body surface area is 1.92 meters squared.     Labs 5/5/22  Meet treatment parameters    Darzalex-hyaluronidase (Darzalex Faspro) 1800 mg fixed dose              No calculation required   Okay to treat with final dose = 1800 mg SQ        "

## 2022-05-09 ENCOUNTER — TELEPHONE (OUTPATIENT)
Dept: MEDICAL GROUP | Facility: LAB | Age: 72
End: 2022-05-09
Payer: MEDICARE

## 2022-05-09 NOTE — TELEPHONE ENCOUNTER
Patient called and LVM requesting call back to schedule NP appt, if provider was taking new patient's ETC, which  is. LVM for patient to call back.

## 2022-05-11 ENCOUNTER — TELEPHONE (OUTPATIENT)
Dept: MEDICAL GROUP | Facility: LAB | Age: 72
End: 2022-05-11
Payer: MEDICARE

## 2022-05-11 NOTE — TELEPHONE ENCOUNTER
NEW PATIENT VISIT PRE-VISIT PLANNING    1.  [employer] Patient is checked in Patient Demographics?Yes    2.  Immunizations were updated in Epic using Reconcile Outside Information activity? Yes         3.  Is this appointment scheduled as a Hospital Follow-Up? No    4.  Patient is due for the following Health Maintenance Topics:   Health Maintenance Due   Topic Date Due   • Annual Wellness Visit  Never done   • IMM ZOSTER VACCINES (1 of 2) Never done   • IMM DTaP/Tdap/Td Vaccine (1 - Tdap) 04/04/2004   • COVID-19 Vaccine (4 - Booster for Moderna series) 02/05/2022     5.  Reviewed/Updated the following with patient:       •   Preferred Pharmacy? No       •   Preferred Lab? No       •   Preferred Communication? No       •   Allergies? No       •   Medications? NO    6.  Updated Care Team?       •   DME Company (gait device, O2, CPAP, etc.) NO       •   Other Specialists (eye doctor, derm, GYN, cardiology, endo, etc): NO    7.  AHA (Puls8) form printed for Provider? Email sent to [facility] requesting form

## 2022-05-11 NOTE — TELEPHONE ENCOUNTER
Left message for patient to call back regarding pre-visit planning. Please transfer call to [phone]

## 2022-05-12 ENCOUNTER — OUTPATIENT INFUSION SERVICES (OUTPATIENT)
Dept: ONCOLOGY | Facility: MEDICAL CENTER | Age: 72
End: 2022-05-12
Attending: INTERNAL MEDICINE
Payer: MEDICARE

## 2022-05-12 ENCOUNTER — HOSPITAL ENCOUNTER (OUTPATIENT)
Dept: LAB | Facility: MEDICAL CENTER | Age: 72
End: 2022-05-12
Attending: RADIOLOGY
Payer: MEDICARE

## 2022-05-12 ENCOUNTER — HOSPITAL ENCOUNTER (OUTPATIENT)
Dept: RADIATION ONCOLOGY | Facility: MEDICAL CENTER | Age: 72
End: 2022-05-12
Attending: RADIOLOGY | Admitting: RADIOLOGY
Payer: MEDICARE

## 2022-05-12 VITALS
HEIGHT: 66 IN | SYSTOLIC BLOOD PRESSURE: 158 MMHG | HEART RATE: 82 BPM | OXYGEN SATURATION: 98 % | TEMPERATURE: 97.7 F | DIASTOLIC BLOOD PRESSURE: 55 MMHG | WEIGHT: 171.96 LBS | RESPIRATION RATE: 18 BRPM | BODY MASS INDEX: 27.64 KG/M2

## 2022-05-12 DIAGNOSIS — C61 PROSTATE CANCER (HCC): ICD-10-CM

## 2022-05-12 DIAGNOSIS — C90.00 MULTIPLE MYELOMA NOT HAVING ACHIEVED REMISSION (HCC): ICD-10-CM

## 2022-05-12 LAB
BASOPHILS # BLD AUTO: 1.1 % (ref 0–1.8)
BASOPHILS # BLD: 0.01 K/UL (ref 0–0.12)
EOSINOPHIL # BLD AUTO: 0.1 K/UL (ref 0–0.51)
EOSINOPHIL NFR BLD: 10.6 % (ref 0–6.9)
ERYTHROCYTE [DISTWIDTH] IN BLOOD BY AUTOMATED COUNT: 50.3 FL (ref 35.9–50)
HCT VFR BLD AUTO: 33.6 % (ref 42–52)
HGB BLD-MCNC: 12 G/DL (ref 14–18)
IMM GRANULOCYTES # BLD AUTO: 0.01 K/UL (ref 0–0.11)
IMM GRANULOCYTES NFR BLD AUTO: 1.1 % (ref 0–0.9)
LYMPHOCYTES # BLD AUTO: 0.19 K/UL (ref 1–4.8)
LYMPHOCYTES NFR BLD: 20.2 % (ref 22–41)
MCH RBC QN AUTO: 38.5 PG (ref 27–33)
MCHC RBC AUTO-ENTMCNC: 35.7 G/DL (ref 33.7–35.3)
MCV RBC AUTO: 107.7 FL (ref 81.4–97.8)
MONOCYTES # BLD AUTO: 0.19 K/UL (ref 0–0.85)
MONOCYTES NFR BLD AUTO: 20.2 % (ref 0–13.4)
NEUTROPHILS # BLD AUTO: 0.44 K/UL (ref 1.82–7.42)
NEUTROPHILS NFR BLD: 46.8 % (ref 44–72)
NRBC # BLD AUTO: 0 K/UL
NRBC BLD-RTO: 0 /100 WBC
OUTPT INFUS CBC COMMENT OICOM: ABNORMAL
PLATELET # BLD AUTO: 49 K/UL (ref 164–446)
PMV BLD AUTO: 10.4 FL (ref 9–12.9)
PSA SERPL-MCNC: <0.02 NG/ML (ref 0–4)
RBC # BLD AUTO: 3.12 M/UL (ref 4.7–6.1)
WBC # BLD AUTO: 0.9 K/UL (ref 4.8–10.8)

## 2022-05-12 PROCEDURE — 84153 ASSAY OF PSA TOTAL: CPT

## 2022-05-12 PROCEDURE — 36415 COLL VENOUS BLD VENIPUNCTURE: CPT

## 2022-05-12 PROCEDURE — 85025 COMPLETE CBC W/AUTO DIFF WBC: CPT

## 2022-05-12 ASSESSMENT — FIBROSIS 4 INDEX: FIB4 SCORE: 2.35

## 2022-05-12 NOTE — PROGRESS NOTES
3pt arrived ambulatory to [facility] for Darzalex Faspro D22C1.  POC discussed with pt and he agrees with plan. Pt with call light in reach for safety.     PIV established, brisk blood return noted. CBC drawn as ordered. Results reviewed, platelet count 44k and ANC 0.44 today. Pt does NOT meet parameters for treatment today. This RN contacted Samantha MARTIN with results, orders received to defer treatment x 1 week. Pt updated on plan. Pt verbalized understanding. PIV dc'd catheter tip intact, gauze and coban dressing applied. Pt discharged to self care, NAD. Pt's next appt confirmed 5/19/2022.

## 2022-05-12 NOTE — PROGRESS NOTES
"Pharmacy Chemotherapy Calculations:    Dx: MM     DEFERRED due to thrombocytopenia and neutropenia  Previous treatment: C1D15 on 5/5/22    Protocol: DPD  *Dosing Reference*   IV on Days 1, 8, 15, and 22 - plan to change to SQ after C1D1   -- 4/28/22: substituted with Darzalex Faspro 1800 mg fixed dose  Pomalidomide 4 mg PO daily on Days 1-21  Dexamethasone 20 mg PO on days 1-2, 8-9, 15-16, 22-23  28-day cycle for cycles 1-2  ~followed by~  Daratumumab-hyaluronidase 1800 mg SQ on Days 1 and 15   Pomalidomide 4 mg PO daily on Days 1-21  Dexamethasone 20 mg PO on days 1-2, 8-9, 15-16, 22-23  28-day cycle for cycles 3-6  ~followed by~  Daratumumab-hyaluronidase 1800 mg SQ on Day 1   Pomalidomide 4 mg PO daily on Days 1-21  Dexamethasone 20 mg PO on days 1-2, 8-9, 15-16, 22-23  28-day cycle until disease progression or unacceptable toxicity  NCCN Guidelines for Multiple Myeloma V.1.2022.  Candelaria A, et al. Blood. 2017;130(8):974-981.       BP (!) 158/55   Pulse 82   Temp 36.5 °C (97.7 °F) (Temporal)   Resp 18   Ht 1.68 m (5' 6.14\")   Wt 78 kg (171 lb 15.3 oz)   SpO2 98%   BMI 27.64 kg/m²  Body surface area is 1.91 meters squared.    Hep B negative from [facility] 4/7/22    Labs 5/12/22:  ANC~ 440 Plt = 49k   Hgb = 12           Nick Williamson, PharmD  "

## 2022-05-13 ENCOUNTER — OFFICE VISIT (OUTPATIENT)
Dept: MEDICAL GROUP | Facility: LAB | Age: 72
End: 2022-05-13
Payer: MEDICARE

## 2022-05-13 ENCOUNTER — TELEPHONE (OUTPATIENT)
Dept: HEMATOLOGY ONCOLOGY | Facility: MEDICAL CENTER | Age: 72
End: 2022-05-13

## 2022-05-13 VITALS
RESPIRATION RATE: 15 BRPM | DIASTOLIC BLOOD PRESSURE: 78 MMHG | WEIGHT: 169 LBS | BODY MASS INDEX: 27.16 KG/M2 | HEIGHT: 66 IN | HEART RATE: 76 BPM | TEMPERATURE: 98.9 F | OXYGEN SATURATION: 96 % | SYSTOLIC BLOOD PRESSURE: 138 MMHG

## 2022-05-13 DIAGNOSIS — E78.5 DYSLIPIDEMIA: ICD-10-CM

## 2022-05-13 DIAGNOSIS — F33.42 RECURRENT MAJOR DEPRESSIVE DISORDER, IN FULL REMISSION (HCC): ICD-10-CM

## 2022-05-13 DIAGNOSIS — R11.0 NAUSEA: ICD-10-CM

## 2022-05-13 DIAGNOSIS — C90.00 MULTIPLE MYELOMA NOT HAVING ACHIEVED REMISSION (HCC): ICD-10-CM

## 2022-05-13 DIAGNOSIS — N40.1 BENIGN LOCALIZED PROSTATIC HYPERPLASIA WITH LOWER URINARY TRACT SYMPTOMS (LUTS): ICD-10-CM

## 2022-05-13 DIAGNOSIS — Z76.89 ENCOUNTER TO ESTABLISH CARE: ICD-10-CM

## 2022-05-13 DIAGNOSIS — E03.9 HYPOTHYROIDISM, UNSPECIFIED TYPE: ICD-10-CM

## 2022-05-13 PROCEDURE — 99214 OFFICE O/P EST MOD 30 MIN: CPT | Performed by: FAMILY MEDICINE

## 2022-05-13 RX ORDER — SIMVASTATIN 20 MG
20 TABLET ORAL EVERY EVENING
Qty: 100 TABLET | Refills: 3 | Status: SHIPPED | OUTPATIENT
Start: 2022-05-13 | End: 2023-07-06

## 2022-05-13 RX ORDER — FLUOXETINE HYDROCHLORIDE 40 MG/1
40 CAPSULE ORAL DAILY
Qty: 90 CAPSULE | Refills: 3 | Status: SHIPPED | OUTPATIENT
Start: 2022-05-13 | End: 2022-08-30

## 2022-05-13 RX ORDER — PROMETHAZINE HYDROCHLORIDE 25 MG/1
25 TABLET ORAL EVERY 6 HOURS PRN
Qty: 60 TABLET | Refills: 0 | Status: SHIPPED | OUTPATIENT
Start: 2022-05-13 | End: 2022-06-12

## 2022-05-13 ASSESSMENT — FIBROSIS 4 INDEX: FIB4 SCORE: 6.66

## 2022-05-13 ASSESSMENT — ENCOUNTER SYMPTOMS
SHORTNESS OF BREATH: 0
FEVER: 0
NAUSEA: 1
WHEEZING: 0
PALPITATIONS: 0
BLURRED VISION: 0
CHILLS: 0
WEIGHT LOSS: 1

## 2022-05-13 ASSESSMENT — LIFESTYLE VARIABLES: SUBSTANCE_ABUSE: 0

## 2022-05-13 NOTE — TELEPHONE ENCOUNTER
"Naomi from [facility] needs additional records that are not available on \"Care Everywhere\"  Please fax the followin. Pulmonary function test, done on 22, including graphs.   2. EKG rhythm strips, done on 22.  3. Most recent office visit note.    Also, the patient needs myeloma markers drawn.    FAX# [phone].  "

## 2022-05-13 NOTE — PROGRESS NOTES
Subjective:   Gary Hedrick is a 71 y.o. male here today for   No chief complaint on file.      #Multiple Myeloma   -Patient currently on Daratumaumab and Pomalidomide.  He has been followed by oncology at [facility] and is planning on autologous stem cell transplant.  here today to discuss COVID-19 vaccine booster.  Working on improving diet, hydration.  -Has been completing labs, recently has had a significant drop in white blood cell count and absolute neutrophils.    -Patient continues to have symptoms of increased malaise, fatigue, nausea.    #Prostate cancer:   -Status post radiation pellet therapy.  Continues to have some urinary symptoms but overall mostly controlled with tamsulosin 0.8 mg.  He is continue to follow-up with urology.  Asymptomatic otherwise with no questions or concerns.    #Dyslipidemia:  -Currently treating with Zocor 20 mg daily.  Compliant medications.  Working on appropriate diet and exercise.  No concerns at this time.  Requesting refill of medication    #MDD:  -Treating with Prozac 40 mg daily.  Compliant medication, no side effects.  Symptoms mostly controlled.  Continues to have moments of despair, anxiety given current cancer treatment.  Denies any suicidal/homicidal ideations    #Hypothyroidism:  -Treating with Synthroid 100 mcg daily.  Taking on an empty stomach.  No concerning symptoms at this time.    Allergies   Allergen Reactions   • Grass Pollen(K-O-R-T-Swt Rodrigo) Shortness of Breath   • Pet Dander [Cat Hair Extract] Shortness of Breath and Unspecified   • Sagebrush Unspecified   • Magnolia (Diagnostic) Shortness of Breath         Current medicines (including changes today)  Current Outpatient Medications   Medication Sig Dispense Refill   • prochlorperazine (COMPAZINE) 10 MG Tab Take 10 mg by mouth every 6 hours as needed.     • ondansetron (ZOFRAN ODT) 8 MG TABLET DISPERSIBLE Take 8 mg by mouth every 8 hours as needed for Nausea.     • zolpidem (AMBIEN) 10 MG Tab TAKE 1  "TABLET BY MOUTH AT BEDTIME AS NEEDED FOR SLEEP FOR UP TO 30 DAYS. 30 Tablet 2   • tamsulosin (FLOMAX) 0.4 MG capsule TAKE 2 CAPSULES BY MOUTH 30 MINUTES AFTER DINNER. TAKE 1 CAPSULE BY MOUTH FOR A WEEK THEN INCREASE TO 2 CAPSULES 180 Capsule 1   • Pomalidomide 4 MG Cap Take one capsule by mouth daily on Days 1 to 21 of each 28 day cycle 21 Capsule 0   • valACYclovir (VALTREX) 500 MG Tab TAKE 1 TABLET BY MOUTH 2 TIMES A DAY  DAYS. 200 Tablet 3   • Magnesium Hydroxide (MILK OF MAGNESIA PO) Take  by mouth as needed.     • VITAMIN D PO Take 8,000 Units by mouth.     • aspirin EC (ECOTRIN) 81 MG Tablet Delayed Response Take 81 mg by mouth every day.     • FLUoxetine (PROZAC) 20 MG Cap Take 1 capsule by mouth every day. 90 capsule 4   • Omega-3 Fatty Acids (FISH OIL) 1000 MG Cap capsule Take 1,000 mg by mouth 2 times a day.     • levothyroxine (SYNTHROID) 100 MCG Tab Take 1 tablet by mouth Every morning on an empty stomach. 100 tablet 3   • simvastatin (ZOCOR) 20 MG Tab Take 1 tablet by mouth every evening. 100 tablet 3     No current facility-administered medications for this visit.     He  has a past medical history of Breath shortness, Cancer (HCC), Disorder of thyroid, High cholesterol, Light chain myeloma (HCC) (2021), Prostate cancer (HCC), and Psychiatric problem.    ROS   Review of Systems   Constitutional: Positive for malaise/fatigue and weight loss. Negative for chills and fever.   HENT: Negative for hearing loss.    Eyes: Negative for blurred vision.   Respiratory: Negative for shortness of breath and wheezing.    Cardiovascular: Negative for chest pain and palpitations.   Gastrointestinal: Positive for nausea.   Genitourinary: Negative for dysuria, frequency and urgency.   Psychiatric/Behavioral: Negative for substance abuse and suicidal ideas.      Objective:     Physical Exam:  /78   Pulse 76   Temp 37.2 °C (98.9 °F) (Temporal)   Resp 15   Ht 1.68 m (5' 6.14\")   Wt 76.7 kg (169 lb)   SpO2 " 96%  Body mass index is 27.16 kg/m².   Constitutional: Alert, no distress.  Skin: Warm, dry, good turgor, no rashes in visible areas.  Eye: Equal, round and reactive, conjunctiva clear, lids normal.  Respiratory: Unlabored respiratory effort, lungs clear to auscultation, no wheezes, no rhonchi.  Cardiovascular: Normal S1, S2, no murmur, no edema.  Abdomen: Soft, non-tender, no masses, no hepatosplenomegaly.  Psych: Alert and oriented x3, normal affect and mood.    Assessment and Plan:     1. Encounter to establish care  -All questions concerns were answered at this time.  -Vaccinations/screenings needed at this time: At this time we will defer all vaccines  -Labs reviewed, patient will continue to follow-up with oncology for further evaluation and future treatment decisions for multiple myeloma.  -Discussed the importance of a healthy, Mediterranean style diet, routine exercise regimen.  -Discussed general safety measures including seatbelts, helmets, avoidance of smoking, sunscreen, hydration.  -Follow-up for general physical exam on a yearly basis, sooner if needed.    2. Multiple myeloma not having achieved remission (HCC)  -Patient will continue to follow with oncologist, Dr. Aaron, and oncologist at [facility] in preparation for autologous stem cell transplant.  This will most likely occur in July.  Patient will follow-up if he continues to experience symptoms from current therapies (see #7)    3. Benign localized prostatic hyperplasia with lower urinary tract symptoms (LUTS)  -Stable.  Patient will continue treatment with tamsulosin, follow-up with urology as needed.    4. Dyslipidemia  -Stable.  Continue Zocor.  Will get lipid profile, titrate medication if needed.  - simvastatin (ZOCOR) 20 MG Tab; Take 1 Tablet by mouth every evening.  Dispense: 100 Tablet; Refill: 3  - Lipid Profile; Future    5. Recurrent major depressive disorder, in full remission (HCC)  -We will continue Prozac.  - FLUoxetine (PROZAC) 40  MG capsule; Take 1 Capsule by mouth every day for 90 days.  Dispense: 90 Capsule; Refill: 3    6. Hypothyroidism, unspecified type  -Stable.  Recheck TSH, titrate medication if needed.  - TSH WITH REFLEX TO FT4; Future    7. Nausea  -Patient been experiencing significant nausea with current chemotherapy.  Has tried several medications including Compazine, Zofran both of which have not helped.  We will attempt further treatment with Phenergan at this time.  Because of patient's age we discussed the increased risk of side effects that he could be experiencing given that this medication is on the beers criteria.  Patient understood the risks and still wishes to attempt medication.  He will be very cautious with medication and follow-up with me as needed.  - promethazine (PHENERGAN) 25 MG Tab; Take 1 Tablet by mouth as needed in the morning and 1 Tablet as needed at noon and 1 Tablet as needed in the evening and 1 Tablet as needed before bedtime for Nausea/Vomiting. Do all this for up to 30 days.  Dispense: 60 Tablet; Refill: 0      Followup: No follow-ups on file.         PLEASE NOTE: This dictation was created using voice recognition software. I have made every reasonable attempt to correct obvious errors, but I expect that there are errors of grammar and possibly content that I did not discover before finalizing the note.

## 2022-05-13 NOTE — TELEPHONE ENCOUNTER
Naomi called - she received the fax, but there was no graph for the pulmonary function test.  Please re-fax the graph.

## 2022-05-13 NOTE — TELEPHONE ENCOUNTER
Per request of VERONICA Lane, pt instructed to hold his pomalyst this week due to low counts until he get's his labs redrawn in one week. Message left on pt identifiable voicemail at this time, with office contact information to call us back with any questions.

## 2022-05-16 DIAGNOSIS — C90.00 MULTIPLE MYELOMA NOT HAVING ACHIEVED REMISSION (HCC): ICD-10-CM

## 2022-05-16 DIAGNOSIS — C61 PROSTATE CANCER (HCC): ICD-10-CM

## 2022-05-16 NOTE — TELEPHONE ENCOUNTER
Confirmed with pt and spouse, Beatrice, pt has continued to hold his Pomalyst due to low counts. Pt states the Pomalyst has been rough taking. Discussed plan for pt at this time:    Thursday 05/19/22: pt will proceed to have his labs drawn on the morning of (labs will include his CBC and multiple myeloma labs [facility] has requested), will wait for results and if makes counts this day, will proceed to have his Darzalex in infusion center this day and start on the reduced dosing of Pomalyst at 2mg per new order by Dr. Aaron for next cycle (see RN orders only encounter on 05/16/22)  Advised in the mean time, if pt or spouse has any concerns or questions, contact this office and both verbalized understanding of the above at this time.

## 2022-05-17 ENCOUNTER — TELEPHONE (OUTPATIENT)
Dept: HEMATOLOGY ONCOLOGY | Facility: MEDICAL CENTER | Age: 72
End: 2022-05-17
Payer: MEDICARE

## 2022-05-17 DIAGNOSIS — C61 PROSTATE CANCER (HCC): ICD-10-CM

## 2022-05-17 DIAGNOSIS — C90.00 MULTIPLE MYELOMA NOT HAVING ACHIEVED REMISSION (HCC): ICD-10-CM

## 2022-05-17 NOTE — TELEPHONE ENCOUNTER
Oral Chemo Compliance Review    Dose: Pomalidomide (Pomalyst) 2 mg on Days 1 to 21 of each 28 day cycle    05/16/22: ART Aaron/Maxwell RN (see RN notes under orders); dose reduced due to neutropenia

## 2022-05-18 ENCOUNTER — HOSPITAL ENCOUNTER (OUTPATIENT)
Dept: LAB | Facility: MEDICAL CENTER | Age: 72
End: 2022-05-18
Attending: INTERNAL MEDICINE
Payer: MEDICARE

## 2022-05-18 ENCOUNTER — HOSPITAL ENCOUNTER (OUTPATIENT)
Dept: LAB | Facility: MEDICAL CENTER | Age: 72
End: 2022-05-18
Attending: FAMILY MEDICINE
Payer: MEDICARE

## 2022-05-18 ENCOUNTER — TELEPHONE (OUTPATIENT)
Dept: HEMATOLOGY ONCOLOGY | Facility: MEDICAL CENTER | Age: 72
End: 2022-05-18

## 2022-05-18 DIAGNOSIS — C90.00 MULTIPLE MYELOMA NOT HAVING ACHIEVED REMISSION (HCC): ICD-10-CM

## 2022-05-18 DIAGNOSIS — E03.9 HYPOTHYROIDISM, UNSPECIFIED TYPE: ICD-10-CM

## 2022-05-18 DIAGNOSIS — E78.5 DYSLIPIDEMIA: ICD-10-CM

## 2022-05-18 LAB
ANISOCYTOSIS BLD QL SMEAR: ABNORMAL
BASOPHILS # BLD AUTO: 2.9 % (ref 0–1.8)
BASOPHILS # BLD: 0.03 K/UL (ref 0–0.12)
CHOLEST SERPL-MCNC: 142 MG/DL (ref 100–199)
EOSINOPHIL # BLD AUTO: 0.03 K/UL (ref 0–0.51)
EOSINOPHIL NFR BLD: 2.9 % (ref 0–6.9)
ERYTHROCYTE [DISTWIDTH] IN BLOOD BY AUTOMATED COUNT: 47.9 FL (ref 35.9–50)
FASTING STATUS PATIENT QL REPORTED: NORMAL
HCT VFR BLD AUTO: 32.6 % (ref 42–52)
HDLC SERPL-MCNC: 30 MG/DL
HGB BLD-MCNC: 11.5 G/DL (ref 14–18)
LDLC SERPL CALC-MCNC: 78 MG/DL
LYMPHOCYTES # BLD AUTO: 0.31 K/UL (ref 1–4.8)
LYMPHOCYTES NFR BLD: 34.8 % (ref 22–41)
MACROCYTES BLD QL SMEAR: ABNORMAL
MANUAL DIFF BLD: NORMAL
MCH RBC QN AUTO: 37.6 PG (ref 27–33)
MCHC RBC AUTO-ENTMCNC: 35.3 G/DL (ref 33.7–35.3)
MCV RBC AUTO: 106.5 FL (ref 81.4–97.8)
MICROCYTES BLD QL SMEAR: ABNORMAL
MONOCYTES # BLD AUTO: 0.14 K/UL (ref 0–0.85)
MONOCYTES NFR BLD AUTO: 15 % (ref 0–13.4)
MORPHOLOGY BLD-IMP: NORMAL
NEUTROPHILS # BLD AUTO: 0.4 K/UL (ref 1.82–7.42)
NEUTROPHILS NFR BLD: 44.4 % (ref 44–72)
NRBC # BLD AUTO: 0 K/UL
NRBC BLD-RTO: 0 /100 WBC
PLATELET # BLD AUTO: 109 K/UL (ref 164–446)
PLATELET BLD QL SMEAR: NORMAL
PMV BLD AUTO: 10.1 FL (ref 9–12.9)
POIKILOCYTOSIS BLD QL SMEAR: NORMAL
POLYCHROMASIA BLD QL SMEAR: NORMAL
RBC # BLD AUTO: 3.06 M/UL (ref 4.7–6.1)
RBC BLD AUTO: PRESENT
SCHISTOCYTES BLD QL SMEAR: NORMAL
TRIGL SERPL-MCNC: 170 MG/DL (ref 0–149)
TSH SERPL DL<=0.005 MIU/L-ACNC: 0.85 UIU/ML (ref 0.38–5.33)
WBC # BLD AUTO: 0.9 K/UL (ref 4.8–10.8)

## 2022-05-18 PROCEDURE — 82784 ASSAY IGA/IGD/IGG/IGM EACH: CPT

## 2022-05-18 PROCEDURE — 84155 ASSAY OF PROTEIN SERUM: CPT

## 2022-05-18 PROCEDURE — 36415 COLL VENOUS BLD VENIPUNCTURE: CPT

## 2022-05-18 PROCEDURE — 84165 PROTEIN E-PHORESIS SERUM: CPT

## 2022-05-18 PROCEDURE — 84443 ASSAY THYROID STIM HORMONE: CPT

## 2022-05-18 PROCEDURE — 80061 LIPID PANEL: CPT

## 2022-05-18 PROCEDURE — 86334 IMMUNOFIX E-PHORESIS SERUM: CPT

## 2022-05-18 PROCEDURE — 85025 COMPLETE CBC W/AUTO DIFF WBC: CPT

## 2022-05-18 PROCEDURE — 85007 BL SMEAR W/DIFF WBC COUNT: CPT

## 2022-05-18 PROCEDURE — 83521 IG LIGHT CHAINS FREE EACH: CPT

## 2022-05-18 NOTE — TELEPHONE ENCOUNTER
Follow-up phone call with pt spouse Beatrice, to inform them pt lab counts are still low and Dr. Aaron advised pt to continue holding his Pomalyst.     Plan is for repeat labs next 05/26/22 with provider follow-up in the AM of 05/27/22. Beatrice states she has been able to keep pt hydrated and she does not feel he needs hydration. She is also encouraging high protein foods. Advised Beatrice to contact office with any concerns or worrisome symptoms and she has verbalized understanding.    MOHAN Yap, to schedule pt appointment next week. No further orders at this time.

## 2022-05-19 ENCOUNTER — APPOINTMENT (OUTPATIENT)
Dept: ONCOLOGY | Facility: MEDICAL CENTER | Age: 72
End: 2022-05-19
Attending: INTERNAL MEDICINE
Payer: MEDICARE

## 2022-05-19 ENCOUNTER — OFFICE VISIT (OUTPATIENT)
Dept: HEMATOLOGY ONCOLOGY | Facility: MEDICAL CENTER | Age: 72
End: 2022-05-19
Payer: MEDICARE

## 2022-05-19 ENCOUNTER — HOSPITAL ENCOUNTER (OUTPATIENT)
Dept: RADIATION ONCOLOGY | Facility: MEDICAL CENTER | Age: 72
End: 2022-05-31
Attending: RADIOLOGY | Admitting: RADIOLOGY
Payer: MEDICARE

## 2022-05-19 ENCOUNTER — TELEPHONE (OUTPATIENT)
Dept: HEMATOLOGY ONCOLOGY | Facility: MEDICAL CENTER | Age: 72
End: 2022-05-19

## 2022-05-19 VITALS
OXYGEN SATURATION: 100 % | BODY MASS INDEX: 27.32 KG/M2 | WEIGHT: 170 LBS | TEMPERATURE: 98.5 F | DIASTOLIC BLOOD PRESSURE: 86 MMHG | HEART RATE: 83 BPM | RESPIRATION RATE: 20 BRPM | SYSTOLIC BLOOD PRESSURE: 115 MMHG

## 2022-05-19 VITALS
TEMPERATURE: 98.3 F | SYSTOLIC BLOOD PRESSURE: 102 MMHG | RESPIRATION RATE: 16 BRPM | WEIGHT: 170.53 LBS | DIASTOLIC BLOOD PRESSURE: 58 MMHG | OXYGEN SATURATION: 99 % | BODY MASS INDEX: 27.41 KG/M2 | HEART RATE: 74 BPM | HEIGHT: 66 IN

## 2022-05-19 DIAGNOSIS — C90.00 MULTIPLE MYELOMA NOT HAVING ACHIEVED REMISSION (HCC): ICD-10-CM

## 2022-05-19 DIAGNOSIS — C61 PROSTATE CANCER (HCC): ICD-10-CM

## 2022-05-19 DIAGNOSIS — D70.1 CHEMOTHERAPY-INDUCED NEUTROPENIA (HCC): ICD-10-CM

## 2022-05-19 DIAGNOSIS — R68.83 CHILLS: ICD-10-CM

## 2022-05-19 DIAGNOSIS — R53.81 MALAISE: ICD-10-CM

## 2022-05-19 DIAGNOSIS — T45.1X5A CHEMOTHERAPY-INDUCED NEUTROPENIA (HCC): ICD-10-CM

## 2022-05-19 PROCEDURE — 99214 OFFICE O/P EST MOD 30 MIN: CPT | Performed by: NURSE PRACTITIONER

## 2022-05-19 RX ORDER — LEVOFLOXACIN 750 MG/1
750 TABLET, FILM COATED ORAL DAILY
Qty: 7 TABLET | Refills: 0 | Status: SHIPPED | OUTPATIENT
Start: 2022-05-19 | End: 2022-09-21

## 2022-05-19 ASSESSMENT — FIBROSIS 4 INDEX
FIB4 SCORE: 2.99
FIB4 SCORE: 2.99

## 2022-05-19 ASSESSMENT — ENCOUNTER SYMPTOMS
NAUSEA: 0
BACK PAIN: 1
DIARRHEA: 0
WEIGHT LOSS: 0
MYALGIAS: 0
DIZZINESS: 1
INSOMNIA: 0
HEADACHES: 0
FLANK PAIN: 0
SHORTNESS OF BREATH: 1
FEVER: 0
TINGLING: 1
VOMITING: 0
CHILLS: 1
COUGH: 0
BLOOD IN STOOL: 0
PALPITATIONS: 1
CONSTIPATION: 0
WHEEZING: 0

## 2022-05-19 ASSESSMENT — PAIN SCALES - GENERAL: PAINLEVEL: NO PAIN

## 2022-05-19 NOTE — PROGRESS NOTES
RADIATION ONCOLOGY FOLLOW-UP    Patient name:  Gary Hedrick    Primary Physician:  Elkin Rebolledo M.D. MRN: [id number]  CSN: [id number]   Referring physician:  Kirk Isaac M.*   : 1950, 71 y.o.     DATE OF SERVICE: 2022    IDENTIFICATION:   A 71 y.o. male with   Visit Diagnoses     ICD-10-CM   1. Prostate cancer (HCC)  C61     Prostate cancer (HCC)  Staging form: Prostate, AJCC 8th Edition  - Clinical stage from 10/8/2021: Stage IIB (cT1c, cN0, cM0, PSA: 4.1, Grade Group: 2) - Signed by Kirk DELANEY M.D. on 10/8/2021  Histopathologic type: Adenocarcinoma, NOS  Stage prefix: Initial diagnosis  Prostate specific antigen (PSA) range: Less than 10  [place] primary pattern: 3  Sarah secondary pattern: 4  Sarah score: 7  Histologic grading system: 5 grade system  Bilateral cancer: Yes  Number of biopsy cores examined: 12  Number of biopsy cores positive: 6  Location of positive needle core biopsies: Both sides      RADIATION SUMMARY:  Radiation Therapy Episodes     Active Episodes     Radiation Therapy: Brachytherapy (2021)               Radiation Treatments     Historical Treatments (Plans: 2)    Plan Last Treated On Elapsed Days Fractions Treated Prescribed Fraction Dose (cGy) Prescribed Total Dose (cGy)   Pelvis 3/17/2022 38 @ [id number] 28 of 28 180 5,040   LDR tracking 2021 0 @ [id number] 1 of 1 9,500 9,500              Reference Point Last Treated On Elapsed Days Most Recent Session Dose (cGy) Total Dose (cGy)   Pelvis 3/17/2022 38 @ [id number] -- 5,040   Pelvis CP 3/17/2022 38 @ [id number] -- 5,293   LDR tracking 2021 0 @ [id number] -- 9,500                      HISTORY OF PRESENT ILLNESS:   Subjective    70 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  He is presently on RVD having completed 3 cycles.  He also has a past medical history  significant for hypogonadism and has been on testosterone during replacement therapy until recently.     He states his PSAs have fluctuated in the 1-2 range.  More recently PSA was noted to be 4.10.  He underwent an MRI on 6/24/2021.  MRI demonstrated 57 cc gland without suspicious lesion.     He was referred by his primary care physician for urologic work-up.  He saw Dr. Lazar and underwent transrectal ultrasound directed biopsies of the prostate on 7/15/2021.  Volume was noted to be 52 cc.  12 core biopsy prostate obtained demonstrating [place] 3+4 disease involving the right base and right base lateral.  [place] 3+3 disease noted right mid lateral right apex lateral left mid and left mid lateral.  In all 6 out of 12 cores were involved with prostate cancer.     June 2021 he was evaluated for macrocytosis without anemia.  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  Bone marrow biopsy demonstrates 66 to 70% monoclonal plasma cells.  PET/CT, FDG, 8/19/2021 showed no evidence of metastatic disease.  RVD started and completed x3.     Current complaints mild fatigue.  He does report urinary frequency, intermittency, weak stream, incomplete emptying and nocturia 1-2 times per night.  Current IPSS score 17-18.  Quality-of-life score 3.     12/02/21  Preop visit for upcoming cesium 131 low-dose rate seed implant.  He has been on Orgovyx and Flomax.  Reports significant improvement in his urinary function.  His IPSS score is decreased 6 from 18.  Quality-of-life score is 2.  Is experiencing some hot flashes.  But overall feels good.  Also continues on his myeloma therapy.     01/13/22  He is currently approximately 30 days post cesium 131 seed implant.  Is experiencing urinary symptoms as expected.  IPSS score 24 with a quality score 4.  He is on Flomax 0.8 mg daily.  On Orgovyx 120 mg daily.      INTERVAL HISTORY:  5/19/2022.  Scheduled follow-up visit.  Overall doing well  from prostate perspective.  PSA is undetectable.  IPSS score 9 with a quality score 1.  He is completed 6 months of androgen deprivation therapy in April and has discontinued Orgovyx.    With respect to myeloma he is seeing Dr. Aaron as well as Dr. [facility] at [facility].  Patient reports the change in his meds and this is resulted in worsening shortness of breath.  He will be discussing with Dr. Aaron.    CURRENT MEDICATIONS:  Current Outpatient Medications   Medication Sig Dispense Refill   • Pomalidomide 2 MG Cap Take one capsule by mouth daily on Days 1 to 21 of each 28 day cycle (Patient not taking: Reported on 5/19/2022) 21 Capsule 0   • simvastatin (ZOCOR) 20 MG Tab Take 1 Tablet by mouth every evening. 100 Tablet 3   • FLUoxetine (PROZAC) 40 MG capsule Take 1 Capsule by mouth every day for 90 days. 90 Capsule 3   • promethazine (PHENERGAN) 25 MG Tab Take 1 Tablet by mouth as needed in the morning and 1 Tablet as needed at noon and 1 Tablet as needed in the evening and 1 Tablet as needed before bedtime for Nausea/Vomiting. Do all this for up to 30 days. 60 Tablet 0   • ondansetron (ZOFRAN ODT) 8 MG TABLET DISPERSIBLE Take 8 mg by mouth every 8 hours as needed for Nausea.     • zolpidem (AMBIEN) 10 MG Tab TAKE 1 TABLET BY MOUTH AT BEDTIME AS NEEDED FOR SLEEP FOR UP TO 30 DAYS. 30 Tablet 2   • tamsulosin (FLOMAX) 0.4 MG capsule TAKE 2 CAPSULES BY MOUTH 30 MINUTES AFTER DINNER. TAKE 1 CAPSULE BY MOUTH FOR A WEEK THEN INCREASE TO 2 CAPSULES 180 Capsule 1   • valACYclovir (VALTREX) 500 MG Tab TAKE 1 TABLET BY MOUTH 2 TIMES A DAY  DAYS. 200 Tablet 3   • Magnesium Hydroxide (MILK OF MAGNESIA PO) Take  by mouth as needed.     • VITAMIN D PO Take 8,000 Units by mouth.     • aspirin EC (ECOTRIN) 81 MG Tablet Delayed Response Take 81 mg by mouth every day.     • Omega-3 Fatty Acids (FISH OIL) 1000 MG Cap capsule Take 1,000 mg by mouth 2 times a day.     • levothyroxine (SYNTHROID) 100 MCG Tab Take 1 tablet by mouth  Every morning on an empty stomach. 100 tablet 3     No current facility-administered medications for this encounter.       ALLERGIES:  Grass pollen(k-o-r-t-swt jose), Pet dander [cat hair extract], Sagebrush, and [place] (diagnostic)    PHYSICAL EXAM:   ECOG PERFORMANCE STATUS:  No flowsheet data found.  /86   Pulse 83   Temp 36.9 °C (98.5 °F)   Resp 20   Wt 77.1 kg (170 lb)   SpO2 100%   BMI 27.32 kg/m²   Physical Exam  Vitals and nursing note reviewed.   Constitutional:       General: He is not in acute distress.     Appearance: He is well-developed.   HENT:      Head: Normocephalic.   Skin:     General: Skin is warm and dry.      Findings: No erythema.   Neurological:      Mental Status: He is alert and oriented to person, place, and time.   Psychiatric:         Behavior: Behavior normal.         Thought Content: Thought content normal.         Judgment: Judgment normal.         INTERNATIONAL PROSTATE SYMPTOM SCORE (I-PSS):  I-PSS Review 2/16/2022 2/23/2022 2/24/2022 3/2/2022 3/9/2022 3/16/2022 5/19/2022   Incomplete Emptying- How often have you had the sensation of not emptying your bladder? 2 3 3 3 3 0 1   How often have you had to urinate less than every tow hours? 2 2 2 3 2 0 2   Intermittency- How often have you found you stopped and started again several times when you urinated? 4 3 3 3 3 0 2   Urgency- How often have you found it difficult to postpone urination? 2 2 2 2 2 0 1   Weak Stream- How often have you had a weak urinary stream? 3 2 2 4 4 0 2   Straining- How often have you had to strain to start urination? 2 1 1 2 3 0 0   Nocturia- How many times did you typically get up at night to urinate? 2 2 2 3 3 0 1   Score: 17 15 15 20 20 0 9       QUALITY OF LIFE DUE TO URINARY SYMPTOMS:   If you were to spend the rest of your life with your urinary condition just the way it is now, how would you feel about that? 1 = Pleased    SEXUAL HEALTH INVENTORY FOR MEN (EULALIO):   EULALIO Total 10/8/2021  12/2/2021 1/13/2022 2/24/2022 3/2/2022 3/9/2022 5/19/2022   How do you rate your confidence that you could get and keep an erection? 1 0 0 0 0 0 0   When you had erections with sexual stimulation, how often were your erections hard enough for penetration (entering your partner)? 1 0 0 0 0 0 0   During sexual intercourse, how often were you able to maintain you erection after you had penetrated (entered) your partner? 1 0 0 0 0 0 0   During Sexual intercourse, how difficult was it to maintain your erection to completion of intercourse? 1 0 0 0 0 0 0   When you attampted sexual intercourse, how often was it satisfactory for you? 1 0 0 0 0 0 0   Total: 5 0 0 0 0 0 0       LABORATORY DATA:   Lab Results   Component Value Date/Time    WBC 0.9 (LL) 05/18/2022 10:32 AM    RBC 3.06 (L) 05/18/2022 10:32 AM    HEMOGLOBIN 11.5 (L) 05/18/2022 10:32 AM    HEMATOCRIT 32.6 (L) 05/18/2022 10:32 AM    .5 (H) 05/18/2022 10:32 AM    MCH 37.6 (H) 05/18/2022 10:32 AM    MCHC 35.3 05/18/2022 10:32 AM    RDW 47.9 05/18/2022 10:32 AM    PLATELETCT 109 (L) 05/18/2022 10:32 AM    MPV 10.1 05/18/2022 10:32 AM    NEUTSPOLYS 44.40 05/18/2022 10:32 AM    LYMPHOCYTES 34.80 05/18/2022 10:32 AM    MONOCYTES 15.00 (H) 05/18/2022 10:32 AM    EOSINOPHILS 2.90 05/18/2022 10:32 AM    BASOPHILS 2.90 (H) 05/18/2022 10:32 AM    ANISOCYTOSIS 1+ 05/18/2022 10:32 AM      Lab Results   Component Value Date/Time    SODIUM 140 04/21/2022 07:35 AM    POTASSIUM 4.0 04/21/2022 07:35 AM    CHLORIDE 105 04/21/2022 07:35 AM    CO2 22 04/21/2022 07:35 AM    GLUCOSE 106 (H) 04/21/2022 07:35 AM    BUN 19 04/21/2022 07:35 AM    CREATININE 0.93 04/21/2022 07:35 AM       Lab Results   Component Value Date/Time    PSATOTAL <0.02 05/12/2022 10:02 AM    PSATOTAL 4.10 (H) 04/12/2021 08:04 AM    PSATOTAL 4.10 (H) 04/12/2021 08:01 AM       RADIOLOGY DATA:  DX-CHEST-2 VIEWS    Result Date: 4/29/2022 4/29/2022 10:22 AM HISTORY/REASON FOR EXAM: Multiple myeloma.  TECHNIQUE/EXAM DESCRIPTION AND NUMBER OF VIEWS: Two views of the chest. COMPARISON: None. FINDINGS: There is no evidence of focal consolidation or evidence of pulmonary edema. The heart is normal in size. There is no evidence of pleural effusion. Soft tissues and bony structures are unremarkable.     No evidence of acute cardiopulmonary process.    NM-CARDIAC STRESS TEST    Result Date: 2022   Myocardial Perfusion  Report  NUCLEAR IMAGING INTERPRETATION  There is a small sized, reversible, decreased uptake in the mid anterolateral   segment. Mild ischemia with SDS 2.  Normal left ventricular size, ejection fraction, and wall motion.  ECG INTERPRETATION  Negative stress ECG for ischemia with regadenoson.  JAMES GRANT  MRN:    [id number]         Gender:    M  Exam Date: 2022 08:42  Exam Location:      Out Patient  Ordering Phys:     JAMES DURANT  NucMed Tech:       Reid Cano RT                     (R,N)  Age:    71    :    1950        Ht (in):     68  Wt (lb):     175    BMI:    26.52       Cardiologis                                          t  Risk Factors:             none  Indications:              Pre-Op Surgery  ICD Codes:                V72.83  Cardiac History:          Dyspnea and Murmur  Cardiac Meds:  Meds Past 24 hrs:  Pretest Chest Pain:       No symptoms  STRESS TEST      Pharmacologi                   c  Protoc   Lexiscan       Dose: 0.4 mg  ol:  Post-Injection Exercise:        No exercise followed the intravenous injection  Resting HR (bpm):      59  Peak HR (bpm):         90  Resting BP (mmHg):       134    /   66  Peak BP (mmHg):       142   /   63  MaxPHR:     151     Target HR (bpm):       128  % MaxPHR:     60  Double Product:       [id number]  BP Response:  Stress Termination:       Completion of Protocol  Stress Symptoms:  GI UPSET,RACING HEART  ECG  Resting ECG:     Sinus rhythm.  Stress ECG:      No ischemic changes with Regadenoson.  IMAGE PROTOCOL      Rest/Stress 1                       Day          RadiopharmaceuticalDose (mCi)   Imaging  Date      Imaging  Time         Inj to Img Time (min)  Rest:   Tc-99m             7.2          22-Apr-2022        09:23          Tetrofosmin  Stress: Tc-99m             27.8         22-Apr-2022        10:18          Tetrofosmin  Rest:  Administration Site:       Right wrist  Administered by:      Reid Cano RT (R,N)  Stress:  Administration Site:       Right wrist  Administered by:      Reid Cano RT (R,N)  % Percent HR Achieved:  SPECT RESULTS  Technical Quality:       Good  Raw Data Analysis:  Summed Stress Score:    3  Summed Rest Score:    11  Summed Difference Score:        2  PERFUSION:  There is a small sized, reversible, decreased uptake in the mid anterolateral   segment. Mild ischemia with SDS 2.  FUNCTIONAL RESULTS (calculated via Gated SPECT)  Stress Image LV EF:        67     %  Upper Normal Limit  Stress EDV:      92     ml   EDVI:    48      ml/m2  Stress ESV:      30     ml   ESVI:    16      ml/m2  TID:    0.93   TID - 1.19      TID (ed) - 1.23  LV Function:  Normal left ventricular wall motion.  LV ejection fraction = 67%.  Dewayne Santiago MD  (Electronically Signed)  Final Date:      22 April 2022                   14:41      IMPRESSION:    A 71 y.o. with   Prostate cancer (HCC)  Staging form: Prostate, AJCC 8th Edition  - Clinical stage from 10/8/2021: Stage IIB (cT1c, cN0, cM0, PSA: 4.1, Grade Group: 2) - Signed by Kirk DELANEY M.D. on 10/8/2021  Histopathologic type: Adenocarcinoma, NOS  Stage prefix: Initial diagnosis  Prostate specific antigen (PSA) range: Less than 10  [place] primary pattern: 3  [place] secondary pattern: 4  [place] score: 7  Histologic grading system: 5 grade system  Bilateral cancer: Yes  Number of biopsy cores examined: 12  Number of biopsy cores positive: 6  Location of positive needle core biopsies: Both sides    Multiple myeloma not in remission    CANCER STATUS:  No Evidence of  Disease (prostate cancer)    RECOMMENDATIONS:   Reassured him with respect to his prostate cancer.  Recommended repeat PSA in 6 months with follow-up.    Thank you for the opportunity to participate in his care.  If any questions or comments, please do not hesitate in calling.    Orders Placed This Encounter   • PROSTATE SPECIFIC AG DIAGNOSTIC

## 2022-05-19 NOTE — PROGRESS NOTES
Subjective     Miguel Ángel Hedrick is a 71 y.o. male who presents with Multiple Myeloma (Lab fv)          HPI   Mr Floridalma presents for evaluation of malaise and recurrent chills. He is accompanied by his wife for today's visit.    Patient initially referred per PCP in 5/2021 for further evaluation macrocytosis without anemia. 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 BM Bx completed 7/23/21 showed 60-70% improvement from plasma cells, cytogenetics were negative, he was diagnosed with lambda light chain myeloma. PET completed 8/18/21 was negative for FDG accumulation. He was referred to Dr. Stone at [facility] for further evaluation of bone marrow transplant and in the interim initiated RVD [Revlimid 25 mg daily days 1-14, 7 days off; Velcade 1.3 mg/m2 subcu weekly dexamethasone 40 mg p.o. on days of Velcade; 21 days] on 9/2/21 He completed 9 cycles RVD and transitioned to daratumumab plus Pomalyst in anticipation of autologous stem cell transplant, initiating treatment on 4/21/22.    Patient diagnosed with concurrent prostate cancer per bx on 7/15/21 per Urology, Dr. Lazar: pathology showed Sarah 3+4 = 7, 2/12 cores, 3+3 = 6, 4/12 cores; PSA 4.10 (4/2021). Patient with h/o testosterone replacement therapy, discontinued at time of diagnosis.He is established with Dr. Isaac and completed brachytherapy 12/16/21; 38 fractions to pelvis completed 3/17/22. He continues with surveillance per Urology.    Patient with neutropenia related to new regimen, Pomalyst/daratumumab, held last week. He has remained afebrile but notes new onset chills and persistent malaise, he is feeling unwell and presents for further evaluation in clinic.      Review of Systems   Constitutional: Positive for chills and malaise/fatigue. Negative for fever and weight loss.   Respiratory: Positive for shortness of breath (with activity). Negative for cough and wheezing.    Cardiovascular: Positive  for palpitations (with in SOB). Negative for chest pain and leg swelling.   Gastrointestinal: Negative for blood in stool, constipation (Last BM yesterday), diarrhea, melena, nausea and vomiting.   Genitourinary: Negative for dysuria, flank pain and hematuria.   Musculoskeletal: Positive for back pain (resolved from earlier this week). Negative for joint pain and myalgias.   Neurological: Positive for dizziness and tingling (bilaeral feet - variable and self limiting). Negative for headaches.   Psychiatric/Behavioral: The patient does not have insomnia.          Allergies   Allergen Reactions   • Grass Pollen(K-O-R-T-Swt Rodrigo) Shortness of Breath   • Pet Dander [Cat Hair Extract] Shortness of Breath and Unspecified   • [place] Anaphylaxis and Unspecified     sneezing         Current Outpatient Medications on File Prior to Visit   Medication Sig Dispense Refill   • Pomalidomide 2 MG Cap Take one capsule by mouth daily on Days 1 to 21 of each 28 day cycle (Patient not taking: Reported on 5/23/2022) 21 Capsule 0   • simvastatin (ZOCOR) 20 MG Tab Take 1 Tablet by mouth every evening. 100 Tablet 3   • FLUoxetine (PROZAC) 40 MG capsule Take 1 Capsule by mouth every day for 90 days. 90 Capsule 3   • promethazine (PHENERGAN) 25 MG Tab Take 1 Tablet by mouth as needed in the morning and 1 Tablet as needed at noon and 1 Tablet as needed in the evening and 1 Tablet as needed before bedtime for Nausea/Vomiting. Do all this for up to 30 days. (Patient not taking: Reported on 5/23/2022) 60 Tablet 0   • ondansetron (ZOFRAN ODT) 8 MG TABLET DISPERSIBLE Take 8 mg by mouth every 8 hours as needed for Nausea. (Patient not taking: Reported on 5/23/2022)     • zolpidem (AMBIEN) 10 MG Tab TAKE 1 TABLET BY MOUTH AT BEDTIME AS NEEDED FOR SLEEP FOR UP TO 30 DAYS. 30 Tablet 2   • tamsulosin (FLOMAX) 0.4 MG capsule TAKE 2 CAPSULES BY MOUTH 30 MINUTES AFTER DINNER. TAKE 1 CAPSULE BY MOUTH FOR A WEEK THEN INCREASE TO 2 CAPSULES (Patient  "taking differently: Taking 1 capsule) 180 Capsule 1   • valACYclovir (VALTREX) 500 MG Tab TAKE 1 TABLET BY MOUTH 2 TIMES A DAY  DAYS. 200 Tablet 3   • Magnesium Hydroxide (MILK OF MAGNESIA PO) Take  by mouth as needed.     • VITAMIN D PO Take 8,000 Units by mouth.     • aspirin EC (ECOTRIN) 81 MG Tablet Delayed Response Take 81 mg by mouth every day.     • Omega-3 Fatty Acids (FISH OIL) 1000 MG Cap capsule Take 1,000 mg by mouth 2 times a day.     • levothyroxine (SYNTHROID) 100 MCG Tab Take 1 tablet by mouth Every morning on an empty stomach. 100 tablet 3     No current facility-administered medications on file prior to visit.              Objective     /58 (BP Location: Right arm, Patient Position: Sitting, BP Cuff Size: Adult)   Pulse 74   Temp 36.8 °C (98.3 °F) (Temporal)   Resp 16   Ht 1.68 m (5' 6.14\")   Wt 77.3 kg (170 lb 8.4 oz)   SpO2 99%   BMI 27.41 kg/m²      Physical Exam  Vitals reviewed.   Constitutional:       General: He is not in acute distress.     Appearance: He is well-developed. He is not diaphoretic.   HENT:      Head: Normocephalic and atraumatic.   Eyes:      General: No scleral icterus.        Right eye: No discharge.         Left eye: No discharge.   Cardiovascular:      Rate and Rhythm: Normal rate and regular rhythm.      Heart sounds: Normal heart sounds. No murmur heard.    No friction rub. No gallop.   Pulmonary:      Effort: Pulmonary effort is normal. No respiratory distress.      Breath sounds: Normal breath sounds. No wheezing.   Abdominal:      General: There is no distension.      Palpations: Abdomen is soft.      Tenderness: There is no abdominal tenderness.   Musculoskeletal:         General: Normal range of motion.      Cervical back: Normal range of motion.   Skin:     General: Skin is warm and dry.      Coloration: Skin is not pale.      Findings: No erythema or rash.   Neurological:      Mental Status: He is alert and oriented to person, place, and time. "   Psychiatric:         Behavior: Behavior normal.                       Latest Reference Range & Units 04/21/22 07:35   Sodium 135 - 145 mmol/L 140   Potassium 3.6 - 5.5 mmol/L 4.0   Chloride 96 - 112 mmol/L 105   Co2 20 - 33 mmol/L 22   Anion Gap 7.0 - 16.0  13.0   Glucose 65 - 99 mg/dL 106 (H)   Bun 8 - 22 mg/dL 19   Creatinine 0.50 - 1.40 mg/dL 0.93   GFR (CKD-EPI) >60 mL/min/1.73 m 2 88 [1]   Calcium 8.5 - 10.5 mg/dL 9.2   AST(SGOT) 12 - 45 U/L 31   ALT(SGPT) 2 - 50 U/L 43   Alkaline Phosphatase 30 - 99 U/L 73   Total Bilirubin 0.1 - 1.5 mg/dL 0.5   Albumin 3.2 - 4.9 g/dL 4.3   Total Protein 6.0 - 8.2 g/dL 5.9 (L)   Globulin 1.9 - 3.5 g/dL 1.6 (L)   A-G Ratio g/dL 2.7                               Assessment & Plan        1. Multiple myeloma not having achieved remission (HCC)     2. Chemotherapy-induced neutropenia (HCC)  levoFLOXacin (LEVAQUIN) 750 MG tablet   3. Chills     4. Malaise     5. Prostate cancer (HCC)       1.  Malaise/neutropenia: Patient has remained afebrile over the past week (s/p Cycle 1, days 1 and 8) due to neutropenic state. He notes new onset chills, persistent fatigue/malaise. Further treatment has been held accordingly. Given diagnosis, no supportive Granix has been provided, he will be provided empiric Levaquin and continue to monitor symptoms. He has been offered supportive hydration, he declines at this time.    2.  Prostate cancer: Diagnosed 7/2021; s/p brachytherapy 12/16/2021; s/p XRT completed 2022. Continued surveillance per Urology & Rad Onc.    3.  Multiple myeloma: Diagnosed 7/23/21 initiated RVD 9/2/21;  transitioned to daratumumab plus Pomalyst in anticipation of autologous stem cell transplant (at [facility]), initiated treatment 4/21/22. Cycle 1, days 15 and 22 have been held for neutropenia. He will return as scheduled in 1 week for evaluation prior to cycle 2, sooner as needed.          The patient verbalized agreement and understanding of current plan. All questions and  concerns were addressed at time of visit.    Please note that this dictation was created using voice recognition software. I have made every reasonable attempt to correct obvious errors, but I expect that there are errors of grammar and possibly content that I did not discover before finalizing the note.

## 2022-05-19 NOTE — NON-PROVIDER
Patient was seen today in clinic with Dr. Isaac for follow up.  Vitals signs and weight were obtained and pain assessment was completed.  Allergies and medications were reviewed with the patient.       Vitals/Pain:  Vitals:    05/19/22 0856   BP: 115/86   Pulse: 83   Resp: 20   Temp: 36.9 °C (98.5 °F)   SpO2: 100%   Weight: 77.1 kg (170 lb)   Pain Score: No pain        Allergies:   Grass pollen(k-o-r-t-swt jose), Pet dander [cat hair extract], Sagebrush, and [place]ova (diagnostic)    Current Medications:  Current Outpatient Medications   Medication Sig Dispense Refill   • Pomalidomide 2 MG Cap Take one capsule by mouth daily on Days 1 to 21 of each 28 day cycle (Patient not taking: Reported on 5/19/2022) 21 Capsule 0   • simvastatin (ZOCOR) 20 MG Tab Take 1 Tablet by mouth every evening. 100 Tablet 3   • FLUoxetine (PROZAC) 40 MG capsule Take 1 Capsule by mouth every day for 90 days. 90 Capsule 3   • promethazine (PHENERGAN) 25 MG Tab Take 1 Tablet by mouth as needed in the morning and 1 Tablet as needed at noon and 1 Tablet as needed in the evening and 1 Tablet as needed before bedtime for Nausea/Vomiting. Do all this for up to 30 days. 60 Tablet 0   • ondansetron (ZOFRAN ODT) 8 MG TABLET DISPERSIBLE Take 8 mg by mouth every 8 hours as needed for Nausea.     • zolpidem (AMBIEN) 10 MG Tab TAKE 1 TABLET BY MOUTH AT BEDTIME AS NEEDED FOR SLEEP FOR UP TO 30 DAYS. 30 Tablet 2   • tamsulosin (FLOMAX) 0.4 MG capsule TAKE 2 CAPSULES BY MOUTH 30 MINUTES AFTER DINNER. TAKE 1 CAPSULE BY MOUTH FOR A WEEK THEN INCREASE TO 2 CAPSULES 180 Capsule 1   • valACYclovir (VALTREX) 500 MG Tab TAKE 1 TABLET BY MOUTH 2 TIMES A DAY  DAYS. 200 Tablet 3   • Magnesium Hydroxide (MILK OF MAGNESIA PO) Take  by mouth as needed.     • VITAMIN D PO Take 8,000 Units by mouth.     • aspirin EC (ECOTRIN) 81 MG Tablet Delayed Response Take 81 mg by mouth every day.     • Omega-3 Fatty Acids (FISH OIL) 1000 MG Cap capsule Take 1,000 mg by mouth 2  times a day.     • levothyroxine (SYNTHROID) 100 MCG Tab Take 1 tablet by mouth Every morning on an empty stomach. 100 tablet 3     No current facility-administered medications for this encounter.         PCP:  Amaury Cash R.N.

## 2022-05-19 NOTE — TELEPHONE ENCOUNTER
Rec'vd call from pt with c/o difficulty breathing, SOB, increased weakness, & overall concern of not feeling better since holding pomalidomide. Pt & pt's spouse requesting to see Dr. Aaron today if possible for an in person office visit.    Spoke with Samantha MARTIN whom recommended pt come to office today & can be evaluated by VERONICA as Dr. Aaron is out of office today.    Returned call to pt & pt agreeable.  Office visit scheduled with MA & pt on his way to office now.

## 2022-05-22 LAB
ALBUMIN SERPL ELPH-MCNC: 2.89 G/DL (ref 3.75–5.01)
ALPHA1 GLOB SERPL ELPH-MCNC: 0.51 G/DL (ref 0.19–0.46)
ALPHA2 GLOB SERPL ELPH-MCNC: 1.24 G/DL (ref 0.48–1.05)
B-GLOBULIN SERPL ELPH-MCNC: 0.69 G/DL (ref 0.48–1.1)
EER MONOCLONAL PROTEIN AND FLC, SERUM Q5224: ABNORMAL
GAMMA GLOB SERPL ELPH-MCNC: 0.37 G/DL (ref 0.62–1.51)
IGA SERPL-MCNC: 28 MG/DL (ref 68–408)
IGG SERPL-MCNC: 332 MG/DL (ref 768–1632)
IGM SERPL-MCNC: 19 MG/DL (ref 35–263)
INTERPRETATION SERPL IFE-IMP: ABNORMAL
INTERPRETATION SERPL IFE-IMP: ABNORMAL
KAPPA LC FREE SER-MCNC: 5.8 MG/L (ref 3.3–19.4)
KAPPA LC FREE/LAMBDA FREE SER NEPH: 0.15 {RATIO} (ref 0.26–1.65)
LAMBDA LC FREE SERPL-MCNC: 39.78 MG/L (ref 5.71–26.3)
PROT SERPL-MCNC: 5.7 G/DL (ref 6.3–8.2)

## 2022-05-23 ENCOUNTER — APPOINTMENT (OUTPATIENT)
Dept: ADMISSIONS | Facility: MEDICAL CENTER | Age: 72
End: 2022-05-23
Payer: MEDICARE

## 2022-05-23 ENCOUNTER — TELEPHONE (OUTPATIENT)
Dept: HEMATOLOGY ONCOLOGY | Facility: MEDICAL CENTER | Age: 72
End: 2022-05-23
Payer: MEDICARE

## 2022-05-23 ENCOUNTER — PRE-ADMISSION TESTING (OUTPATIENT)
Dept: ADMISSIONS | Facility: MEDICAL CENTER | Age: 72
End: 2022-05-23
Attending: INTERNAL MEDICINE
Payer: MEDICARE

## 2022-05-23 NOTE — OR NURSING
I called Marely the medical assistant for Dr. Aaron and notified her that the patient requested general anesthesia for procedure. She states she will get orders for general anesthesia and notify  and patient.

## 2022-05-23 NOTE — TELEPHONE ENCOUNTER
Called pt & spoke with pt's spouse, Beatrice, to f/u on how pt is feeling after being on Levaquin for a few days.  Per Beatrice, pt is feeling better (and not worse) & has been out & about completing errands & allowing for periods of rest.  Beatrice appreciative of call & stated no further questions nor concerns at this time.  Confirmed office visit this week.

## 2022-05-26 ENCOUNTER — APPOINTMENT (OUTPATIENT)
Dept: ONCOLOGY | Facility: MEDICAL CENTER | Age: 72
End: 2022-05-26
Attending: INTERNAL MEDICINE
Payer: MEDICARE

## 2022-05-26 ENCOUNTER — HOSPITAL ENCOUNTER (OUTPATIENT)
Dept: LAB | Facility: MEDICAL CENTER | Age: 72
End: 2022-05-26
Attending: INTERNAL MEDICINE
Payer: MEDICARE

## 2022-05-26 DIAGNOSIS — C90.00 MULTIPLE MYELOMA NOT HAVING ACHIEVED REMISSION (HCC): ICD-10-CM

## 2022-05-26 LAB
ALBUMIN SERPL BCP-MCNC: 3.7 G/DL (ref 3.2–4.9)
ALBUMIN/GLOB SERPL: 1.6 G/DL
ALP SERPL-CCNC: 94 U/L (ref 30–99)
ALT SERPL-CCNC: 29 U/L (ref 2–50)
ANION GAP SERPL CALC-SCNC: 12 MMOL/L (ref 7–16)
AST SERPL-CCNC: 20 U/L (ref 12–45)
BASOPHILS # BLD AUTO: 1.4 % (ref 0–1.8)
BASOPHILS # BLD: 0.03 K/UL (ref 0–0.12)
BILIRUB SERPL-MCNC: 0.3 MG/DL (ref 0.1–1.5)
BUN SERPL-MCNC: 16 MG/DL (ref 8–22)
CALCIUM SERPL-MCNC: 8.9 MG/DL (ref 8.5–10.5)
CHLORIDE SERPL-SCNC: 106 MMOL/L (ref 96–112)
CO2 SERPL-SCNC: 23 MMOL/L (ref 20–33)
CREAT SERPL-MCNC: 0.9 MG/DL (ref 0.5–1.4)
EOSINOPHIL # BLD AUTO: 0.01 K/UL (ref 0–0.51)
EOSINOPHIL NFR BLD: 0.5 % (ref 0–6.9)
ERYTHROCYTE [DISTWIDTH] IN BLOOD BY AUTOMATED COUNT: 48.9 FL (ref 35.9–50)
GFR SERPLBLD CREATININE-BSD FMLA CKD-EPI: 92 ML/MIN/1.73 M 2
GLOBULIN SER CALC-MCNC: 2.3 G/DL (ref 1.9–3.5)
GLUCOSE SERPL-MCNC: 98 MG/DL (ref 65–99)
HCT VFR BLD AUTO: 34.5 % (ref 42–52)
HGB BLD-MCNC: 12 G/DL (ref 14–18)
IMM GRANULOCYTES # BLD AUTO: 0.03 K/UL (ref 0–0.11)
IMM GRANULOCYTES NFR BLD AUTO: 1.4 % (ref 0–0.9)
LYMPHOCYTES # BLD AUTO: 0.37 K/UL (ref 1–4.8)
LYMPHOCYTES NFR BLD: 17.5 % (ref 22–41)
MCH RBC QN AUTO: 36.8 PG (ref 27–33)
MCHC RBC AUTO-ENTMCNC: 34.8 G/DL (ref 33.7–35.3)
MCV RBC AUTO: 105.8 FL (ref 81.4–97.8)
MONOCYTES # BLD AUTO: 0.25 K/UL (ref 0–0.85)
MONOCYTES NFR BLD AUTO: 11.8 % (ref 0–13.4)
NEUTROPHILS # BLD AUTO: 1.42 K/UL (ref 1.82–7.42)
NEUTROPHILS NFR BLD: 67.4 % (ref 44–72)
NRBC # BLD AUTO: 0 K/UL
NRBC BLD-RTO: 0 /100 WBC
PLATELET # BLD AUTO: 285 K/UL (ref 164–446)
PMV BLD AUTO: 9.4 FL (ref 9–12.9)
POTASSIUM SERPL-SCNC: 4.1 MMOL/L (ref 3.6–5.5)
PROT SERPL-MCNC: 6 G/DL (ref 6–8.2)
RBC # BLD AUTO: 3.26 M/UL (ref 4.7–6.1)
SODIUM SERPL-SCNC: 141 MMOL/L (ref 135–145)
WBC # BLD AUTO: 2.1 K/UL (ref 4.8–10.8)

## 2022-05-26 PROCEDURE — 85025 COMPLETE CBC W/AUTO DIFF WBC: CPT

## 2022-05-26 PROCEDURE — 80053 COMPREHEN METABOLIC PANEL: CPT

## 2022-05-26 PROCEDURE — 36415 COLL VENOUS BLD VENIPUNCTURE: CPT

## 2022-05-27 ENCOUNTER — OFFICE VISIT (OUTPATIENT)
Dept: HEMATOLOGY ONCOLOGY | Facility: MEDICAL CENTER | Age: 72
End: 2022-05-27
Payer: MEDICARE

## 2022-05-27 VITALS
HEIGHT: 66 IN | HEART RATE: 95 BPM | SYSTOLIC BLOOD PRESSURE: 146 MMHG | WEIGHT: 172.29 LBS | TEMPERATURE: 98 F | DIASTOLIC BLOOD PRESSURE: 70 MMHG | BODY MASS INDEX: 27.69 KG/M2 | OXYGEN SATURATION: 99 % | RESPIRATION RATE: 16 BRPM

## 2022-05-27 DIAGNOSIS — T45.1X5A CHEMOTHERAPY-INDUCED NEUTROPENIA (HCC): ICD-10-CM

## 2022-05-27 DIAGNOSIS — C90.00 MULTIPLE MYELOMA NOT HAVING ACHIEVED REMISSION (HCC): ICD-10-CM

## 2022-05-27 DIAGNOSIS — D70.1 CHEMOTHERAPY-INDUCED NEUTROPENIA (HCC): ICD-10-CM

## 2022-05-27 DIAGNOSIS — C61 PROSTATE CANCER (HCC): ICD-10-CM

## 2022-05-27 PROCEDURE — 99213 OFFICE O/P EST LOW 20 MIN: CPT | Performed by: INTERNAL MEDICINE

## 2022-05-27 ASSESSMENT — PAIN SCALES - GENERAL: PAINLEVEL: NO PAIN

## 2022-05-27 ASSESSMENT — FIBROSIS 4 INDEX: FIB4 SCORE: 0.91

## 2022-05-27 NOTE — PROGRESS NOTES
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 Stem cell transplant is scheduled. He was switched from RVD which he was tolerating well but which was only slowly lowering his light chains to Daratumumab plus Pomalidomide. On this latter regimen he has had issues with profound neutropenia. His lambda light chains however have decreased dramatically. He was treated with Levaquin on 5/19/22 for neutropenia and fever.     ROS:    Constitutional: No weight loss  Skin: No rash or jaundice  HENT: No change in eyesight or hearing  Cardiovascular:No chest pain or arrythmia  Respiratory:No cough or SOB  GI:No nausea, vomiting, diarrhea, constipation  :No dysuria or frequency  Musculoskeletal:No bone or joint pain  Neuro:No sx's of neuropathy  Psych: No complaints    PMH:      Allergies   Allergen Reactions   • Grass Pollen(K-O-R-T-Swt Rodrigo) Shortness of Breath   • Pet Dander [Cat Hair Extract] Shortness of Breath and Unspecified   • Sagebrush Anaphylaxis and Unspecified     sneezing       Past Medical History:   Diagnosis Date   • Bowel habit changes     occasional constipation and diarrhea   • Breath shortness    • Cancer (HCC)    • Disorder of thyroid     hypothyroid   • Heart murmur    • High cholesterol    • Hypertension     not on medication   • Light chain myeloma (HCC) 2021   • Prostate cancer (HCC)    • Psychiatric problem     depression   • Seizure (HCC)     None in the past 30 years        Past Surgical History:   Procedure Laterality Date   • BRACHY THERAPY N/A 12/16/2021    Procedure: BRACHYTHERAPY - PROSTATE SEED AND HYDROGEL SPACER;  Surgeon: Kirk DELANEY M.D.;  Location: SURGERY SAME DAY [facility];  Service: Urology   • MN DX BONE MARROW ASPIRATIONS Left 7/23/2021    Procedure: ASPIRATION, BONE MARROW - DURANT;  Surgeon: Hair Okeefe M.D.;  Location: ENDOSCOPY [facility];  Service: Orthopedics   • MN DX BONE MARROW  BIOPSIES Left 7/23/2021    Procedure: BIOPSY, BONE MARROW, USING NEEDLE OR TROCAR;  Surgeon: Hair Okeefe M.D.;  Location: [facility];  Service: Orthopedics   • WRIST FUSION Left         Medications:    Current Outpatient Medications on File Prior to Visit   Medication Sig Dispense Refill   • levoFLOXacin (LEVAQUIN) 750 MG tablet Take 1 Tablet by mouth every day. 7 Tablet 0   • Pomalidomide 2 MG Cap Take one capsule by mouth daily on Days 1 to 21 of each 28 day cycle (Patient not taking: Reported on 5/23/2022) 21 Capsule 0   • simvastatin (ZOCOR) 20 MG Tab Take 1 Tablet by mouth every evening. 100 Tablet 3   • FLUoxetine (PROZAC) 40 MG capsule Take 1 Capsule by mouth every day for 90 days. 90 Capsule 3   • promethazine (PHENERGAN) 25 MG Tab Take 1 Tablet by mouth as needed in the morning and 1 Tablet as needed at noon and 1 Tablet as needed in the evening and 1 Tablet as needed before bedtime for Nausea/Vomiting. Do all this for up to 30 days. (Patient not taking: Reported on 5/23/2022) 60 Tablet 0   • ondansetron (ZOFRAN ODT) 8 MG TABLET DISPERSIBLE Take 8 mg by mouth every 8 hours as needed for Nausea. (Patient not taking: Reported on 5/23/2022)     • zolpidem (AMBIEN) 10 MG Tab TAKE 1 TABLET BY MOUTH AT BEDTIME AS NEEDED FOR SLEEP FOR UP TO 30 DAYS. 30 Tablet 2   • tamsulosin (FLOMAX) 0.4 MG capsule TAKE 2 CAPSULES BY MOUTH 30 MINUTES AFTER DINNER. TAKE 1 CAPSULE BY MOUTH FOR A WEEK THEN INCREASE TO 2 CAPSULES (Patient taking differently: Taking 1 capsule) 180 Capsule 1   • valACYclovir (VALTREX) 500 MG Tab TAKE 1 TABLET BY MOUTH 2 TIMES A DAY  DAYS. 200 Tablet 3   • Magnesium Hydroxide (MILK OF MAGNESIA PO) Take  by mouth as needed.     • VITAMIN D PO Take 8,000 Units by mouth.     • aspirin EC (ECOTRIN) 81 MG Tablet Delayed Response Take 81 mg by mouth every day.     • Omega-3 Fatty Acids (FISH OIL) 1000 MG Cap capsule Take 1,000 mg by mouth 2 times a day.     • levothyroxine (SYNTHROID) 100  MCG Tab Take 1 tablet by mouth Every morning on an empty stomach. 100 tablet 3     No current facility-administered medications on file prior to visit.       Social History     Tobacco Use   • Smoking status: Former Smoker     Years: 20.00     Types: Cigarettes     Quit date:      Years since quittin.4   • Smokeless tobacco: Never Used   Substance Use Topics   • Alcohol use: Never        Family History   Problem Relation Age of Onset   • Cancer Neg Hx         Objective    Vitals:    There were no vitals taken for this visit.    Physical Exam:    Appears well-developed and well-nourished. No distress.    Head -  Normocephalic .   Eyes - Pupils are equal. Conjunctivae  normal. No scleral icterus.   Ears - normal hearing  Neurological -   Alert and oriented   Skin - Skin is warm and dry. No rash noted. Not diaphoretic. No erythema. No pallor. No jaundice   Psychiatric -  Normal mood and affect.    Labs:     Latest Reference Range & Units 22 07:35 22 14:03 22 13:50 22 13:40 22 10:32 22 12:08   WBC 4.8 - 10.8 K/uL 1.9 (LL) [1] 2.6 (L) 2.6 (L) 0.9 (LL) [2] 0.9 (LL) [3] 2.1 (L)   RBC 4.70 - 6.10 M/uL 2.95 (L) 3.09 (L) 3.13 (L) 3.12 (L) 3.06 (L) 3.26 (L)   Hemoglobin 14.0 - 18.0 g/dL 11.4 (L) 11.9 (L) 12.1 (L) 12.0 (L) 11.5 (L) 12.0 (L)   Hematocrit 42.0 - 52.0 % 31.7 (L) 33.7 (L) 35.1 (L) 33.6 (L) 32.6 (L) 34.5 (L)   MCV 81.4 - 97.8 fL 107.5 (H) 109.1 (H) 112.1 (H) 107.7 (H) 106.5 (H) 105.8 (H)   MCH 27.0 - 33.0 pg 38.6 (H) 38.5 (H) 38.7 (H) 38.5 (H) 37.6 (H) 36.8 (H)   MCHC 33.7 - 35.3 g/dL 36.0 (H) 35.3 34.5 35.7 (H) 35.3 34.8   RDW 35.9 - 50.0 fL 55.5 (H) 57.5 (H) 56.3 (H) 50.3 (H) 47.9 48.9   Platelet Count 164 - 446 K/uL 113 (L) 172 139 (L) 49 (L) 109 (L) 285   MPV 9.0 - 12.9 fL 9.5 9.6 9.4 10.4 10.1 9.4   Neutrophils-Polys 44.00 - 72.00 % 52.50 76.70 (H) 81.80 (H) 46.80 44.40 67.40   Neutrophils (Absolute) 1.82 - 7.42 K/uL 1.02 (L) [4] 2.01 [5] 2.12 [6] 0.44 (LL) [7] 0.40  (LL) [8] 1.42 (L) [9]   Lymphocytes 22.00 - 41.00 % 18.00 (L) 5.70 (L) 5.00 (L) 20.20 (L) 34.80 17.50 (L)   Lymphs (Absolute) 1.00 - 4.80 K/uL 0.35 (L) 0.15 (L) 0.13 (L) 0.19 (L) 0.31 (L) 0.37 (L)      Latest Reference Range & Units 01/06/22 15:51 01/27/22 11:55 02/17/22 13:55 03/10/22 11:24 03/31/22 11:30 05/18/22 10:32   Free Kappa Light Chains 3.30 - 19.40 mg/L 4.05 [1] 4.21 [2] 4.47 [3] 4.93 [4] 4.32 [5] 5.80 [6]   Free Lambda Light Chains 5.71 - 26.30 mg/L 383.60 (H) 336.00 (H) 214.61 (H) [7] 167.11 (H) [8] 141.59 (H) [9] 39.78 (H) [10]   Kappa-Lambda Ratio 0.26 - 1.65  0.01 (L) [11] 0.01 (L) [12] 0.02 (L) [13] 0.03 (L) [14] 0.03 (L) [15] 0.15 (L)     Assessment    Imp:    Visit Diagnosis:    1. Multiple myeloma not having achieved remission (HCC)     2. Chemotherapy-induced neutropenia (HCC)     3. Prostate cancer (HCC)       Plan:  Hold chemo  Discuss with Sagrario palacios low dose Pomalyst maintenance  Let me know when TX so we can decide next OV    Gary Aaron M.D.

## 2022-06-02 ENCOUNTER — APPOINTMENT (OUTPATIENT)
Dept: ONCOLOGY | Facility: MEDICAL CENTER | Age: 72
End: 2022-06-02
Attending: INTERNAL MEDICINE
Payer: MEDICARE

## 2022-06-07 ENCOUNTER — TELEPHONE (OUTPATIENT)
Dept: HEMATOLOGY ONCOLOGY | Facility: MEDICAL CENTER | Age: 72
End: 2022-06-07
Payer: MEDICARE

## 2022-06-07 NOTE — TELEPHONE ENCOUNTER
Spoke with pt spouse, Beatrice, to follow-up on Pomalyst refills. Confirmed pt is still holding his Pomalyst until after his BMA on 06/10/22. Beatrice confirms they have an unpened refill for his Pomalyst at home, at the 2 mg dose. Will hold on to this in the event pt is to restart the Pomalyst after his BMA results. Refill for Pomalyst not filled today.

## 2022-06-09 ENCOUNTER — APPOINTMENT (OUTPATIENT)
Dept: ONCOLOGY | Facility: MEDICAL CENTER | Age: 72
End: 2022-06-09
Attending: INTERNAL MEDICINE
Payer: MEDICARE

## 2022-06-10 ENCOUNTER — HOSPITAL ENCOUNTER (OUTPATIENT)
Facility: MEDICAL CENTER | Age: 72
End: 2022-06-10
Attending: INTERNAL MEDICINE | Admitting: INTERNAL MEDICINE
Payer: MEDICARE

## 2022-06-10 ENCOUNTER — ANESTHESIA (OUTPATIENT)
Dept: SURGERY | Facility: MEDICAL CENTER | Age: 72
End: 2022-06-10
Payer: MEDICARE

## 2022-06-10 ENCOUNTER — ANESTHESIA EVENT (OUTPATIENT)
Dept: SURGERY | Facility: MEDICAL CENTER | Age: 72
End: 2022-06-10
Payer: MEDICARE

## 2022-06-10 VITALS
OXYGEN SATURATION: 96 % | BODY MASS INDEX: 25.39 KG/M2 | WEIGHT: 167.55 LBS | DIASTOLIC BLOOD PRESSURE: 63 MMHG | RESPIRATION RATE: 16 BRPM | TEMPERATURE: 97.3 F | SYSTOLIC BLOOD PRESSURE: 147 MMHG | HEIGHT: 68 IN | HEART RATE: 55 BPM

## 2022-06-10 DIAGNOSIS — C90.00 MULTIPLE MYELOMA NOT HAVING ACHIEVED REMISSION (HCC): ICD-10-CM

## 2022-06-10 LAB
BASOPHILS # BLD AUTO: 1.4 % (ref 0–1.8)
BASOPHILS # BLD: 0.03 K/UL (ref 0–0.12)
EOSINOPHIL # BLD AUTO: 0.05 K/UL (ref 0–0.51)
EOSINOPHIL NFR BLD: 2.3 % (ref 0–6.9)
ERYTHROCYTE [DISTWIDTH] IN BLOOD BY AUTOMATED COUNT: 56.2 FL (ref 35.9–50)
HCT VFR BLD AUTO: 31.8 % (ref 42–52)
HGB BLD-MCNC: 11.2 G/DL (ref 14–18)
HGB RETIC QN AUTO: 41.3 PG/CELL (ref 29–35)
IMM GRANULOCYTES # BLD AUTO: 0 K/UL (ref 0–0.11)
IMM GRANULOCYTES NFR BLD AUTO: 0 % (ref 0–0.9)
IMM RETICS NFR: 27.8 % (ref 9.3–17.4)
LYMPHOCYTES # BLD AUTO: 0.45 K/UL (ref 1–4.8)
LYMPHOCYTES NFR BLD: 20.7 % (ref 22–41)
MCH RBC QN AUTO: 37.3 PG (ref 27–33)
MCHC RBC AUTO-ENTMCNC: 35.2 G/DL (ref 33.7–35.3)
MCV RBC AUTO: 106 FL (ref 81.4–97.8)
MONOCYTES # BLD AUTO: 0.36 K/UL (ref 0–0.85)
MONOCYTES NFR BLD AUTO: 16.6 % (ref 0–13.4)
NEUTROPHILS # BLD AUTO: 1.28 K/UL (ref 1.82–7.42)
NEUTROPHILS NFR BLD: 59 % (ref 44–72)
NRBC # BLD AUTO: 0 K/UL
NRBC BLD-RTO: 0 /100 WBC
PATHOLOGY CONSULT NOTE: NORMAL
PLATELET # BLD AUTO: 234 K/UL (ref 164–446)
PMV BLD AUTO: 9 FL (ref 9–12.9)
RBC # BLD AUTO: 3 M/UL (ref 4.7–6.1)
RETICS # AUTO: 0.09 M/UL (ref 0.04–0.06)
RETICS/RBC NFR: 2.9 % (ref 0.8–2.1)
WBC # BLD AUTO: 2.2 K/UL (ref 4.8–10.8)

## 2022-06-10 PROCEDURE — 700105 HCHG RX REV CODE 258: Performed by: STUDENT IN AN ORGANIZED HEALTH CARE EDUCATION/TRAINING PROGRAM

## 2022-06-10 PROCEDURE — 85046 RETICYTE/HGB CONCENTRATE: CPT

## 2022-06-10 PROCEDURE — 160048 HCHG OR STATISTICAL LEVEL 1-5: Performed by: STUDENT IN AN ORGANIZED HEALTH CARE EDUCATION/TRAINING PROGRAM

## 2022-06-10 PROCEDURE — 160038 HCHG SURGERY MINUTES - EA ADDL 1 MIN LEVEL 2: Performed by: STUDENT IN AN ORGANIZED HEALTH CARE EDUCATION/TRAINING PROGRAM

## 2022-06-10 PROCEDURE — 160035 HCHG PACU - 1ST 60 MINS PHASE I: Performed by: STUDENT IN AN ORGANIZED HEALTH CARE EDUCATION/TRAINING PROGRAM

## 2022-06-10 PROCEDURE — 86334 IMMUNOFIX E-PHORESIS SERUM: CPT

## 2022-06-10 PROCEDURE — 88311 DECALCIFY TISSUE: CPT

## 2022-06-10 PROCEDURE — 160027 HCHG SURGERY MINUTES - 1ST 30 MINS LEVEL 2: Performed by: STUDENT IN AN ORGANIZED HEALTH CARE EDUCATION/TRAINING PROGRAM

## 2022-06-10 PROCEDURE — 160025 RECOVERY II MINUTES (STATS): Performed by: STUDENT IN AN ORGANIZED HEALTH CARE EDUCATION/TRAINING PROGRAM

## 2022-06-10 PROCEDURE — 88305 TISSUE EXAM BY PATHOLOGIST: CPT | Mod: 59

## 2022-06-10 PROCEDURE — 84165 PROTEIN E-PHORESIS SERUM: CPT

## 2022-06-10 PROCEDURE — 160046 HCHG PACU - 1ST 60 MINS PHASE II: Performed by: STUDENT IN AN ORGANIZED HEALTH CARE EDUCATION/TRAINING PROGRAM

## 2022-06-10 PROCEDURE — 84155 ASSAY OF PROTEIN SERUM: CPT

## 2022-06-10 PROCEDURE — 160002 HCHG RECOVERY MINUTES (STAT): Performed by: STUDENT IN AN ORGANIZED HEALTH CARE EDUCATION/TRAINING PROGRAM

## 2022-06-10 PROCEDURE — 99100 ANES PT EXTEME AGE<1 YR&>70: CPT | Performed by: ANESTHESIOLOGY

## 2022-06-10 PROCEDURE — 85025 COMPLETE CBC W/AUTO DIFF WBC: CPT

## 2022-06-10 PROCEDURE — 88237 TISSUE CULTURE BONE MARROW: CPT

## 2022-06-10 PROCEDURE — 160009 HCHG ANES TIME/MIN: Performed by: STUDENT IN AN ORGANIZED HEALTH CARE EDUCATION/TRAINING PROGRAM

## 2022-06-10 PROCEDURE — 38222 DX BONE MARROW BX & ASPIR: CPT | Performed by: STUDENT IN AN ORGANIZED HEALTH CARE EDUCATION/TRAINING PROGRAM

## 2022-06-10 PROCEDURE — 01112 ANES BONE MARROW ASPIR&/BX: CPT | Performed by: ANESTHESIOLOGY

## 2022-06-10 PROCEDURE — 82784 ASSAY IGA/IGD/IGG/IGM EACH: CPT

## 2022-06-10 PROCEDURE — 700111 HCHG RX REV CODE 636 W/ 250 OVERRIDE (IP): Performed by: ANESTHESIOLOGY

## 2022-06-10 PROCEDURE — 83521 IG LIGHT CHAINS FREE EACH: CPT | Mod: 91

## 2022-06-10 PROCEDURE — 88184 FLOWCYTOMETRY/ TC 1 MARKER: CPT

## 2022-06-10 PROCEDURE — 88185 FLOWCYTOMETRY/TC ADD-ON: CPT | Mod: 91

## 2022-06-10 RX ORDER — HYDROMORPHONE HYDROCHLORIDE 1 MG/ML
0.2 INJECTION, SOLUTION INTRAMUSCULAR; INTRAVENOUS; SUBCUTANEOUS
Status: DISCONTINUED | OUTPATIENT
Start: 2022-06-10 | End: 2022-06-10 | Stop reason: HOSPADM

## 2022-06-10 RX ORDER — HYDRALAZINE HYDROCHLORIDE 20 MG/ML
5 INJECTION INTRAMUSCULAR; INTRAVENOUS
Status: DISCONTINUED | OUTPATIENT
Start: 2022-06-10 | End: 2022-06-10 | Stop reason: HOSPADM

## 2022-06-10 RX ORDER — SODIUM CHLORIDE, SODIUM LACTATE, POTASSIUM CHLORIDE, CALCIUM CHLORIDE 600; 310; 30; 20 MG/100ML; MG/100ML; MG/100ML; MG/100ML
INJECTION, SOLUTION INTRAVENOUS CONTINUOUS
Status: DISCONTINUED | OUTPATIENT
Start: 2022-06-10 | End: 2022-06-10 | Stop reason: HOSPADM

## 2022-06-10 RX ORDER — HYDROMORPHONE HYDROCHLORIDE 1 MG/ML
0.1 INJECTION, SOLUTION INTRAMUSCULAR; INTRAVENOUS; SUBCUTANEOUS
Status: DISCONTINUED | OUTPATIENT
Start: 2022-06-10 | End: 2022-06-10 | Stop reason: HOSPADM

## 2022-06-10 RX ORDER — HYDROMORPHONE HYDROCHLORIDE 1 MG/ML
0.4 INJECTION, SOLUTION INTRAMUSCULAR; INTRAVENOUS; SUBCUTANEOUS
Status: DISCONTINUED | OUTPATIENT
Start: 2022-06-10 | End: 2022-06-10 | Stop reason: HOSPADM

## 2022-06-10 RX ORDER — DIPHENHYDRAMINE HYDROCHLORIDE 50 MG/ML
12.5 INJECTION INTRAMUSCULAR; INTRAVENOUS
Status: DISCONTINUED | OUTPATIENT
Start: 2022-06-10 | End: 2022-06-10 | Stop reason: HOSPADM

## 2022-06-10 RX ORDER — OXYCODONE HCL 5 MG/5 ML
10 SOLUTION, ORAL ORAL
Status: DISCONTINUED | OUTPATIENT
Start: 2022-06-10 | End: 2022-06-10 | Stop reason: HOSPADM

## 2022-06-10 RX ORDER — OXYCODONE HCL 5 MG/5 ML
5 SOLUTION, ORAL ORAL
Status: DISCONTINUED | OUTPATIENT
Start: 2022-06-10 | End: 2022-06-10 | Stop reason: HOSPADM

## 2022-06-10 RX ORDER — MIDAZOLAM HYDROCHLORIDE 1 MG/ML
INJECTION INTRAMUSCULAR; INTRAVENOUS PRN
Status: DISCONTINUED | OUTPATIENT
Start: 2022-06-10 | End: 2022-06-10 | Stop reason: SURG

## 2022-06-10 RX ORDER — HALOPERIDOL 5 MG/ML
1 INJECTION INTRAMUSCULAR
Status: DISCONTINUED | OUTPATIENT
Start: 2022-06-10 | End: 2022-06-10 | Stop reason: HOSPADM

## 2022-06-10 RX ORDER — ONDANSETRON 2 MG/ML
4 INJECTION INTRAMUSCULAR; INTRAVENOUS
Status: DISCONTINUED | OUTPATIENT
Start: 2022-06-10 | End: 2022-06-10 | Stop reason: HOSPADM

## 2022-06-10 RX ORDER — ALBUTEROL SULFATE 2.5 MG/3ML
2.5 SOLUTION RESPIRATORY (INHALATION)
Status: DISCONTINUED | OUTPATIENT
Start: 2022-06-10 | End: 2022-06-10 | Stop reason: HOSPADM

## 2022-06-10 RX ADMIN — PROPOFOL 100 MG: 10 INJECTION, EMULSION INTRAVENOUS at 13:32

## 2022-06-10 RX ADMIN — SODIUM CHLORIDE, POTASSIUM CHLORIDE, SODIUM LACTATE AND CALCIUM CHLORIDE: 600; 310; 30; 20 INJECTION, SOLUTION INTRAVENOUS at 13:28

## 2022-06-10 RX ADMIN — MIDAZOLAM HYDROCHLORIDE 2 MG: 1 INJECTION, SOLUTION INTRAMUSCULAR; INTRAVENOUS at 13:32

## 2022-06-10 RX ADMIN — FENTANYL CITRATE 50 MCG: 50 INJECTION, SOLUTION INTRAMUSCULAR; INTRAVENOUS at 13:43

## 2022-06-10 ASSESSMENT — PAIN SCALES - GENERAL: PAIN_LEVEL: 1

## 2022-06-10 ASSESSMENT — FIBROSIS 4 INDEX: FIB4 SCORE: 0.91

## 2022-06-10 NOTE — ANESTHESIA TIME REPORT
Anesthesia Start and Stop Event Times     Date Time Event    6/10/2022 1320 Ready for Procedure     1328 Anesthesia Start     1424 Anesthesia Stop        Responsible Staff  06/10/22    Name Role Begin End    Stan Mccormack M.D. Anesth 1328 1428        Overtime Reason:  no overtime (within assigned shift)    Comments:

## 2022-06-10 NOTE — PROCEDURES
Bone Marrow Biopsy/Aspiration    Date/Time: 6/10/2022 2:26 PM  Performed by: Malachi Kay M.D.  Authorized by: Malachi Kay M.D.     Consent:     Consent obtained:  Verbal and written    Consent given by:  Patient    Risks discussed:  Bleeding, infection, pain and repeat procedure    Alternatives discussed:  No treatment, observation and delayed treatment  Universal protocol:     Procedure explained and questions answered to patient or proxy's satisfaction: yes      Relevant documents present and verified: yes      Immediately prior to procedure a time out was called: yes      Site/side marked: yes      Patient identity confirmed:  Arm band, provided demographic data and hospital-assigned identification number  Pre-procedure details:     Procedure type:  Aspiration and biopsy    Requesting physician:  Dr. Aaron    Indications:  Multiple Myeloma pre-autoSCT    Position:  Prone    Buttock laterality:  Left    Local anesthetic:  1% Lidocaine    Subcutaneous volume:  1 mL    Periosteum anesthetic volume:  3 mL  Sedation:     Patient Sedated: Yes      Sedation type comment:  General Anesthesia  Procedure details:     Aspirate obtained:  5 mL followed by 5 mL    : 4 cores.    Number of attempts:  4+    Estimated blood loss (mL):  1  Post-procedure:     Puncture site:  Adhesive bandage applied and direct pressure applied    Patient tolerance of procedure:  Tolerated well, no immediate complications  Comments:      First Attempt: Confirmed periosteum. Successful aspirate. ~1.3 cm of compressed marrow.  Second Attempt: Confirmed periosteum. ~2 cm core, mostly cortex.  Third Attempt: Confirmed periosteum. ~1.4 cm of pale core. Uncertain if hypocellular marrow or cortex.  Pathologist Dr. Damon called to evaluate specimens in OR. She recommended additional core.  Fourth Attempt: Confirmed periosteum. ~1.3 cm of pale core.   Dr. Damon advised that samples should be sufficient for evaluation of myeloma.

## 2022-06-10 NOTE — DISCHARGE INSTRUCTIONS
ACTIVITY: Rest and take it easy for the first 24 hours.  A responsible adult is recommended to remain with you during that time.  It is normal to feel sleepy.  We encourage you to not do anything that requires balance, judgment or coordination.    MILD FLU-LIKE SYMPTOMS ARE NORMAL. YOU MAY EXPERIENCE GENERALIZED MUSCLE ACHES, THROAT IRRITATION, HEADACHE AND/OR SOME NAUSEA.    FOR 24 HOURS DO NOT:  Drive, operate machinery or run household appliances.  Drink beer or alcoholic beverages.   Make important decisions or sign legal documents.      SPECIAL INSTRUCTIONS:     This sheet gives you information about how to care for yourself after your procedure. Your health care provider may also give you more specific instructions. If you have problems or questions, contact your health care provider.  What can I expect after the procedure?  After the procedure, it is common to have:  Mild pain and tenderness.  Swelling.  Bruising.  Follow these instructions at home:  Puncture site care     Follow instructions from your health care provider about how to take care of the puncture site. Make sure you:  Wash your hands with soap and water before you change your bandage (dressing). If soap and water are not available, use hand .  Change your dressing as told by your health care provider.  Check your puncture site every day for signs of infection. Check for:  More redness, swelling, or pain.  More fluid or blood.  Warmth.  Pus or a bad smell.  General instructions  Take over-the-counter and prescription medicines only as told by your health care provider.  Do not take baths, swim, or use a hot tub until your health care provider approves. Ask if you can take a shower or have a sponge bath.  Return to your normal activities as told by your health care provider. Ask your health care provider what activities are safe for you.  Do not drive for 24 hours if you were given a medicine to help you relax (sedative) during your  procedure.  Keep all follow-up visits as told by your health care provider. This is important.  Contact a health care provider if:  Your pain is not controlled with medicine.  Get help right away if:  You have a fever.  You have more redness, swelling, or pain around the puncture site.  You have more fluid or blood coming from the puncture site.  Your puncture site feels warm to the touch.  You have pus or a bad smell coming from the puncture site.  These symptoms may represent a serious problem that is an emergency. Do not wait to see if the symptoms will go away. Get medical help right away. Call your local emergency services (911 in the [place] Do not drive yourself to the hospital.  Summary  After the procedure, it is common to have mild pain, tenderness, swelling, and bruising.  Follow instructions from your health care provider about how to take care of the puncture site.  Get help right away if you have any symptoms of infection or if you have more blood or fluid coming from the puncture site.        DIET: To avoid nausea, slowly advance diet as tolerated, avoiding spicy or greasy foods for the first day.  Add more substantial food to your diet according to your physician's instructions.  Babies can be fed formula or breast milk as soon as they are hungry.  INCREASE FLUIDS AND FIBER TO AVOID CONSTIPATION.    SURGICAL DRESSING/BATHING: Band-Aid. Leave on 24-48hrs    FOLLOW-UP APPOINTMENT:  A follow-up appointment should be arranged with your doctor, call to schedule.    You should CALL YOUR PHYSICIAN if you develop:  Fever greater than 101 degrees F.  Pain not relieved by medication, or persistent nausea or vomiting.  Excessive bleeding (blood soaking through dressing) or unexpected drainage from the wound.  Extreme redness or swelling around the incision site, drainage of pus or foul smelling drainage.  Inability to urinate or empty your bladder within 8 hours.  Problems with breathing or chest pain.    You  should call 911 if you develop problems with breathing or chest pain.  If you are unable to contact your doctor or surgical center, you should go to the nearest emergency room or urgent care center.  Physician's telephone #: Dr Kay [phone]    If any questions arise, call your doctor.  If your doctor is not available, please feel free to call the Surgical Center at [phone].     A registered nurse may call you a few days after your surgery to see how you are doing after your procedure.    MEDICATIONS: Resume taking daily medication.  Take prescribed pain medication with food.  If no medication is prescribed, you may take non-aspirin pain medication if needed.  PAIN MEDICATION CAN BE VERY CONSTIPATING.  Take a stool softener or laxative such as senokot, pericolace, or milk of magnesia if needed.    Prescription given for .  Last pain medication given at       If your physician has prescribed pain medication that includes Acetaminophen (Tylenol), do not take additional Acetaminophen (Tylenol) while taking the prescribed medication.    Depression / Suicide Risk    As you are discharged from this [facility] facility, it is important to learn how to keep safe from harming yourself.    Recognize the warning signs:  Abrupt changes in personality, positive or negative- including increase in energy   Giving away possessions  Change in eating patterns- significant weight changes-  positive or negative  Change in sleeping patterns- unable to sleep or sleeping all the time   Unwillingness or inability to communicate  Depression  Unusual sadness, discouragement and loneliness  Talk of wanting to die  Neglect of personal appearance   Rebelliousness- reckless behavior  Withdrawal from people/activities they love  Confusion- inability to concentrate     If you or a loved one observes any of these behaviors or has concerns about self-harm, here's what you can do:  Talk about it- your feelings and reasons for harming  yourself  Remove any means that you might use to hurt yourself (examples: pills, rope, extension cords, firearm)  Get professional help from the community (Mental Health, Substance Abuse, psychological counseling)  Do not be alone:Call your Safe Contact- someone whom you trust who will be there for you.  Call your local CRISIS HOTLINE 473-7083 or 111-016-2452  Call your local Children's Mobile Crisis Response Team [place] (967) 705-8754 or www.[employer] Mark[employer]  Call the toll free National Suicide Prevention Hotlines   National Suicide Prevention Lifeline 849-708-XXOP (6928)  National Hope Line Network 800-SUICIDE (183-8814)

## 2022-06-10 NOTE — OR NURSING
1423 Arrived via gurney from OR. Report received from anesthesiologist and RN. Monitors attached. Awakens to verbal stimuli. Denies pain or nausea    1430 Wife, Yancy, called and given update. Will be en route to [facility] for pending discharge    1455 Phase 1 completed    Patient getting dressed. Discharge instructions provided and discussed with wife    1510 Discharged via wheelchair

## 2022-06-10 NOTE — ANESTHESIA POSTPROCEDURE EVALUATION
Patient: Gary Hedrick    Procedure Summary     Date: 06/10/22 Room / Location: [facility] ROOM 26 / SURGERY SAME DAY [facility]    Anesthesia Start: 1328 Anesthesia Stop: 1424    Procedures:       ASPIRATION, BONE MARROW (Left Hip)      BIOPSY, BONE MARROW, USING NEEDLE OR TROCAR - DR. DURANT (Left Hip) Diagnosis: (MULTIPLE MYELOMA)    Surgeons: Malachi Kay M.D. Responsible Provider: Stan Mccormack M.D.    Anesthesia Type: general ASA Status: 2          Final Anesthesia Type: general  Last vitals  BP   Blood Pressure : 121/66    Temp   36.4 °C (97.6 °F)    Pulse   68   Resp   17    SpO2   98 %      Anesthesia Post Evaluation    Patient location during evaluation: PACU  Patient participation: complete - patient participated  Level of consciousness: sleepy but conscious  Pain score: 1    Airway patency: patent  Anesthetic complications: no  Cardiovascular status: hemodynamically stable  Respiratory status: acceptable  Hydration status: euvolemic    PONV: none          There were no known complications for this encounter.     Nurse Pain Score: 0 (NPRS)

## 2022-06-10 NOTE — ANESTHESIA PREPROCEDURE EVALUATION
Case: [id number] Anesthesia Start Date/Time: 06/10/22 1328    Procedures:       ASPIRATION, BONE MARROW (Left Hip)      BIOPSY, BONE MARROW, USING NEEDLE OR TROCAR - DR. DURANT (Left Hip)    Anesthesia type: MAC    Pre-op diagnosis: MULTIPLE MYELOMA    Location: [facility] ROOM 26 / SURGERY SAME DAY [facility]    Surgeons: Malachi Kay M.D.          Relevant Problems   CARDIAC   (positive) Other secondary hypertension      ENDO   (positive) Hypothyroidism      Other   (positive) Multiple myeloma not having achieved remission (HCC)       Physical Exam    Airway   Mallampati: II  TM distance: >3 FB  Neck ROM: full       Cardiovascular - normal exam  Rhythm: regular  Rate: normal  (-) murmur     Dental - normal exam           Pulmonary - normal exam  Breath sounds clear to auscultation     Abdominal    Neurological - normal exam                 Anesthesia Plan    ASA 2       Plan - general       Airway plan will be natural airway          Induction: intravenous      Pertinent diagnostic labs and testing reviewed    Informed Consent:    Anesthetic plan and risks discussed with patient.    Use of blood products discussed with: patient whom consented to blood products.

## 2022-06-14 DIAGNOSIS — E03.9 HYPOTHYROIDISM, UNSPECIFIED TYPE: ICD-10-CM

## 2022-06-14 NOTE — TELEPHONE ENCOUNTER
Received request via: Patient    Was the patient seen in the last year in this department? Yes  5/13/22  Does the patient have an active prescription (recently filled or refills available) for medication(s) requested? No

## 2022-06-15 ENCOUNTER — TELEPHONE (OUTPATIENT)
Dept: HEMATOLOGY ONCOLOGY | Facility: MEDICAL CENTER | Age: 72
End: 2022-06-15
Payer: MEDICARE

## 2022-06-15 LAB
ALBUMIN SERPL ELPH-MCNC: 3.49 G/DL (ref 3.75–5.01)
ALPHA1 GLOB SERPL ELPH-MCNC: 0.29 G/DL (ref 0.19–0.46)
ALPHA2 GLOB SERPL ELPH-MCNC: 0.91 G/DL (ref 0.48–1.05)
B-GLOBULIN SERPL ELPH-MCNC: 0.62 G/DL (ref 0.48–1.1)
EER MONOCLONAL PROTEIN AND FLC, SERUM Q5224: ABNORMAL
GAMMA GLOB SERPL ELPH-MCNC: 0.29 G/DL (ref 0.62–1.51)
IGA SERPL-MCNC: 11 MG/DL (ref 68–408)
IGG SERPL-MCNC: 240 MG/DL (ref 768–1632)
IGM SERPL-MCNC: 12 MG/DL (ref 35–263)
INTERPRETATION SERPL IFE-IMP: ABNORMAL
INTERPRETATION SERPL IFE-IMP: ABNORMAL
KAPPA LC FREE SER-MCNC: 1.38 MG/L (ref 3.3–19.4)
KAPPA LC FREE/LAMBDA FREE SER NEPH: 0.05 {RATIO} (ref 0.26–1.65)
LAMBDA LC FREE SERPL-MCNC: 27.06 MG/L (ref 5.71–26.3)
PROT SERPL-MCNC: 5.6 G/DL (ref 6.3–8.2)

## 2022-06-15 NOTE — TELEPHONE ENCOUNTER
Patient and spouse asking if patient needs to keep the infusion appointment tomorrow, based on the BMB results.

## 2022-06-16 ENCOUNTER — TELEPHONE (OUTPATIENT)
Dept: HEMATOLOGY ONCOLOGY | Facility: MEDICAL CENTER | Age: 72
End: 2022-06-16
Payer: MEDICARE

## 2022-06-16 ENCOUNTER — APPOINTMENT (OUTPATIENT)
Dept: ONCOLOGY | Facility: MEDICAL CENTER | Age: 72
End: 2022-06-16
Attending: INTERNAL MEDICINE
Payer: MEDICARE

## 2022-06-16 RX ORDER — LEVOTHYROXINE SODIUM 0.1 MG/1
100 TABLET ORAL
Qty: 100 TABLET | Refills: 3 | Status: SHIPPED | OUTPATIENT
Start: 2022-06-16 | End: 2023-07-06

## 2022-06-16 NOTE — TELEPHONE ENCOUNTER
Pt's wife called to ask questions about the results of the patient's bmbx and to make sure they were clarified about the dates of his transplants that are happening as her niece would like to buy plane tickets to be with her while he is in the hospital.

## 2022-06-16 NOTE — TELEPHONE ENCOUNTER
Pt spouse calling inquiring the results of Dr. Aaron's conversation with Dr. Zabala yesterday. Informed Beatrice that Dr. Aaron is out of the office at this time, will discuss with him tomorrow. Beatrice verbalized understanding.

## 2022-06-16 NOTE — TELEPHONE ENCOUNTER
Called pt spouse, Beatrice, to relay the following from Dr. Aaron:    Dr. Zabala was happy with the marrow. Hold all chemo. And proceed as planned with stem cell collection.     Above discussed with Beatrice and she verbalized understanding, expressed appreciation. Advised Beatrice to contact this office with any further concerns or questions.

## 2022-06-23 ENCOUNTER — APPOINTMENT (OUTPATIENT)
Dept: ONCOLOGY | Facility: MEDICAL CENTER | Age: 72
End: 2022-06-23
Attending: INTERNAL MEDICINE
Payer: MEDICARE

## 2022-06-29 DIAGNOSIS — Z52.011 AUTOLOGOUS DONOR OF STEM CELLS: ICD-10-CM

## 2022-07-07 RX ORDER — HEPARIN SODIUM 5000 [USP'U]/ML
5000 INJECTION, SOLUTION INTRAVENOUS; SUBCUTANEOUS PRN
Status: CANCELLED | OUTPATIENT
Start: 2022-07-15

## 2022-07-07 RX ORDER — 0.9 % SODIUM CHLORIDE 0.9 %
10 VIAL (ML) INJECTION PRN
Status: CANCELLED | OUTPATIENT
Start: 2022-07-08

## 2022-07-07 RX ORDER — HEPARIN SODIUM 5000 [USP'U]/ML
5000 INJECTION, SOLUTION INTRAVENOUS; SUBCUTANEOUS PRN
Status: CANCELLED | OUTPATIENT
Start: 2022-07-08

## 2022-07-07 RX ORDER — 0.9 % SODIUM CHLORIDE 0.9 %
10 VIAL (ML) INJECTION PRN
Status: CANCELLED | OUTPATIENT
Start: 2022-07-21

## 2022-07-07 RX ORDER — 0.9 % SODIUM CHLORIDE 0.9 %
10 VIAL (ML) INJECTION PRN
Status: CANCELLED | OUTPATIENT
Start: 2022-07-14

## 2022-07-07 RX ORDER — HEPARIN SODIUM 5000 [USP'U]/ML
5000 INJECTION, SOLUTION INTRAVENOUS; SUBCUTANEOUS PRN
Status: CANCELLED | OUTPATIENT
Start: 2022-07-22

## 2022-07-08 ENCOUNTER — OUTPATIENT INFUSION SERVICES (OUTPATIENT)
Dept: ONCOLOGY | Facility: MEDICAL CENTER | Age: 72
End: 2022-07-08
Attending: INTERNAL MEDICINE
Payer: MEDICARE

## 2022-07-08 VITALS
WEIGHT: 167.55 LBS | TEMPERATURE: 98.3 F | HEART RATE: 86 BPM | OXYGEN SATURATION: 95 % | DIASTOLIC BLOOD PRESSURE: 66 MMHG | BODY MASS INDEX: 25.48 KG/M2 | RESPIRATION RATE: 18 BRPM | SYSTOLIC BLOOD PRESSURE: 114 MMHG

## 2022-07-08 DIAGNOSIS — Z52.011 AUTOLOGOUS DONOR OF STEM CELLS: ICD-10-CM

## 2022-07-08 DIAGNOSIS — C90.00 MULTIPLE MYELOMA NOT HAVING ACHIEVED REMISSION (HCC): ICD-10-CM

## 2022-07-08 PROCEDURE — 700111 HCHG RX REV CODE 636 W/ 250 OVERRIDE (IP): Performed by: INTERNAL MEDICINE

## 2022-07-08 PROCEDURE — 99211 OFF/OP EST MAY X REQ PHY/QHP: CPT

## 2022-07-08 RX ORDER — HEPARIN SODIUM 5000 [USP'U]/ML
5000 INJECTION, SOLUTION INTRAVENOUS; SUBCUTANEOUS PRN
Status: CANCELLED | OUTPATIENT
Start: 2022-07-08

## 2022-07-08 RX ORDER — HEPARIN SODIUM 5000 [USP'U]/ML
5000 INJECTION, SOLUTION INTRAVENOUS; SUBCUTANEOUS PRN
Status: COMPLETED | OUTPATIENT
Start: 2022-07-08 | End: 2022-07-08

## 2022-07-08 RX ORDER — HEPARIN SODIUM 5000 [USP'U]/ML
5000 INJECTION, SOLUTION INTRAVENOUS; SUBCUTANEOUS PRN
Status: DISCONTINUED | OUTPATIENT
Start: 2022-07-08 | End: 2022-07-08 | Stop reason: HOSPADM

## 2022-07-08 RX ADMIN — HEPARIN SODIUM 5000 UNITS: 5000 INJECTION, SOLUTION INTRAVENOUS; SUBCUTANEOUS at 14:58

## 2022-07-08 RX ADMIN — HEPARIN SODIUM 5000 UNITS: 5000 INJECTION, SOLUTION INTRAVENOUS; SUBCUTANEOUS at 15:06

## 2022-07-08 ASSESSMENT — FIBROSIS 4 INDEX: FIB4 SCORE: 1.11

## 2022-07-09 NOTE — PROGRESS NOTES
Patient arrived ambulatory to  for apheresis catheter care.  Patient states he had the line placed yesterday at [facility].  He is going to return to [facility] on Sunday for stem cell retrieval.  Patient reports tenderness to site.  Scant amount of dried blood noted at insertion site.  Dressing and biopatch clean and dry and intact. Both lumens flushed per order, 10ml blood removed followed by NS flush and heparin.  Lines capped.  Patient tolerated well.  Confirmed next appointment and patient ambulated out of clinic in no apparent distress.

## 2022-07-13 RX ORDER — HEPARIN SODIUM 5000 [USP'U]/ML
5000 INJECTION, SOLUTION INTRAVENOUS; SUBCUTANEOUS PRN
Status: CANCELLED | OUTPATIENT
Start: 2022-07-14

## 2022-07-14 ENCOUNTER — OUTPATIENT INFUSION SERVICES (OUTPATIENT)
Dept: ONCOLOGY | Facility: MEDICAL CENTER | Age: 72
End: 2022-07-14
Attending: INTERNAL MEDICINE
Payer: MEDICARE

## 2022-07-14 VITALS
RESPIRATION RATE: 18 BRPM | TEMPERATURE: 97.6 F | DIASTOLIC BLOOD PRESSURE: 58 MMHG | HEIGHT: 66 IN | SYSTOLIC BLOOD PRESSURE: 99 MMHG | BODY MASS INDEX: 26.75 KG/M2 | WEIGHT: 166.45 LBS | HEART RATE: 80 BPM | OXYGEN SATURATION: 94 %

## 2022-07-14 DIAGNOSIS — C90.00 MULTIPLE MYELOMA NOT HAVING ACHIEVED REMISSION (HCC): ICD-10-CM

## 2022-07-14 DIAGNOSIS — Z52.011 AUTOLOGOUS DONOR OF STEM CELLS: ICD-10-CM

## 2022-07-14 RX ORDER — HEPARIN SODIUM 5000 [USP'U]/ML
5000 INJECTION, SOLUTION INTRAVENOUS; SUBCUTANEOUS PRN
Status: DISCONTINUED | OUTPATIENT
Start: 2022-07-14 | End: 2022-07-14 | Stop reason: HOSPADM

## 2022-07-14 ASSESSMENT — FIBROSIS 4 INDEX: FIB4 SCORE: 1.11

## 2022-07-14 NOTE — NON-PROVIDER
Pt called with concerns regarding his electrolytes/ potassium level and would like an order for his labs to be drawn. Due to the fact pt received IV/oral potassium and IV magnesium at [facility] yesterday, provider would like to wait one week for repeat lab draw. BMP order received from CHANDRIKA Chambers, and placed in pt catheter care treatment plan for his infusion appt on 07/21/22. Message left on pt identifiable voicemail with updated poc, advised pt to contact us if they have any further concerns.

## 2022-07-15 NOTE — PROGRESS NOTES
"Patient came today for Central line care, patient had central line dressing changed yesterday 7/13 and nursing judgement deemed it not necessary for today. Claves and caps applied as there were none from previous facility. Labeled lumens with \"High dose Heparin: DO NOT FLUSH.\" Pt informed labs would be drawn during next weeks appt on 7/21. Pt left home in NAD.  " Hospitalist

## 2022-07-18 RX ORDER — HEPARIN SODIUM 5000 [USP'U]/ML
5000 INJECTION, SOLUTION INTRAVENOUS; SUBCUTANEOUS PRN
Status: CANCELLED | OUTPATIENT
Start: 2022-07-28

## 2022-07-18 RX ORDER — HEPARIN SODIUM 5000 [USP'U]/ML
5000 INJECTION, SOLUTION INTRAVENOUS; SUBCUTANEOUS PRN
Status: CANCELLED | OUTPATIENT
Start: 2022-07-21

## 2022-07-18 RX ORDER — 0.9 % SODIUM CHLORIDE 0.9 %
10 VIAL (ML) INJECTION PRN
Status: CANCELLED | OUTPATIENT
Start: 2022-07-28

## 2022-07-21 ENCOUNTER — OUTPATIENT INFUSION SERVICES (OUTPATIENT)
Dept: ONCOLOGY | Facility: MEDICAL CENTER | Age: 72
End: 2022-07-21
Attending: INTERNAL MEDICINE
Payer: MEDICARE

## 2022-07-21 VITALS
SYSTOLIC BLOOD PRESSURE: 138 MMHG | OXYGEN SATURATION: 94 % | DIASTOLIC BLOOD PRESSURE: 70 MMHG | RESPIRATION RATE: 18 BRPM | HEART RATE: 76 BPM | HEIGHT: 66 IN | BODY MASS INDEX: 27.28 KG/M2 | WEIGHT: 169.75 LBS | TEMPERATURE: 97.6 F

## 2022-07-21 DIAGNOSIS — C90.00 MULTIPLE MYELOMA NOT HAVING ACHIEVED REMISSION (HCC): ICD-10-CM

## 2022-07-21 DIAGNOSIS — Z52.011 AUTOLOGOUS DONOR OF STEM CELLS: ICD-10-CM

## 2022-07-21 LAB
ANION GAP SERPL CALC-SCNC: 12 MMOL/L (ref 7–16)
BUN SERPL-MCNC: 18 MG/DL (ref 8–22)
CALCIUM SERPL-MCNC: 8.6 MG/DL (ref 8.5–10.5)
CHLORIDE SERPL-SCNC: 105 MMOL/L (ref 96–112)
CO2 SERPL-SCNC: 21 MMOL/L (ref 20–33)
CREAT SERPL-MCNC: 0.96 MG/DL (ref 0.5–1.4)
GFR SERPLBLD CREATININE-BSD FMLA CKD-EPI: 85 ML/MIN/1.73 M 2
GLUCOSE SERPL-MCNC: 80 MG/DL (ref 65–99)
POTASSIUM SERPL-SCNC: 3.9 MMOL/L (ref 3.6–5.5)
SODIUM SERPL-SCNC: 138 MMOL/L (ref 135–145)

## 2022-07-21 PROCEDURE — 36593 DECLOT VASCULAR DEVICE: CPT

## 2022-07-21 PROCEDURE — 36592 COLLECT BLOOD FROM PICC: CPT

## 2022-07-21 PROCEDURE — 80048 BASIC METABOLIC PNL TOTAL CA: CPT

## 2022-07-21 PROCEDURE — 700111 HCHG RX REV CODE 636 W/ 250 OVERRIDE (IP): Performed by: INTERNAL MEDICINE

## 2022-07-21 RX ORDER — HEPARIN SODIUM 5000 [USP'U]/ML
5000 INJECTION, SOLUTION INTRAVENOUS; SUBCUTANEOUS PRN
Status: COMPLETED | OUTPATIENT
Start: 2022-07-21 | End: 2022-07-21

## 2022-07-21 RX ADMIN — HEPARIN SODIUM 5000 UNITS: 5000 INJECTION, SOLUTION INTRAVENOUS; SUBCUTANEOUS at 16:48

## 2022-07-21 RX ADMIN — ALTEPLASE 2 MG: 2.2 INJECTION, POWDER, LYOPHILIZED, FOR SOLUTION INTRAVENOUS at 16:12

## 2022-07-21 RX ADMIN — HEPARIN SODIUM 5000 UNITS: 5000 INJECTION, SOLUTION INTRAVENOUS; SUBCUTANEOUS at 16:50

## 2022-07-21 ASSESSMENT — FIBROSIS 4 INDEX: FIB4 SCORE: 1.11

## 2022-07-22 NOTE — PROGRESS NOTES
Patient arrived ambulatory to [facility] for apheresis catheter care.  Patient denies any acute distress.  Right subclavian apheresis catheter dressing and caps changed per protocol.  Blue lumen flushes well, blood return noted.  Red lumen, blood return noted but would stop withdrawing after aprox 3 ml.  Alteplase instilled into red lumen.  After 30 minutes, brisk blood return noted from lumen and lab drawn per order.  High dose heparin instilled into both lumens.  Patient tolerated well.  Confirmed next appointment and patient ambulated out of clinic in no apparent distress.

## 2022-07-28 ENCOUNTER — OUTPATIENT INFUSION SERVICES (OUTPATIENT)
Dept: ONCOLOGY | Facility: MEDICAL CENTER | Age: 72
End: 2022-07-28
Attending: INTERNAL MEDICINE
Payer: MEDICARE

## 2022-07-28 VITALS
DIASTOLIC BLOOD PRESSURE: 84 MMHG | RESPIRATION RATE: 18 BRPM | HEART RATE: 79 BPM | SYSTOLIC BLOOD PRESSURE: 164 MMHG | TEMPERATURE: 97.8 F | OXYGEN SATURATION: 96 %

## 2022-07-28 DIAGNOSIS — C90.00 MULTIPLE MYELOMA NOT HAVING ACHIEVED REMISSION (HCC): ICD-10-CM

## 2022-07-28 DIAGNOSIS — Z52.011 AUTOLOGOUS DONOR OF STEM CELLS: ICD-10-CM

## 2022-07-28 LAB
ALBUMIN SERPL BCP-MCNC: 4.1 G/DL (ref 3.2–4.9)
ALBUMIN/GLOB SERPL: 2.4 G/DL
ALP SERPL-CCNC: 131 U/L (ref 30–99)
ALT SERPL-CCNC: 27 U/L (ref 2–50)
ANION GAP SERPL CALC-SCNC: 10 MMOL/L (ref 7–16)
ANISOCYTOSIS BLD QL SMEAR: ABNORMAL
AST SERPL-CCNC: 22 U/L (ref 12–45)
BASOPHILS # BLD AUTO: 0 % (ref 0–1.8)
BASOPHILS # BLD: 0 K/UL (ref 0–0.12)
BILIRUB SERPL-MCNC: 0.3 MG/DL (ref 0.1–1.5)
BUN SERPL-MCNC: 12 MG/DL (ref 8–22)
CALCIUM SERPL-MCNC: 8.8 MG/DL (ref 8.5–10.5)
CHLORIDE SERPL-SCNC: 100 MMOL/L (ref 96–112)
CO2 SERPL-SCNC: 25 MMOL/L (ref 20–33)
CREAT SERPL-MCNC: 0.96 MG/DL (ref 0.5–1.4)
EOSINOPHIL # BLD AUTO: 0.07 K/UL (ref 0–0.51)
EOSINOPHIL NFR BLD: 0.9 % (ref 0–6.9)
ERYTHROCYTE [DISTWIDTH] IN BLOOD BY AUTOMATED COUNT: 59.5 FL (ref 35.9–50)
GFR SERPLBLD CREATININE-BSD FMLA CKD-EPI: 85 ML/MIN/1.73 M 2
GLOBULIN SER CALC-MCNC: 1.7 G/DL (ref 1.9–3.5)
GLUCOSE SERPL-MCNC: 86 MG/DL (ref 65–99)
HCT VFR BLD AUTO: 33.6 % (ref 42–52)
HGB BLD-MCNC: 11.7 G/DL (ref 14–18)
LYMPHOCYTES # BLD AUTO: 0.42 K/UL (ref 1–4.8)
LYMPHOCYTES NFR BLD: 5.2 % (ref 22–41)
MACROCYTES BLD QL SMEAR: ABNORMAL
MANUAL DIFF BLD: NORMAL
MCH RBC QN AUTO: 37.4 PG (ref 27–33)
MCHC RBC AUTO-ENTMCNC: 34.8 G/DL (ref 33.7–35.3)
MCV RBC AUTO: 107.3 FL (ref 81.4–97.8)
METAMYELOCYTES NFR BLD MANUAL: 0.9 %
MONOCYTES # BLD AUTO: 0.56 K/UL (ref 0–0.85)
MONOCYTES NFR BLD AUTO: 7 % (ref 0–13.4)
MORPHOLOGY BLD-IMP: NORMAL
MYELOCYTES NFR BLD MANUAL: 5.3 %
NEUTROPHILS # BLD AUTO: 6.46 K/UL (ref 1.82–7.42)
NEUTROPHILS NFR BLD: 80.7 % (ref 44–72)
NRBC # BLD AUTO: 0 K/UL
NRBC BLD-RTO: 0 /100 WBC
OUTPT INFUS CBC COMMENT OICOM: ABNORMAL
PLATELET # BLD AUTO: 112 K/UL (ref 164–446)
PLATELET BLD QL SMEAR: NORMAL
PMV BLD AUTO: 9.5 FL (ref 9–12.9)
POLYCHROMASIA BLD QL SMEAR: NORMAL
POTASSIUM SERPL-SCNC: 3.9 MMOL/L (ref 3.6–5.5)
PROT SERPL-MCNC: 5.8 G/DL (ref 6–8.2)
RBC # BLD AUTO: 3.13 M/UL (ref 4.7–6.1)
RBC BLD AUTO: PRESENT
SODIUM SERPL-SCNC: 135 MMOL/L (ref 135–145)
WBC # BLD AUTO: 8 K/UL (ref 4.8–10.8)

## 2022-07-28 PROCEDURE — 36592 COLLECT BLOOD FROM PICC: CPT

## 2022-07-28 PROCEDURE — 700111 HCHG RX REV CODE 636 W/ 250 OVERRIDE (IP): Performed by: INTERNAL MEDICINE

## 2022-07-28 PROCEDURE — 36593 DECLOT VASCULAR DEVICE: CPT

## 2022-07-28 PROCEDURE — 85007 BL SMEAR W/DIFF WBC COUNT: CPT

## 2022-07-28 PROCEDURE — 80053 COMPREHEN METABOLIC PANEL: CPT

## 2022-07-28 PROCEDURE — 85027 COMPLETE CBC AUTOMATED: CPT

## 2022-07-28 RX ORDER — HEPARIN SODIUM 5000 [USP'U]/ML
5000 INJECTION, SOLUTION INTRAVENOUS; SUBCUTANEOUS PRN
Status: COMPLETED | OUTPATIENT
Start: 2022-07-28 | End: 2022-07-28

## 2022-07-28 RX ADMIN — HEPARIN SODIUM 5000 UNITS: 5000 INJECTION, SOLUTION INTRAVENOUS; SUBCUTANEOUS at 16:52

## 2022-07-28 RX ADMIN — HEPARIN SODIUM 5000 UNITS: 5000 INJECTION, SOLUTION INTRAVENOUS; SUBCUTANEOUS at 16:54

## 2022-07-28 RX ADMIN — ALTEPLASE 2 MG: 2.2 INJECTION, POWDER, LYOPHILIZED, FOR SOLUTION INTRAVENOUS at 16:12

## 2022-07-29 NOTE — PROGRESS NOTES
Miguel Ángel is here for apheresis catheter care. Apheresis catheter dressing changed using sterile technique; claves changed. Empty 10 ml syringe attached to each lumen, unclamped and withdrew dwell solution. Blood return noted from both lumens, however unable to draw enough blood from red lumen. Aleplase instilled into red lumen. Brisk blood return noted after 30 minute instillation; labs drawn as ordered. Both lumens flushed with 20 ml normal saline, then heplocked each lumen with heparin solution. Miguel Ángel to follow up with MD for future plan of care. Discharged to self care; no apparent distress noted.

## 2022-08-15 ENCOUNTER — TELEPHONE (OUTPATIENT)
Dept: HEMATOLOGY ONCOLOGY | Facility: MEDICAL CENTER | Age: 72
End: 2022-08-15
Payer: MEDICARE

## 2022-08-15 DIAGNOSIS — G47.01 INSOMNIA DUE TO MEDICAL CONDITION: ICD-10-CM

## 2022-08-15 RX ORDER — ZOLPIDEM TARTRATE 10 MG/1
10 TABLET ORAL
Qty: 30 TABLET | Refills: 2 | OUTPATIENT
Start: 2022-08-15 | End: 2022-09-14

## 2022-08-15 NOTE — TELEPHONE ENCOUNTER
1. Caller Name: Gary Hedrick                          Call Back Number: [phone] (home)         How would the patient prefer to be contacted with a response: Phone call do NOT leave a detailed message    Received request via: Patient    Was the patient seen in the last year in this department? Yes  05/2022  Does the patient have an active prescription (recently filled or refills available) for medication(s) requested? No

## 2022-08-15 NOTE — TELEPHONE ENCOUNTER
Patient will need face-to-face visit in order to refill controlled substances.  Please schedule appointment.  Can be video.

## 2022-08-16 NOTE — TELEPHONE ENCOUNTER
Called and spoke to Beatrice, Patient may have brought Rx to [place]. Patient wife will call back to let us know.   Will need dates of stay to try and discuss a short term Rx with PCP if needed but will let us know, Notified that controlled substances need visits.

## 2022-08-27 ENCOUNTER — PATIENT MESSAGE (OUTPATIENT)
Dept: MEDICAL GROUP | Facility: LAB | Age: 72
End: 2022-08-27
Payer: MEDICARE

## 2022-08-27 DIAGNOSIS — R53.81 PHYSICAL DECONDITIONING: ICD-10-CM

## 2022-08-29 DIAGNOSIS — F33.42 RECURRENT MAJOR DEPRESSIVE DISORDER, IN FULL REMISSION (HCC): ICD-10-CM

## 2022-08-29 NOTE — TELEPHONE ENCOUNTER
Received request via: Pharmacy    Was the patient seen in the last year in this department? Yes    Does the patient have an active prescription (recently filled or refills available) for medication(s) requested? No  FLUOXETINE HCL 40 MG CAPSULE

## 2022-08-30 RX ORDER — FLUOXETINE HYDROCHLORIDE 40 MG/1
CAPSULE ORAL
Qty: 90 CAPSULE | Refills: 3 | Status: SHIPPED | OUTPATIENT
Start: 2022-08-30 | End: 2022-10-03

## 2022-09-08 ENCOUNTER — TELEPHONE (OUTPATIENT)
Dept: HEMATOLOGY ONCOLOGY | Facility: MEDICAL CENTER | Age: 72
End: 2022-09-08
Payer: MEDICARE

## 2022-09-08 ENCOUNTER — TELEPHONE (OUTPATIENT)
Dept: ONCOLOGY | Facility: MEDICAL CENTER | Age: 72
End: 2022-09-08
Payer: MEDICARE

## 2022-09-08 NOTE — TELEPHONE ENCOUNTER
Called pt's spouse, Beatrice, to inquire further regarding concern over apheresis cath site.  Per Beatrice, catheter was removed about 12 days & clear dressing was placed over site & Beatrice is unaware of what follow-up care needs to be done to assess for s/s of infection.  RN scheduled pt for an RN visit in office for a quick assessment of cath site tomorrow.  Beatrice agreeable & stated no further questions.

## 2022-09-08 NOTE — TELEPHONE ENCOUNTER
Pt's wife called and would like to schedule an appointment for apheresis care as he had his line taken out 10 days ago and she is concerned about infection. She stated that they took a trip and forgot about it and then they went to [facility] on 09/06 and saw his Hematologist and forgot to have his check it that day as well. I spoke with Marely, Dr. Aaron's MA and she stated that he wasn't in today but that she will forward a message to him and discuss his order with him and see if he would like to see him in clinic first. I let patient wife know this information and that someone from Dr. Aaron's office would be in contact with her to let her know what the plan would be. She agreed.

## 2022-09-09 ENCOUNTER — HOSPITAL ENCOUNTER (OUTPATIENT)
Dept: HEMATOLOGY ONCOLOGY | Facility: MEDICAL CENTER | Age: 72
End: 2022-09-09
Attending: INTERNAL MEDICINE
Payer: MEDICARE

## 2022-09-09 DIAGNOSIS — C90.00 MULTIPLE MYELOMA NOT HAVING ACHIEVED REMISSION (HCC): ICD-10-CM

## 2022-09-09 PROCEDURE — 999999 HB NO CHARGE

## 2022-09-09 NOTE — NON-PROVIDER
Pt to med/onc for dressing assessment. Pt had his apheresis catheter pulled 12 days ago at [facility] and states they did not give him any directions on when to remove the dressing. Tegaderm and gauze pulled from site, site is clean, dry and intact with a healed incision line. No s/s of bleeding or infection (no redness, open wound, pus and pt denies fever). Area cleaned with chlorhexidine and left open to air. Pt discharged in NAD at this time, states he will be making a call to schedule his f/up with Dr. Aaron.

## 2022-09-16 NOTE — PROGRESS NOTES
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 He also has prostate cancer s/p XRT by Dr. Isaac. Feels \"unsteady\" and anxious. Vomits about once a week. Took Xanax this am - helped a bit. Using marijuana for appetite - weight down 30 lbs!    ROS:    Constitutional: 30 lb weight loss  Skin: No rash or jaundice  HENT: No change in eyesight or hearing  Cardiovascular:No chest pain or arrythmia  Respiratory:No cough or SOB  GI See PI  :No dysuria or frequency  Musculoskeletal:No bone or joint pain  Neuro:No sx's of neuropathy  Psych: No complaints    PMH:      Allergies   Allergen Reactions    Grass Pollen(K-O-R-T-Swt Rodrigo) Shortness of Breath    Pet Dander [Cat Hair Extract] Shortness of Breath and Unspecified    Sagebrush Anaphylaxis and Unspecified     sneezing       Past Medical History:   Diagnosis Date    Bowel habit changes     occasional constipation and diarrhea    Breath shortness     Cancer (HCC)     Disorder of thyroid     hypothyroid    Heart murmur     High cholesterol     Hypertension     not on medication    Light chain myeloma (HCC) 2021    Prostate cancer (HCC)     Psychiatric problem     depression    Seizure (HCC)     None in the past 30 years        Past Surgical History:   Procedure Laterality Date    KS DX BONE MARROW ASPIRATIONS Left 6/10/2022    Procedure: ASPIRATION, BONE MARROW;  Surgeon: Malachi Kay M.D.;  Location: SURGERY SAME DAY [facility];  Service: Orthopedics    KS DX BONE MARROW BIOPSIES Left 6/10/2022    Procedure: BIOPSY, BONE MARROW, USING NEEDLE OR TROCAR - DR. DURANT;  Surgeon: Malachi Kay M.D.;  Location: SURGERY SAME DAY [facility];  Service: Orthopedics    BRACHY THERAPY N/A 12/16/2021    Procedure: BRACHYTHERAPY - PROSTATE SEED AND HYDROGEL SPACER;  Surgeon: Kirk DELANEY M.D.;  Location: SURGERY SAME DAY [facility];  " Service: Urology    MS DX BONE MARROW ASPIRATIONS Left 2021    Procedure: ASPIRATION, BONE MARROW - DURANT;  Surgeon: Hair Okeefe M.D.;  Location: ENDOSCOPY [facility];  Service: Orthopedics    MS DX BONE MARROW BIOPSIES Left 2021    Procedure: BIOPSY, BONE MARROW, USING NEEDLE OR TROCAR;  Surgeon: Hair Okeefe M.D.;  Location: ENDOSCOPY [facility];  Service: Orthopedics    WRIST FUSION Left         Medications:    Current Outpatient Medications on File Prior to Encounter   Medication Sig Dispense Refill    sulfamethoxazole-trimethoprim (BACTRIM DS) 800-160 MG tablet Take 1 Tablet by mouth every Monday, Wednesday, and Friday.      fluoxetine (PROZAC) 40 MG capsule TAKE 1 CAPSULE BY MOUTH EVERY DAY 90 Capsule 3    levothyroxine (SYNTHROID) 100 MCG Tab Take 1 Tablet by mouth every morning on an empty stomach. 100 Tablet 3    simvastatin (ZOCOR) 20 MG Tab Take 1 Tablet by mouth every evening. 100 Tablet 3    tamsulosin (FLOMAX) 0.4 MG capsule TAKE 2 CAPSULES BY MOUTH 30 MINUTES AFTER DINNER. TAKE 1 CAPSULE BY MOUTH FOR A WEEK THEN INCREASE TO 2 CAPSULES 180 Capsule 1    valACYclovir (VALTREX) 500 MG Tab TAKE 1 TABLET BY MOUTH 2 TIMES A DAY  DAYS. 200 Tablet 3    Magnesium Hydroxide (MILK OF MAGNESIA PO) Take  by mouth as needed.      VITAMIN D PO Take 8,000 Units by mouth.      Omega-3 Fatty Acids (FISH OIL) 1000 MG Cap capsule Take 1,000 mg by mouth 2 times a day.       No current facility-administered medications on file prior to encounter.       Social History     Tobacco Use    Smoking status: Former     Years: 20.00     Types: Cigarettes     Quit date:      Years since quittin.7    Smokeless tobacco: Never   Substance Use Topics    Alcohol use: Never        Family History   Problem Relation Age of Onset    Cancer Neg Hx         Objective    Vitals:    /72 (BP Location: Left arm, Patient Position: Sitting, BP Cuff Size: Adult)   Pulse (!) 123   Temp 35.8 °C (96.5 °F)  "(Temporal)   Resp 18   Ht 1.68 m (5' 6.14\")   Wt 65.8 kg (145 lb 1 oz)   SpO2 97%   BMI 23.31 kg/m²     Physical Exam:    Appears peaked and weak. Depressed. Obvious weight loss.     Head -  Normocephalic .   Eyes - Pupils are equal. Conjunctivae normal. No scleral icterus.   Ears - normal hearing  Mouth - Throat: Oropharynx is clear and moist. No oropharyngeal exudate  Neck - Neck supple. No thyromegaly  Cardiovascular - Normal rate, regular rhythm, normal heart sounds and intact distal pulses. No  gallop, murmur or rub  Pulmonary - Normal breath sounds.  No wheeze, rales or rhonchi  Abdominal -Soft. No distension, tenderness, organomegaly or mass  Extremities-  No edema or tenderness.    Nodes - No submental, submandibular, preauricular, cervical, axillary or inguinal adenopathy.    Neurological -   Alert and oriented.  Skin - Skin is warm and dry. No rash noted. Not diaphoretic. No erythema. No pallor. No jaundice   Psychiatric -  Normal mood and affect.    Labs:  From [facility] 9/6/22  White Blood Cell Count 4.5 - 11 K/MM3 5.6         Red Blood Cell Count 4.5 - 5.9 M/MM3 3.24 Low     Hemoglobin 13.5 - 17.5 g/dL 11.8 Low     Hematocrit 41 - 53 % 33.8 Low     MCV 80 - 100 fL 104.6 High     MCH 27 - 33 pg 36.5 High     MCHC 32 - 36 % 34.9    RDW 0 - 14.7 % 17.5 High     MPV 6.8 - 10 fL 7.7    Platelet Count 130 - 400 K/MM3 251    Neutrophils % Auto % 51.3    Lymphocytes % Auto % 34.3    Monocytes % Auto % 13.7    Eosinophils % Auto % 0.3    Basophils % Auto % 0.4    Neutrophils Abs Auto 1.8 - 7.7 K/MM3 2.9    Lymphocytes Abs Auto 1 - 4.8 K/MM3 1.9    Monocytes Abs Auto 0.1 - 0.8 K/MM3 0.8    Eosinophils Abs Auto 0 - 0.5 K/MM3 0.0    Basophils Abs Auto 0 - 0.2 K/MM3 0.0    Resulting Agency  [facility] MAIN LAB   Specimen Collected: 09/06/22  4:18 PM     Assessment    Imp:    Visit Diagnosis:    1. Prostate cancer (HCC)        2. History of stem cell transplant (HCC)  CBC WITH DIFFERENTIAL    Comp Metabolic Panel    " Monoclonal Protein and FLC, Serum    LDH    MAGNESIUM    ALPRAZolam (XANAX) 0.25 MG Tab      3. Lambda light chain myeloma (HCC)  CBC WITH DIFFERENTIAL    Comp Metabolic Panel    Monoclonal Protein and FLC, Serum    LDH    MAGNESIUM          Plan:  Above lab  1 week    Gary Aaron M.D.

## 2022-09-21 ENCOUNTER — HOSPITAL ENCOUNTER (OUTPATIENT)
Dept: HEMATOLOGY ONCOLOGY | Facility: MEDICAL CENTER | Age: 72
End: 2022-09-21
Attending: INTERNAL MEDICINE
Payer: MEDICARE

## 2022-09-21 ENCOUNTER — HOSPITAL ENCOUNTER (OUTPATIENT)
Dept: LAB | Facility: MEDICAL CENTER | Age: 72
End: 2022-09-21
Attending: INTERNAL MEDICINE
Payer: MEDICARE

## 2022-09-21 VITALS
HEIGHT: 66 IN | OXYGEN SATURATION: 97 % | RESPIRATION RATE: 18 BRPM | TEMPERATURE: 96.5 F | HEART RATE: 123 BPM | WEIGHT: 145.06 LBS | BODY MASS INDEX: 23.31 KG/M2 | SYSTOLIC BLOOD PRESSURE: 138 MMHG | DIASTOLIC BLOOD PRESSURE: 72 MMHG

## 2022-09-21 DIAGNOSIS — Z94.84 HISTORY OF STEM CELL TRANSPLANT (HCC): ICD-10-CM

## 2022-09-21 DIAGNOSIS — C90.00 LAMBDA LIGHT CHAIN MYELOMA (HCC): ICD-10-CM

## 2022-09-21 DIAGNOSIS — C61 PROSTATE CANCER (HCC): ICD-10-CM

## 2022-09-21 LAB
ANISOCYTOSIS BLD QL SMEAR: ABNORMAL
BASOPHILS # BLD AUTO: 0.9 % (ref 0–1.8)
BASOPHILS # BLD: 0.05 K/UL (ref 0–0.12)
EOSINOPHIL # BLD AUTO: 0 K/UL (ref 0–0.51)
EOSINOPHIL NFR BLD: 0 % (ref 0–6.9)
ERYTHROCYTE [DISTWIDTH] IN BLOOD BY AUTOMATED COUNT: 65.1 FL (ref 35.9–50)
HCT VFR BLD AUTO: 36.5 % (ref 42–52)
HGB BLD-MCNC: 12.6 G/DL (ref 14–18)
LYMPHOCYTES # BLD AUTO: 1.16 K/UL (ref 1–4.8)
LYMPHOCYTES NFR BLD: 21.9 % (ref 22–41)
MACROCYTES BLD QL SMEAR: ABNORMAL
MANUAL DIFF BLD: NORMAL
MCH RBC QN AUTO: 37.1 PG (ref 27–33)
MCHC RBC AUTO-ENTMCNC: 34.5 G/DL (ref 33.7–35.3)
MCV RBC AUTO: 107.4 FL (ref 81.4–97.8)
MONOCYTES # BLD AUTO: 0.37 K/UL (ref 0–0.85)
MONOCYTES NFR BLD AUTO: 7 % (ref 0–13.4)
MORPHOLOGY BLD-IMP: NORMAL
NEUTROPHILS # BLD AUTO: 3.72 K/UL (ref 1.82–7.42)
NEUTROPHILS NFR BLD: 70.2 % (ref 44–72)
NRBC # BLD AUTO: 0 K/UL
NRBC BLD-RTO: 0 /100 WBC
PLATELET # BLD AUTO: 233 K/UL (ref 164–446)
PLATELET BLD QL SMEAR: NORMAL
PMV BLD AUTO: 9.4 FL (ref 9–12.9)
RBC # BLD AUTO: 3.4 M/UL (ref 4.7–6.1)
RBC BLD AUTO: PRESENT
WBC # BLD AUTO: 5.3 K/UL (ref 4.8–10.8)

## 2022-09-21 PROCEDURE — 82784 ASSAY IGA/IGD/IGG/IGM EACH: CPT

## 2022-09-21 PROCEDURE — 80053 COMPREHEN METABOLIC PANEL: CPT

## 2022-09-21 PROCEDURE — 85007 BL SMEAR W/DIFF WBC COUNT: CPT

## 2022-09-21 PROCEDURE — 86334 IMMUNOFIX E-PHORESIS SERUM: CPT

## 2022-09-21 PROCEDURE — 83615 LACTATE (LD) (LDH) ENZYME: CPT

## 2022-09-21 PROCEDURE — 99212 OFFICE O/P EST SF 10 MIN: CPT | Performed by: INTERNAL MEDICINE

## 2022-09-21 PROCEDURE — 84155 ASSAY OF PROTEIN SERUM: CPT | Mod: 91

## 2022-09-21 PROCEDURE — 83735 ASSAY OF MAGNESIUM: CPT

## 2022-09-21 PROCEDURE — 84165 PROTEIN E-PHORESIS SERUM: CPT

## 2022-09-21 PROCEDURE — 85025 COMPLETE CBC W/AUTO DIFF WBC: CPT

## 2022-09-21 PROCEDURE — 36415 COLL VENOUS BLD VENIPUNCTURE: CPT

## 2022-09-21 PROCEDURE — 83521 IG LIGHT CHAINS FREE EACH: CPT | Mod: 91

## 2022-09-21 PROCEDURE — 99214 OFFICE O/P EST MOD 30 MIN: CPT | Performed by: INTERNAL MEDICINE

## 2022-09-21 RX ORDER — ALPRAZOLAM 0.25 MG/1
0.25 TABLET ORAL NIGHTLY PRN
Qty: 30 TABLET | Refills: 0 | Status: SHIPPED | OUTPATIENT
Start: 2022-09-21 | End: 2022-10-21

## 2022-09-21 RX ORDER — SULFAMETHOXAZOLE AND TRIMETHOPRIM 800; 160 MG/1; MG/1
1 TABLET ORAL
COMMUNITY
Start: 2022-09-14 | End: 2022-10-14

## 2022-09-21 ASSESSMENT — PAIN SCALES - GENERAL: PAINLEVEL: NO PAIN

## 2022-09-21 ASSESSMENT — FIBROSIS 4 INDEX: FIB4 SCORE: 2.65

## 2022-09-22 ENCOUNTER — TELEPHONE (OUTPATIENT)
Dept: MEDICAL GROUP | Facility: LAB | Age: 72
End: 2022-09-22
Payer: MEDICARE

## 2022-09-22 DIAGNOSIS — G47.01 INSOMNIA DUE TO MEDICAL CONDITION: ICD-10-CM

## 2022-09-22 LAB
ALBUMIN SERPL BCP-MCNC: 4.4 G/DL (ref 3.2–4.9)
ALBUMIN/GLOB SERPL: 2.1 G/DL
ALP SERPL-CCNC: 70 U/L (ref 30–99)
ALT SERPL-CCNC: 17 U/L (ref 2–50)
ANION GAP SERPL CALC-SCNC: 16 MMOL/L (ref 7–16)
AST SERPL-CCNC: 17 U/L (ref 12–45)
BILIRUB SERPL-MCNC: 0.4 MG/DL (ref 0.1–1.5)
BUN SERPL-MCNC: 14 MG/DL (ref 8–22)
CALCIUM SERPL-MCNC: 9.6 MG/DL (ref 8.5–10.5)
CHLORIDE SERPL-SCNC: 104 MMOL/L (ref 96–112)
CO2 SERPL-SCNC: 22 MMOL/L (ref 20–33)
CREAT SERPL-MCNC: 0.99 MG/DL (ref 0.5–1.4)
GFR SERPLBLD CREATININE-BSD FMLA CKD-EPI: 82 ML/MIN/1.73 M 2
GLOBULIN SER CALC-MCNC: 2.1 G/DL (ref 1.9–3.5)
GLUCOSE SERPL-MCNC: 117 MG/DL (ref 65–99)
LDH SERPL L TO P-CCNC: 178 U/L (ref 107–266)
MAGNESIUM SERPL-MCNC: 2 MG/DL (ref 1.5–2.5)
POTASSIUM SERPL-SCNC: 3.7 MMOL/L (ref 3.6–5.5)
PROT SERPL-MCNC: 6.5 G/DL (ref 6–8.2)
SODIUM SERPL-SCNC: 142 MMOL/L (ref 135–145)

## 2022-09-22 RX ORDER — ZOLPIDEM TARTRATE 10 MG/1
10 TABLET ORAL
Qty: 30 TABLET | Refills: 2 | OUTPATIENT
Start: 2022-09-22 | End: 2022-10-22

## 2022-09-22 NOTE — TELEPHONE ENCOUNTER
Patient called stating Dr. Aaron suggested they request this refill from you to help the patient sleep.    Received request via: Pharmacy    Was the patient seen in the last year in this department? Yes  5/13/2022  Does the patient have an active prescription (recently filled or refills available) for medication(s) requested? No

## 2022-09-22 NOTE — TELEPHONE ENCOUNTER
It has been more than 3 months since last seen. Follow up in office is needed. Please schedule appointment. Thanks.

## 2022-09-23 ENCOUNTER — APPOINTMENT (OUTPATIENT)
Dept: MEDICAL GROUP | Facility: LAB | Age: 72
End: 2022-09-23
Payer: MEDICARE

## 2022-09-23 NOTE — PROGRESS NOTES
09/28/22    Subjective    Chief Complaint: Follow up from last weeks' visit for myeloma    HPI:  72 male with lambda light chain myeloma s/p stem cell transplant. Was seen last week feeling poorly. Felt mentally unstable. Depressed. Anxious. Anorexic. Given Xanax 025 mg. Thinks he is dependent on Ambien. Worried about getting dependent on Xanax.     ROS:    Constitutional: No weight loss  Skin: No rash or jaundice  HENT: No change in eyesight or hearing  Cardiovascular:No chest pain or arrythmia  Respiratory:No cough or SOB  GI:No nausea, vomiting, diarrhea, constipation  :No dysuria or frequency  Musculoskeletal:No bone or joint pain  Neuro:No sx's of neuropathy  Psych: No complaints    PMH:      Allergies   Allergen Reactions    Grass Pollen(K-O-R-T-Swt Rodrigo) Shortness of Breath    Pet Dander [Cat Hair Extract] Shortness of Breath and Unspecified    Sagebrush Anaphylaxis and Unspecified     sneezing       Past Medical History:   Diagnosis Date    Bowel habit changes     occasional constipation and diarrhea    Breath shortness     Cancer (HCC)     Disorder of thyroid     hypothyroid    Heart murmur     High cholesterol     Hypertension     not on medication    Light chain myeloma (HCC) 2021    Prostate cancer (HCC)     Psychiatric problem     depression    Seizure (HCC)     None in the past 30 years        Past Surgical History:   Procedure Laterality Date    [place] DX BONE MARROW ASPIRATIONS Left 6/10/2022    Procedure: ASPIRATION, BONE MARROW;  Surgeon: Malachi Kay M.D.;  Location: SURGERY SAME DAY [facility];  Service: Orthopedics    [place] DX BONE MARROW BIOPSIES Left 6/10/2022    Procedure: BIOPSY, BONE MARROW, USING NEEDLE OR TROCAR - DR. DURANT;  Surgeon: Malachi Kay M.D.;  Location: SURGERY SAME DAY [facility];  Service: Orthopedics    BRACHY THERAPY N/A 12/16/2021    Procedure: BRACHYTHERAPY - PROSTATE SEED AND HYDROGEL SPACER;  Surgeon: Kirk DELANEY M.D.;  Location: SURGERY SAME DAY [facility];  Service:  Urology    SD DX BONE MARROW ASPIRATIONS Left 2021    Procedure: ASPIRATION, BONE MARROW - DURANT;  Surgeon: Hair Okeefe M.D.;  Location: ENDOSCOPY [facility];  Service: Orthopedics    SD DX BONE MARROW BIOPSIES Left 2021    Procedure: BIOPSY, BONE MARROW, USING NEEDLE OR TROCAR;  Surgeon: Hair Okeefe M.D.;  Location: ENDOSCOPY [facility];  Service: Orthopedics    WRIST FUSION Left         Medications:    Current Outpatient Medications on File Prior to Encounter   Medication Sig Dispense Refill    sulfamethoxazole-trimethoprim (BACTRIM DS) 800-160 MG tablet Take 1 Tablet by mouth every Monday, Wednesday, and Friday.      ALPRAZolam (XANAX) 0.25 MG Tab Take 1 Tablet by mouth at bedtime as needed for Sleep or Anxiety for up to 30 days. 30 Tablet 0    fluoxetine (PROZAC) 40 MG capsule TAKE 1 CAPSULE BY MOUTH EVERY DAY 90 Capsule 3    levothyroxine (SYNTHROID) 100 MCG Tab Take 1 Tablet by mouth every morning on an empty stomach. 100 Tablet 3    simvastatin (ZOCOR) 20 MG Tab Take 1 Tablet by mouth every evening. 100 Tablet 3    tamsulosin (FLOMAX) 0.4 MG capsule TAKE 2 CAPSULES BY MOUTH 30 MINUTES AFTER DINNER. TAKE 1 CAPSULE BY MOUTH FOR A WEEK THEN INCREASE TO 2 CAPSULES 180 Capsule 1    valACYclovir (VALTREX) 500 MG Tab TAKE 1 TABLET BY MOUTH 2 TIMES A DAY  DAYS. 200 Tablet 3    Magnesium Hydroxide (MILK OF MAGNESIA PO) Take  by mouth as needed.      VITAMIN D PO Take 8,000 Units by mouth.      Omega-3 Fatty Acids (FISH OIL) 1000 MG Cap capsule Take 1,000 mg by mouth 2 times a day.       No current facility-administered medications on file prior to encounter.       Social History     Tobacco Use    Smoking status: Former     Years: 20.00     Types: Cigarettes     Quit date:      Years since quittin.7    Smokeless tobacco: Never   Substance Use Topics    Alcohol use: Never        Family History   Problem Relation Age of Onset    Cancer Neg Hx         Objective    Vitals:    BP (!)  "144/80 (BP Location: Left arm, Patient Position: Sitting, BP Cuff Size: Adult)   Pulse (!) 114   Temp 36.9 °C (98.4 °F) (Temporal)   Resp 18   Ht 1.68 m (5' 6.14\")   Wt 65.5 kg (144 lb 8.2 oz)   SpO2 96%   BMI 23.22 kg/m²     Physical Exam:    Appears well-developed and well-nourished. No distress.    Head -  Normocephalic .   Eyes - Pupils are equal. Conjunctivae normal. No scleral icterus.   Ears - normal hearing  Neurological -   Alert and oriented.  Skin - Skin is warm and dry. No rash noted. Not diaphoretic. No erythema. No pallor. No jaundice   Psychiatric -  Normal mood and affect.    Labs:     Latest Reference Range & Units 6/10/22 12:03 7/28/22 16:51 9/21/22 11:38   WBC 4.8 - 10.8 K/uL 2.2 (L) 8.0 5.3   RBC 4.70 - 6.10 M/uL 3.00 (L) 3.13 (L) 3.40 (L)   Hemoglobin 14.0 - 18.0 g/dL 11.2 (L) 11.7 (L) 12.6 (L)   Hematocrit 42.0 - 52.0 % 31.8 (L) 33.6 (L) 36.5 (L)   MCV 81.4 - 97.8 fL 106.0 (H) 107.3 (H) 107.4 (H)   MCH 27.0 - 33.0 pg 37.3 (H) 37.4 (H) 37.1 (H)   MCHC 33.7 - 35.3 g/dL 35.2 34.8 34.5   RDW 35.9 - 50.0 fL 56.2 (H) 59.5 (H) 65.1 (H)   Platelet Count 164 - 446 K/uL 234 112 (L) 233      Latest Reference Range & Units 9/21/22 11:38   Sodium 135 - 145 mmol/L 142   Potassium 3.6 - 5.5 mmol/L 3.7   Chloride 96 - 112 mmol/L 104   Co2 20 - 33 mmol/L 22   Anion Gap 7.0 - 16.0  16.0   Glucose 65 - 99 mg/dL 117 (H)   Bun 8 - 22 mg/dL 14   Creatinine 0.50 - 1.40 mg/dL 0.99   GFR (CKD-EPI) >60 mL/min/1.73 m 2 82   Calcium 8.5 - 10.5 mg/dL 9.6   AST(SGOT) 12 - 45 U/L 17   ALT(SGPT) 2 - 50 U/L 17   Alkaline Phosphatase 30 - 99 U/L 70   Total Bilirubin 0.1 - 1.5 mg/dL 0.4   Albumin 3.2 - 4.9 g/dL 4.4   Total Protein 6.0 - 8.2 g/dL 6.5   Globulin 1.9 - 3.5 g/dL 2.1   A-G Ratio g/dL 2.1   Magnesium 1.5 - 2.5 mg/dL 2.0   LDH Total 107 - 266 U/L 178      Latest Reference Range & Units 5/18/22 10:32 6/10/22 12:03 9/21/22 11:38   Immunoglobulin A 68 - 408 mg/dL 28 (L) 11 (L) 21 (L)   Immunoglobulin G 768 - 1632 " mg/dL 332 (L) 240 (L) 374 (L)   Immunoglobulin M 35 - 263 mg/dL 19 (L) 12 (L) 32 (L)      Latest Reference Range & Units 5/18/22 10:32 6/10/22 12:03 9/21/22 11:38   Free Kappa Light Chains 3.30 - 19.40 mg/L 5.80 1.38 (L) 5.55   Free Lambda Light Chains 5.71 - 26.30 mg/L 39.78 (H) 27.06 (H) 2.80 (L)   Kappa-Lambda Ratio 0.26 - 1.65  0.15 (L) 0.05 (L) 1.98 (H)     Assessment    Imp:    Visit Diagnosis:    1. Lambda light chain myeloma (HCC)        2. History of stem cell transplant (HCC)        3. Prostate cancer (HCC)          Plan:  Try slow taper of Ambien - 10-5-10-5 then go to 5 daily then 5-0-5  See primary MD  All else fails - ER    Gary Aaron M.D.

## 2022-09-23 NOTE — TELEPHONE ENCOUNTER
ESTABLISHED PATIENT PRE-VISIT PLANNING     Patient was NOT contacted to complete PVP.     Note: Patient will not be contacted if there is no indication to call.     1.  Reviewed notes from the last few office visits within the medical group: Yes    2.  If any orders were placed at last visit or intended to be done for this visit (i.e. 6 mos follow-up), do we have Results/Consult Notes?           Labs - Labs were not ordered at last office visit.  Note: If patient appointment is for lab review and patient did not complete labs, check with provider if OK to reschedule patient until labs completed.         Imaging - Imaging was not ordered at last office visit.         Referrals - Referral ordered, patient has NOT been seen. Scheduled     3. Is this appointment scheduled as a Hospital Follow-Up? No    4.  Immunizations were updated in Epic using Reconcile Outside Information activity? Yes    5.  Patient is due for the following Health Maintenance Topics:   Health Maintenance Due   Topic Date Due    Annual Wellness Visit  Never done    IMM HEP B VACCINE (1 of 3 - 3-dose series) Never done    IMM ZOSTER VACCINES (1 of 2) Never done    IMM DTaP/Tdap/Td Vaccine (1 - Tdap) 04/04/2004    IMM INFLUENZA (1) 09/01/2022     6.  AHA (Pulse8) form printed for Provider? N/A

## 2022-09-25 LAB
ALBUMIN SERPL ELPH-MCNC: 3.94 G/DL (ref 3.75–5.01)
ALPHA1 GLOB SERPL ELPH-MCNC: 0.27 G/DL (ref 0.19–0.46)
ALPHA2 GLOB SERPL ELPH-MCNC: 0.92 G/DL (ref 0.48–1.05)
B-GLOBULIN SERPL ELPH-MCNC: 0.62 G/DL (ref 0.48–1.1)
EER MONOCLONAL PROTEIN AND FLC, SERUM Q5224: ABNORMAL
GAMMA GLOB SERPL ELPH-MCNC: 0.45 G/DL (ref 0.62–1.51)
IGA SERPL-MCNC: 21 MG/DL (ref 68–408)
IGG SERPL-MCNC: 374 MG/DL (ref 768–1632)
IGM SERPL-MCNC: 32 MG/DL (ref 35–263)
INTERPRETATION SERPL IFE-IMP: ABNORMAL
INTERPRETATION SERPL IFE-IMP: ABNORMAL
KAPPA LC FREE SER-MCNC: 5.55 MG/L (ref 3.3–19.4)
KAPPA LC FREE/LAMBDA FREE SER NEPH: 1.98 {RATIO} (ref 0.26–1.65)
LAMBDA LC FREE SERPL-MCNC: 2.8 MG/L (ref 5.71–26.3)
MONOCLONAL PROTEIN NL11656: ABNORMAL G/DL
PROT SERPL-MCNC: 6.2 G/DL (ref 6.3–8.2)

## 2022-09-28 ENCOUNTER — PATIENT OUTREACH (OUTPATIENT)
Dept: ONCOLOGY | Facility: MEDICAL CENTER | Age: 72
End: 2022-09-28

## 2022-09-28 ENCOUNTER — HOSPITAL ENCOUNTER (OUTPATIENT)
Dept: HEMATOLOGY ONCOLOGY | Facility: MEDICAL CENTER | Age: 72
End: 2022-09-28
Attending: INTERNAL MEDICINE
Payer: MEDICARE

## 2022-09-28 VITALS
DIASTOLIC BLOOD PRESSURE: 80 MMHG | OXYGEN SATURATION: 96 % | HEART RATE: 114 BPM | HEIGHT: 66 IN | TEMPERATURE: 98.4 F | RESPIRATION RATE: 18 BRPM | WEIGHT: 144.51 LBS | BODY MASS INDEX: 23.22 KG/M2 | SYSTOLIC BLOOD PRESSURE: 144 MMHG

## 2022-09-28 DIAGNOSIS — Z94.84 HISTORY OF STEM CELL TRANSPLANT (HCC): ICD-10-CM

## 2022-09-28 DIAGNOSIS — C90.00 LAMBDA LIGHT CHAIN MYELOMA (HCC): ICD-10-CM

## 2022-09-28 DIAGNOSIS — C61 PROSTATE CANCER (HCC): ICD-10-CM

## 2022-09-28 PROCEDURE — 99212 OFFICE O/P EST SF 10 MIN: CPT | Performed by: INTERNAL MEDICINE

## 2022-09-28 PROCEDURE — 99214 OFFICE O/P EST MOD 30 MIN: CPT | Performed by: INTERNAL MEDICINE

## 2022-09-28 ASSESSMENT — PAIN SCALES - GENERAL: PAINLEVEL: NO PAIN

## 2022-09-28 ASSESSMENT — FIBROSIS 4 INDEX: FIB4 SCORE: 1.24

## 2022-09-28 NOTE — PROGRESS NOTES
Oncology Nurse Navigator (ONN) Helen Golden reached out to patient to follow up on previous communication.  No answer.  ONN left voice message with my contact information requesting a call back at patient's convenience.    INTERVENTION:  ONN introduction letter and [facility] Cancer Support Services Calendar sent to patient's personal email address.

## 2022-09-29 ENCOUNTER — TELEPHONE (OUTPATIENT)
Dept: MEDICAL GROUP | Facility: LAB | Age: 72
End: 2022-09-29
Payer: MEDICARE

## 2022-10-03 ENCOUNTER — OFFICE VISIT (OUTPATIENT)
Dept: MEDICAL GROUP | Facility: LAB | Age: 72
End: 2022-10-03
Payer: MEDICARE

## 2022-10-03 VITALS
OXYGEN SATURATION: 98 % | RESPIRATION RATE: 14 BRPM | HEIGHT: 66 IN | TEMPERATURE: 98 F | DIASTOLIC BLOOD PRESSURE: 64 MMHG | BODY MASS INDEX: 24.27 KG/M2 | WEIGHT: 151 LBS | SYSTOLIC BLOOD PRESSURE: 132 MMHG | HEART RATE: 112 BPM

## 2022-10-03 DIAGNOSIS — F41.1 GAD (GENERALIZED ANXIETY DISORDER): ICD-10-CM

## 2022-10-03 PROCEDURE — 3075F SYST BP GE 130 - 139MM HG: CPT | Performed by: FAMILY MEDICINE

## 2022-10-03 PROCEDURE — 3078F DIAST BP <80 MM HG: CPT | Performed by: FAMILY MEDICINE

## 2022-10-03 PROCEDURE — 99999 PR NO CHARGE: CPT | Performed by: FAMILY MEDICINE

## 2022-10-03 RX ORDER — QUETIAPINE FUMARATE 100 MG/1
100 TABLET, FILM COATED ORAL
COMMUNITY
Start: 2022-09-30 | End: 2022-10-13 | Stop reason: SDUPTHER

## 2022-10-03 RX ORDER — PROPRANOLOL HYDROCHLORIDE 10 MG/1
10 TABLET ORAL 2 TIMES DAILY PRN
Qty: 180 TABLET | Refills: 3 | Status: SHIPPED | OUTPATIENT
Start: 2022-10-03 | End: 2022-11-03

## 2022-10-03 RX ORDER — ESCITALOPRAM OXALATE 20 MG/1
20 TABLET ORAL DAILY
Qty: 90 TABLET | Refills: 3 | Status: SHIPPED | OUTPATIENT
Start: 2022-10-03 | End: 2023-09-22

## 2022-10-03 ASSESSMENT — FIBROSIS 4 INDEX: FIB4 SCORE: 1.24

## 2022-10-03 NOTE — PROGRESS NOTES
Subjective:   Gary Hedrick is a 72 y.o. male here today for   Chief Complaint   Patient presents with    Hospital Follow-up       Patient 72-year-old male with longstanding history of treatment of multiple myeloma.  Recent been struggling with depression and anxiety.  Having difficulty sleeping has been chasing a sleep with Ambien with no avail.  During hospitalization patient was treated with Seroquel and discharged to psychiatry.  Patient states today is feeling well.  Continues to have significant depression and anxiety and would like to discuss further treatment.  Denies any suicidal/homicidal ideations      Allergies   Allergen Reactions    Grass Pollen(K-O-R-T-Swt Rodrigo) Shortness of Breath    Pet Dander [Cat Hair Extract] Shortness of Breath and Unspecified    Sagebrush Anaphylaxis and Unspecified     sneezing         Current medicines (including changes today)  Current Outpatient Medications   Medication Sig Dispense Refill    sulfamethoxazole-trimethoprim (BACTRIM DS) 800-160 MG tablet Take 1 Tablet by mouth every Monday, Wednesday, and Friday.      ALPRAZolam (XANAX) 0.25 MG Tab Take 1 Tablet by mouth at bedtime as needed for Sleep or Anxiety for up to 30 days. 30 Tablet 0    fluoxetine (PROZAC) 40 MG capsule TAKE 1 CAPSULE BY MOUTH EVERY DAY 90 Capsule 3    levothyroxine (SYNTHROID) 100 MCG Tab Take 1 Tablet by mouth every morning on an empty stomach. 100 Tablet 3    simvastatin (ZOCOR) 20 MG Tab Take 1 Tablet by mouth every evening. 100 Tablet 3    tamsulosin (FLOMAX) 0.4 MG capsule TAKE 2 CAPSULES BY MOUTH 30 MINUTES AFTER DINNER. TAKE 1 CAPSULE BY MOUTH FOR A WEEK THEN INCREASE TO 2 CAPSULES 180 Capsule 1    valACYclovir (VALTREX) 500 MG Tab TAKE 1 TABLET BY MOUTH 2 TIMES A DAY  DAYS. 200 Tablet 3    Magnesium Hydroxide (MILK OF MAGNESIA PO) Take  by mouth as needed.      VITAMIN D PO Take 8,000 Units by mouth.      Omega-3 Fatty Acids (FISH OIL) 1000 MG Cap capsule Take 1,000 mg by mouth  "2 times a day.       No current facility-administered medications for this visit.     He  has a past medical history of Bowel habit changes, Breath shortness, Cancer (HCC), Disorder of thyroid, Heart murmur, High cholesterol, Hypertension, Light chain myeloma (HCC) (2021), Prostate cancer (HCC), Psychiatric problem, and Seizure (HCC).    ROS   ROS  -See HPI   Objective:     Physical Exam:  /64   Pulse (!) 112   Temp 36.7 °C (98 °F) (Temporal)   Resp 14   Ht 1.68 m (5' 6.14\")   Wt 68.5 kg (151 lb)   SpO2 98%  Body mass index is 24.27 kg/m².   Constitutional: Alert, no distress, well-groomed.  Skin: No rashes in visible areas.  Eye: Round. Conjunctiva clear, lids normal. No icterus.   ENMT: Lips pink without lesions, good dentition, moist mucous membranes. Phonation normal.  Neck: No masses, no thyromegaly. Moves freely without pain.  Respiratory: Unlabored respiratory effort, no cough or audible wheeze  Psych: Alert and oriented x3, normal affect and mood.    Assessment and Plan:     1. JOSE (generalized anxiety disorder)  -Given ongoing concerns we will go ahead and treatment with Lexapro.  Discussed treatment of acute anxiety/panic attacks with propranolol.  Discussed potential side effects, return precautions.      Followup: No follow-ups on file.         PLEASE NOTE: This dictation was created using voice recognition software. I have made every reasonable attempt to correct obvious errors, but I expect that there are errors of grammar and possibly content that I did not discover before finalizing the note.  "

## 2022-10-11 DIAGNOSIS — E78.5 DYSLIPIDEMIA: ICD-10-CM

## 2022-10-11 RX ORDER — QUETIAPINE FUMARATE 100 MG/1
100 TABLET, FILM COATED ORAL
Qty: 60 TABLET | Status: CANCELLED | OUTPATIENT
Start: 2022-10-11

## 2022-10-19 ENCOUNTER — PHYSICAL THERAPY (OUTPATIENT)
Dept: PHYSICAL THERAPY | Facility: MEDICAL CENTER | Age: 72
End: 2022-10-19
Attending: FAMILY MEDICINE
Payer: MEDICARE

## 2022-10-19 DIAGNOSIS — R53.81 PHYSICAL DECONDITIONING: ICD-10-CM

## 2022-10-19 PROCEDURE — 97161 PT EVAL LOW COMPLEX 20 MIN: CPT

## 2022-10-19 ASSESSMENT — ENCOUNTER SYMPTOMS
PAIN SCALE AT LOWEST: 0
PAIN SCALE: 0
PAIN SCALE AT HIGHEST: 0

## 2022-10-19 NOTE — OP THERAPY EVALUATION
Outpatient Physical Therapy  INITIAL EVALUATION    [facility] Outpatient Physical Therapy  [address]  [place] [address]  Phone:  [phone]  Fax:  [phone]    Date of Evaluation: 10/19/2022    Patient: Miguel Ángel Hedrick  YOB: 1950  MRN: [id number]     Referring Provider: Elkin Rbeolledo M.D.  [address]  [place] [address]   Referring Diagnosis Physical deconditioning [R53.81]     Time Calculation  Start time: 1120  Stop time: 1220 Time Calculation (min): 60 minutes     Chief Complaint: No chief complaint on file.    Visit Diagnoses     ICD-10-CM   1. Physical deconditioning  R53.81       Date of onset of impairment: 2022    Subjective:   History of Present Illness:     Mechanism of injury:  Patient has multiple myeloma that is in remission, and has significant decline in function from cancer as well as chemotherapy treatments.  Reports that he has slowly been progressing, to the point where he can walk .25 miles, and does not feel like he is a fall risk.  Reports he is unsure if he needs PT, however wanted to follow through with it since the MD recommend it.     Patient has no specific goals, other than to walk more and increase his function in general.  Reports that he only feels like he is going to fall if he is dehydrated, which he is trying to be better about.  Reports appetite has increased, although taste has changed, reports foods he likes just taste different.  Denies changes in vision, smell, or hearing.  Patient demonstrates intention tremor and reports that it is due to his anxiety, which has gradually improved with medication and improving confidence, as was having fear that wife would not return when she left the room.    Reports gait gets worsen as he fatigues, as he hits his toes during terminal swing of ambulation, can improve with focus.        Pain:     Current pain ratin    At best pain rating:   0    At worst pain ratin  Patient Goals:     Patient goals for therapy:  Improved balance and independence with ADLs/IADLs    Past Medical History:   Diagnosis Date    Bowel habit changes     occasional constipation and diarrhea    Breath shortness     Cancer (HCC)     Disorder of thyroid     hypothyroid    Heart murmur     High cholesterol     Hypertension     not on medication    Light chain myeloma (HCC)     Prostate cancer (HCC)     Psychiatric problem     depression    Seizure (HCC)     None in the past 30 years     Past Surgical History:   Procedure Laterality Date    ME DX BONE MARROW ASPIRATIONS Left 6/10/2022    Procedure: ASPIRATION, BONE MARROW;  Surgeon: Malachi Kay M.D.;  Location: SURGERY SAME DAY [facility];  Service: Orthopedics    ME DX BONE MARROW BIOPSIES Left 6/10/2022    Procedure: BIOPSY, BONE MARROW, USING NEEDLE OR TROCAR - DR. DURANT;  Surgeon: Malachi Kay M.D.;  Location: SURGERY SAME DAY [facility];  Service: Orthopedics    BRACHY THERAPY N/A 2021    Procedure: BRACHYTHERAPY - PROSTATE SEED AND HYDROGEL SPACER;  Surgeon: Kirk DELANEY M.D.;  Location: SURGERY SAME DAY [facility];  Service: Urology    ME DX BONE MARROW ASPIRATIONS Left 2021    Procedure: ASPIRATION, BONE MARROW - CARLEEN;  Surgeon: Hair Okeefe M.D.;  Location: ENDOSCOPY [facility];  Service: Orthopedics    ME DX BONE MARROW BIOPSIES Left 2021    Procedure: BIOPSY, BONE MARROW, USING NEEDLE OR TROCAR;  Surgeon: Hair Okeefe M.D.;  Location: ENDOSCOPY [facility];  Service: Orthopedics    WRIST FUSION Left      Social History     Tobacco Use    Smoking status: Former     Years: 20.00     Types: Cigarettes     Quit date:      Years since quittin.8    Smokeless tobacco: Never   Substance Use Topics    Alcohol use: Never     Family and Occupational History     Socioeconomic History    Marital status:      Spouse name: Not on file    Number of children: Not on file    Years of  education: Not on file    Highest education level: Not on file   Occupational History    Not on file     Objective     Static Posture     Comments  Coordination  Positive opponens, FTN, sustained contraction increased tremor, intention tremor  Negative disdiadochokinesia     5x sit<>stand - 9 seconds   DGI - no deficits, not fall risk   6MWT - 1625 feet, goal 1729 feet      MMT - WNL  Sensation - WNL    CN screen - WNL      Therapeutic Exercises (CPT 42601):       Therapeutic Exercise Summary:   Next visit   Assess dual tasks   Assess part task desk    Time-based treatments/modalities:           Assessment, Response and Plan:   Impairments: impaired functional mobility and lacks appropriate home exercise program    Assessment details:  Patient demonstrate good scoring on DGI, LE strength, and mild deficits in 6MWT.  Further evaluation of coordination and dual tasks activities are needed.  Patient educated to reassess coordination at home, wife instructed on tests to perform, when anxiety more controlled.  Mild endurance deficits at this time.  If no dual task or coordination problems, then d/c.  If present, then PT will be indicated.  Will adjust POC accordingly next visit.   Prognosis: good    Goals:   Short Term Goals:   Assessment of dual tasks will be performed  Assessment of part task gait will be performed   Short term goal time span:  2-4 weeks      Long Term Goals:    Assessment of dual tasks will be performed  Assessment of part task gait will be performed   Long term goal time span:  6-8 weeks    Plan:   Therapy options:  Physical therapy treatment to continue  Planned therapy interventions:  Neuromuscular Re-education (CPT 59500), Manual Therapy (CPT 74139), Gait Training (CPT 33920), E Stim Attended (CPT 50167), Therapeutic Activities (CPT 94727) and Therapeutic Exercise (CPT 92186)  Frequency:  2x week  Duration in weeks:  6  Duration in visits:  12  Discussed with:  Patient  Plan details:    May d/c  after next visit secondary to no needs.  Further assessment of coordination and dual task needed.     Functional Assessment Used        Referring provider co-signature:  I have reviewed this plan of care and my co-signature certifies the need for services.    Certification Period: 10/19/2022 to  12/07/22    Physician Signature: ________________________________ Date: ______________

## 2022-10-21 ENCOUNTER — APPOINTMENT (OUTPATIENT)
Dept: PHYSICAL THERAPY | Facility: MEDICAL CENTER | Age: 72
End: 2022-10-21
Attending: FAMILY MEDICINE
Payer: MEDICARE

## 2022-10-25 ENCOUNTER — TELEPHONE (OUTPATIENT)
Dept: MEDICAL GROUP | Facility: LAB | Age: 72
End: 2022-10-25
Payer: MEDICARE

## 2022-10-25 NOTE — TELEPHONE ENCOUNTER
ESTABLISHED PATIENT PRE-VISIT PLANNING     Patient was NOT contacted to complete PVP.     Note: Patient will not be contacted if there is no indication to call.     1.  Reviewed notes from the last few office visits within the medical group: Yes    2.  If any orders were placed at last visit or intended to be done for this visit (i.e. 6 mos follow-up), do we have Results/Consult Notes?           Labs - Labs were not ordered at last office visit.  Note: If patient appointment is for lab review and patient did not complete labs, check with provider if OK to reschedule patient until labs completed.         Imaging - Imaging was not ordered at last office visit.         Referrals - No referrals were ordered at last office visit.    3. Is this appointment scheduled as a Hospital Follow-Up? No    4.  Immunizations were updated in Epic using Reconcile Outside Information activity? Yes    5.  Patient is due for the following Health Maintenance Topics:   Health Maintenance Due   Topic Date Due    Annual Wellness Visit  Never done    IMM HEP B VACCINE (1 of 3 - 3-dose series) Never done    IMM ZOSTER VACCINES (1 of 2) Never done    IMM DTaP/Tdap/Td Vaccine (1 - Tdap) 04/04/2004    COVID-19 Vaccine (5 - Booster for Moderna series) 08/11/2022    IMM INFLUENZA (1) 09/01/2022   6.  AHA (Pulse8) form printed for Provider? N/A

## 2022-10-26 ENCOUNTER — APPOINTMENT (OUTPATIENT)
Dept: PHYSICAL THERAPY | Facility: MEDICAL CENTER | Age: 72
End: 2022-10-26
Attending: FAMILY MEDICINE
Payer: MEDICARE

## 2022-10-26 NOTE — PROGRESS NOTES
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 Also prostate cancer s/p XRT. Has had a difficult course since completing the transplant. Seen at [facility] a week ago. Got COVID shot. Lab all good. Feeling much better - using Seroquel    ROS:    Constitutional: No weight loss  Skin: No rash or jaundice  HENT: No change in eyesight or hearing  Cardiovascular:No chest pain or arrythmia  Respiratory:No cough or SOB  GI:No nausea, vomiting, diarrhea, constipation  :No dysuria or frequency  Musculoskeletal:No bone or joint pain  Neuro:No sx's of neuropathy  Psych: No complaints    PMH:      Allergies   Allergen Reactions    Grass Pollen(K-O-R-T-Swt Rodrigo) Shortness of Breath    Pet Dander [Cat Hair Extract] Shortness of Breath and Unspecified    Sagebrush Anaphylaxis and Unspecified     sneezing       Past Medical History:   Diagnosis Date    Bowel habit changes     occasional constipation and diarrhea    Breath shortness     Cancer (HCC)     Disorder of thyroid     hypothyroid    Heart murmur     High cholesterol     Hypertension     not on medication    Light chain myeloma (HCC) 2021    Prostate cancer (HCC)     Psychiatric problem     depression    Seizure (HCC)     None in the past 30 years        Past Surgical History:   Procedure Laterality Date    GA DX BONE MARROW ASPIRATIONS Left 6/10/2022    Procedure: ASPIRATION, BONE MARROW;  Surgeon: Malachi Kay M.D.;  Location: SURGERY SAME DAY [facility];  Service: Orthopedics    GA DX BONE MARROW BIOPSIES Left 6/10/2022    Procedure: BIOPSY, BONE MARROW, USING NEEDLE OR TROCAR - DR. DURANT;  Surgeon: Malachi Kay M.D.;  Location: SURGERY SAME DAY [facility];  Service: Orthopedics    BRACHY THERAPY N/A 12/16/2021    Procedure: BRACHYTHERAPY - PROSTATE SEED AND HYDROGEL SPACER;  Surgeon: Kirk Isaac V  M.D.;  Location: SURGERY SAME DAY [facility];  Service: Urology    NH DX BONE MARROW ASPIRATIONS Left 2021    Procedure: ASPIRATION, BONE MARROW - DURANT;  Surgeon: Hair Okeefe M.D.;  Location: ENDOSCOPY [facility]  Service: Orthopedics    NH DX BONE MARROW BIOPSIES Left 2021    Procedure: BIOPSY, BONE MARROW, USING NEEDLE OR TROCAR;  Surgeon: Hair Okeefe M.D.;  Location: ENDOSCOPY [facility]  Service: Orthopedics    WRIST FUSION Left         Medications:    Current Outpatient Medications on File Prior to Visit   Medication Sig Dispense Refill    QUEtiapine (SEROQUEL) 100 MG Tab Take 2 Tablets by mouth every day for 360 days. 180 Tablet 3    escitalopram (LEXAPRO) 20 MG tablet Take 1 Tablet by mouth every day for 360 days. 90 Tablet 3    propranolol (INDERAL) 10 MG Tab Take 1 Tablet by mouth 2 times a day as needed (for increased anxiety/panic attacks) for up to 360 days. 180 Tablet 3    levothyroxine (SYNTHROID) 100 MCG Tab Take 1 Tablet by mouth every morning on an empty stomach. 100 Tablet 3    simvastatin (ZOCOR) 20 MG Tab Take 1 Tablet by mouth every evening. 100 Tablet 3    tamsulosin (FLOMAX) 0.4 MG capsule TAKE 2 CAPSULES BY MOUTH 30 MINUTES AFTER DINNER. TAKE 1 CAPSULE BY MOUTH FOR A WEEK THEN INCREASE TO 2 CAPSULES 180 Capsule 1    valACYclovir (VALTREX) 500 MG Tab TAKE 1 TABLET BY MOUTH 2 TIMES A DAY  DAYS. 200 Tablet 3    Magnesium Hydroxide (MILK OF MAGNESIA PO) Take  by mouth as needed.      VITAMIN D PO Take 8,000 Units by mouth.      Omega-3 Fatty Acids (FISH OIL) 1000 MG Cap capsule Take 1,000 mg by mouth 2 times a day.       No current facility-administered medications on file prior to visit.       Social History     Tobacco Use    Smoking status: Former     Years: 20.00     Types: Cigarettes     Quit date:      Years since quittin.8    Smokeless tobacco: Never   Substance Use Topics    Alcohol use: Never        Family History   Problem Relation Age of Onset     Cancer Neg Hx         Objective    Vitals:    There were no vitals taken for this visit.    Physical Exam:    Appears well-developed and well-nourished. No distress.    Head -  Normocephalic .   Eyes - Pupils are equal. Conjunctivae normal. No scleral icterus.   Ears - normal hearing    Neurological -   Alert and oriented.  Skin - Skin is warm and dry. No rash noted. Not diaphoretic. No erythema. No pallor. No jaundice   Psychiatric -  Normal mood and affect.    Labs:    From [facility] last week:  Kappa/lambda ratio - 0.86    WBC 4.6  H/H 12.6/36.6  Plt 219K  Assessment    Imp:    Visit Diagnosis:    1. Lambda light chain myeloma (HCC)        2. History of stem cell transplant (HCC)        3. Prostate cancer (HCC)          Plan:  Need Sagrario's input on which maintenance to use  2 months with lab prior  Gary Aaron M.D.

## 2022-10-27 NOTE — OP THERAPY DAILY TREATMENT
Outpatient Physical Therapy  DAILY TREATMENT     [facility] Outpatient Physical Therapy  [address]  [place] [address]  Phone:  [phone]  Fax:  [phone]    Date: 10/28/2022    Patient: Miguel Ángel Hedrick  YOB: 1950  MRN: [id number]     Time Calculation                   Chief Complaint: No chief complaint on file.    Visit #: 2    SUBJECTIVE:  ***    OBJECTIVE:  ***          Therapeutic Exercises (CPT 54037):       Therapeutic Exercise Summary:   Next visit   Assess dual tasks   Assess part task desk    Time-based treatments/modalities:           ASSESSMENT:   ***    PLAN/RECOMMENDATIONS:   ***

## 2022-10-28 ENCOUNTER — APPOINTMENT (OUTPATIENT)
Dept: PHYSICAL THERAPY | Facility: MEDICAL CENTER | Age: 72
End: 2022-10-28
Attending: FAMILY MEDICINE
Payer: MEDICARE

## 2022-11-01 ENCOUNTER — HOSPITAL ENCOUNTER (OUTPATIENT)
Dept: HEMATOLOGY ONCOLOGY | Facility: MEDICAL CENTER | Age: 72
End: 2022-11-01
Attending: INTERNAL MEDICINE
Payer: MEDICARE

## 2022-11-01 VITALS
BODY MASS INDEX: 24.61 KG/M2 | RESPIRATION RATE: 18 BRPM | DIASTOLIC BLOOD PRESSURE: 90 MMHG | SYSTOLIC BLOOD PRESSURE: 152 MMHG | WEIGHT: 153.11 LBS | HEIGHT: 66 IN | HEART RATE: 105 BPM | OXYGEN SATURATION: 93 % | TEMPERATURE: 98.4 F

## 2022-11-01 DIAGNOSIS — C90.00 LAMBDA LIGHT CHAIN MYELOMA (HCC): ICD-10-CM

## 2022-11-01 DIAGNOSIS — Z94.84 HISTORY OF STEM CELL TRANSPLANT (HCC): ICD-10-CM

## 2022-11-01 DIAGNOSIS — C61 PROSTATE CANCER (HCC): ICD-10-CM

## 2022-11-01 PROCEDURE — 99214 OFFICE O/P EST MOD 30 MIN: CPT | Performed by: INTERNAL MEDICINE

## 2022-11-01 PROCEDURE — 99212 OFFICE O/P EST SF 10 MIN: CPT | Performed by: INTERNAL MEDICINE

## 2022-11-01 ASSESSMENT — PAIN SCALES - GENERAL: PAINLEVEL: NO PAIN

## 2022-11-01 ASSESSMENT — FIBROSIS 4 INDEX: FIB4 SCORE: 1.24

## 2022-11-02 ENCOUNTER — PHYSICAL THERAPY (OUTPATIENT)
Dept: PHYSICAL THERAPY | Facility: MEDICAL CENTER | Age: 72
End: 2022-11-02
Attending: FAMILY MEDICINE
Payer: MEDICARE

## 2022-11-02 DIAGNOSIS — R53.81 PHYSICAL DECONDITIONING: ICD-10-CM

## 2022-11-02 PROCEDURE — 97110 THERAPEUTIC EXERCISES: CPT

## 2022-11-02 NOTE — OP THERAPY DISCHARGE SUMMARY
Outpatient Physical Therapy  DISCHARGE SUMMARY NOTE      [facility] Outpatient Physical Therapy  [address]  [facility] [address]  Phone:  [phone]  Fax:  [phone]    Date of Visit: 11/02/2022    Patient: Miguel Ángel Hedrick  YOB: 1950  MRN: [id number]     Referring Provider: Elkin Rebolledo M.D.  [address]  [facility] [facility]  [place] [address]   Referring Diagnosis Other malaise [R53.81]     Your patient is being discharged from Physical Therapy with the following comments:   Goals partially met    Comments:  Continue I HEP for coordination drills, reactive balance, and sustained muscular contraction.     Jone Mueller, PT    Date: 11/2/2022

## 2022-11-02 NOTE — OP THERAPY DAILY TREATMENT
Outpatient Physical Therapy  DAILY TREATMENT     [facility] Outpatient Physical Therapy  [address]  [place] [address]  Phone:  [phone]  Fax:  [phone]    Date: 11/02/2022    Patient: Miguel Ángel Hedrick  YOB: 1950  MRN: [id number]     Time Calculation    Start time: 1120  Stop time: 1200 Time Calculation (min): 40 minutes     Chief Complaint: No chief complaint on file.    Visit #: 2    SUBJECTIVE:  Feeling good physically.     OBJECTIVE:  Reactive balance impaired compared to anticipatory (cone tapping)  L trendelenburg with slow motion gait - able to cue out  L>R coordination deficits of UE and LE          Therapeutic Exercises (CPT 40804):       Therapeutic Exercise Summary:   Cone Tapping Drills - 10 minutes  Crossover stepping drills -10 minutes  Slow motion all directions - 10 minutes     Significant education on edyta chi, self progression, and deficits.  Patient very pleasant and demonstrates understanding.     No deficits with dual task activities.  Coordination improved compared to last visit.     Time-based treatments/modalities:    Physical Therapy Timed Treatment Charges  Therapeutic exercise minutes (CPT 86294): 40 minutes      ASSESSMENT:   Mild coordination drills L>R, focused on this for HEP with encouragement to return to gym, with tempo weight lifting to focus on contraction.  Left gluteal squeezes during L stance phase of gait encouraged to correct trendelenburg, patient demonstrates understanding.  Discussed having his wife cue patient with color or direction to train reactive balance, as reactive balance more impaired than anticipatory balance.     PLAN/RECOMMENDATIONS:   D/c to I HEP.

## 2022-11-03 ENCOUNTER — OFFICE VISIT (OUTPATIENT)
Dept: MEDICAL GROUP | Facility: LAB | Age: 72
End: 2022-11-03
Payer: MEDICARE

## 2022-11-03 VITALS
OXYGEN SATURATION: 94 % | TEMPERATURE: 97.6 F | HEART RATE: 105 BPM | DIASTOLIC BLOOD PRESSURE: 70 MMHG | WEIGHT: 155 LBS | HEIGHT: 66 IN | BODY MASS INDEX: 24.91 KG/M2 | RESPIRATION RATE: 14 BRPM | SYSTOLIC BLOOD PRESSURE: 116 MMHG

## 2022-11-03 DIAGNOSIS — C90.00 MULTIPLE MYELOMA NOT HAVING ACHIEVED REMISSION (HCC): ICD-10-CM

## 2022-11-03 DIAGNOSIS — Z23 NEED FOR VACCINATION: ICD-10-CM

## 2022-11-03 DIAGNOSIS — F41.1 GAD (GENERALIZED ANXIETY DISORDER): ICD-10-CM

## 2022-11-03 PROCEDURE — G0008 ADMIN INFLUENZA VIRUS VAC: HCPCS | Performed by: FAMILY MEDICINE

## 2022-11-03 PROCEDURE — 90662 IIV NO PRSV INCREASED AG IM: CPT | Performed by: FAMILY MEDICINE

## 2022-11-03 PROCEDURE — 99213 OFFICE O/P EST LOW 20 MIN: CPT | Mod: 25 | Performed by: FAMILY MEDICINE

## 2022-11-03 ASSESSMENT — ANXIETY QUESTIONNAIRES
1. FEELING NERVOUS, ANXIOUS, OR ON EDGE: NOT AT ALL
7. FEELING AFRAID AS IF SOMETHING AWFUL MIGHT HAPPEN: NOT AT ALL
3. WORRYING TOO MUCH ABOUT DIFFERENT THINGS: NOT AT ALL
6. BECOMING EASILY ANNOYED OR IRRITABLE: SEVERAL DAYS
4. TROUBLE RELAXING: NOT AT ALL
2. NOT BEING ABLE TO STOP OR CONTROL WORRYING: NOT AT ALL
GAD7 TOTAL SCORE: 1
5. BEING SO RESTLESS THAT IT IS HARD TO SIT STILL: NOT AT ALL

## 2022-11-03 ASSESSMENT — ENCOUNTER SYMPTOMS
SHORTNESS OF BREATH: 0
PALPITATIONS: 0
WHEEZING: 0
BLURRED VISION: 0

## 2022-11-03 ASSESSMENT — PATIENT HEALTH QUESTIONNAIRE - PHQ9
4. FEELING TIRED OR HAVING LITTLE ENERGY: NOT AT ALL
3. TROUBLE FALLING OR STAYING ASLEEP OR SLEEPING TOO MUCH: NOT AT ALL
8. MOVING OR SPEAKING SO SLOWLY THAT OTHER PEOPLE COULD HAVE NOTICED. OR THE OPPOSITE, BEING SO FIGETY OR RESTLESS THAT YOU HAVE BEEN MOVING AROUND A LOT MORE THAN USUAL: NOT AT ALL
5. POOR APPETITE OR OVEREATING: NOT AT ALL
1. LITTLE INTEREST OR PLEASURE IN DOING THINGS: NOT AT ALL
SUM OF ALL RESPONSES TO PHQ9 QUESTIONS 1 AND 2: 0
7. TROUBLE CONCENTRATING ON THINGS, SUCH AS READING THE NEWSPAPER OR WATCHING TELEVISION: NOT AT ALL
6. FEELING BAD ABOUT YOURSELF - OR THAT YOU ARE A FAILURE OR HAVE LET YOURSELF OR YOUR FAMILY DOWN: NOT AL ALL
9. THOUGHTS THAT YOU WOULD BE BETTER OFF DEAD, OR OF HURTING YOURSELF: NOT AT ALL
2. FEELING DOWN, DEPRESSED, IRRITABLE, OR HOPELESS: NOT AT ALL
SUM OF ALL RESPONSES TO PHQ QUESTIONS 1-9: 0

## 2022-11-03 ASSESSMENT — FIBROSIS 4 INDEX: FIB4 SCORE: 1.24

## 2022-11-03 NOTE — PROGRESS NOTES
Subjective:   Gary Hedrick is a 72 y.o. male here today for   No chief complaint on file.      #Anxiety:  -Recently started patient on Lexapro 20 mg daily as well as propranolol 10 mg as needed for increased anxiety or panic attacks.  He then added Seroquel at night to help with sleep.  Symptoms anxiety mostly revolve around continue treatment for multiple myeloma.  He states that the Lexapro, even though he is taking it before, has helped significantly as he feels much more calm and stable regarding everything.  The anxiety is much more controlled.  He is no longer taking the propranolol.  He is taking Seroquel, 150 mg at night which has helped significantly with sleep as well.  He is very happy with the progress he is seen.  Denies any insomnia, suicidal/homicidal ideations, hallucinations, delusions, paranoia.    #Health maintenance:  -Due for flu vaccine.    Allergies   Allergen Reactions    Grass Pollen(K-O-R-T-Swt Rodrigo) Shortness of Breath    Pet Dander [Cat Hair Extract] Shortness of Breath and Unspecified    Sagebrush Anaphylaxis and Unspecified     sneezing         Current medicines (including changes today)  Current Outpatient Medications   Medication Sig Dispense Refill    QUEtiapine (SEROQUEL) 100 MG Tab Take 2 Tablets by mouth every day for 360 days. 180 Tablet 3    escitalopram (LEXAPRO) 20 MG tablet Take 1 Tablet by mouth every day for 360 days. 90 Tablet 3    propranolol (INDERAL) 10 MG Tab Take 1 Tablet by mouth 2 times a day as needed (for increased anxiety/panic attacks) for up to 360 days. 180 Tablet 3    levothyroxine (SYNTHROID) 100 MCG Tab Take 1 Tablet by mouth every morning on an empty stomach. 100 Tablet 3    simvastatin (ZOCOR) 20 MG Tab Take 1 Tablet by mouth every evening. 100 Tablet 3    tamsulosin (FLOMAX) 0.4 MG capsule TAKE 2 CAPSULES BY MOUTH 30 MINUTES AFTER DINNER. TAKE 1 CAPSULE BY MOUTH FOR A WEEK THEN INCREASE TO 2 CAPSULES 180 Capsule 1    Magnesium Hydroxide (MILK OF  "MAGNESIA PO) Take  by mouth as needed.      VITAMIN D PO Take 8,000 Units by mouth.      Omega-3 Fatty Acids (FISH OIL) 1000 MG Cap capsule Take 1,000 mg by mouth 2 times a day.       No current facility-administered medications for this visit.     He  has a past medical history of Bowel habit changes, Breath shortness, Cancer (HCC), Disorder of thyroid, Heart murmur, High cholesterol, Hypertension, Light chain myeloma (HCC) (2021), Prostate cancer (HCC), Psychiatric problem, and Seizure (HCC).    ROS   Review of Systems   Constitutional:  Positive for malaise/fatigue.   HENT:  Negative for hearing loss.    Eyes:  Negative for blurred vision.   Respiratory:  Negative for shortness of breath and wheezing.    Cardiovascular:  Negative for chest pain and palpitations.        Objective:     Physical Exam:  /70   Pulse (!) 105   Temp 36.4 °C (97.6 °F) (Temporal)   Resp 14   Ht 1.68 m (5' 6.14\")   Wt 70.3 kg (155 lb)   SpO2 94%  Body mass index is 24.91 kg/m².   Constitutional: Alert, no distress.  Skin: Warm, dry, good turgor, no rashes in visible areas.  Eye: Equal, round and reactive, conjunctiva clear, lids normal.  Respiratory: Unlabored respiratory effort, lungs clear to auscultation, no wheezes, no rhonchi.  Cardiovascular: Normal S1, S2, no murmur, no edema.  Abdomen: Soft, non-tender, no masses, no hepatosplenomegaly.  Psych: Alert and oriented x3, normal affect and mood.    JOSE-7 Questionnaire    Feeling nervous, anxious, or on edge: Not at all  Not being able to sop or control worrying: Not at all  Worrying too much about different things: Not at all  Trouble relaxing: Not at all  Being so restless that it's hard to sit still: Not at all  Becoming easily annoyed or irritable: Several days  Feeling afraid as if something awful might happen: Not at all  Total: 1    Assessment and Plan:     1. JOSE (generalized anxiety disorder)  -Significant improvement has been seen and thus we will continue with " medications  Lexapro and Seroquel.  Encouraged continuation of appropriate lifestyle modifications for age and condition.  Patient will follow-up in 3 months for symptom check.    2. Multiple myeloma not having achieved remission (HCC)  -Reviewed recent notes from oncologist.  Patient is status post stem cell transplant and will need to begin revaccinating very soon.    3. Need for vaccination  -Patient was agreeable to receiving the influenza vaccine in clinic today after discussion of potential risks, benefits, and side effects. Vaccine was administered without adverse effects.  - Influenza Vaccine, High Dose (65+ Only)      Followup: No follow-ups on file.         PLEASE NOTE: This dictation was created using voice recognition software. I have made every reasonable attempt to correct obvious errors, but I expect that there are errors of grammar and possibly content that I did not discover before finalizing the note.

## 2022-11-04 ENCOUNTER — APPOINTMENT (OUTPATIENT)
Dept: PHYSICAL THERAPY | Facility: MEDICAL CENTER | Age: 72
End: 2022-11-04
Attending: FAMILY MEDICINE
Payer: MEDICARE

## 2022-11-09 ENCOUNTER — APPOINTMENT (OUTPATIENT)
Dept: PHYSICAL THERAPY | Facility: MEDICAL CENTER | Age: 72
End: 2022-11-09
Attending: FAMILY MEDICINE
Payer: MEDICARE

## 2022-11-10 DIAGNOSIS — C90.00 LAMBDA LIGHT CHAIN MYELOMA (HCC): Primary | ICD-10-CM

## 2022-11-10 DIAGNOSIS — Z94.84 HISTORY OF STEM CELL TRANSPLANT (HCC): ICD-10-CM

## 2022-11-10 RX ORDER — LENALIDOMIDE 10 MG/1
CAPSULE ORAL
Qty: 21 CAPSULE | Refills: 0 | Status: SHIPPED | OUTPATIENT
Start: 2022-11-10 | End: 2022-12-08 | Stop reason: SDUPTHER

## 2022-11-11 ENCOUNTER — TELEPHONE (OUTPATIENT)
Dept: HEMATOLOGY ONCOLOGY | Facility: MEDICAL CENTER | Age: 72
End: 2022-11-11
Payer: MEDICARE

## 2022-11-11 ENCOUNTER — APPOINTMENT (OUTPATIENT)
Dept: PHYSICAL THERAPY | Facility: MEDICAL CENTER | Age: 72
End: 2022-11-11
Attending: FAMILY MEDICINE
Payer: MEDICARE

## 2022-11-11 NOTE — TELEPHONE ENCOUNTER
Received request for Revlimid, ran test claim and a PA is required.    Submitted PA via [employer] [place] is [id number]

## 2022-11-16 ENCOUNTER — APPOINTMENT (OUTPATIENT)
Dept: PHYSICAL THERAPY | Facility: MEDICAL CENTER | Age: 72
End: 2022-11-16
Attending: FAMILY MEDICINE
Payer: MEDICARE

## 2022-11-16 NOTE — PROGRESS NOTES
Called patient to verify if he set up delivery with the specialty pharmacy for Revlimid and to let him know he needs to get labs done on his week off of oral chemo once a month.  LVM requesting call back to RN at [phone].

## 2022-11-17 ENCOUNTER — TELEPHONE (OUTPATIENT)
Dept: HEMATOLOGY ONCOLOGY | Facility: MEDICAL CENTER | Age: 72
End: 2022-11-17
Payer: MEDICARE

## 2022-11-17 ENCOUNTER — HOSPITAL ENCOUNTER (OUTPATIENT)
Dept: RADIATION ONCOLOGY | Facility: MEDICAL CENTER | Age: 72
End: 2022-11-30
Attending: RADIOLOGY
Payer: MEDICARE

## 2022-11-17 VITALS
BODY MASS INDEX: 25.62 KG/M2 | SYSTOLIC BLOOD PRESSURE: 139 MMHG | DIASTOLIC BLOOD PRESSURE: 76 MMHG | TEMPERATURE: 97.6 F | HEART RATE: 95 BPM | OXYGEN SATURATION: 94 % | WEIGHT: 159.39 LBS

## 2022-11-17 DIAGNOSIS — C61 PROSTATE CANCER (HCC): ICD-10-CM

## 2022-11-17 PROCEDURE — 99213 OFFICE O/P EST LOW 20 MIN: CPT | Performed by: RADIOLOGY

## 2022-11-17 PROCEDURE — 99212 OFFICE O/P EST SF 10 MIN: CPT | Performed by: RADIOLOGY

## 2022-11-17 ASSESSMENT — PAIN SCALES - GENERAL: PAINLEVEL: NO PAIN

## 2022-11-17 ASSESSMENT — FIBROSIS 4 INDEX: FIB4 SCORE: 1.24

## 2022-11-17 NOTE — PROGRESS NOTES
Patient was seen today in clinic with Dr. Isaac for follow up.  Vitals signs and weight were obtained and pain assessment was completed.  Allergies and medications were reviewed with the patient.  Toxicities of treatment assessed.     Vitals/Pain:  Vitals:    11/17/22 1356   BP: 139/76   BP Location: Right arm   Patient Position: Sitting   BP Cuff Size: Adult   Pulse: 95   Temp: 36.4 °C (97.6 °F)   TempSrc: Temporal   SpO2: 94%   Weight: 72.3 kg (159 lb 6.3 oz)   Pain Score: No pain        Allergies:   Grass pollen(k-o-r-t-swt jose), Pet dander [cat hair extract], and Sagebrush    Current Medications:  Current Outpatient Medications   Medication Sig Dispense Refill    Lenalidomide 10 MG Cap Take 10 mg (1 cap) by mouth on Days 1 - 21 of each 28-day cycle 21 Capsule 0    QUEtiapine (SEROQUEL) 100 MG Tab Take 2 Tablets by mouth every day for 360 days. 180 Tablet 3    escitalopram (LEXAPRO) 20 MG tablet Take 1 Tablet by mouth every day for 360 days. 90 Tablet 3    levothyroxine (SYNTHROID) 100 MCG Tab Take 1 Tablet by mouth every morning on an empty stomach. 100 Tablet 3    simvastatin (ZOCOR) 20 MG Tab Take 1 Tablet by mouth every evening. 100 Tablet 3    tamsulosin (FLOMAX) 0.4 MG capsule TAKE 2 CAPSULES BY MOUTH 30 MINUTES AFTER DINNER. TAKE 1 CAPSULE BY MOUTH FOR A WEEK THEN INCREASE TO 2 CAPSULES 180 Capsule 1    Magnesium Hydroxide (MILK OF MAGNESIA PO) Take  by mouth as needed.      VITAMIN D PO Take 8,000 Units by mouth.      Omega-3 Fatty Acids (FISH OIL) 1000 MG Cap capsule Take 1,000 mg by mouth 2 times a day.       No current facility-administered medications for this encounter.           Latasha Sharpe, Med Ass't

## 2022-11-17 NOTE — PROGRESS NOTES
RADIATION ONCOLOGY FOLLOW-UP    Patient name:  Gary Hedrick    Primary Physician:  Elkin Rebolledo M.D. MRN: [id number]  CSN: [id number]   Referring physician:  No ref. provider found   : 1950, 72 y.o.     DATE OF SERVICE: 2022    IDENTIFICATION:   A 72 y.o. male with   Visit Diagnoses     ICD-10-CM   1. Prostate cancer (HCC)  C61     Prostate cancer (HCC)  Staging form: Prostate, AJCC 8th Edition  - Clinical stage from 10/8/2021: Stage IIB (cT1c, cN0, cM0, PSA: 4.1, Grade Group: 2) - Signed by Kirk DELANEY M.D. on 10/8/2021  Histopathologic type: Adenocarcinoma, NOS  Stage prefix: Initial diagnosis  Prostate specific antigen (PSA) range: Less than 10  Sarah primary pattern: 3  [place] secondary pattern: 4  Sarah score: 7  Histologic grading system: 5 grade system  Bilateral cancer: Yes  Number of biopsy cores examined: 12  Number of biopsy cores positive: 6  Location of positive needle core biopsies: Both sides      RADIATION SUMMARY:  Radiation Therapy Episodes       Active Episodes       Radiation Therapy: Brachytherapy (2021)                   Radiation Treatments       Historical Treatments (Plans: 2)      Plan Last Treated On Elapsed Days Fractions Treated Prescribed Fraction Dose (cGy) Prescribed Total Dose (cGy)   Pelvis 3/17/2022 38 @ [id number] 28 of 28 180 5,040   LDR tracking 2021 0 @ [id number] 1 of 1 9,500 9,500                Reference Point Last Treated On Elapsed Days Most Recent Session Dose (cGy) Total Dose (cGy)   Pelvis 3/17/2022 38 @ [id number] -- 5,040   Pelvis CP 3/17/2022 38 @ [id number] -- 5,293   LDR tracking 2021 0 @ [id number] -- 9,500                            HISTORY OF PRESENT ILLNESS:   Subjective    72 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  He is presently on RVD having completed 3 cycles.  He also has a past medical  history significant for hypogonadism and has been on testosterone during replacement therapy until recently.     He states his PSAs have fluctuated in the 1-2 range.  More recently PSA was noted to be 4.10.  He underwent an MRI on 6/24/2021.  MRI demonstrated 57 cc gland without suspicious lesion.     He was referred by his primary care physician for urologic work-up.  He saw Dr. Lazar and underwent transrectal ultrasound directed biopsies of the prostate on 7/15/2021.  Volume was noted to be 52 cc.  12 core biopsy prostate obtained demonstrating Sarah 3+4 disease involving the right base and right base lateral.  Rohwer 3+3 disease noted right mid lateral right apex lateral left mid and left mid lateral.  In all 6 out of 12 cores were involved with prostate cancer.     June 2021 he was evaluated for macrocytosis without anemia.  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  Bone marrow biopsy demonstrates 66 to 70% monoclonal plasma cells.  PET/CT, FDG, 8/19/2021 showed no evidence of metastatic disease.  RVD started and completed x3.     Current complaints mild fatigue.  He does report urinary frequency, intermittency, weak stream, incomplete emptying and nocturia 1-2 times per night.  Current IPSS score 17-18.  Quality-of-life score 3.     12/02/21  Preop visit for upcoming cesium 131 low-dose rate seed implant.  He has been on Orgovyx and Flomax.  Reports significant improvement in his urinary function.  His IPSS score is decreased 6 from 18.  Quality-of-life score is 2.  Is experiencing some hot flashes.  But overall feels good.  Also continues on his myeloma therapy.     01/13/22  He is currently approximately 30 days post cesium 131 seed implant.  Is experiencing urinary symptoms as expected.  IPSS score 24 with a quality score 4.  He is on Flomax 0.8 mg daily.  On Orgovyx 120 mg daily.    5/19/2022.  Scheduled follow-up visit.  Overall doing well from  "prostate perspective.  PSA is undetectable.  IPSS score 9 with a quality score 1.  He is completed 6 months of androgen deprivation therapy in April and has discontinued Orgovyx.     With respect to myeloma he is seeing Dr. Aaron as well as Dr. Stone at [facility].  Patient reports the change in his meds and this is resulted in worsening shortness of breath.  He will be discussing with Dr. Aaron.      INTERVAL HISTORY:  11/17/2022.  Scheduled follow-up visit.  Doing very well with respect to his prostate cancer.  No significant urinary complaints.  On tamsulosin 8 mg daily.  PSA is undetectable done October 25, 2022.    He states undergoing stem cell transplant [facility].  He describes the procedure as well as recovery as \"rough\"' states having severe rigors post stem cell transplant.  The rigors appear to be controlled with Seroquel.  He states that he is finally getting better and getting closer to normal.  He is even thinking about going back to the gym.     CURRENT MEDICATIONS:  Current Outpatient Medications   Medication Sig Dispense Refill    Lenalidomide 10 MG Cap Take 10 mg (1 cap) by mouth on Days 1 - 21 of each 28-day cycle 21 Capsule 0    QUEtiapine (SEROQUEL) 100 MG Tab Take 2 Tablets by mouth every day for 360 days. 180 Tablet 3    escitalopram (LEXAPRO) 20 MG tablet Take 1 Tablet by mouth every day for 360 days. 90 Tablet 3    levothyroxine (SYNTHROID) 100 MCG Tab Take 1 Tablet by mouth every morning on an empty stomach. 100 Tablet 3    simvastatin (ZOCOR) 20 MG Tab Take 1 Tablet by mouth every evening. 100 Tablet 3    tamsulosin (FLOMAX) 0.4 MG capsule TAKE 2 CAPSULES BY MOUTH 30 MINUTES AFTER DINNER. TAKE 1 CAPSULE BY MOUTH FOR A WEEK THEN INCREASE TO 2 CAPSULES 180 Capsule 1    Magnesium Hydroxide (MILK OF MAGNESIA PO) Take  by mouth as needed.      VITAMIN D PO Take 8,000 Units by mouth.      Omega-3 Fatty Acids (FISH OIL) 1000 MG Cap capsule Take 1,000 mg by mouth 2 times a day.       No current " facility-administered medications for this encounter.       ALLERGIES:  Grass pollen(k-o-r-t-swt jose), Pet dander [cat hair extract], and Sagebrush    PHYSICAL EXAM:   ECOG PERFORMANCE STATUS:       View : No data to display.              /76 (BP Location: Right arm, Patient Position: Sitting, BP Cuff Size: Adult)   Pulse 95   Temp 36.4 °C (97.6 °F) (Temporal)   Wt 72.3 kg (159 lb 6.3 oz)   SpO2 94%   BMI 25.62 kg/m²   Physical Exam  Vitals and nursing note reviewed.   Constitutional:       General: He is not in acute distress.     Appearance: He is well-developed.   HENT:      Head: Normocephalic.   Skin:     General: Skin is warm and dry.      Findings: No erythema.   Neurological:      Mental Status: He is alert and oriented to person, place, and time.   Psychiatric:         Behavior: Behavior normal.         Thought Content: Thought content normal.         Judgment: Judgment normal.         INTERNATIONAL PROSTATE SYMPTOM SCORE (I-PSS):      2/23/2022     9:24 AM 2/24/2022     7:59 AM 3/2/2022     8:29 AM 3/9/2022     8:23 AM 3/16/2022     7:55 AM 5/19/2022     9:02 AM 11/17/2022     1:58 PM   I-PSS Review   Incomplete Emptying- How often have you had the sensation of not emptying your bladder? 3 3 3 3 0 1 2   How often have you had to urinate less than every tow hours? 2 2 3 2 0 2 1   Intermittency- How often have you found you stopped and started again several times when you urinated? 3 3 3 3 0 2 1   Urgency- How often have you found it difficult to postpone urination? 2 2 2 2 0 1 1   Weak Stream- How often have you had a weak urinary stream? 2 2 4 4 0 2 2   Straining- How often have you had to strain to start urination? 1 1 2 3 0 0 1   Nocturia- How many times did you typically get up at night to urinate? 2 2 3 3 0 1 2   Score: 15 15 20 20 0 9 10       QUALITY OF LIFE DUE TO URINARY SYMPTOMS:   If you were to spend the rest of your life with your urinary condition just the way it is now, how would  you feel about that? 2  = Mostly satisfied    SEXUAL HEALTH INVENTORY FOR MEN (EULALIO):       12/2/2021     9:11 AM 1/13/2022     2:47 PM 2/24/2022     7:59 AM 3/2/2022     8:29 AM 3/9/2022     8:24 AM 5/19/2022     9:03 AM 11/17/2022     1:58 PM   EULALIO Total   How do you rate your confidence that you could get and keep an erection? 0 0 0 0 0 0 0   When you had erections with sexual stimulation, how often were your erections hard enough for penetration (entering your partner)? 0 0 0 0 0 0 0   During sexual intercourse, how often were you able to maintain you erection after you had penetrated (entered) your partner? 0 0 0 0 0 0 0   During Sexual intercourse, how difficult was it to maintain your erection to completion of intercourse? 0 0 0 0 0 0 0   When you attampted sexual intercourse, how often was it satisfactory for you? 0 0 0 0 0 0 0   Total: 0 0 0 0 0 0 0       LABORATORY DATA:   Lab Results   Component Value Date/Time    WBC 5.3 09/21/2022 11:38 AM    RBC 3.40 (L) 09/21/2022 11:38 AM    HEMOGLOBIN 12.6 (L) 09/21/2022 11:38 AM    HEMATOCRIT 36.5 (L) 09/21/2022 11:38 AM    .4 (H) 09/21/2022 11:38 AM    MCH 37.1 (H) 09/21/2022 11:38 AM    MCHC 34.5 09/21/2022 11:38 AM    RDW 65.1 (H) 09/21/2022 11:38 AM    PLATELETCT 233 09/21/2022 11:38 AM    MPV 9.4 09/21/2022 11:38 AM    NEUTSPOLYS 70.20 09/21/2022 11:38 AM    LYMPHOCYTES 21.90 (L) 09/21/2022 11:38 AM    MONOCYTES 7.00 09/21/2022 11:38 AM    EOSINOPHILS 0.00 09/21/2022 11:38 AM    BASOPHILS 0.90 09/21/2022 11:38 AM    ANISOCYTOSIS 2+ (A) 09/21/2022 11:38 AM      Lab Results   Component Value Date/Time    SODIUM 142 09/21/2022 11:38 AM    POTASSIUM 3.7 09/21/2022 11:38 AM    CHLORIDE 104 09/21/2022 11:38 AM    CO2 22 09/21/2022 11:38 AM    GLUCOSE 117 (H) 09/21/2022 11:38 AM    BUN 14 09/21/2022 11:38 AM    CREATININE 0.99 09/21/2022 11:38 AM       Lab Results   Component Value Date/Time    PSATOTAL <0.02 05/12/2022 10:02 AM    PSATOTAL 4.10 (H)  04/12/2021 08:04 AM    PSATOTAL 4.10 (H) 04/12/2021 08:01 AM       RADIOLOGY DATA:  No results found.    IMPRESSION:    A 72 y.o. with   Prostate cancer (HCC)  Staging form: Prostate, AJCC 8th Edition  - Clinical stage from 10/8/2021: Stage IIB (cT1c, cN0, cM0, PSA: 4.1, Grade Group: 2) - Signed by Kirk DELANEY M.D. on 10/8/2021  Histopathologic type: Adenocarcinoma, NOS  Stage prefix: Initial diagnosis  Prostate specific antigen (PSA) range: Less than 10  [place] primary pattern: 3  Sarah secondary pattern: 4  Sarah score: 7  Histologic grading system: 5 grade system  Bilateral cancer: Yes  Number of biopsy cores examined: 12  Number of biopsy cores positive: 6  Location of positive needle core biopsies: Both sides        CANCER STATUS:  No Evidence of Disease    RECOMMENDATIONS:   Reviewed PSA data with him.  Reassured him.  Did recommend PSAs every 6 months.  Deferred myeloma follow-ups to Dr. Aaron.  At this point his PSAs can be monitored by primary care.  We will see him back in the department on an as-needed basis especially if there is a rising PSA.      Thank you for the opportunity to participate in his care.  If any questions or comments, please do not hesitate in calling.    Orders Placed This Encounter    PROSTATE SPECIFIC AG DIAGNOSTIC

## 2022-11-17 NOTE — TELEPHONE ENCOUNTER
PA has been approved from 11/11/2022 to 11/10/2023. Sent order to [id number].    Contacted [phone] via email, patient has scheduled delivery for 11/18/22.

## 2022-11-22 ENCOUNTER — TELEPHONE (OUTPATIENT)
Dept: HEMATOLOGY ONCOLOGY | Facility: MEDICAL CENTER | Age: 72
End: 2022-11-22
Payer: MEDICARE

## 2022-11-22 NOTE — TELEPHONE ENCOUNTER
Called patient to confirm if he started taking oral chemo, Revlimid and to educate patient that he should get labs on his week off of chemo and take baby aspirin 81mg daily per Dr. Aaron.  M requesting call back to RN at [phone].

## 2022-11-23 NOTE — TELEPHONE ENCOUNTER
Spoke with patient's spouse Beatrice, Miguel Ángel has received his medication and started taking on Sunday 11/20. Communicated Anita's note for patient to have labs done on off week and to take with baby aspirin 81mg, Beatrice confirmed that they were aware of this.

## 2022-11-29 ENCOUNTER — TELEPHONE (OUTPATIENT)
Dept: HEMATOLOGY ONCOLOGY | Facility: MEDICAL CENTER | Age: 72
End: 2022-11-29
Payer: MEDICARE

## 2022-11-29 DIAGNOSIS — C90.00 MULTIPLE MYELOMA NOT HAVING ACHIEVED REMISSION (HCC): ICD-10-CM

## 2022-12-08 ENCOUNTER — TELEPHONE (OUTPATIENT)
Dept: HEMATOLOGY ONCOLOGY | Facility: MEDICAL CENTER | Age: 72
End: 2022-12-08
Payer: MEDICARE

## 2022-12-08 DIAGNOSIS — Z94.84 HISTORY OF STEM CELL TRANSPLANT (HCC): ICD-10-CM

## 2022-12-08 DIAGNOSIS — C90.00 LAMBDA LIGHT CHAIN MYELOMA (HCC): ICD-10-CM

## 2022-12-08 RX ORDER — LENALIDOMIDE 10 MG/1
CAPSULE ORAL
Qty: 21 CAPSULE | Refills: 0 | Status: SHIPPED | OUTPATIENT
Start: 2022-12-08 | End: 2023-01-06 | Stop reason: SDUPTHER

## 2022-12-08 NOTE — TELEPHONE ENCOUNTER
Oral Chemo Compliance review for lenalidomide (Revlimid)    Current dose:  10 mg PO on Days 1 - 21 of each 28-day cycle

## 2022-12-21 ENCOUNTER — HOSPITAL ENCOUNTER (OUTPATIENT)
Dept: LAB | Facility: MEDICAL CENTER | Age: 72
End: 2022-12-21
Attending: INTERNAL MEDICINE
Payer: MEDICARE

## 2022-12-21 DIAGNOSIS — C90.00 MULTIPLE MYELOMA NOT HAVING ACHIEVED REMISSION (HCC): ICD-10-CM

## 2022-12-21 LAB
BASOPHILS # BLD AUTO: 2.5 % (ref 0–1.8)
BASOPHILS # BLD: 0.07 K/UL (ref 0–0.12)
EOSINOPHIL # BLD AUTO: 0.08 K/UL (ref 0–0.51)
EOSINOPHIL NFR BLD: 2.9 % (ref 0–6.9)
ERYTHROCYTE [DISTWIDTH] IN BLOOD BY AUTOMATED COUNT: 49.4 FL (ref 35.9–50)
HCT VFR BLD AUTO: 40.1 % (ref 42–52)
HGB BLD-MCNC: 13.5 G/DL (ref 14–18)
IMM GRANULOCYTES # BLD AUTO: 0.01 K/UL (ref 0–0.11)
IMM GRANULOCYTES NFR BLD AUTO: 0.4 % (ref 0–0.9)
LYMPHOCYTES # BLD AUTO: 1.01 K/UL (ref 1–4.8)
LYMPHOCYTES NFR BLD: 36.5 % (ref 22–41)
MCH RBC QN AUTO: 36.2 PG (ref 27–33)
MCHC RBC AUTO-ENTMCNC: 33.7 G/DL (ref 33.7–35.3)
MCV RBC AUTO: 107.5 FL (ref 81.4–97.8)
MONOCYTES # BLD AUTO: 0.44 K/UL (ref 0–0.85)
MONOCYTES NFR BLD AUTO: 15.9 % (ref 0–13.4)
NEUTROPHILS # BLD AUTO: 1.16 K/UL (ref 1.82–7.42)
NEUTROPHILS NFR BLD: 41.8 % (ref 44–72)
NRBC # BLD AUTO: 0 K/UL
NRBC BLD-RTO: 0 /100 WBC
PLATELET # BLD AUTO: 235 K/UL (ref 164–446)
PMV BLD AUTO: 9.2 FL (ref 9–12.9)
RBC # BLD AUTO: 3.73 M/UL (ref 4.7–6.1)
WBC # BLD AUTO: 2.8 K/UL (ref 4.8–10.8)

## 2022-12-21 PROCEDURE — 84155 ASSAY OF PROTEIN SERUM: CPT

## 2022-12-21 PROCEDURE — 86334 IMMUNOFIX E-PHORESIS SERUM: CPT

## 2022-12-21 PROCEDURE — 82784 ASSAY IGA/IGD/IGG/IGM EACH: CPT

## 2022-12-21 PROCEDURE — 85025 COMPLETE CBC W/AUTO DIFF WBC: CPT

## 2022-12-21 PROCEDURE — 84165 PROTEIN E-PHORESIS SERUM: CPT

## 2022-12-21 PROCEDURE — 36415 COLL VENOUS BLD VENIPUNCTURE: CPT

## 2022-12-21 PROCEDURE — 83521 IG LIGHT CHAINS FREE EACH: CPT

## 2022-12-22 ENCOUNTER — DOCUMENTATION (OUTPATIENT)
Dept: HEALTH INFORMATION MANAGEMENT | Facility: OTHER | Age: 72
End: 2022-12-22
Payer: MEDICARE

## 2022-12-24 LAB
ALBUMIN SERPL ELPH-MCNC: 3.88 G/DL (ref 3.75–5.01)
ALPHA1 GLOB SERPL ELPH-MCNC: 0.29 G/DL (ref 0.19–0.46)
ALPHA2 GLOB SERPL ELPH-MCNC: 0.81 G/DL (ref 0.48–1.05)
B-GLOBULIN SERPL ELPH-MCNC: 0.67 G/DL (ref 0.48–1.1)
EER MONOCLONAL PROTEIN AND FLC, SERUM Q5224: ABNORMAL
GAMMA GLOB SERPL ELPH-MCNC: 0.35 G/DL (ref 0.62–1.51)
IGA SERPL-MCNC: 6 MG/DL (ref 68–408)
IGG SERPL-MCNC: 362 MG/DL (ref 768–1632)
IGM SERPL-MCNC: 26 MG/DL (ref 35–263)
INTERPRETATION SERPL IFE-IMP: ABNORMAL
INTERPRETATION SERPL IFE-IMP: ABNORMAL
KAPPA LC FREE SER-MCNC: 5.77 MG/L (ref 3.3–19.4)
KAPPA LC FREE/LAMBDA FREE SER NEPH: 0.96 {RATIO} (ref 0.26–1.65)
LAMBDA LC FREE SERPL-MCNC: 6.03 MG/L (ref 5.71–26.3)
MONOCLONAL PROTEIN NL11656: ABNORMAL G/DL
PROT SERPL-MCNC: 6 G/DL (ref 6.3–8.2)

## 2022-12-28 NOTE — PROGRESS NOTES
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 Also h/o prostate cancer s/p XRT. On Revlimid  3 weeks on, one off. OPn ASA. On an anti viral thru [facility] but does not remember the name. Dr. Ruvalcaba looking into acclamations.       ROS:    Constitutional: No weight loss  Skin: No rash or jaundice  HENT: No change in eyesight or hearing  Cardiovascular:No chest pain or arrythmia  Respiratory:No cough or SOB  GI:No nausea, vomiting, diarrhea, constipation  :No dysuria or frequency  Musculoskeletal:No bone or joint pain  Neuro:No sx's of neuropathy  Psych: No complaints    PMH:      Allergies   Allergen Reactions    Grass Pollen(K-O-R-T-Swt Rodrigo) Shortness of Breath    Pet Dander [Cat Hair Extract] Shortness of Breath and Unspecified    Sagebrush Anaphylaxis and Unspecified     sneezing       Past Medical History:   Diagnosis Date    Bowel habit changes     occasional constipation and diarrhea    Breath shortness     Cancer (HCC)     Disorder of thyroid     hypothyroid    Heart murmur     High cholesterol     Hypertension     not on medication    Light chain myeloma (HCC) 2021    Prostate cancer (HCC)     Psychiatric problem     depression    Seizure (HCC)     None in the past 30 years        Past Surgical History:   Procedure Laterality Date    MI DX BONE MARROW ASPIRATIONS Left 6/10/2022    Procedure: ASPIRATION, BONE MARROW;  Surgeon: Malachi Kay M.D.;  Location: SURGERY SAME DAY [facility];  Service: Orthopedics    MI DX BONE MARROW BIOPSIES Left 6/10/2022    Procedure: BIOPSY, BONE MARROW, USING NEEDLE OR TROCAR - DR. DURANT;  Surgeon: Malachi Kay M.D.;  Location: SURGERY SAME DAY [facility];  Service: Orthopedics    BRACHY THERAPY N/A 12/16/2021    Procedure:  BRACHYTHERAPY - PROSTATE SEED AND HYDROGEL SPACER;  Surgeon: Kirk DELANEY M.D.;  Location: SURGERY SAME DAY [facility];  Service: Urology    SD DX BONE MARROW ASPIRATIONS Left 2021    Procedure: ASPIRATION, BONE MARROW - DURANT;  Surgeon: Hair Okeefe M.D.;  Location: ENDOSCOPY [facility];  Service: Orthopedics    SD DX BONE MARROW BIOPSIES Left 2021    Procedure: BIOPSY, BONE MARROW, USING NEEDLE OR TROCAR;  Surgeon: Hair Okeefe M.D.;  Location: ENDOSCOPY [facility];  Service: Orthopedics    WRIST FUSION Left         Medications:    Current Outpatient Medications on File Prior to Encounter   Medication Sig Dispense Refill    Lenalidomide 10 MG Cap Take 10 mg (1 cap) by mouth on Days 1 - 21 of each 28-day cycle 21 Capsule 0    QUEtiapine (SEROQUEL) 100 MG Tab Take 2 Tablets by mouth every day for 360 days. 180 Tablet 3    escitalopram (LEXAPRO) 20 MG tablet Take 1 Tablet by mouth every day for 360 days. 90 Tablet 3    levothyroxine (SYNTHROID) 100 MCG Tab Take 1 Tablet by mouth every morning on an empty stomach. 100 Tablet 3    simvastatin (ZOCOR) 20 MG Tab Take 1 Tablet by mouth every evening. 100 Tablet 3    tamsulosin (FLOMAX) 0.4 MG capsule TAKE 2 CAPSULES BY MOUTH 30 MINUTES AFTER DINNER. TAKE 1 CAPSULE BY MOUTH FOR A WEEK THEN INCREASE TO 2 CAPSULES 180 Capsule 1    Magnesium Hydroxide (MILK OF MAGNESIA PO) Take  by mouth as needed.      VITAMIN D PO Take 8,000 Units by mouth.      Omega-3 Fatty Acids (FISH OIL) 1000 MG Cap capsule Take 1,000 mg by mouth 2 times a day. (Patient not taking: Reported on 1/3/2023)       No current facility-administered medications on file prior to encounter.       Social History     Tobacco Use    Smoking status: Former     Years: 20.00     Types: Cigarettes     Quit date:      Years since quittin.0    Smokeless tobacco: Never   Substance Use Topics    Alcohol use: Never        Family History   Problem Relation Age of Onset    Cancer Neg Hx      "    Objective    Vitals:    BP (!) 148/80 (BP Location: Left arm, Patient Position: Sitting, BP Cuff Size: Adult)   Pulse 85   Temp 36 °C (96.8 °F) (Temporal)   Resp 18   Ht 1.68 m (5' 6.14\")   Wt 78 kg (171 lb 13.6 oz)   SpO2 96%   BMI 27.62 kg/m²     Physical Exam:    Appears well-developed and well-nourished. No distress.    Head -  Normocephalic .   Eyes - Pupils are equal. Conjunctivae normal. No scleral icterus.   Ears - normal hearing  Skin - Skin is warm and dry. No rash noted. Not diaphoretic. No erythema. No pallor. No jaundice   Psychiatric -  Normal mood and affect.    Labs:     Latest Reference Range & Units 09/21/22 11:38 12/21/22 09:35   WBC 4.8 - 10.8 K/uL 5.3 2.8 (L)   RBC 4.70 - 6.10 M/uL 3.40 (L) 3.73 (L)   Hemoglobin 14.0 - 18.0 g/dL 12.6 (L) 13.5 (L)   Hematocrit 42.0 - 52.0 % 36.5 (L) 40.1 (L)   MCV 81.4 - 97.8 fL 107.4 (H) 107.5 (H)   MCH 27.0 - 33.0 pg 37.1 (H) 36.2 (H)   MCHC 33.7 - 35.3 g/dL 34.5 33.7   RDW 35.9 - 50.0 fL 65.1 (H) 49.4   Platelet Count 164 - 446 K/uL 233 235   MPV 9.0 - 12.9 fL 9.4 9.2   Neutrophils-Polys 44.00 - 72.00 % 70.20 41.80 (L)   Neutrophils (Absolute) 1.82 - 7.42 K/uL 3.72 1.16 (L)   Lymphocytes 22.00 - 41.00 % 21.90 (L) 36.50   Lymphs (Absolute) 1.00 - 4.80 K/uL 1.16 1.01   Monocytes 0.00 - 13.40 % 7.00 15.90 (H)      Latest Reference Range & Units 09/21/22 11:38 12/21/22 09:35   Immunoglobulin A 68 - 408 mg/dL 21 (L) 6 (L)   Immunoglobulin G 768 - 1632 mg/dL 374 (L) 362 (L)   Immunoglobulin M 35 - 263 mg/dL 32 (L) 26 (L)      [facility] Reference Range & Units 09/21/22 11:38 12/21/22 09:35   Free Kappa Light Chains 3.30 - 19.40 mg/L 5.55 5.77   Free Lambda Light Chains 5.71 - 26.30 mg/L 2.80 (L) 6.03   Kappa-Lambda Ratio 0.26 - 1.65  1.98 (H) 0.96     Assessment    Imp:    Visit Diagnosis:    1. Lambda light chain myeloma (HCC)  CBC WITH DIFFERENTIAL      2. History of stem cell transplant (HCC)  CBC WITH DIFFERENTIAL      3. Prostate cancer (HCC)        4. " Encounter for long-term current use of high risk medication          Plan:  Return in 2 months with CBC prior    Gary Aaron M.D.

## 2023-01-03 ENCOUNTER — HOSPITAL ENCOUNTER (OUTPATIENT)
Dept: HEMATOLOGY ONCOLOGY | Facility: MEDICAL CENTER | Age: 73
End: 2023-01-03
Attending: INTERNAL MEDICINE
Payer: MEDICARE

## 2023-01-03 VITALS
SYSTOLIC BLOOD PRESSURE: 148 MMHG | RESPIRATION RATE: 18 BRPM | DIASTOLIC BLOOD PRESSURE: 80 MMHG | HEIGHT: 66 IN | HEART RATE: 85 BPM | BODY MASS INDEX: 27.62 KG/M2 | WEIGHT: 171.85 LBS | TEMPERATURE: 96.8 F | OXYGEN SATURATION: 96 %

## 2023-01-03 DIAGNOSIS — C61 PROSTATE CANCER (HCC): ICD-10-CM

## 2023-01-03 DIAGNOSIS — C90.00 LAMBDA LIGHT CHAIN MYELOMA (HCC): ICD-10-CM

## 2023-01-03 DIAGNOSIS — Z94.84 HISTORY OF STEM CELL TRANSPLANT (HCC): ICD-10-CM

## 2023-01-03 DIAGNOSIS — Z79.899 ENCOUNTER FOR LONG-TERM CURRENT USE OF HIGH RISK MEDICATION: ICD-10-CM

## 2023-01-03 PROCEDURE — 99214 OFFICE O/P EST MOD 30 MIN: CPT | Performed by: INTERNAL MEDICINE

## 2023-01-03 PROCEDURE — 99212 OFFICE O/P EST SF 10 MIN: CPT | Performed by: INTERNAL MEDICINE

## 2023-01-03 ASSESSMENT — FIBROSIS 4 INDEX: FIB4 SCORE: 1.23

## 2023-01-03 ASSESSMENT — PAIN SCALES - GENERAL: PAINLEVEL: NO PAIN

## 2023-01-06 ENCOUNTER — TELEPHONE (OUTPATIENT)
Dept: HEMATOLOGY ONCOLOGY | Facility: MEDICAL CENTER | Age: 73
End: 2023-01-06
Payer: MEDICARE

## 2023-01-06 DIAGNOSIS — C90.00 LAMBDA LIGHT CHAIN MYELOMA (HCC): ICD-10-CM

## 2023-01-06 DIAGNOSIS — Z94.84 HISTORY OF STEM CELL TRANSPLANT (HCC): ICD-10-CM

## 2023-01-06 RX ORDER — LENALIDOMIDE 10 MG/1
CAPSULE ORAL
Qty: 21 CAPSULE | Refills: 0 | Status: SHIPPED | OUTPATIENT
Start: 2023-01-06 | End: 2023-02-06

## 2023-01-06 NOTE — TELEPHONE ENCOUNTER
Oral chemo compliance review for lenalidomide (Revlimid)     Current dose: 10mg on days 1-21 of each 28 day cycle    Auth # [id number] obtained 1/6/2023.  Rx refill sent to [facility]

## 2023-01-25 ENCOUNTER — TELEPHONE (OUTPATIENT)
Dept: MEDICAL GROUP | Facility: LAB | Age: 73
End: 2023-01-25
Payer: MEDICARE

## 2023-01-25 NOTE — TELEPHONE ENCOUNTER
ESTABLISHED PATIENT PRE-VISIT PLANNING     Patient was NOT contacted to complete PVP.     Note: Patient will not be contacted if there is no indication to call.     1.  Reviewed notes from the last few office visits within the medical group: Yes    2.  If any orders were placed at last visit or intended to be done for this visit (i.e. 6 mos follow-up), do we have Results/Consult Notes?           Labs - Labs were not ordered at last office visit.  Note: If patient appointment is for lab review and patient did not complete labs, check with provider if OK to reschedule patient until labs completed.         Imaging - Imaging was not ordered at last office visit.         Referrals - No referrals were ordered at last office visit.    3. Is this appointment scheduled as a Hospital Follow-Up? No    4.  Immunizations were updated in Epic using Reconcile Outside Information activity? Yes    5.  Patient is due for the following Health Maintenance Topics:   Health Maintenance Due   Topic Date Due    Annual Wellness Visit  Never done    IMM ZOSTER VACCINES (1 of 2) Never done    IMM DTaP/Tdap/Td Vaccine (1 - Tdap) 04/04/2004    COVID-19 Vaccine (5 - Booster for Moderna series) 08/11/2022     6.  AHA (Pulse8) form printed for Provider? N/A

## 2023-02-01 ENCOUNTER — HOSPITAL ENCOUNTER (OUTPATIENT)
Dept: LAB | Facility: MEDICAL CENTER | Age: 73
End: 2023-02-01
Attending: INTERNAL MEDICINE
Payer: MEDICARE

## 2023-02-01 DIAGNOSIS — Z94.84 HISTORY OF STEM CELL TRANSPLANT (HCC): ICD-10-CM

## 2023-02-01 DIAGNOSIS — C90.00 MULTIPLE MYELOMA NOT HAVING ACHIEVED REMISSION (HCC): ICD-10-CM

## 2023-02-01 DIAGNOSIS — C90.00 LAMBDA LIGHT CHAIN MYELOMA (HCC): ICD-10-CM

## 2023-02-01 LAB
BASOPHILS # BLD AUTO: 0.6 % (ref 0–1.8)
BASOPHILS # BLD: 0.02 K/UL (ref 0–0.12)
EOSINOPHIL # BLD AUTO: 0.16 K/UL (ref 0–0.51)
EOSINOPHIL NFR BLD: 5.2 % (ref 0–6.9)
ERYTHROCYTE [DISTWIDTH] IN BLOOD BY AUTOMATED COUNT: 51.7 FL (ref 35.9–50)
HCT VFR BLD AUTO: 37.7 % (ref 42–52)
HGB BLD-MCNC: 12.9 G/DL (ref 14–18)
IMM GRANULOCYTES # BLD AUTO: 0.01 K/UL (ref 0–0.11)
IMM GRANULOCYTES NFR BLD AUTO: 0.3 % (ref 0–0.9)
LYMPHOCYTES # BLD AUTO: 0.77 K/UL (ref 1–4.8)
LYMPHOCYTES NFR BLD: 24.9 % (ref 22–41)
MCH RBC QN AUTO: 35.1 PG (ref 27–33)
MCHC RBC AUTO-ENTMCNC: 34.2 G/DL (ref 33.7–35.3)
MCV RBC AUTO: 102.4 FL (ref 81.4–97.8)
MONOCYTES # BLD AUTO: 0.47 K/UL (ref 0–0.85)
MONOCYTES NFR BLD AUTO: 15.2 % (ref 0–13.4)
NEUTROPHILS # BLD AUTO: 1.66 K/UL (ref 1.82–7.42)
NEUTROPHILS NFR BLD: 53.8 % (ref 44–72)
NRBC # BLD AUTO: 0 K/UL
NRBC BLD-RTO: 0 /100 WBC
PLATELET # BLD AUTO: 132 K/UL (ref 164–446)
PMV BLD AUTO: 10.2 FL (ref 9–12.9)
RBC # BLD AUTO: 3.68 M/UL (ref 4.7–6.1)
WBC # BLD AUTO: 3.1 K/UL (ref 4.8–10.8)

## 2023-02-01 PROCEDURE — 83521 IG LIGHT CHAINS FREE EACH: CPT

## 2023-02-01 PROCEDURE — 82784 ASSAY IGA/IGD/IGG/IGM EACH: CPT

## 2023-02-01 PROCEDURE — 85025 COMPLETE CBC W/AUTO DIFF WBC: CPT

## 2023-02-01 PROCEDURE — 86334 IMMUNOFIX E-PHORESIS SERUM: CPT

## 2023-02-01 PROCEDURE — 84155 ASSAY OF PROTEIN SERUM: CPT

## 2023-02-01 PROCEDURE — 36415 COLL VENOUS BLD VENIPUNCTURE: CPT

## 2023-02-01 PROCEDURE — 84165 PROTEIN E-PHORESIS SERUM: CPT

## 2023-02-03 ENCOUNTER — HOSPITAL ENCOUNTER (OUTPATIENT)
Dept: RADIOLOGY | Facility: MEDICAL CENTER | Age: 73
End: 2023-02-03
Attending: FAMILY MEDICINE
Payer: MEDICARE

## 2023-02-03 ENCOUNTER — OFFICE VISIT (OUTPATIENT)
Dept: MEDICAL GROUP | Facility: LAB | Age: 73
End: 2023-02-03
Payer: MEDICARE

## 2023-02-03 VITALS
TEMPERATURE: 96.8 F | WEIGHT: 175 LBS | HEART RATE: 84 BPM | RESPIRATION RATE: 15 BRPM | HEIGHT: 66 IN | DIASTOLIC BLOOD PRESSURE: 74 MMHG | BODY MASS INDEX: 28.12 KG/M2 | OXYGEN SATURATION: 98 % | SYSTOLIC BLOOD PRESSURE: 122 MMHG

## 2023-02-03 DIAGNOSIS — C90.00 MULTIPLE MYELOMA, REMISSION STATUS UNSPECIFIED (HCC): ICD-10-CM

## 2023-02-03 DIAGNOSIS — Z23 NEED FOR VACCINATION: ICD-10-CM

## 2023-02-03 DIAGNOSIS — M25.511 ACUTE PAIN OF RIGHT SHOULDER: ICD-10-CM

## 2023-02-03 DIAGNOSIS — E03.9 HYPOTHYROIDISM, UNSPECIFIED TYPE: ICD-10-CM

## 2023-02-03 DIAGNOSIS — E78.5 DYSLIPIDEMIA: ICD-10-CM

## 2023-02-03 PROCEDURE — 73030 X-RAY EXAM OF SHOULDER: CPT | Mod: RT

## 2023-02-03 PROCEDURE — 90662 IIV NO PRSV INCREASED AG IM: CPT | Performed by: FAMILY MEDICINE

## 2023-02-03 PROCEDURE — 99214 OFFICE O/P EST MOD 30 MIN: CPT | Mod: 25 | Performed by: FAMILY MEDICINE

## 2023-02-03 PROCEDURE — G0008 ADMIN INFLUENZA VIRUS VAC: HCPCS | Performed by: FAMILY MEDICINE

## 2023-02-03 RX ORDER — SULFAMETHOXAZOLE AND TRIMETHOPRIM 800; 160 MG/1; MG/1
1 TABLET ORAL
COMMUNITY
Start: 2023-01-04 | End: 2023-03-02

## 2023-02-03 RX ORDER — ACYCLOVIR 400 MG/1
400 TABLET ORAL
COMMUNITY
Start: 2023-01-19

## 2023-02-03 RX ORDER — ACYCLOVIR 400 MG/1
1 TABLET ORAL 3 TIMES DAILY
COMMUNITY
Start: 2022-11-03 | End: 2023-07-20

## 2023-02-03 RX ORDER — PROCHLORPERAZINE MALEATE 10 MG
10 TABLET ORAL
COMMUNITY
Start: 2022-08-17 | End: 2023-07-20

## 2023-02-03 RX ORDER — SULFAMETHOXAZOLE AND TRIMETHOPRIM 800; 160 MG/1; MG/1
1 TABLET ORAL
COMMUNITY
Start: 2023-01-24 | End: 2023-03-02

## 2023-02-03 ASSESSMENT — PATIENT HEALTH QUESTIONNAIRE - PHQ9: CLINICAL INTERPRETATION OF PHQ2 SCORE: 0

## 2023-02-03 ASSESSMENT — FIBROSIS 4 INDEX: FIB4 SCORE: 2.19

## 2023-02-03 NOTE — PROGRESS NOTES
Subjective:   Gary Hedrick is a 72 y.o. male here today for   Chief Complaint   Patient presents with    Follow-Up       #Fall/acute pain of right shoulder:  -Approximately 2 to 3 days ago patient had fall on outstretched hands after slipping on ice.  He states he had injury to her wrist as well as shoulder.  Wrist continues to heal.  Does have some mild swelling but improving.  His shoulder however continues to have pain.  He states he has pain in his shoulder with any extension of shoulder.  He states no difficulty with overhead motions.  Denies any weakness; however, does feel like he is unable to lift things because of the pain.  He feels like the pain is improving over the last few days.  He denies any numbness, tingling, weakness in right hand or arm.    #Hypothyroidism  Patient is being treated for hypothyroidism, currently taking meds, no symptoms of fast or slow heartbeat and energy level steady. No new hair loss or skin symptoms. Labs have been checked in past year and are stable on current dose.  controlled    #Multiple myeloma:  -Status post autologous donor stem cells.  He is being followed closely by oncologist in [facility] as well as here in [place].  Patient states that oncology has not approved him to begin the process of revaccination.  He wishes to just take it slow, 1 vaccine at a time.  Requesting flu shot today.    #DLD  Dyslipidemia: Chronic condition. Last labs are not at goal.  Currently taking the statin as directed.  Denies side effects--no myalgias or abdominal pain.  Reports diet is:   He is exercising regularly.  He denies dizziness, claudication, or chest pain.  Cholesterol,Tot   Date Value Ref Range Status   05/18/2022 142 100 - 199 mg/dL Final     HDL   Date Value Ref Range Status   05/18/2022 30 (A) >=40 mg/dL Final     LDL   Date Value Ref Range Status   05/18/2022 78 <100 mg/dL Final     Triglycerides   Date Value Ref Range Status   05/18/2022 170 (H) 0 - 149 mg/dL Final          Allergies   Allergen Reactions    Grass Pollen(K-O-R-T-Swt Rodrigo) Shortness of Breath    Pet Dander [Cat Hair Extract] Shortness of Breath and Unspecified    Sagebrush Anaphylaxis and Unspecified     sneezing         Current medicines (including changes today)  Current Outpatient Medications   Medication Sig Dispense Refill    acyclovir (ZOVIRAX) 400 MG tablet Take 1 Tablet by mouth 3 times a day.      prochlorperazine (COMPAZINE) 10 MG Tab Take 10 mg by mouth.      sulfamethoxazole-trimethoprim (BACTRIM DS) 800-160 MG tablet Take 1 Tablet by mouth every Monday, Wednesday, and Friday.      acyclovir (ZOVIRAX) 400 MG tablet Take 400 mg by mouth 3 times a day.      sulfamethoxazole-trimethoprim (BACTRIM DS) 800-160 MG tablet Take 1 Tablet by mouth every Monday, Wednesday, and Friday.      tamsulosin (FLOMAX) 0.4 MG capsule Take 2 Capsules by mouth every day for 90 days. Take 30 minutes after dinner 180 Capsule 5    Lenalidomide 10 MG Cap Take 10 mg (1 cap) by mouth on Days 1 - 21 of each 28-day cycle 21 Capsule 0    QUEtiapine (SEROQUEL) 100 MG Tab Take 2 Tablets by mouth every day for 360 days. 180 Tablet 3    escitalopram (LEXAPRO) 20 MG tablet Take 1 Tablet by mouth every day for 360 days. 90 Tablet 3    levothyroxine (SYNTHROID) 100 MCG Tab Take 1 Tablet by mouth every morning on an empty stomach. 100 Tablet 3    simvastatin (ZOCOR) 20 MG Tab Take 1 Tablet by mouth every evening. 100 Tablet 3    Magnesium Hydroxide (MILK OF MAGNESIA PO) Take  by mouth as needed.      VITAMIN D PO Take 8,000 Units by mouth.      Omega-3 Fatty Acids (FISH OIL) 1000 MG Cap capsule Take 1,000 mg by mouth 2 times a day. (Patient not taking: Reported on 1/3/2023)       No current facility-administered medications for this visit.     He  has a past medical history of Bowel habit changes, Breath shortness, Cancer (HCC), Disorder of thyroid, Heart murmur, High cholesterol, Hypertension, Light chain myeloma (HCC) (2021), Prostate  "cancer (HCC), Psychiatric problem, and Seizure (HCC).    ROS   -See HPI       Objective:     Physical Exam:  /74   Pulse 84   Temp 36 °C (96.8 °F) (Temporal)   Resp 15   Ht 1.68 m (5' 6.14\")   Wt 79.4 kg (175 lb)   SpO2 98%  Body mass index is 28.12 kg/m².   Const: Vitals above. Well-appearing.  CV: Inspected/evaluated for upper extremity edema. Bilateral radial pulses palpated noting below if not 2+. Capillary refill evaluated distally, noting below if greater than 2 seconds.  Skin: Inspected for rash or skin lesions in area of concern.  Neuro/psych: Sensation to light touch evaluated at lateral arm (C5), lateral forearm (C6), middle finger (C7), medial forearm (C8), and medial arm (T1). Sensation to light touch evaluated over deltoid area (axillary nerve), evaluated for scapular winging (long thoracic nerve). Observed for normal mood/affect/insight.  MSK: Gait and posture evaluated.  Examination of neck:  - Evaluated range of motion for flexion, extension, bilateral rotation, and bilateral tilting. Inspected/palpated spinous processes, paraspinous muscles, Spurling maneuver for tenderness or radiating pain.  Examination of bilateral shoulders:  - Inspected/palpated bilaterally for upper extremity carriage, warmth, and tenderness of the SC joint, clavicle, AC joint, coracoid process, bicipital groove and tendon, and rotator cuff tendons.  - Assessed passive/active range of motion bilaterally in flexion, extension, abduction, external/internal rotation, internal rotation with adduction, noting below for any pain, crepitation, or altered scapulothoracic motion. Drop arm test performed (rotator cuff).  - Assessed muscle strength bilaterally in shoulder flexion, extension, abduction (C5), external rotation (infraspinatus, teres minor), internal rotation (subscapularis), noting below if less than 5/5 or pain. Lift-off test (subscapularis), empty can test (supraspinatus), Speed's test (biceps), Yergason's " test (biceps). Observed for muscular atrophy and biceps belly position. Assessed muscle strength bilaterally with wrist extension (C6), wrist flexion (C7), finger flexion (C8), finger abduction (T1), noting below if less than 5/5, or if muscle atrophy is present.  - Assessed stability bilaterally with apprehension/relocation test, anterior/posterior  supine position. Murry's test, grind test to evaluate integrity of labrum. Crossover test, Arjun-Lindsay, and Neers test to evaluate for rotator cuff integrity and AC joint tenderness.    All of the above were found to be normal, except:  -Pain with patient at the A/C joint.  Positive also for test.  Positive empty can test.    Assessment and Plan:     1. Acute pain of right shoulder  -Uncertain etiology of acute pain.  Physical examination is concerning increase the differential of AC separation versus rotator cuff tendinopathy, specifically at supraspinatus.  Discussed conservative treatment measures, at home physical therapy.  We will put patient in sling to be used for comfort only.  We will complete x-ray for further evaluation to rule out osseous pathology.  Patient with results.  - DX-SHOULDER 2+ RIGHT; Future    2. Hypothyroidism, unspecified type  -Stable.  Continue Synthroid daily.  Recheck TSH, titrate medication as needed.  - TSH; Future    3. Dyslipidemia  -Chronic issue, stable with no concerns.  Continue simvastatin 20 mg daily.  We will recheck lipid profile at this time.  - Lipid Profile; Future    4. Multiple myeloma, remission status unspecified (HCC)  -Discussed revaccination program.  We will take this as patient tolerates.  Today we will begin with influenza vaccine.  He will follow-up for further vaccine updates.    5. Need for vaccination  -Patient was agreeable to receiving the influenza vaccine in clinic today after discussion of potential risks, benefits, and side effects. Vaccine was administered without adverse effects.  -  Influenza Vaccine, High Dose (65+ Only)      Followup: No follow-ups on file.         PLEASE NOTE: This dictation was created using voice recognition software. I have made every reasonable attempt to correct obvious errors, but I expect that there are errors of grammar and possibly content that I did not discover before finalizing the note.

## 2023-02-06 DIAGNOSIS — Z94.84 HISTORY OF STEM CELL TRANSPLANT (HCC): ICD-10-CM

## 2023-02-06 DIAGNOSIS — C90.00 LAMBDA LIGHT CHAIN MYELOMA (HCC): ICD-10-CM

## 2023-02-06 LAB
ALBUMIN SERPL ELPH-MCNC: 3.81 G/DL (ref 3.75–5.01)
ALPHA1 GLOB SERPL ELPH-MCNC: 0.32 G/DL (ref 0.19–0.46)
ALPHA2 GLOB SERPL ELPH-MCNC: 0.73 G/DL (ref 0.48–1.05)
B-GLOBULIN SERPL ELPH-MCNC: 0.7 G/DL (ref 0.48–1.1)
EER MONOCLONAL PROTEIN AND FLC, SERUM Q5224: ABNORMAL
GAMMA GLOB SERPL ELPH-MCNC: 0.35 G/DL (ref 0.62–1.51)
IGA SERPL-MCNC: 8 MG/DL (ref 68–408)
IGG SERPL-MCNC: 369 MG/DL (ref 768–1632)
IGM SERPL-MCNC: 17 MG/DL (ref 35–263)
INTERPRETATION SERPL IFE-IMP: ABNORMAL
INTERPRETATION SERPL IFE-IMP: ABNORMAL
KAPPA LC FREE SER-MCNC: 7.4 MG/L (ref 3.3–19.4)
KAPPA LC FREE/LAMBDA FREE SER NEPH: 1.81 {RATIO} (ref 0.26–1.65)
LAMBDA LC FREE SERPL-MCNC: 4.08 MG/L (ref 5.71–26.3)
MONOCLONAL PROTEIN NL11656: ABNORMAL G/DL
PROT SERPL-MCNC: 5.9 G/DL (ref 6.3–8.2)

## 2023-02-06 RX ORDER — LENALIDOMIDE 10 MG/1
CAPSULE ORAL
Qty: 21 CAPSULE | Refills: 0 | OUTPATIENT
Start: 2023-02-06

## 2023-02-06 RX ORDER — LENALIDOMIDE 10 MG/1
CAPSULE ORAL
Qty: 28 CAPSULE | Refills: 0 | Status: SHIPPED | OUTPATIENT
Start: 2023-02-06 | End: 2023-03-01 | Stop reason: SDUPTHER

## 2023-02-06 RX ORDER — LENALIDOMIDE 10 MG/1
CAPSULE ORAL
Qty: 28 CAPSULE | Refills: 0 | Status: SHIPPED | OUTPATIENT
Start: 2023-02-06 | End: 2023-02-06 | Stop reason: SDUPTHER

## 2023-02-07 NOTE — TELEPHONE ENCOUNTER
Per Marely FLOREZ transplant coordinator at [facility] he should be on Revlimid 10 mg daily as long as he is tolerating it.  She will confirm with the ARNP tomorrow and call back if any changes.

## 2023-02-07 NOTE — TELEPHONE ENCOUNTER
Oral chemo compliance review for lenalidomide (Revlimid)    Current dose: 10mg daily on days 1-28 of each 28 day cycle    Auth # [id number] obtained on 2/6 and refill sent to [facility]

## 2023-02-15 ENCOUNTER — NON-PROVIDER VISIT (OUTPATIENT)
Dept: MEDICAL GROUP | Facility: LAB | Age: 73
End: 2023-02-15
Payer: MEDICARE

## 2023-02-15 ENCOUNTER — TELEPHONE (OUTPATIENT)
Dept: MEDICAL GROUP | Facility: LAB | Age: 73
End: 2023-02-15

## 2023-02-15 DIAGNOSIS — Z23 NEED FOR VACCINATION: ICD-10-CM

## 2023-02-15 PROCEDURE — 90715 TDAP VACCINE 7 YRS/> IM: CPT | Performed by: FAMILY MEDICINE

## 2023-02-15 PROCEDURE — 90471 IMMUNIZATION ADMIN: CPT | Performed by: FAMILY MEDICINE

## 2023-02-15 NOTE — PROGRESS NOTES
Miguel Ángel Hedrick is a 72 y.o. male here for a non-provider visit for:   TDAP    Reason for immunization: Overdue/Provider Recommended  Immunization records indicate need for vaccine: Yes, confirmed with Epic  Minimum interval has been met for this vaccine: Yes  ABN completed: Yes    VIS Dated   was given to patient: Yes    Pt answered yes to screening questions    Patient tolerated injection and no adverse effects were observed or reported: Yes    Pt scheduled for next dose in series: Not Indicated

## 2023-02-17 ENCOUNTER — HOSPITAL ENCOUNTER (OUTPATIENT)
Dept: LAB | Facility: MEDICAL CENTER | Age: 73
End: 2023-02-17
Attending: FAMILY MEDICINE
Payer: MEDICARE

## 2023-02-17 DIAGNOSIS — E78.5 DYSLIPIDEMIA: ICD-10-CM

## 2023-02-17 DIAGNOSIS — E03.9 HYPOTHYROIDISM, UNSPECIFIED TYPE: ICD-10-CM

## 2023-02-17 LAB
CHOLEST SERPL-MCNC: 145 MG/DL (ref 100–199)
FASTING STATUS PATIENT QL REPORTED: NORMAL
HDLC SERPL-MCNC: 49 MG/DL
LDLC SERPL CALC-MCNC: 65 MG/DL
TRIGL SERPL-MCNC: 157 MG/DL (ref 0–149)
TSH SERPL DL<=0.005 MIU/L-ACNC: 1.52 UIU/ML (ref 0.38–5.33)

## 2023-02-17 PROCEDURE — 80061 LIPID PANEL: CPT

## 2023-02-17 PROCEDURE — 84443 ASSAY THYROID STIM HORMONE: CPT

## 2023-02-17 PROCEDURE — 36415 COLL VENOUS BLD VENIPUNCTURE: CPT

## 2023-02-23 ENCOUNTER — TELEPHONE (OUTPATIENT)
Dept: MEDICAL GROUP | Facility: LAB | Age: 73
End: 2023-02-23
Payer: MEDICARE

## 2023-02-23 NOTE — TELEPHONE ENCOUNTER
ESTABLISHED PATIENT PRE-VISIT PLANNING     Patient was NOT contacted to complete PVP.     Note: Patient will not be contacted if there is no indication to call.     1.  Reviewed notes from the last few office visits within the medical group: Yes    2.  If any orders were placed at last visit or intended to be done for this visit (i.e. 6 mos follow-up), do we have Results/Consult Notes?           Labs - Labs ordered, completed on 2/17/23 and results are in chart.  Note: If patient appointment is for lab review and patient did not complete labs, check with provider if OK to reschedule patient until labs completed.         Imaging - Imaging ordered, completed and results are in chart.         Referrals - No referrals were ordered at last office visit.    3. Is this appointment scheduled as a Hospital Follow-Up? No    4.  Immunizations were updated in Epic using Reconcile Outside Information activity? Yes    5.  Patient is due for the following Health Maintenance Topics:   Health Maintenance Due   Topic Date Due    Annual Wellness Visit  Never done    IMM ZOSTER VACCINES (1 of 2) Never done    COVID-19 Vaccine (5 - Booster for Moderna series) 08/11/2022     6.  AHA (Pulse8) form printed for Provider? N/A

## 2023-03-01 ENCOUNTER — HOSPITAL ENCOUNTER (OUTPATIENT)
Dept: LAB | Facility: MEDICAL CENTER | Age: 73
End: 2023-03-01
Attending: INTERNAL MEDICINE
Payer: MEDICARE

## 2023-03-01 DIAGNOSIS — C90.00 LAMBDA LIGHT CHAIN MYELOMA (HCC): ICD-10-CM

## 2023-03-01 DIAGNOSIS — C90.00 MULTIPLE MYELOMA NOT HAVING ACHIEVED REMISSION (HCC): ICD-10-CM

## 2023-03-01 LAB
BASOPHILS # BLD AUTO: 2.2 % (ref 0–1.8)
BASOPHILS # BLD: 0.04 K/UL (ref 0–0.12)
EOSINOPHIL # BLD AUTO: 0.15 K/UL (ref 0–0.51)
EOSINOPHIL NFR BLD: 8.2 % (ref 0–6.9)
ERYTHROCYTE [DISTWIDTH] IN BLOOD BY AUTOMATED COUNT: 58.8 FL (ref 35.9–50)
HCT VFR BLD AUTO: 38.3 % (ref 42–52)
HGB BLD-MCNC: 13.2 G/DL (ref 14–18)
IMM GRANULOCYTES # BLD AUTO: 0.01 K/UL (ref 0–0.11)
IMM GRANULOCYTES NFR BLD AUTO: 0.5 % (ref 0–0.9)
LYMPHOCYTES # BLD AUTO: 0.79 K/UL (ref 1–4.8)
LYMPHOCYTES NFR BLD: 42.9 % (ref 22–41)
MCH RBC QN AUTO: 36.2 PG (ref 27–33)
MCHC RBC AUTO-ENTMCNC: 34.5 G/DL (ref 33.7–35.3)
MCV RBC AUTO: 104.9 FL (ref 81.4–97.8)
MONOCYTES # BLD AUTO: 0.23 K/UL (ref 0–0.85)
MONOCYTES NFR BLD AUTO: 12.5 % (ref 0–13.4)
NEUTROPHILS # BLD AUTO: 0.62 K/UL (ref 1.82–7.42)
NEUTROPHILS NFR BLD: 33.7 % (ref 44–72)
NRBC # BLD AUTO: 0 K/UL
NRBC BLD-RTO: 0 /100 WBC
PLATELET # BLD AUTO: 133 K/UL (ref 164–446)
PMV BLD AUTO: 9.9 FL (ref 9–12.9)
RBC # BLD AUTO: 3.65 M/UL (ref 4.7–6.1)
WBC # BLD AUTO: 1.8 K/UL (ref 4.8–10.8)

## 2023-03-01 PROCEDURE — 83521 IG LIGHT CHAINS FREE EACH: CPT | Mod: 91

## 2023-03-01 PROCEDURE — 85025 COMPLETE CBC W/AUTO DIFF WBC: CPT

## 2023-03-01 PROCEDURE — 86334 IMMUNOFIX E-PHORESIS SERUM: CPT

## 2023-03-01 PROCEDURE — 84155 ASSAY OF PROTEIN SERUM: CPT

## 2023-03-01 PROCEDURE — 36415 COLL VENOUS BLD VENIPUNCTURE: CPT

## 2023-03-01 PROCEDURE — 82784 ASSAY IGA/IGD/IGG/IGM EACH: CPT

## 2023-03-01 PROCEDURE — 84165 PROTEIN E-PHORESIS SERUM: CPT

## 2023-03-01 RX ORDER — LENALIDOMIDE 10 MG/1
CAPSULE ORAL
Qty: 28 CAPSULE | Refills: 0 | OUTPATIENT
Start: 2023-03-01

## 2023-03-01 RX ORDER — LENALIDOMIDE 10 MG/1
CAPSULE ORAL
Qty: 28 CAPSULE | Refills: 0 | Status: SHIPPED | OUTPATIENT
Start: 2023-03-01 | End: 2023-04-05 | Stop reason: SDUPTHER

## 2023-03-02 ENCOUNTER — OFFICE VISIT (OUTPATIENT)
Dept: MEDICAL GROUP | Facility: LAB | Age: 73
End: 2023-03-02
Payer: MEDICARE

## 2023-03-02 VITALS
RESPIRATION RATE: 16 BRPM | SYSTOLIC BLOOD PRESSURE: 126 MMHG | WEIGHT: 181 LBS | TEMPERATURE: 97.6 F | HEIGHT: 66 IN | BODY MASS INDEX: 29.09 KG/M2 | DIASTOLIC BLOOD PRESSURE: 80 MMHG | OXYGEN SATURATION: 97 % | HEART RATE: 81 BPM

## 2023-03-02 DIAGNOSIS — C90.00 MULTIPLE MYELOMA, REMISSION STATUS UNSPECIFIED (HCC): ICD-10-CM

## 2023-03-02 DIAGNOSIS — D72.810 LYMPHOPENIA: ICD-10-CM

## 2023-03-02 PROCEDURE — 99213 OFFICE O/P EST LOW 20 MIN: CPT | Performed by: FAMILY MEDICINE

## 2023-03-02 ASSESSMENT — FIBROSIS 4 INDEX: FIB4 SCORE: 2.17

## 2023-03-02 NOTE — PROGRESS NOTES
03/10/23    Subjective    Chief Complaint:  Follow up lambda light chain myeloma s/p autologous stem cell transplant    HPI:  72 male s/p RVD and then one cycle of daratumumab with Pomalyst complicated by profound pancytopenia. Had stem cell transplant at [facility] 8/3/22. Now on single agent Revlimid 10 mg 3 weeks on, one off. WBC has come down significantly.     ROS:    Constitutional: No weight loss  Skin: No rash or jaundice  HENT: No change in eyesight or hearing  Cardiovascular:No chest pain or arrythmia  Respiratory:No cough or SOB  GI:No nausea, vomiting, diarrhea, constipation  :No dysuria or frequency  Musculoskeletal:No bone or joint pain  Neuro:No sx's of neuropathy  Psych: No complaints    PMH:      Allergies   Allergen Reactions    Grass Pollen(K-O-R-T-Swt Rodrigo) Shortness of Breath    Pet Dander [Cat Hair Extract] Shortness of Breath and Unspecified    Sagebrush Anaphylaxis and Unspecified     sneezing       Past Medical History:   Diagnosis Date    Bowel habit changes     occasional constipation and diarrhea    Breath shortness     Cancer (HCC)     Disorder of thyroid     hypothyroid    Heart murmur     High cholesterol     Hypertension     not on medication    Light chain myeloma (HCC) 2021    Prostate cancer (HCC)     Psychiatric problem     depression    Seizure (HCC)     None in the past 30 years        Past Surgical History:   Procedure Laterality Date    CT DX BONE MARROW ASPIRATIONS Left 6/10/2022    Procedure: ASPIRATION, BONE MARROW;  Surgeon: Malachi Kay M.D.;  Location: SURGERY SAME DAY [facility];  Service: Orthopedics    CT DX BONE MARROW BIOPSIES Left 6/10/2022    Procedure: BIOPSY, BONE MARROW, USING NEEDLE OR TROCAR - DR. DURANT;  Surgeon: Malachi Kay M.D.;  Location: SURGERY SAME DAY [facility];  Service: Orthopedics    BRACHY THERAPY N/A 12/16/2021    Procedure: BRACHYTHERAPY - PROSTATE SEED AND HYDROGEL SPACER;  Surgeon: Kirk DELANEY M.D.;  [facility];  Service: Urology    NV DX BONE MARROW ASPIRATIONS Left 2021    Procedure: ASPIRATION, BONE MARROW - DURANT;  Surgeon: Hair Okeefe M.D.;  Location: ENDOSCOPY [facility];  Service: Orthopedics    NV DX BONE MARROW BIOPSIES Left 2021    Procedure: BIOPSY, BONE MARROW, USING NEEDLE OR TROCAR;  Surgeon: Hair Okeefe M.D.;  Location: ENDOSCOPY [facility];  Service: Orthopedics    WRIST FUSION Left         Medications:    Current Outpatient Medications on File Prior to Encounter   Medication Sig Dispense Refill    Lenalidomide 10 MG Cap Take 10mg by mouth daily with no days off 28 Capsule 0    acyclovir (ZOVIRAX) 400 MG tablet Take 400 mg by mouth 3 times a day.      acyclovir (ZOVIRAX) 400 MG tablet Take 1 Tablet by mouth 3 times a day.      prochlorperazine (COMPAZINE) 10 MG Tab Take 10 mg by mouth.      tamsulosin (FLOMAX) 0.4 MG capsule Take 2 Capsules by mouth every day for 90 days. Take 30 minutes after dinner 180 Capsule 5    QUEtiapine (SEROQUEL) 100 MG Tab Take 2 Tablets by mouth every day for 360 days. 180 Tablet 3    escitalopram (LEXAPRO) 20 MG tablet Take 1 Tablet by mouth every day for 360 days. 90 Tablet 3    levothyroxine (SYNTHROID) 100 MCG Tab Take 1 Tablet by mouth every morning on an empty stomach. 100 Tablet 3    simvastatin (ZOCOR) 20 MG Tab Take 1 Tablet by mouth every evening. 100 Tablet 3    Magnesium Hydroxide (MILK OF MAGNESIA PO) Take  by mouth as needed.      VITAMIN D PO Take 8,000 Units by mouth.       No current facility-administered medications on file prior to encounter.       Social History     Tobacco Use    Smoking status: Former     Years: 20.00     Types: Cigarettes     Quit date:      Years since quittin.2    Smokeless tobacco: Never   Substance Use Topics    Alcohol use: Never        Family History   Problem Relation Age of Onset    Cancer Neg Hx         Objective    Vitals:    BP (!) 154/70 (BP Location: Left arm, Patient Position: Sitting, BP  "Cuff Size: Adult)   Pulse 82   Temp 36.3 °C (97.4 °F) (Temporal)   Resp 18   Ht 1.68 m (5' 6.14\")   Wt 81.1 kg (178 lb 10.9 oz)   SpO2 94%   BMI 28.72 kg/m²     Physical Exam:    Appears well-developed and well-nourished. No distress.    Head -  Normocephalic .   Eyes - Pupils are equal. Conjunctivae normal. No scleral icterus.   Ears - normal hearing  Neurological -   Alert and oriented.  Skin - Skin is warm and dry. No rash noted. Not diaphoretic. No erythema. No pallor. No jaundice   Psychiatric -  Normal mood and affect.    Labs:     Latest Reference Range & Units 12/21/22 09:35 02/01/23 11:47 03/01/23 10:16   WBC 4.8 - 10.8 K/uL 2.8 (L) 3.1 (L) 1.8 (LL)   RBC 4.70 - 6.10 M/uL 3.73 (L) 3.68 (L) 3.65 (L)   Hemoglobin 14.0 - 18.0 g/dL 13.5 (L) 12.9 (L) 13.2 (L)   Hematocrit 42.0 - 52.0 % 40.1 (L) 37.7 (L) 38.3 (L)   MCV 81.4 - 97.8 fL 107.5 (H) 102.4 (H) 104.9 (H)   MCH 27.0 - 33.0 pg 36.2 (H) 35.1 (H) 36.2 (H)   MCHC 33.7 - 35.3 g/dL 33.7 34.2 34.5   RDW 35.9 - 50.0 fL 49.4 51.7 (H) 58.8 (H)   Platelet Count 164 - 446 K/uL 235 132 (L) 133 (L)   MPV 9.0 - 12.9 fL 9.2 10.2 9.9   Neutrophils-Polys 44.00 - 72.00 % 41.80 (L) 53.80 33.70 (L)   Neutrophils (Absolute) 1.82 - 7.42 K/uL 1.16 (L) 1.66 (L) 0.62 (L)   Lymphocytes 22.00 - 41.00 % 36.50 24.90 42.90 (H)   Lymphs (Absolute) 1.00 - 4.80 K/uL 1.01 0.77 (L) 0.79 (L)   Monocytes 0.00 - 13.40 % 15.90 (H) 15.20 (H) 12.50   Monos (Absolute) 0.00 - 0.85 K/uL 0.44 0.47 0.23   Eosinophils 0.00 - 6.90 % 2.90 5.20 8.20 (H)      Latest Reference Range & Units 09/21/22 11:38 12/21/22 09:35 02/01/23 11:47 03/01/23 10:16   Immunoglobulin A 68 - 408 mg/dL 21 (L) 6 (L) 8 (L) 24 (L)   Immunoglobulin G 768 - 1632 mg/dL 374 (L) 362 (L) 369 (L) 507 (L)   Immunoglobulin M 35 - 263 mg/dL 32 (L) 26 (L) 17 (L) 13 (L)      Latest Reference Range & Units 09/21/22 11:38 12/21/22 09:35 02/01/23 11:47 03/01/23 10:16   Free Kappa Light Chains 3.30 - 19.40 mg/L 5.55 5.77 7.40 13.55   Free " Lambda Light Chains 5.71 - 26.30 mg/L 2.80 (L) 6.03 4.08 (L) 5.68 (L)   Kappa-Lambda Ratio 0.26 - 1.65  1.98 (H) 0.96 1.81 (H) 2.39 (H)       Assessment    Imp:    Visit Diagnosis:    1. Lambda light chain myeloma (HCC)        2. History of stem cell transplant (HCC)        3. Prostate cancer (HCC)        4. Encounter for long-term current use of high risk medication            Plan:  Decrease Revlimid due to neutropenia. Will try 3 weeks on, one off  CBC in a month     Gary Aaron M.D.

## 2023-03-03 NOTE — PROGRESS NOTES
Subjective:   Gary Hedrick is a 72 y.o. male here today for   Chief Complaint   Patient presents with    Immunizations    Lab Results       Patient is a 72-year-old male with history of multiple myeloma status post stem cell transplant.  We have been working with him on immunizations now that he is 3 months post transplant.  He is also here to review labs.  He states that he is feeling well with no concerns at this time.  He denies any fevers, chills, weight loss, bone pain or increased fatigue.  He is continuing to follow-up with oncology here as well as at [facility].      Allergies   Allergen Reactions    Grass Pollen(K-O-R-T-Swt Rodrigo) Shortness of Breath    Pet Dander [Cat Hair Extract] Shortness of Breath and Unspecified    Sagebrush Anaphylaxis and Unspecified     sneezing         Current medicines (including changes today)  Current Outpatient Medications   Medication Sig Dispense Refill    Lenalidomide 10 MG Cap Take 10mg by mouth daily with no days off 28 Capsule 0    tamsulosin (FLOMAX) 0.4 MG capsule Take 2 Capsules by mouth every day for 90 days. Take 30 minutes after dinner 180 Capsule 5    QUEtiapine (SEROQUEL) 100 MG Tab Take 2 Tablets by mouth every day for 360 days. 180 Tablet 3    simvastatin (ZOCOR) 20 MG Tab Take 1 Tablet by mouth every evening. 100 Tablet 3    VITAMIN D PO Take 8,000 Units by mouth.      acyclovir (ZOVIRAX) 400 MG tablet Take 400 mg by mouth 3 times a day.      acyclovir (ZOVIRAX) 400 MG tablet Take 1 Tablet by mouth 3 times a day.      prochlorperazine (COMPAZINE) 10 MG Tab Take 10 mg by mouth.      escitalopram (LEXAPRO) 20 MG tablet Take 1 Tablet by mouth every day for 360 days. 90 Tablet 3    levothyroxine (SYNTHROID) 100 MCG Tab Take 1 Tablet by mouth every morning on an empty stomach. 100 Tablet 3    Magnesium Hydroxide (MILK OF MAGNESIA PO) Take  by mouth as needed.       No current facility-administered medications for this visit.     He  has a past medical history  "of Bowel habit changes, Breath shortness, Cancer (HCC), Disorder of thyroid, Heart murmur, High cholesterol, Hypertension, Light chain myeloma (HCC) (2021), Prostate cancer (HCC), Psychiatric problem, and Seizure (HCC).    ROS   -See HPI       Objective:     Physical Exam:  /80   Pulse 81   Temp 36.4 °C (97.6 °F) (Temporal)   Resp 16   Ht 1.68 m (5' 6.14\")   Wt 82.1 kg (181 lb)   SpO2 97%  Body mass index is 29.09 kg/m².   Constitutional: Alert, no distress.  Skin: Warm, dry, good turgor, no rashes in visible areas.  Eye: Equal, round and reactive, conjunctiva clear, lids normal.  Neck: Trachea midline, no masses, no thyromegaly. No cervical or supraclavicular lymphadenopathy.  Respiratory: Unlabored respiratory effort, lungs clear to auscultation, no wheezes, no rhonchi.  Cardiovascular: Normal S1, S2, no murmur, no edema.  Abdomen: Soft, non-tender, no masses, no hepatosplenomegaly.  Psych: Alert and oriented x3, normal affect and mood.     Latest Reference Range & Units Most Recent   WBC 4.8 - 10.8 K/uL 1.8 (LL)  3/1/23 10:16   RBC 4.70 - 6.10 M/uL 3.65 (L)  3/1/23 10:16   Hemoglobin 14.0 - 18.0 g/dL 13.2 (L)  3/1/23 10:16   Hematocrit 42.0 - 52.0 % 38.3 (L)  3/1/23 10:16   MCV 81.4 - 97.8 fL 104.9 (H)  3/1/23 10:16   MCH 27.0 - 33.0 pg 36.2 (H)  3/1/23 10:16   MCHC 33.7 - 35.3 g/dL 34.5  3/1/23 10:16   RDW 35.9 - 50.0 fL 58.8 (H)  3/1/23 10:16   Platelet Count 164 - 446 K/uL 133 (L)  3/1/23 10:16   MPV 9.0 - 12.9 fL 9.9  3/1/23 10:16   Neutrophils-Polys 44.00 - 72.00 % 33.70 (L)  3/1/23 10:16   Neutrophils (Absolute) 1.82 - 7.42 K/uL 0.62 (L)  3/1/23 10:16   Lymphocytes 22.00 - 41.00 % 42.90 (H)  3/1/23 10:16   Lymphs (Absolute) 1.00 - 4.80 K/uL 0.79 (L)  3/1/23 10:16   Monocytes 0.00 - 13.40 % 12.50  3/1/23 10:16   Monos (Absolute) 0.00 - 0.85 K/uL 0.23  3/1/23 10:16   Eosinophils 0.00 - 6.90 % 8.20 (H)  3/1/23 10:16   Eos (Absolute) 0.00 - 0.51 K/uL 0.15  3/1/23 10:16   Basophils 0.00 - 1.80 % 2.20 " (H)  3/1/23 10:16   Baso (Absolute) 0.00 - 0.12 K/uL 0.04  3/1/23 10:16   Immature Granulocytes 0.00 - 0.90 % 0.50  3/1/23 10:16   Immature Granulocytes (abs) 0.00 - 0.11 K/uL 0.01  3/1/23 10:16   Nucleated RBC /100 WBC 0.00  3/1/23 10:16   NRBC (Absolute) K/uL 0.00  3/1/23 10:16   (LL): Data is critically low  (L): Data is abnormally low  (H): Data is abnormally high  Assessment and Plan:     1. Multiple myeloma, remission status unspecified (HCC)  -At this time patient is feeling well with no concerns; however, recent labs completed yesterday shows white blood cell count of 1.8, absolute lymphocyte count of 0.79, absolute neutrophil count of 0.62.  While patient like to continue undergoing revaccination given his status post stem cell transplant we will pause at this time and have patient follow-up with oncology next week.  We will go ahead and get the okay from oncology to continue vaccination despite low white cell count numbers.  His examination is benign, although labs are unremarkable    2. Lymphopenia  -Given lymphopenia we will decline vaccines at this time and wait for follow-up for oncology.  If oncology approves we will go ahead with vaccines next week.      Followup: Return in about 3 months (around 6/2/2023).         PLEASE NOTE: This dictation was created using voice recognition software. I have made every reasonable attempt to correct obvious errors, but I expect that there are errors of grammar and possibly content that I did not discover before finalizing the note.

## 2023-03-04 LAB
ALBUMIN SERPL ELPH-MCNC: 4.06 G/DL (ref 3.75–5.01)
ALPHA1 GLOB SERPL ELPH-MCNC: 0.25 G/DL (ref 0.19–0.46)
ALPHA2 GLOB SERPL ELPH-MCNC: 0.69 G/DL (ref 0.48–1.05)
B-GLOBULIN SERPL ELPH-MCNC: 0.69 G/DL (ref 0.48–1.1)
EER MONOCLONAL PROTEIN AND FLC, SERUM Q5224: ABNORMAL
GAMMA GLOB SERPL ELPH-MCNC: 0.5 G/DL (ref 0.62–1.51)
IGA SERPL-MCNC: 24 MG/DL (ref 68–408)
IGG SERPL-MCNC: 507 MG/DL (ref 768–1632)
IGM SERPL-MCNC: 13 MG/DL (ref 35–263)
INTERPRETATION SERPL IFE-IMP: ABNORMAL
INTERPRETATION SERPL IFE-IMP: ABNORMAL
KAPPA LC FREE SER-MCNC: 13.55 MG/L (ref 3.3–19.4)
KAPPA LC FREE/LAMBDA FREE SER NEPH: 2.39 {RATIO} (ref 0.26–1.65)
LAMBDA LC FREE SERPL-MCNC: 5.68 MG/L (ref 5.71–26.3)
MONOCLONAL PROTEIN NL11656: ABNORMAL G/DL
PROT SERPL-MCNC: 6.2 G/DL (ref 6.3–8.2)

## 2023-03-10 ENCOUNTER — HOSPITAL ENCOUNTER (OUTPATIENT)
Dept: HEMATOLOGY ONCOLOGY | Facility: MEDICAL CENTER | Age: 73
End: 2023-03-10
Attending: INTERNAL MEDICINE
Payer: MEDICARE

## 2023-03-10 VITALS
DIASTOLIC BLOOD PRESSURE: 70 MMHG | OXYGEN SATURATION: 94 % | RESPIRATION RATE: 18 BRPM | BODY MASS INDEX: 28.72 KG/M2 | HEART RATE: 82 BPM | HEIGHT: 66 IN | TEMPERATURE: 97.4 F | WEIGHT: 178.68 LBS | SYSTOLIC BLOOD PRESSURE: 154 MMHG

## 2023-03-10 DIAGNOSIS — C61 PROSTATE CANCER (HCC): ICD-10-CM

## 2023-03-10 DIAGNOSIS — C90.00 LAMBDA LIGHT CHAIN MYELOMA (HCC): ICD-10-CM

## 2023-03-10 DIAGNOSIS — Z94.84 HISTORY OF STEM CELL TRANSPLANT (HCC): ICD-10-CM

## 2023-03-10 DIAGNOSIS — Z79.899 ENCOUNTER FOR LONG-TERM CURRENT USE OF HIGH RISK MEDICATION: ICD-10-CM

## 2023-03-10 PROCEDURE — 99213 OFFICE O/P EST LOW 20 MIN: CPT | Performed by: INTERNAL MEDICINE

## 2023-03-10 PROCEDURE — 99212 OFFICE O/P EST SF 10 MIN: CPT | Performed by: INTERNAL MEDICINE

## 2023-03-10 ASSESSMENT — FIBROSIS 4 INDEX: FIB4 SCORE: 2.17

## 2023-03-10 ASSESSMENT — PAIN SCALES - GENERAL: PAINLEVEL: NO PAIN

## 2023-03-27 ENCOUNTER — TELEPHONE (OUTPATIENT)
Dept: HEMATOLOGY ONCOLOGY | Facility: MEDICAL CENTER | Age: 73
End: 2023-03-27
Payer: MEDICARE

## 2023-04-03 ENCOUNTER — HOSPITAL ENCOUNTER (OUTPATIENT)
Dept: LAB | Facility: MEDICAL CENTER | Age: 73
End: 2023-04-03
Attending: INTERNAL MEDICINE
Payer: MEDICARE

## 2023-04-03 DIAGNOSIS — C90.00 MULTIPLE MYELOMA NOT HAVING ACHIEVED REMISSION (HCC): ICD-10-CM

## 2023-04-03 LAB
BASOPHILS # BLD AUTO: 0.6 % (ref 0–1.8)
BASOPHILS # BLD: 0.02 K/UL (ref 0–0.12)
EOSINOPHIL # BLD AUTO: 0.09 K/UL (ref 0–0.51)
EOSINOPHIL NFR BLD: 2.9 % (ref 0–6.9)
ERYTHROCYTE [DISTWIDTH] IN BLOOD BY AUTOMATED COUNT: 58.3 FL (ref 35.9–50)
HCT VFR BLD AUTO: 38.8 % (ref 42–52)
HGB BLD-MCNC: 13.2 G/DL (ref 14–18)
IMM GRANULOCYTES # BLD AUTO: 0.01 K/UL (ref 0–0.11)
IMM GRANULOCYTES NFR BLD AUTO: 0.3 % (ref 0–0.9)
LYMPHOCYTES # BLD AUTO: 0.82 K/UL (ref 1–4.8)
LYMPHOCYTES NFR BLD: 26.4 % (ref 22–41)
MCH RBC QN AUTO: 36.2 PG (ref 27–33)
MCHC RBC AUTO-ENTMCNC: 34 G/DL (ref 33.7–35.3)
MCV RBC AUTO: 106.3 FL (ref 81.4–97.8)
MONOCYTES # BLD AUTO: 0.38 K/UL (ref 0–0.85)
MONOCYTES NFR BLD AUTO: 12.2 % (ref 0–13.4)
NEUTROPHILS # BLD AUTO: 1.79 K/UL (ref 1.82–7.42)
NEUTROPHILS NFR BLD: 57.6 % (ref 44–72)
NRBC # BLD AUTO: 0 K/UL
NRBC BLD-RTO: 0 /100 WBC
PLATELET # BLD AUTO: 117 K/UL (ref 164–446)
PMV BLD AUTO: 9.9 FL (ref 9–12.9)
RBC # BLD AUTO: 3.65 M/UL (ref 4.7–6.1)
WBC # BLD AUTO: 3.1 K/UL (ref 4.8–10.8)

## 2023-04-03 PROCEDURE — 82784 ASSAY IGA/IGD/IGG/IGM EACH: CPT

## 2023-04-03 PROCEDURE — 84165 PROTEIN E-PHORESIS SERUM: CPT

## 2023-04-03 PROCEDURE — 83521 IG LIGHT CHAINS FREE EACH: CPT | Mod: 91

## 2023-04-03 PROCEDURE — 86334 IMMUNOFIX E-PHORESIS SERUM: CPT

## 2023-04-03 PROCEDURE — 85025 COMPLETE CBC W/AUTO DIFF WBC: CPT

## 2023-04-03 PROCEDURE — 36415 COLL VENOUS BLD VENIPUNCTURE: CPT

## 2023-04-03 PROCEDURE — 84155 ASSAY OF PROTEIN SERUM: CPT

## 2023-04-05 DIAGNOSIS — C90.00 LAMBDA LIGHT CHAIN MYELOMA (HCC): ICD-10-CM

## 2023-04-05 RX ORDER — LENALIDOMIDE 10 MG/1
CAPSULE ORAL
Qty: 28 CAPSULE | Refills: 0 | Status: SHIPPED | OUTPATIENT
Start: 2023-04-05 | End: 2023-04-06 | Stop reason: SDUPTHER

## 2023-04-05 NOTE — NON-PROVIDER
Oral chemo compliance review for Lenalidomide (Revlimid) 10mg daily.  Auth # [id number] obtained 4/5/2023 and new rx sent to [facility].

## 2023-04-05 NOTE — PROGRESS NOTES
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 He is on revlimid 10 mg 3 weeks out of 4. Following RVD he had one dose of daratumumab complicated by severe pancytopenia and he has tended to be pancytopenic ever since.     ROS:    Constitutional: No weight loss  Skin: No rash or jaundice  HENT: No change in eyesight or hearing  Cardiovascular:No chest pain or arrythmia  Respiratory:No cough or SOB  GI:No nausea, vomiting, diarrhea, constipation  :No dysuria or frequency  Musculoskeletal:No bone or joint pain  Neuro:No sx's of neuropathy  Psych: No complaints    PMH:      Allergies   Allergen Reactions    Grass Pollen(K-O-R-T-Swt Rodrigo) Shortness of Breath    Pet Dander [Cat Hair Extract] Shortness of Breath and Unspecified    Sagebrush Anaphylaxis and Unspecified     sneezing       Past Medical History:   Diagnosis Date    Bowel habit changes     occasional constipation and diarrhea    Breath shortness     Cancer (HCC)     Disorder of thyroid     hypothyroid    Heart murmur     High cholesterol     Hypertension     not on medication    Light chain myeloma (HCC) 2021    Prostate cancer (HCC)     Psychiatric problem     depression    Seizure (HCC)     None in the past 30 years        Past Surgical History:   Procedure Laterality Date    AZ DX BONE MARROW ASPIRATIONS Left 6/10/2022    Procedure: ASPIRATION, BONE MARROW;  Surgeon: Malachi Kay M.D.;  Location: SURGERY SAME DAY [facility];  Service: Orthopedics    AZ DX BONE MARROW BIOPSIES Left 6/10/2022    Procedure: BIOPSY, BONE MARROW, USING NEEDLE OR TROCAR - DR. DURANT;  Surgeon: Malachi Kay M.D.;  Location: SURGERY SAME DAY [facility];  Service: Orthopedics    BRACHY THERAPY N/A 12/16/2021    Procedure: BRACHYTHERAPY - PROSTATE SEED AND HYDROGEL SPACER;  Surgeon: Kirk DELANEY M.D.;  Location: SURGERY SAME DAY [facility];   Service: Urology    DE DX BONE MARROW ASPIRATIONS Left 2021    Procedure: ASPIRATION, BONE MARROW - DURANT;  Surgeon: Hair Okeefe M.D.;  Location: ENDOSCOPY [facility];  Service: Orthopedics    DE DX BONE MARROW BIOPSIES Left 2021    Procedure: BIOPSY, BONE MARROW, USING NEEDLE OR TROCAR;  Surgeon: Hair Okeefe M.D.;  Location: ENDOSCOPY [facility];  Service: Orthopedics    WRIST FUSION Left         Medications:    Current Outpatient Medications on File Prior to Visit   Medication Sig Dispense Refill    Lenalidomide 10 MG Cap Take 10mg by mouth daily with no days off 28 Capsule 0    acyclovir (ZOVIRAX) 400 MG tablet Take 400 mg by mouth 3 times a day.      acyclovir (ZOVIRAX) 400 MG tablet Take 1 Tablet by mouth 3 times a day.      prochlorperazine (COMPAZINE) 10 MG Tab Take 10 mg by mouth.      tamsulosin (FLOMAX) 0.4 MG capsule Take 2 Capsules by mouth every day for 90 days. Take 30 minutes after dinner 180 Capsule 5    QUEtiapine (SEROQUEL) 100 MG Tab Take 2 Tablets by mouth every day for 360 days. 180 Tablet 3    escitalopram (LEXAPRO) 20 MG tablet Take 1 Tablet by mouth every day for 360 days. 90 Tablet 3    levothyroxine (SYNTHROID) 100 MCG Tab Take 1 Tablet by mouth every morning on an empty stomach. 100 Tablet 3    simvastatin (ZOCOR) 20 MG Tab Take 1 Tablet by mouth every evening. 100 Tablet 3    Magnesium Hydroxide (MILK OF MAGNESIA PO) Take  by mouth as needed.      VITAMIN D PO Take 8,000 Units by mouth.       No current facility-administered medications on file prior to visit.       Social History     Tobacco Use    Smoking status: Former     Years: 20.00     Types: Cigarettes     Quit date:      Years since quittin.2    Smokeless tobacco: Never   Substance Use Topics    Alcohol use: Never        Family History   Problem Relation Age of Onset    Cancer Neg Hx         Objective    Vitals:    There were no vitals taken for this visit.    Physical Exam:    Appears well-developed  and well-nourished. No distress.    Head -  Normocephalic .   Eyes - Pupils are equal. Conjunctivae normal. No scleral icterus.   Ears - normal hearing   Neurological -   Alert and oriented.  Skin - Skin is warm and dry. No rash noted. Not diaphoretic. No erythema. No pallor. No jaundice   Psychiatric -  Normal mood and affect.    Labs:     Latest Reference Range & Units 12/21/22 09:35 02/01/23 11:47 03/01/23 10:16 04/03/23 09:54   WBC 4.8 - 10.8 K/uL 2.8 (L) 3.1 (L) 1.8 (LL) 3.1 (L)   RBC 4.70 - 6.10 M/uL 3.73 (L) 3.68 (L) 3.65 (L) 3.65 (L)   Hemoglobin 14.0 - 18.0 g/dL 13.5 (L) 12.9 (L) 13.2 (L) 13.2 (L)   Hematocrit 42.0 - 52.0 % 40.1 (L) 37.7 (L) 38.3 (L) 38.8 (L)   MCV 81.4 - 97.8 fL 107.5 (H) 102.4 (H) 104.9 (H) 106.3 (H)   MCH 27.0 - 33.0 pg 36.2 (H) 35.1 (H) 36.2 (H) 36.2 (H)   MCHC 33.7 - 35.3 g/dL 33.7 34.2 34.5 34.0   RDW 35.9 - 50.0 fL 49.4 51.7 (H) 58.8 (H) 58.3 (H)   Platelet Count 164 - 446 K/uL 235 132 (L) 133 (L) 117 (L)   MPV 9.0 - 12.9 fL 9.2 10.2 9.9 9.9   Neutrophils-Polys 44.00 - 72.00 % 41.80 (L) 53.80 33.70 (L) 57.60   Neutrophils (Absolute) 1.82 - 7.42 K/uL 1.16 (L) 1.66 (L) 0.62 (L) 1.79 (L)   Lymphocytes 22.00 - 41.00 % 36.50 24.90 42.90 (H) 26.40   Lymphs (Absolute) 1.00 - 4.80 K/uL 1.01 0.77 (L) 0.79 (L) 0.82 (L)      Latest Reference Range & Units 03/01/23 10:16 04/03/23 09:54   Immunoglobulin A 68 - 408 mg/dL 24 (L) 24 (L)   Immunoglobulin G 768 - 1632 mg/dL 507 (L) 483 (L)   Immunoglobulin M 35 - 263 mg/dL 13 (L) 22 (L)      Latest Reference Range & Units 03/01/23 10:16 04/03/23 09:54   Free Kappa Light Chains 3.30 - 19.40 mg/L 13.55 13.28   Free Lambda Light Chains 5.71 - 26.30 mg/L 5.68 (L) 10.10   Kappa-Lambda Ratio 0.26 - 1.65  2.39 (H) 1.31     Assessment    Imp:    Visit Diagnosis:    1. Lambda light chain myeloma (HCC)        2. History of stem cell transplant (HCC)        3. Prostate cancer (HCC)        4. Encounter for long-term current use of high risk medication           Plan:  Stay the course - 3 months    Gary Aaron M.D.

## 2023-04-06 DIAGNOSIS — C90.00 LAMBDA LIGHT CHAIN MYELOMA (HCC): ICD-10-CM

## 2023-04-06 LAB
ALBUMIN SERPL ELPH-MCNC: 4.05 G/DL (ref 3.75–5.01)
ALPHA1 GLOB SERPL ELPH-MCNC: 0.27 G/DL (ref 0.19–0.46)
ALPHA2 GLOB SERPL ELPH-MCNC: 0.69 G/DL (ref 0.48–1.05)
B-GLOBULIN SERPL ELPH-MCNC: 0.69 G/DL (ref 0.48–1.1)
EER MONOCLONAL PROTEIN AND FLC, SERUM Q5224: ABNORMAL
GAMMA GLOB SERPL ELPH-MCNC: 0.5 G/DL (ref 0.62–1.51)
IGA SERPL-MCNC: 24 MG/DL (ref 68–408)
IGG SERPL-MCNC: 483 MG/DL (ref 768–1632)
IGM SERPL-MCNC: 22 MG/DL (ref 35–263)
INTERPRETATION SERPL IFE-IMP: ABNORMAL
INTERPRETATION SERPL IFE-IMP: ABNORMAL
KAPPA LC FREE SER-MCNC: 13.28 MG/L (ref 3.3–19.4)
KAPPA LC FREE/LAMBDA FREE SER NEPH: 1.31 {RATIO} (ref 0.26–1.65)
LAMBDA LC FREE SERPL-MCNC: 10.1 MG/L (ref 5.71–26.3)
MONOCLONAL PROTEIN NL11656: ABNORMAL G/DL
PROT SERPL-MCNC: 6.2 G/DL (ref 6.3–8.2)

## 2023-04-06 RX ORDER — LENALIDOMIDE 10 MG/1
CAPSULE ORAL
Qty: 21 CAPSULE | Refills: 0 | Status: SHIPPED | OUTPATIENT
Start: 2023-04-06 | End: 2023-05-08 | Stop reason: SDUPTHER

## 2023-04-14 ENCOUNTER — HOSPITAL ENCOUNTER (OUTPATIENT)
Dept: HEMATOLOGY ONCOLOGY | Facility: MEDICAL CENTER | Age: 73
End: 2023-04-14
Attending: INTERNAL MEDICINE
Payer: MEDICARE

## 2023-04-14 VITALS
BODY MASS INDEX: 28.43 KG/M2 | SYSTOLIC BLOOD PRESSURE: 136 MMHG | OXYGEN SATURATION: 97 % | RESPIRATION RATE: 15 BRPM | HEART RATE: 78 BPM | HEIGHT: 66 IN | WEIGHT: 176.92 LBS | TEMPERATURE: 97.2 F | DIASTOLIC BLOOD PRESSURE: 72 MMHG

## 2023-04-14 DIAGNOSIS — Z79.899 ENCOUNTER FOR LONG-TERM CURRENT USE OF HIGH RISK MEDICATION: ICD-10-CM

## 2023-04-14 DIAGNOSIS — C61 PROSTATE CANCER (HCC): ICD-10-CM

## 2023-04-14 DIAGNOSIS — C90.00 LAMBDA LIGHT CHAIN MYELOMA (HCC): ICD-10-CM

## 2023-04-14 DIAGNOSIS — Z94.84 HISTORY OF STEM CELL TRANSPLANT (HCC): ICD-10-CM

## 2023-04-14 PROCEDURE — 99212 OFFICE O/P EST SF 10 MIN: CPT | Performed by: INTERNAL MEDICINE

## 2023-04-14 PROCEDURE — 99213 OFFICE O/P EST LOW 20 MIN: CPT | Performed by: INTERNAL MEDICINE

## 2023-04-14 RX ORDER — ATROPINE SULFATE 10 MG/ML
SOLUTION/ DROPS OPHTHALMIC
COMMUNITY
Start: 2023-03-29 | End: 2023-07-20

## 2023-04-14 ASSESSMENT — PAIN SCALES - GENERAL: PAINLEVEL: NO PAIN

## 2023-04-14 ASSESSMENT — FIBROSIS 4 INDEX: FIB4 SCORE: 2.47

## 2023-05-08 ENCOUNTER — PHARMACY VISIT (OUTPATIENT)
Dept: PHARMACY | Facility: MEDICAL CENTER | Age: 73
End: 2023-05-08
Payer: MEDICARE

## 2023-05-08 ENCOUNTER — TELEPHONE (OUTPATIENT)
Dept: HEMATOLOGY ONCOLOGY | Facility: MEDICAL CENTER | Age: 73
End: 2023-05-08
Payer: MEDICARE

## 2023-05-08 DIAGNOSIS — C90.00 LAMBDA LIGHT CHAIN MYELOMA (HCC): ICD-10-CM

## 2023-05-08 PROCEDURE — RXMED WILLOW AMBULATORY MEDICATION CHARGE: Performed by: INTERNAL MEDICINE

## 2023-05-08 RX ORDER — LENALIDOMIDE 10 MG/1
CAPSULE ORAL
Qty: 21 CAPSULE | Refills: 0 | Status: SHIPPED | OUTPATIENT
Start: 2023-05-08 | End: 2023-06-01 | Stop reason: SDUPTHER

## 2023-05-08 NOTE — TELEPHONE ENCOUNTER
Oral chemo compliance review for lenadlidomide (Revlimid) 10mg on days 1-21 of each 28 day cycle. [employer] auth [id number] obtained 5/8/2023 and rx refill sent to [facility].

## 2023-05-23 ENCOUNTER — TELEPHONE (OUTPATIENT)
Dept: MEDICAL GROUP | Facility: LAB | Age: 73
End: 2023-05-23
Payer: MEDICARE

## 2023-05-23 NOTE — TELEPHONE ENCOUNTER
ESTABLISHED PATIENT PRE-VISIT PLANNING     Patient was NOT contacted to complete PVP.     Note: Patient will not be contacted if there is no indication to call.     1.  Reviewed notes from the last few office visits within the medical group: Yes    2.  If any orders were placed at last visit or intended to be done for this visit (i.e. 6 mos follow-up), do we have Results/Consult Notes?           Labs - Labs were not ordered at last office visit.  Note: If patient appointment is for lab review and patient did not complete labs, check with provider if OK to reschedule patient until labs completed.         Imaging - Imaging was not ordered at last office visit.         Referrals - No referrals were ordered at last office visit.    3. Is this appointment scheduled as a Hospital Follow-Up? No    4.  Immunizations were updated in Epic using Reconcile Outside Information activity? Yes    5.  Patient is due for the following Health Maintenance Topics:   Health Maintenance Due   Topic Date Due    Annual Wellness Visit  Never done     6.  AHA (Pulse8) form printed for Provider? N/A

## 2023-06-01 DIAGNOSIS — C90.00 LAMBDA LIGHT CHAIN MYELOMA (HCC): ICD-10-CM

## 2023-06-01 RX ORDER — LENALIDOMIDE 10 MG/1
CAPSULE ORAL
Qty: 21 CAPSULE | Refills: 0 | Status: SHIPPED | OUTPATIENT
Start: 2023-06-01 | End: 2023-07-05 | Stop reason: SDUPTHER

## 2023-06-02 ENCOUNTER — OFFICE VISIT (OUTPATIENT)
Dept: MEDICAL GROUP | Facility: LAB | Age: 73
End: 2023-06-02
Payer: MEDICARE

## 2023-06-02 ENCOUNTER — HOSPITAL ENCOUNTER (OUTPATIENT)
Dept: LAB | Facility: MEDICAL CENTER | Age: 73
End: 2023-06-02
Attending: FAMILY MEDICINE
Payer: MEDICARE

## 2023-06-02 VITALS
SYSTOLIC BLOOD PRESSURE: 108 MMHG | HEIGHT: 66 IN | DIASTOLIC BLOOD PRESSURE: 72 MMHG | HEART RATE: 75 BPM | OXYGEN SATURATION: 97 % | BODY MASS INDEX: 28.93 KG/M2 | TEMPERATURE: 97.8 F | RESPIRATION RATE: 15 BRPM | WEIGHT: 180 LBS

## 2023-06-02 DIAGNOSIS — Z23 NEED FOR VACCINATION: ICD-10-CM

## 2023-06-02 DIAGNOSIS — Z12.5 ENCOUNTER FOR SCREENING FOR MALIGNANT NEOPLASM OF PROSTATE: ICD-10-CM

## 2023-06-02 DIAGNOSIS — C90.00 MULTIPLE MYELOMA, REMISSION STATUS UNSPECIFIED (HCC): ICD-10-CM

## 2023-06-02 PROCEDURE — 90472 IMMUNIZATION ADMIN EACH ADD: CPT | Performed by: FAMILY MEDICINE

## 2023-06-02 PROCEDURE — 90619 MENACWY-TT VACCINE IM: CPT | Performed by: FAMILY MEDICINE

## 2023-06-02 PROCEDURE — 90700 DTAP VACCINE < 7 YRS IM: CPT | Performed by: FAMILY MEDICINE

## 2023-06-02 PROCEDURE — 99214 OFFICE O/P EST MOD 30 MIN: CPT | Mod: 25 | Performed by: FAMILY MEDICINE

## 2023-06-02 PROCEDURE — 84153 ASSAY OF PSA TOTAL: CPT

## 2023-06-02 PROCEDURE — 90636 HEP A/HEP B VACC ADULT IM: CPT | Performed by: FAMILY MEDICINE

## 2023-06-02 PROCEDURE — 3074F SYST BP LT 130 MM HG: CPT | Performed by: FAMILY MEDICINE

## 2023-06-02 PROCEDURE — G0009 ADMIN PNEUMOCOCCAL VACCINE: HCPCS | Performed by: FAMILY MEDICINE

## 2023-06-02 PROCEDURE — 90648 HIB PRP-T VACCINE 4 DOSE IM: CPT | Performed by: FAMILY MEDICINE

## 2023-06-02 PROCEDURE — 90670 PCV13 VACCINE IM: CPT | Performed by: FAMILY MEDICINE

## 2023-06-02 PROCEDURE — 3078F DIAST BP <80 MM HG: CPT | Performed by: FAMILY MEDICINE

## 2023-06-02 RX ORDER — SULFAMETHOXAZOLE AND TRIMETHOPRIM 800; 160 MG/1; MG/1
1 TABLET ORAL
COMMUNITY
Start: 2023-03-09 | End: 2023-07-20

## 2023-06-02 RX ORDER — TAMSULOSIN HYDROCHLORIDE 0.4 MG/1
CAPSULE ORAL
COMMUNITY
Start: 2023-04-22 | End: 2024-03-15 | Stop reason: SDUPTHER

## 2023-06-02 RX ORDER — SULFAMETHOXAZOLE AND TRIMETHOPRIM 800; 160 MG/1; MG/1
1 TABLET ORAL
COMMUNITY
Start: 2023-03-10 | End: 2023-07-20

## 2023-06-02 ASSESSMENT — FIBROSIS 4 INDEX: FIB4 SCORE: 2.5

## 2023-06-02 NOTE — PROGRESS NOTES
Subjective:   Gary Hedrick is a 73 y.o. male here today for   No chief complaint on file.      #Multiple myeloma:  -Status post autologous donor stem cells, working on reintroduction of vaccine regiment.  Patient feeling well with no concerns.  Here to continue vaccines.    #Health maintenance:  -You for PSA.    Allergies   Allergen Reactions    Grass Pollen(K-O-R-T-Swt Rodriog) Shortness of Breath    Pet Dander [Cat Hair Extract] Shortness of Breath and Unspecified    Sagebrush Anaphylaxis and Unspecified     sneezing         Current medicines (including changes today)  Current Outpatient Medications   Medication Sig Dispense Refill    sulfamethoxazole-trimethoprim (BACTRIM DS) 800-160 MG tablet Take 1 Tablet by mouth every Monday, Wednesday, and Friday.      sulfamethoxazole-trimethoprim (BACTRIM DS) 800-160 MG tablet Take 1 Tablet by mouth every Monday, Wednesday, and Friday.      tamsulosin (FLOMAX) 0.4 MG capsule TAKE 2 CAPSULES BY MOUTH EVERY DAY FOR 90 DAYS. TAKE 30 MINUTES AFTER DINNER      Lenalidomide 10 MG Cap Take 10mg by mouth daily on days 1-21 of each 28 day cycle. 21 Capsule 0    COVID-19mRNA Bival Vac Moderna (MODERNA COVID-19 BIVALENT) 50 MCG/0.5ML Suspension injection Inject  into the shoulder, thigh, or buttocks. 0.5 mL 0    Zoster Vac Recomb Adjuvanted (SHINGRIX) 50 MCG/0.5ML Recon Susp Inject  into the shoulder, thigh, or buttocks. 0.5 mL 0    atropine 1 % Solution INSTILL 1 DROP IN PRE OPERATIVE EYE THREE TIMES A DAY STARTING TWO DAYS PRIOR TO SURGERY      acyclovir (ZOVIRAX) 400 MG tablet Take 400 mg by mouth 3 times a day.      acyclovir (ZOVIRAX) 400 MG tablet Take 1 Tablet by mouth 3 times a day.      prochlorperazine (COMPAZINE) 10 MG Tab Take 10 mg by mouth.      QUEtiapine (SEROQUEL) 100 MG Tab Take 2 Tablets by mouth every day for 360 days. 180 Tablet 3    escitalopram (LEXAPRO) 20 MG tablet Take 1 Tablet by mouth every day for 360 days. 90 Tablet 3    levothyroxine (SYNTHROID) 100  "MCG Tab Take 1 Tablet by mouth every morning on an empty stomach. 100 Tablet 3    simvastatin (ZOCOR) 20 MG Tab Take 1 Tablet by mouth every evening. 100 Tablet 3    Magnesium Hydroxide (MILK OF MAGNESIA PO) Take  by mouth as needed.      VITAMIN D PO Take 8,000 Units by mouth.       No current facility-administered medications for this visit.     He  has a past medical history of Bowel habit changes, Breath shortness, Cancer (HCC), Disorder of thyroid, Heart murmur, High cholesterol, Hypertension, Light chain myeloma (HCC) (2021), Prostate cancer (HCC), Psychiatric problem, and Seizure (HCC).    ROS   Review of Systems   Constitutional:  Negative for chills and fever.   Respiratory:  Negative for shortness of breath and wheezing.    Cardiovascular:  Negative for chest pain and palpitations.   Gastrointestinal:  Negative for abdominal pain, constipation, diarrhea, nausea and vomiting.   Psychiatric/Behavioral:  Negative for depression. The patient is not nervous/anxious.       Objective:     Physical Exam:  /72   Pulse 75   Temp 36.6 °C (97.8 °F) (Temporal)   Resp 15   Ht 1.676 m (5' 5.98\")   Wt 81.6 kg (180 lb)   SpO2 97%  Body mass index is 29.07 kg/m².   Constitutional: Alert, no distress, well-groomed.  Skin: No rashes in visible areas.  Eye: Round. Conjunctiva clear, lids normal. No icterus.   ENMT: Lips pink without lesions, good dentition, moist mucous membranes. Phonation normal.  Neck: No masses, no thyromegaly. Moves freely without pain.  Respiratory: Unlabored respiratory effort, no cough or audible wheeze  Psych: Alert and oriented x3, normal affect and mood.     Assessment and Plan:     1. Multiple myeloma, remission status unspecified (HCC)  Stable with no concerns.  Continue the vaccinations as below.  Continue follow-up with PCP, oncology team as needed.    2. Need for vaccination  - Pneumococcal Conjugate Vaccine 13-Valent  - Meningococcal ACWY Conjugate Vaccine (MenQuadfi)  - TWINRIX " HepA/HepB IM  - Dtap <8yo IM  - HIB PRP-T Conjugate Vaccine 4-Dose IM    3. Encounter for screening for malignant neoplasm of prostate  - PROSTATE SPECIFIC AG SCREENING; Future    Followup: No follow-ups on file.         PLEASE NOTE: This dictation was created using voice recognition software. I have made every reasonable attempt to correct obvious errors, but I expect that there are errors of grammar and possibly content that I did not discover before finalizing the note.

## 2023-06-03 LAB — PSA SERPL-MCNC: 0.04 NG/ML (ref 0–4)

## 2023-06-08 ENCOUNTER — TELEPHONE (OUTPATIENT)
Dept: MEDICAL GROUP | Facility: LAB | Age: 73
End: 2023-06-08

## 2023-06-08 ENCOUNTER — PATIENT MESSAGE (OUTPATIENT)
Dept: MEDICAL GROUP | Facility: LAB | Age: 73
End: 2023-06-08
Payer: MEDICARE

## 2023-06-08 DIAGNOSIS — Z23 NEED FOR VACCINATION: ICD-10-CM

## 2023-06-08 NOTE — TELEPHONE ENCOUNTER
Patient called and LVM informing us that [facility] is requesting a order form PCP for patient to continue receiving his Covid-19 boosters, Please advise pending future immunization for patient. Thank you.

## 2023-06-16 ENCOUNTER — PHARMACY VISIT (OUTPATIENT)
Dept: PHARMACY | Facility: MEDICAL CENTER | Age: 73
End: 2023-06-16
Payer: MEDICARE

## 2023-06-16 PROCEDURE — RXMED WILLOW AMBULATORY MEDICATION CHARGE: Performed by: INTERNAL MEDICINE

## 2023-06-16 RX ORDER — BNT162B2 ORIGINAL AND OMICRON BA.4/BA.5 .1125; .1125 MG/2.25ML; MG/2.25ML
0.3 INJECTION, SUSPENSION INTRAMUSCULAR
Qty: 0.3 ML | Refills: 0 | OUTPATIENT
Start: 2023-06-16 | End: 2023-06-17

## 2023-06-19 ENCOUNTER — PATIENT MESSAGE (OUTPATIENT)
Dept: MEDICAL GROUP | Facility: LAB | Age: 73
End: 2023-06-19
Payer: MEDICARE

## 2023-06-20 ENCOUNTER — TELEPHONE (OUTPATIENT)
Dept: MEDICAL GROUP | Facility: LAB | Age: 73
End: 2023-06-20
Payer: MEDICARE

## 2023-06-20 NOTE — TELEPHONE ENCOUNTER
ESTABLISHED PATIENT PRE-VISIT PLANNING     Patient was NOT contacted to complete PVP.     Note: Patient will not be contacted if there is no indication to call.     1.  Reviewed notes from the last few office visits within the medical group: Yes    2.  If any orders were placed at last visit or intended to be done for this visit (i.e. 6 mos follow-up), do we have Results/Consult Notes?           Labs - Labs ordered, completed on 6/2/23 and results are in chart.  Note: If patient appointment is for lab review and patient did not complete labs, check with provider if OK to reschedule patient until labs completed.         Imaging - Imaging was not ordered at last office visit.         Referrals - No referrals were ordered at last office visit.    3. Is this appointment scheduled as a Hospital Follow-Up? No    4.  Immunizations were updated in Epic using Reconcile Outside Information activity? Yes    5.  Patient is due for the following Health Maintenance Topics:   Health Maintenance Due   Topic Date Due    Annual Wellness Visit  Never done    IMM HEP B VACCINE (2 of 3 - Hep B Twinrix 3-dose series) 06/30/2023    IMM ZOSTER VACCINES (2 of 2) 07/03/2023     6.  AHA (Pulse8) form printed for Provider? N/A

## 2023-06-22 ENCOUNTER — HOSPITAL ENCOUNTER (OUTPATIENT)
Dept: LAB | Facility: MEDICAL CENTER | Age: 73
End: 2023-06-22
Attending: FAMILY MEDICINE
Payer: MEDICARE

## 2023-06-22 ENCOUNTER — HOSPITAL ENCOUNTER (OUTPATIENT)
Dept: RADIOLOGY | Facility: MEDICAL CENTER | Age: 73
End: 2023-06-22
Attending: FAMILY MEDICINE
Payer: MEDICARE

## 2023-06-22 ENCOUNTER — OFFICE VISIT (OUTPATIENT)
Dept: MEDICAL GROUP | Facility: LAB | Age: 73
End: 2023-06-22
Payer: MEDICARE

## 2023-06-22 VITALS
WEIGHT: 175 LBS | HEIGHT: 66 IN | BODY MASS INDEX: 28.12 KG/M2 | OXYGEN SATURATION: 94 % | TEMPERATURE: 98.8 F | HEART RATE: 78 BPM | DIASTOLIC BLOOD PRESSURE: 72 MMHG | RESPIRATION RATE: 16 BRPM | SYSTOLIC BLOOD PRESSURE: 110 MMHG

## 2023-06-22 DIAGNOSIS — R06.02 SOB (SHORTNESS OF BREATH): ICD-10-CM

## 2023-06-22 DIAGNOSIS — J06.9 UPPER RESPIRATORY TRACT INFECTION, UNSPECIFIED TYPE: ICD-10-CM

## 2023-06-22 DIAGNOSIS — R09.89 CHEST CONGESTION: ICD-10-CM

## 2023-06-22 DIAGNOSIS — R61 NIGHT SWEATS: ICD-10-CM

## 2023-06-22 LAB
ALBUMIN SERPL BCP-MCNC: 3.6 G/DL (ref 3.2–4.9)
ALBUMIN/GLOB SERPL: 1.3 G/DL
ALP SERPL-CCNC: 100 U/L (ref 30–99)
ALT SERPL-CCNC: 58 U/L (ref 2–50)
ANION GAP SERPL CALC-SCNC: 13 MMOL/L (ref 7–16)
AST SERPL-CCNC: 37 U/L (ref 12–45)
BASOPHILS # BLD AUTO: 0.6 % (ref 0–1.8)
BASOPHILS # BLD: 0.01 K/UL (ref 0–0.12)
BILIRUB SERPL-MCNC: 0.5 MG/DL (ref 0.1–1.5)
BUN SERPL-MCNC: 16 MG/DL (ref 8–22)
CALCIUM ALBUM COR SERPL-MCNC: 8.7 MG/DL (ref 8.5–10.5)
CALCIUM SERPL-MCNC: 8.4 MG/DL (ref 8.5–10.5)
CHLORIDE SERPL-SCNC: 105 MMOL/L (ref 96–112)
CO2 SERPL-SCNC: 21 MMOL/L (ref 20–33)
CREAT SERPL-MCNC: 1.16 MG/DL (ref 0.5–1.4)
EOSINOPHIL # BLD AUTO: 0.06 K/UL (ref 0–0.51)
EOSINOPHIL NFR BLD: 3.6 % (ref 0–6.9)
ERYTHROCYTE [DISTWIDTH] IN BLOOD BY AUTOMATED COUNT: 55.3 FL (ref 35.9–50)
GFR SERPLBLD CREATININE-BSD FMLA CKD-EPI: 67 ML/MIN/1.73 M 2
GLOBULIN SER CALC-MCNC: 2.7 G/DL (ref 1.9–3.5)
GLUCOSE SERPL-MCNC: 109 MG/DL (ref 65–99)
HCT VFR BLD AUTO: 32.3 % (ref 42–52)
HGB BLD-MCNC: 11.1 G/DL (ref 14–18)
IMM GRANULOCYTES # BLD AUTO: 0 K/UL (ref 0–0.11)
IMM GRANULOCYTES NFR BLD AUTO: 0 % (ref 0–0.9)
LYMPHOCYTES # BLD AUTO: 0.61 K/UL (ref 1–4.8)
LYMPHOCYTES NFR BLD: 36.5 % (ref 22–41)
MCH RBC QN AUTO: 37.1 PG (ref 27–33)
MCHC RBC AUTO-ENTMCNC: 34.4 G/DL (ref 32.3–36.5)
MCV RBC AUTO: 108 FL (ref 81.4–97.8)
MONOCYTES # BLD AUTO: 0.15 K/UL (ref 0–0.85)
MONOCYTES NFR BLD AUTO: 9 % (ref 0–13.4)
NEUTROPHILS # BLD AUTO: 0.84 K/UL (ref 1.82–7.42)
NEUTROPHILS NFR BLD: 50.3 % (ref 44–72)
NRBC # BLD AUTO: 0 K/UL
NRBC BLD-RTO: 0 /100 WBC (ref 0–0.2)
PLATELET # BLD AUTO: 98 K/UL (ref 164–446)
PLATELETS.RETICULATED NFR BLD AUTO: 3.5 % (ref 0.6–13.1)
PMV BLD AUTO: 10.7 FL (ref 9–12.9)
POTASSIUM SERPL-SCNC: 3.6 MMOL/L (ref 3.6–5.5)
PROT SERPL-MCNC: 6.3 G/DL (ref 6–8.2)
RBC # BLD AUTO: 2.99 M/UL (ref 4.7–6.1)
SODIUM SERPL-SCNC: 139 MMOL/L (ref 135–145)
WBC # BLD AUTO: 1.7 K/UL (ref 4.8–10.8)

## 2023-06-22 PROCEDURE — 3074F SYST BP LT 130 MM HG: CPT | Performed by: FAMILY MEDICINE

## 2023-06-22 PROCEDURE — 80053 COMPREHEN METABOLIC PANEL: CPT

## 2023-06-22 PROCEDURE — 99214 OFFICE O/P EST MOD 30 MIN: CPT | Performed by: FAMILY MEDICINE

## 2023-06-22 PROCEDURE — 71046 X-RAY EXAM CHEST 2 VIEWS: CPT

## 2023-06-22 PROCEDURE — 3078F DIAST BP <80 MM HG: CPT | Performed by: FAMILY MEDICINE

## 2023-06-22 PROCEDURE — 85055 RETICULATED PLATELET ASSAY: CPT

## 2023-06-22 PROCEDURE — 85025 COMPLETE CBC W/AUTO DIFF WBC: CPT

## 2023-06-22 ASSESSMENT — FIBROSIS 4 INDEX: FIB4 SCORE: 2.5

## 2023-06-22 NOTE — PROGRESS NOTES
Subjective:   Gary Hedrick is a 73 y.o. male here today for   No chief complaint on file.    #URI   -Approx 1 month ago patient started noted increasing fatigue, measued by the fact he couldn't walk as much. He soon started having worsening malaise with subjective fevers,, congestion, postnasal drip, slight cough.  -Has noted increased night sweats, subjective fevers that are occurring every night.  He states that he started soaking through sheets.  He is also started getting significant shortness of breath.  Denies any pleuritic-like chest pain.  -decreased appetite, recently lost 5 pounds.  -Patient does have a history of double myeloma status post autologous donor of stem cells.  Is working on completing a revaccination series although has not completed entire sac.  -Denies any abdominal pain, nausea, vomiting, diarrhea.    Allergies   Allergen Reactions    Grass Pollen(K-O-R-T-Swt Rodrigo) Shortness of Breath    Pet Dander [Cat Hair Extract] Shortness of Breath and Unspecified    Sagebrush Anaphylaxis and Unspecified     sneezing         Current medicines (including changes today)  Current Outpatient Medications   Medication Sig Dispense Refill    COVID-19mRNA Bival Vac Moderna (MODERNA COVID-19 BIVALENT) 50 MCG/0.5ML Suspension injection Inject 0.5 ml  into the shoulder, thigh, or buttocks. 0.5 mL 0    sulfamethoxazole-trimethoprim (BACTRIM DS) 800-160 MG tablet Take 1 Tablet by mouth every Monday, Wednesday, and Friday.      sulfamethoxazole-trimethoprim (BACTRIM DS) 800-160 MG tablet Take 1 Tablet by mouth every Monday, Wednesday, and Friday.      tamsulosin (FLOMAX) 0.4 MG capsule TAKE 2 CAPSULES BY MOUTH EVERY DAY FOR 90 DAYS. TAKE 30 MINUTES AFTER DINNER      Lenalidomide 10 MG Cap Take 10mg by mouth daily on days 1-21 of each 28 day cycle. 21 Capsule 0    Zoster Vac Recomb Adjuvanted (SHINGRIX) 50 MCG/0.5ML Recon Susp Inject  into the shoulder, thigh, or buttocks. 0.5 mL 0    atropine 1 % Solution  "INSTILL 1 DROP IN PRE OPERATIVE EYE THREE TIMES A DAY STARTING TWO DAYS PRIOR TO SURGERY      acyclovir (ZOVIRAX) 400 MG tablet Take 400 mg by mouth 3 times a day.      acyclovir (ZOVIRAX) 400 MG tablet Take 1 Tablet by mouth 3 times a day.      prochlorperazine (COMPAZINE) 10 MG Tab Take 10 mg by mouth.      QUEtiapine (SEROQUEL) 100 MG Tab Take 2 Tablets by mouth every day for 360 days. 180 Tablet 3    escitalopram (LEXAPRO) 20 MG tablet Take 1 Tablet by mouth every day for 360 days. 90 Tablet 3    levothyroxine (SYNTHROID) 100 MCG Tab Take 1 Tablet by mouth every morning on an empty stomach. 100 Tablet 3    simvastatin (ZOCOR) 20 MG Tab Take 1 Tablet by mouth every evening. 100 Tablet 3    Magnesium Hydroxide (MILK OF MAGNESIA PO) Take  by mouth as needed.      VITAMIN D PO Take 8,000 Units by mouth.       No current facility-administered medications for this visit.     He  has a past medical history of Bowel habit changes, Breath shortness, Cancer (HCC), Disorder of thyroid, Heart murmur, High cholesterol, Hypertension, Light chain myeloma (HCC) (2021), Prostate cancer (HCC), Psychiatric problem, and Seizure (HCC).    ROS   -See HPI       Objective:     Physical Exam:  /72   Pulse 78   Temp 37.1 °C (98.8 °F) (Temporal)   Resp 16   Ht 1.676 m (5' 5.98\")   Wt 79.4 kg (175 lb)   SpO2 94%  Body mass index is 28.26 kg/m².   Constitutional: Alert, no distress.  Skin: Warm, dry, good turgor, no rashes in visible areas.  Eye: Equal, round and reactive, conjunctiva clear, lids normal.  ENMT: TM's clear bilaterally, lips without lesions, good dentition, oropharynx clear.  Neck: Trachea midline, no masses, no thyromegaly. No cervical or supraclavicular lymphadenopathy.  Respiratory: Unlabored respiratory effort, lungs clear to auscultation, no wheezes, no rhonchi.  Cardiovascular: Normal S1, S2, no murmur, no edema.  Abdomen: Soft, non-tender, no masses, no hepatosplenomegaly.  Psych: Alert and oriented x3, " normal affect and mood.    Assessment and Plan:     Differential diagnosis and potential etiology for infection is broader than the next person given history of multiple myeloma and not yet fully vaccinated.  This could be typical bacterial sinusitis; however, concerns for pneumonia or other infectious etiology are also on the differential.  There is also concern of of neoplastic etiology given history of multiple myeloma.  At this time we will complete full work-up with chest x-ray, stat labs to assure no other findings.  If work-up is unremarkable we will begin treatment for bacterial sinusitis.  We will follow-up with patient with results.      1. Upper respiratory tract infection, unspecified type  - DX-CHEST-2 VIEWS; Future  - CBC WITH DIFFERENTIAL; Future  - Comp Metabolic Panel; Future    2. Night sweats  - DX-CHEST-2 VIEWS; Future  - CBC WITH DIFFERENTIAL; Future  - Comp Metabolic Panel; Future    3. Chest congestion  - DX-CHEST-2 VIEWS; Future  - CBC WITH DIFFERENTIAL; Future  - Comp Metabolic Panel; Future    4. SOB (shortness of breath)  - DX-CHEST-2 VIEWS; Future  - CBC WITH DIFFERENTIAL; Future      Followup: No follow-ups on file.         PLEASE NOTE: This dictation was created using voice recognition software. I have made every reasonable attempt to correct obvious errors, but I expect that there are errors of grammar and possibly content that I did not discover before finalizing the note.

## 2023-06-23 ENCOUNTER — PATIENT MESSAGE (OUTPATIENT)
Dept: MEDICAL GROUP | Facility: LAB | Age: 73
End: 2023-06-23

## 2023-06-23 RX ORDER — AMOXICILLIN AND CLAVULANATE POTASSIUM 875; 125 MG/1; MG/1
1 TABLET, FILM COATED ORAL 2 TIMES DAILY
Qty: 14 TABLET | Refills: 0 | Status: SHIPPED | OUTPATIENT
Start: 2023-06-23 | End: 2023-06-30

## 2023-06-24 ASSESSMENT — ENCOUNTER SYMPTOMS
DEPRESSION: 0
PALPITATIONS: 0
NERVOUS/ANXIOUS: 0
DIARRHEA: 0
VOMITING: 0
FEVER: 0
SHORTNESS OF BREATH: 0
NAUSEA: 0
CONSTIPATION: 0
CHILLS: 0
ABDOMINAL PAIN: 0
WHEEZING: 0

## 2023-06-27 PROBLEM — Z59.89 ON ECONOMIC ASSISTANCE PROGRAM: Status: ACTIVE | Noted: 2023-06-27

## 2023-07-03 DIAGNOSIS — E78.5 DYSLIPIDEMIA: ICD-10-CM

## 2023-07-03 DIAGNOSIS — E03.9 HYPOTHYROIDISM, UNSPECIFIED TYPE: ICD-10-CM

## 2023-07-05 ENCOUNTER — TELEPHONE (OUTPATIENT)
Dept: HEMATOLOGY ONCOLOGY | Facility: MEDICAL CENTER | Age: 73
End: 2023-07-05
Payer: MEDICARE

## 2023-07-05 DIAGNOSIS — C90.00 LAMBDA LIGHT CHAIN MYELOMA (HCC): ICD-10-CM

## 2023-07-05 RX ORDER — LENALIDOMIDE 10 MG/1
CAPSULE ORAL
Qty: 21 CAPSULE | Refills: 0 | Status: SHIPPED | OUTPATIENT
Start: 2023-07-05 | End: 2023-07-28 | Stop reason: SDUPTHER

## 2023-07-05 NOTE — TELEPHONE ENCOUNTER
Received request via: Pharmacy    Was the patient seen in the last year in this department? Yes    Does the patient have an active prescription (recently filled or refills available) for medication(s) requested? No    Does the patient have [facility] and need 100 day supply (blood pressure, diabetes and cholesterol meds only)? Yes, quantity updated to 100 days      LEVOTHYROXINE 100 MCG TABLET    SIMVASTATIN 20 MG TABLET

## 2023-07-05 NOTE — PROGRESS NOTES
Revlimid reorder  Today's Date: Jul 05, 2023  Patient Name: JAMES GRANT  Authorization Number: [id number]  This Authorization is valid for: 30 days  Authorization Status: Patient Survey Required

## 2023-07-06 RX ORDER — SIMVASTATIN 20 MG
TABLET ORAL
Qty: 100 TABLET | Refills: 3 | Status: SHIPPED | OUTPATIENT
Start: 2023-07-06

## 2023-07-06 RX ORDER — LEVOTHYROXINE SODIUM 0.1 MG/1
TABLET ORAL
Qty: 100 TABLET | Refills: 3 | Status: SHIPPED | OUTPATIENT
Start: 2023-07-06

## 2023-07-06 NOTE — PROGRESS NOTES
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 He is on maintenance Revlimid 10 mg 3 weeks out of 4. He has been moderately pancytopenic ever since the daratumumab.     ROS:    Constitutional: No weight loss  Skin: No rash or jaundice  HENT: No change in eyesight or hearing  Cardiovascular:No chest pain or arrythmia  Respiratory:No cough or SOB  GI:No nausea, vomiting, diarrhea, constipation  :No dysuria or frequency  Musculoskeletal:No bone or joint pain  Neuro:No sx's of neuropathy  Psych: No complaints    PMH:      Allergies   Allergen Reactions    Grass Pollen(K-O-R-T-Swt Rodrigo) Shortness of Breath    Pet Dander [Cat Hair Extract] Shortness of Breath and Unspecified    Sagebrush Anaphylaxis and Unspecified     sneezing       Past Medical History:   Diagnosis Date    Bowel habit changes     occasional constipation and diarrhea    Breath shortness     Cancer (HCC)     Disorder of thyroid     hypothyroid    Heart murmur     High cholesterol     Hypertension     not on medication    Light chain myeloma (HCC) 2021    Prostate cancer (HCC)     Psychiatric problem     depression    Seizure (HCC)     None in the past 30 years        Past Surgical History:   Procedure Laterality Date    RI DX BONE MARROW ASPIRATIONS Left 6/10/2022    Procedure: ASPIRATION, BONE MARROW;  Surgeon: Malachi Kay M.D.;  Location: SURGERY SAME DAY [facility];  Service: Orthopedics    RI DX BONE MARROW BIOPSIES Left 6/10/2022    Procedure: BIOPSY, BONE MARROW, USING NEEDLE OR TROCAR - DR. DURANT;  Surgeon: Malachi Kay M.D.;  Location: SURGERY SAME DAY [facility];  Service: Orthopedics    BRACHY THERAPY N/A 12/16/2021    Procedure: BRACHYTHERAPY - PROSTATE SEED AND HYDROGEL SPACER;  Surgeon: Kirk DELANEY M.D.;  Location: SURGERY SAME  OB/GYN  PN Visit  Morris López  [id number]  5/10/2021  11:30 AM  Kevin Kenyon MD    S: Patient's  was present today for interpretation which she prefers over    34 y o  Nicolás Clagibson 36w0d here for PN visit  Patient has no questions or concerns she brings to today's visit  She denies any contractions, vaginal bleeding or loss of fluid  She reports regular fetal movement  Reviewed GBS and Tdap today  O:  Vitals:    05/10/21 1100   BP: 117/72   Pulse: 88       Gen: no acute distress, nonlabored breathing, pleasant demeanor  OB exam completed: fundal height 35cm, FHTs 140 bpm    A/P:  #1  36w0d GESTATION  Tdap today  GBS collected today, no allergy to PCN  Labor precautions reviewed  RTC in 1 weeks    #2  Anemia in setting of sickle cell trait  Last Hg 10 0  Continue Fe and stool softener   not tested for sickle trait 2/2 no insurance  UCx in 3rd trimester negative      #3   Contraception: Currently declines    Future Appointments   Date Time Provider Ruthie Gray   5/17/2021  9:15 AM MD Kimberly Dexter [address]     Kevin Kenyon MD  5/10/2021  11:30 AM [facility];  Service: Urology    MN DX BONE MARROW ASPIRATIONS Left 7/23/2021    Procedure: ASPIRATION, BONE MARROW - DURANT;  Surgeon: Hair Okeefe M.D.;  Location: [place]  Service: Orthopedics    MN DX BONE MARROW BIOPSIES Left 7/23/2021    Procedure: BIOPSY, BONE MARROW, USING NEEDLE OR TROCAR;  Surgeon: Hair Okeefe M.D.;  Location: [place]OSCOPY [facility]  Service: Orthopedics    WRIST FUSION Left         Medications:    Current Outpatient Medications on File Prior to Encounter   Medication Sig Dispense Refill    levothyroxine (SYNTHROID) 100 MCG Tab TAKE 1 TABLET BY MOUTH EVERY DAY IN THE MORNING ON AN EMPTY STOMACH 100 Tablet 3    simvastatin (ZOCOR) 20 MG Tab TAKE 1 TABLET BY MOUTH EVERY DAY IN THE EVENING 100 Tablet 3    Lenalidomide 10 MG Cap Take 10mg by mouth daily on days 1-21 of each 28 day cycle. 21 Capsule 0    COVID-19mRNA Bival Vac Moderna (MODERNA COVID-19 BIVALENT) 50 MCG/0.5ML Suspension injection Inject 0.5 ml  into the shoulder, thigh, or buttocks. 0.5 mL 0    sulfamethoxazole-trimethoprim (BACTRIM DS) 800-160 MG tablet Take 1 Tablet by mouth every Monday, Wednesday, and Friday.      sulfamethoxazole-trimethoprim (BACTRIM DS) 800-160 MG tablet Take 1 Tablet by mouth every Monday, Wednesday, and Friday.      tamsulosin (FLOMAX) 0.4 MG capsule TAKE 2 CAPSULES BY MOUTH EVERY DAY FOR 90 DAYS. TAKE 30 MINUTES AFTER DINNER      Zoster Vac Recomb Adjuvanted (SHINGRIX) 50 MCG/0.5ML Recon Susp Inject  into the shoulder, thigh, or buttocks. 0.5 mL 0    atropine 1 % Solution INSTILL 1 DROP IN PRE OPERATIVE EYE THREE TIMES A DAY STARTING TWO DAYS PRIOR TO SURGERY      acyclovir (ZOVIRAX) 400 MG tablet Take 400 mg by mouth 3 times a day.      acyclovir (ZOVIRAX) 400 MG tablet Take 1 Tablet by mouth 3 times a day.      prochlorperazine (COMPAZINE) 10 MG Tab Take 10 mg by mouth.      QUEtiapine (SEROQUEL) 100 MG Tab Take 2 Tablets by mouth every day for 360 days. 180 Tablet 3     "escitalopram (LEXAPRO) 20 MG tablet Take 1 Tablet by mouth every day for 360 days. 90 Tablet 3    Magnesium Hydroxide (MILK OF MAGNESIA PO) Take  by mouth as needed.      VITAMIN D PO Take 8,000 Units by mouth.       No current facility-administered medications on file prior to encounter.       Social History     Tobacco Use    Smoking status: Former     Years: .00     Types: Cigarettes     Quit date:      Years since quittin.5    Smokeless tobacco: Never   Substance Use Topics    Alcohol use: Never        Family History   Problem Relation Age of Onset    Cancer Neg Hx         Objective    Vitals:    /60 (BP Location: Right arm, Patient Position: Sitting, BP Cuff Size: Adult)   Pulse 84   Temp 35.9 °C (96.6 °F) (Temporal)   Resp 14   Ht 1.651 m (5' 5\")   Wt 77.7 kg (171 lb 3.2 oz)   SpO2 96%   BMI 28.49 kg/m²     Physical Exam:    Appears well-developed and well-nourished. No distress.    Head -  Normocephalic .   Eyes - Pupils are equal. Conjunctivae normal. No scleral icterus.   Ears - normal hearing  Abdominal -Soft. No distension, tenderness, organomegaly or mass. No palpable spleen  Extremities-  No edema or tenderness.   Neurological -   Alert and oriented.  Skin - Skin is warm and dry. No rash noted. Not diaphoretic. No erythema. No pallor. No jaundice   Psychiatric -  Normal mood and affect.    Labs:     Latest Reference Range & Units 23 10:16 23 09:54 23 11:15 23 09:27   WBC 4.8 - 10.8 K/uL 1.8 (LL) 3.1 (L) 1.7 (LL) 1.4 (LL)   RBC 4.70 - 6.10 M/uL 3.65 (L) 3.65 (L) 2.99 (L) 2.66 (L)   Hemoglobin 14.0 - 18.0 g/dL 13.2 (L) 13.2 (L) 11.1 (L) 10.2 (L)   Hematocrit 42.0 - 52.0 % 38.3 (L) 38.8 (L) 32.3 (L) 29.5 (L)   MCV 81.4 - 97.8 fL 104.9 (H) 106.3 (H) 108.0 (H) 110.9 (H)   MCH 27.0 - 33.0 pg 36.2 (H) 36.2 (H) 37.1 (H) 38.3 (H)   MCHC 32.3 - 36.5 g/dL 34.5 34.0 34.4 34.6   RDW 35.9 - 50.0 fL 58.8 (H) 58.3 (H) 55.3 (H) 55.6 (H)   Platelet Count 164 - 446 K/uL 133 " (L) 117 (L) 98 (L) 53 (L)   MPV 9.0 - 12.9 fL 9.9 9.9 10.7 12.1   Neutrophils-Polys 44.00 - 72.00 % 33.70 (L) 57.60 50.30 25.00 (L)   Neutrophils (Absolute) 1.82 - 7.42 K/uL 0.62 (L) 1.79 (L) 0.84 (L) 0.35 (LL)   Lymphocytes 22.00 - 41.00 % 42.90 (H) 26.40 36.50 45.70 (H)   Lymphs (Absolute) 1.00 - 4.80 K/uL 0.79 (L) 0.82 (L) 0.61 (L) 0.64 (L)   Monocytes 0.00 - 13.40 % 12.50 12.20 9.00 12.90   Monos (Absolute) 0.00 - 0.85 K/uL 0.23 0.38 0.15 0.18   Eosinophils 0.00 - 6.90 % 8.20 (H) 2.90 3.60 12.10 (H)   Eos (Absolute) 0.00 - 0.51 K/uL 0.15 0.09 0.06 0.17   Basophils 0.00 - 1.80 % 2.20 (H) 0.60 0.60 4.30 (H)        [facility] Reference Range & Units 07/28/22 16:51 09/21/22 11:38 06/22/23 11:15   Sodium 135 - 145 mmol/L 135 142 139   Potassium 3.6 - 5.5 mmol/L 3.9 3.7 3.6   Chloride 96 - 112 mmol/L 100 104 105   Co2 20 - 33 mmol/L 25 22 21   Anion Gap 7.0 - 16.0  10.0 16.0 13.0   Glucose 65 - 99 mg/dL 86 117 (H) 109 (H)   Bun 8 - 22 mg/dL 12 14 16   Creatinine 0.50 - 1.40 mg/dL 0.96 0.99 1.16   GFR (CKD-EPI) >60 mL/min/1.73 m 2 85 82 67   Calcium 8.5 - 10.5 mg/dL 8.8 9.6 8.4 (L)   Correct Calcium 8.5 - 10.5 mg/dL   8.7   AST(SGOT) 12 - 45 U/L 22 17 37   ALT(SGPT) 2 - 50 U/L 27 17 58 (H)   Alkaline Phosphatase 30 - 99 U/L 131 (H) 70 100 (H)   Total Bilirubin 0.1 - 1.5 mg/dL 0.3 0.4 0.5   Albumin 3.2 - 4.9 g/dL 4.1 4.4 3.6   Total Protein 6.0 - 8.2 g/dL 5.8 (L) 6.5 6.3   Globulin 1.9 - 3.5 g/dL 1.7 (L) 2.1 2.7   A-G Ratio g/dL 2.4 2.1 1.3      Latest Reference Range & Units 02/01/23 11:47 03/01/23 10:16 04/03/23 09:54 07/07/23 09:27   Immunoglobulin A 68 - 408 mg/dL 8 (L) 24 (L) 24 (L) 66 (L)   Immunoglobulin G 768 - 1632 mg/dL 369 (L) 507 (L) 483 (L) 847   Immunoglobulin M 35 - 263 mg/dL 17 (L) 13 (L) 22 (L) 47      Latest Reference Range & Units 02/01/23 11:47 03/01/23 10:16 04/03/23 09:54 07/07/23 09:27   Free Kappa Light Chains 3.30 - 19.40 mg/L 7.40 13.55 13.28 22.13 (H)   Free Lambda Light Chains 5.71 - 26.30 mg/L  4.08 (L) 5.68 (L) 10.10 19.93   Kappa-Lambda Ratio 0.26 - 1.65  1.81 (H) 2.39 (H) 1.31 1.11         Assessment    Imp:    Visit Diagnosis:    1. Lambda light chain myeloma (HCC)        2. History of stem cell transplant (HCC)        3. Prostate cancer (HCC)        4. Encounter for long-term (current) use of high-risk medication            Plan:  BMX  Return when marrow available with repeat CBC    Gary Aaron M.D.

## 2023-07-07 ENCOUNTER — HOSPITAL ENCOUNTER (OUTPATIENT)
Dept: LAB | Facility: MEDICAL CENTER | Age: 73
End: 2023-07-07
Attending: INTERNAL MEDICINE
Payer: MEDICARE

## 2023-07-07 DIAGNOSIS — C90.00 MULTIPLE MYELOMA NOT HAVING ACHIEVED REMISSION (HCC): ICD-10-CM

## 2023-07-07 LAB
ANISOCYTOSIS BLD QL SMEAR: ABNORMAL
BASOPHILS # BLD AUTO: 4.3 % (ref 0–1.8)
BASOPHILS # BLD: 0.06 K/UL (ref 0–0.12)
BURR CELLS BLD QL SMEAR: NORMAL
EOSINOPHIL # BLD AUTO: 0.17 K/UL (ref 0–0.51)
EOSINOPHIL NFR BLD: 12.1 % (ref 0–6.9)
ERYTHROCYTE [DISTWIDTH] IN BLOOD BY AUTOMATED COUNT: 55.6 FL (ref 35.9–50)
HCT VFR BLD AUTO: 29.5 % (ref 42–52)
HGB BLD-MCNC: 10.2 G/DL (ref 14–18)
LYMPHOCYTES # BLD AUTO: 0.64 K/UL (ref 1–4.8)
LYMPHOCYTES NFR BLD: 45.7 % (ref 22–41)
MACROCYTES BLD QL SMEAR: ABNORMAL
MANUAL DIFF BLD: NORMAL
MCH RBC QN AUTO: 38.3 PG (ref 27–33)
MCHC RBC AUTO-ENTMCNC: 34.6 G/DL (ref 32.3–36.5)
MCV RBC AUTO: 110.9 FL (ref 81.4–97.8)
MONOCYTES # BLD AUTO: 0.18 K/UL (ref 0–0.85)
MONOCYTES NFR BLD AUTO: 12.9 % (ref 0–13.4)
MORPHOLOGY BLD-IMP: NORMAL
NEUTROPHILS # BLD AUTO: 0.35 K/UL (ref 1.82–7.42)
NEUTROPHILS NFR BLD: 25 % (ref 44–72)
NRBC # BLD AUTO: 0 K/UL
NRBC BLD-RTO: 0 /100 WBC (ref 0–0.2)
OVALOCYTES BLD QL SMEAR: NORMAL
PLATELET # BLD AUTO: 53 K/UL (ref 164–446)
PLATELET BLD QL SMEAR: NORMAL
PLATELETS.RETICULATED NFR BLD AUTO: 5.1 % (ref 0.6–13.1)
PMV BLD AUTO: 12.1 FL (ref 9–12.9)
POIKILOCYTOSIS BLD QL SMEAR: NORMAL
RBC # BLD AUTO: 2.66 M/UL (ref 4.7–6.1)
RBC BLD AUTO: PRESENT
WBC # BLD AUTO: 1.4 K/UL (ref 4.8–10.8)

## 2023-07-07 PROCEDURE — 82784 ASSAY IGA/IGD/IGG/IGM EACH: CPT

## 2023-07-07 PROCEDURE — 85025 COMPLETE CBC W/AUTO DIFF WBC: CPT

## 2023-07-07 PROCEDURE — 36415 COLL VENOUS BLD VENIPUNCTURE: CPT

## 2023-07-07 PROCEDURE — 83521 IG LIGHT CHAINS FREE EACH: CPT | Mod: 91

## 2023-07-07 PROCEDURE — 86334 IMMUNOFIX E-PHORESIS SERUM: CPT

## 2023-07-07 PROCEDURE — 84165 PROTEIN E-PHORESIS SERUM: CPT

## 2023-07-07 PROCEDURE — 85055 RETICULATED PLATELET ASSAY: CPT

## 2023-07-07 PROCEDURE — 84155 ASSAY OF PROTEIN SERUM: CPT

## 2023-07-07 PROCEDURE — 85007 BL SMEAR W/DIFF WBC COUNT: CPT

## 2023-07-10 LAB
ALBUMIN SERPL ELPH-MCNC: 3.24 G/DL (ref 3.75–5.01)
ALPHA1 GLOB SERPL ELPH-MCNC: 0.32 G/DL (ref 0.19–0.46)
ALPHA2 GLOB SERPL ELPH-MCNC: 0.68 G/DL (ref 0.48–1.05)
B-GLOBULIN SERPL ELPH-MCNC: 0.65 G/DL (ref 0.48–1.1)
EER MONOCLONAL PROTEIN AND FLC, SERUM Q5224: ABNORMAL
GAMMA GLOB SERPL ELPH-MCNC: 0.81 G/DL (ref 0.62–1.51)
IGA SERPL-MCNC: 66 MG/DL (ref 68–408)
IGG SERPL-MCNC: 847 MG/DL (ref 768–1632)
IGM SERPL-MCNC: 47 MG/DL (ref 35–263)
INTERPRETATION SERPL IFE-IMP: ABNORMAL
INTERPRETATION SERPL IFE-IMP: ABNORMAL
KAPPA LC FREE SER-MCNC: 22.13 MG/L (ref 3.3–19.4)
KAPPA LC FREE/LAMBDA FREE SER NEPH: 1.11 {RATIO} (ref 0.26–1.65)
LAMBDA LC FREE SERPL-MCNC: 19.93 MG/L (ref 5.71–26.3)
MONOCLONAL PROTEIN NL11656: ABNORMAL G/DL
PROT SERPL-MCNC: 5.7 G/DL (ref 6.3–8.2)

## 2023-07-11 ENCOUNTER — PATIENT MESSAGE (OUTPATIENT)
Dept: HEALTH INFORMATION MANAGEMENT | Facility: OTHER | Age: 73
End: 2023-07-11

## 2023-07-11 ENCOUNTER — DOCUMENTATION (OUTPATIENT)
Dept: HEALTH INFORMATION MANAGEMENT | Facility: OTHER | Age: 73
End: 2023-07-11
Payer: MEDICARE

## 2023-07-12 ENCOUNTER — APPOINTMENT (OUTPATIENT)
Dept: ADMISSIONS | Facility: MEDICAL CENTER | Age: 73
End: 2023-07-12
Attending: HOSPITALIST
Payer: MEDICARE

## 2023-07-12 ENCOUNTER — HOSPITAL ENCOUNTER (OUTPATIENT)
Dept: HEMATOLOGY ONCOLOGY | Facility: MEDICAL CENTER | Age: 73
End: 2023-07-12
Attending: INTERNAL MEDICINE
Payer: MEDICARE

## 2023-07-12 VITALS
SYSTOLIC BLOOD PRESSURE: 100 MMHG | BODY MASS INDEX: 28.52 KG/M2 | OXYGEN SATURATION: 96 % | TEMPERATURE: 96.6 F | HEIGHT: 65 IN | DIASTOLIC BLOOD PRESSURE: 60 MMHG | RESPIRATION RATE: 14 BRPM | WEIGHT: 171.2 LBS | HEART RATE: 84 BPM

## 2023-07-12 DIAGNOSIS — C61 PROSTATE CANCER (HCC): ICD-10-CM

## 2023-07-12 DIAGNOSIS — Z94.84 HISTORY OF STEM CELL TRANSPLANT (HCC): ICD-10-CM

## 2023-07-12 DIAGNOSIS — C90.00 LAMBDA LIGHT CHAIN MYELOMA (HCC): ICD-10-CM

## 2023-07-12 DIAGNOSIS — Z79.899 ENCOUNTER FOR LONG-TERM (CURRENT) USE OF HIGH-RISK MEDICATION: ICD-10-CM

## 2023-07-12 PROCEDURE — 99213 OFFICE O/P EST LOW 20 MIN: CPT | Performed by: INTERNAL MEDICINE

## 2023-07-12 PROCEDURE — 99212 OFFICE O/P EST SF 10 MIN: CPT | Performed by: INTERNAL MEDICINE

## 2023-07-12 ASSESSMENT — PAIN SCALES - GENERAL: PAINLEVEL: NO PAIN

## 2023-07-12 ASSESSMENT — FIBROSIS 4 INDEX: FIB4 SCORE: 6.51

## 2023-07-17 ENCOUNTER — PHARMACY VISIT (OUTPATIENT)
Dept: PHARMACY | Facility: MEDICAL CENTER | Age: 73
End: 2023-07-17
Payer: MEDICARE

## 2023-07-17 PROCEDURE — RXMED WILLOW AMBULATORY MEDICATION CHARGE: Performed by: INTERNAL MEDICINE

## 2023-07-20 ENCOUNTER — PRE-ADMISSION TESTING (OUTPATIENT)
Dept: ADMISSIONS | Facility: MEDICAL CENTER | Age: 73
End: 2023-07-20
Attending: HOSPITALIST
Payer: MEDICARE

## 2023-07-21 ENCOUNTER — APPOINTMENT (OUTPATIENT)
Dept: ADMISSIONS | Facility: MEDICAL CENTER | Age: 73
End: 2023-07-21
Attending: INTERNAL MEDICINE
Payer: MEDICARE

## 2023-07-21 DIAGNOSIS — Z01.810 PRE-OPERATIVE CARDIOVASCULAR EXAMINATION: ICD-10-CM

## 2023-07-21 LAB — EKG IMPRESSION: NORMAL

## 2023-07-21 PROCEDURE — 93005 ELECTROCARDIOGRAM TRACING: CPT

## 2023-07-21 PROCEDURE — 93010 ELECTROCARDIOGRAM REPORT: CPT | Performed by: STUDENT IN AN ORGANIZED HEALTH CARE EDUCATION/TRAINING PROGRAM

## 2023-07-24 ENCOUNTER — HOSPITAL ENCOUNTER (OUTPATIENT)
Facility: MEDICAL CENTER | Age: 73
End: 2023-07-24
Attending: INTERNAL MEDICINE | Admitting: INTERNAL MEDICINE
Payer: MEDICARE

## 2023-07-24 VITALS
WEIGHT: 170.19 LBS | DIASTOLIC BLOOD PRESSURE: 59 MMHG | SYSTOLIC BLOOD PRESSURE: 148 MMHG | HEIGHT: 68 IN | BODY MASS INDEX: 25.79 KG/M2 | HEART RATE: 73 BPM | TEMPERATURE: 98.5 F | RESPIRATION RATE: 16 BRPM | OXYGEN SATURATION: 94 %

## 2023-07-24 DIAGNOSIS — C90.00 MULTIPLE MYELOMA, REMISSION STATUS UNSPECIFIED (HCC): ICD-10-CM

## 2023-07-24 LAB
ANISOCYTOSIS BLD QL SMEAR: ABNORMAL
BASOPHILS # BLD AUTO: 1.6 % (ref 0–1.8)
BASOPHILS # BLD: 0.03 K/UL (ref 0–0.12)
COMMENT 1642: NORMAL
EOSINOPHIL # BLD AUTO: 0.05 K/UL (ref 0–0.51)
EOSINOPHIL NFR BLD: 2.7 % (ref 0–6.9)
ERYTHROCYTE [DISTWIDTH] IN BLOOD BY AUTOMATED COUNT: 68 FL (ref 35.9–50)
HCT VFR BLD AUTO: 31.2 % (ref 42–52)
HGB BLD-MCNC: 10.9 G/DL (ref 14–18)
HGB RETIC QN AUTO: 42.7 PG/CELL (ref 29–35)
IMM GRANULOCYTES # BLD AUTO: 0 K/UL (ref 0–0.11)
IMM GRANULOCYTES NFR BLD AUTO: 0 % (ref 0–0.9)
IMM RETICS NFR: 28.8 % (ref 2.6–16.1)
LYMPHOCYTES # BLD AUTO: 0.71 K/UL (ref 1–4.8)
LYMPHOCYTES NFR BLD: 38.4 % (ref 22–41)
MACROCYTES BLD QL SMEAR: ABNORMAL
MCH RBC QN AUTO: 39.5 PG (ref 27–33)
MCHC RBC AUTO-ENTMCNC: 34.9 G/DL (ref 32.3–36.5)
MCV RBC AUTO: 113 FL (ref 81.4–97.8)
MICROCYTES BLD QL SMEAR: ABNORMAL
MONOCYTES # BLD AUTO: 0.25 K/UL (ref 0–0.85)
MONOCYTES NFR BLD AUTO: 13.5 % (ref 0–13.4)
MORPHOLOGY BLD-IMP: NORMAL
NEUTROPHILS # BLD AUTO: 0.81 K/UL (ref 1.82–7.42)
NEUTROPHILS NFR BLD: 43.8 % (ref 44–72)
NRBC # BLD AUTO: 0 K/UL
NRBC BLD-RTO: 0 /100 WBC (ref 0–0.2)
OVALOCYTES BLD QL SMEAR: NORMAL
PATHOLOGY CONSULT NOTE: NORMAL
PLATELET # BLD AUTO: 115 K/UL (ref 164–446)
PLATELET BLD QL SMEAR: NORMAL
PLATELETS.RETICULATED NFR BLD AUTO: 2.7 % (ref 0.6–13.1)
PMV BLD AUTO: 9.3 FL (ref 9–12.9)
POIKILOCYTOSIS BLD QL SMEAR: NORMAL
RBC # BLD AUTO: 2.76 M/UL (ref 4.7–6.1)
RBC BLD AUTO: PRESENT
RETICS # AUTO: 0.08 M/UL (ref 0.04–0.12)
RETICS/RBC NFR: 3 % (ref 0.8–2.6)
WBC # BLD AUTO: 1.9 K/UL (ref 4.8–10.8)

## 2023-07-24 PROCEDURE — 700111 HCHG RX REV CODE 636 W/ 250 OVERRIDE (IP): Mod: JZ | Performed by: INTERNAL MEDICINE

## 2023-07-24 PROCEDURE — 85055 RETICULATED PLATELET ASSAY: CPT

## 2023-07-24 PROCEDURE — 38222 DX BONE MARROW BX & ASPIR: CPT | Performed by: INTERNAL MEDICINE

## 2023-07-24 PROCEDURE — 88341 IMHCHEM/IMCYTCHM EA ADD ANTB: CPT

## 2023-07-24 PROCEDURE — 88360 TUMOR IMMUNOHISTOCHEM/MANUAL: CPT

## 2023-07-24 PROCEDURE — 88369 M/PHMTRC ALYSISHQUANT/SEMIQ: CPT

## 2023-07-24 PROCEDURE — 160027 HCHG SURGERY MINUTES - 1ST 30 MINS LEVEL 2: Performed by: INTERNAL MEDICINE

## 2023-07-24 PROCEDURE — 85046 RETICYTE/HGB CONCENTRATE: CPT

## 2023-07-24 PROCEDURE — 88185 FLOWCYTOMETRY/TC ADD-ON: CPT | Mod: 91

## 2023-07-24 PROCEDURE — 88368 INSITU HYBRIDIZATION MANUAL: CPT

## 2023-07-24 PROCEDURE — 88313 SPECIAL STAINS GROUP 2: CPT

## 2023-07-24 PROCEDURE — 160025 RECOVERY II MINUTES (STATS): Performed by: INTERNAL MEDICINE

## 2023-07-24 PROCEDURE — 99152 MOD SED SAME PHYS/QHP 5/>YRS: CPT | Performed by: INTERNAL MEDICINE

## 2023-07-24 PROCEDURE — 88264 CHROMOSOME ANALYSIS 20-25: CPT

## 2023-07-24 PROCEDURE — 88305 TISSUE EXAM BY PATHOLOGIST: CPT

## 2023-07-24 PROCEDURE — 88342 IMHCHEM/IMCYTCHM 1ST ANTB: CPT

## 2023-07-24 PROCEDURE — 160046 HCHG PACU - 1ST 60 MINS PHASE II: Performed by: INTERNAL MEDICINE

## 2023-07-24 PROCEDURE — 85025 COMPLETE CBC W/AUTO DIFF WBC: CPT

## 2023-07-24 PROCEDURE — 88237 TISSUE CULTURE BONE MARROW: CPT

## 2023-07-24 PROCEDURE — 160048 HCHG OR STATISTICAL LEVEL 1-5: Performed by: INTERNAL MEDICINE

## 2023-07-24 PROCEDURE — 88311 DECALCIFY TISSUE: CPT

## 2023-07-24 PROCEDURE — 88184 FLOWCYTOMETRY/ TC 1 MARKER: CPT

## 2023-07-24 PROCEDURE — 700105 HCHG RX REV CODE 258: Mod: JZ | Performed by: INTERNAL MEDICINE

## 2023-07-24 RX ORDER — HYDROMORPHONE HYDROCHLORIDE 1 MG/ML
0.4 INJECTION, SOLUTION INTRAMUSCULAR; INTRAVENOUS; SUBCUTANEOUS
Status: DISCONTINUED | OUTPATIENT
Start: 2023-07-24 | End: 2023-07-24 | Stop reason: HOSPADM

## 2023-07-24 RX ORDER — SODIUM CHLORIDE 9 MG/ML
500 INJECTION, SOLUTION INTRAVENOUS
Status: DISCONTINUED | OUTPATIENT
Start: 2023-07-24 | End: 2023-07-24 | Stop reason: HOSPADM

## 2023-07-24 RX ORDER — DIPHENHYDRAMINE HYDROCHLORIDE 50 MG/ML
12.5 INJECTION INTRAMUSCULAR; INTRAVENOUS
Status: DISCONTINUED | OUTPATIENT
Start: 2023-07-24 | End: 2023-07-24 | Stop reason: HOSPADM

## 2023-07-24 RX ORDER — HYDROMORPHONE HYDROCHLORIDE 1 MG/ML
0.2 INJECTION, SOLUTION INTRAMUSCULAR; INTRAVENOUS; SUBCUTANEOUS
Status: DISCONTINUED | OUTPATIENT
Start: 2023-07-24 | End: 2023-07-24 | Stop reason: HOSPADM

## 2023-07-24 RX ORDER — ONDANSETRON 2 MG/ML
4 INJECTION INTRAMUSCULAR; INTRAVENOUS
Status: DISCONTINUED | OUTPATIENT
Start: 2023-07-24 | End: 2023-07-24 | Stop reason: HOSPADM

## 2023-07-24 RX ORDER — OXYCODONE HCL 5 MG/5 ML
10 SOLUTION, ORAL ORAL
Status: DISCONTINUED | OUTPATIENT
Start: 2023-07-24 | End: 2023-07-24 | Stop reason: HOSPADM

## 2023-07-24 RX ORDER — MIDAZOLAM HYDROCHLORIDE 1 MG/ML
INJECTION INTRAMUSCULAR; INTRAVENOUS
Status: DISCONTINUED
Start: 2023-07-24 | End: 2023-07-24 | Stop reason: HOSPADM

## 2023-07-24 RX ORDER — HYDROMORPHONE HYDROCHLORIDE 1 MG/ML
0.1 INJECTION, SOLUTION INTRAMUSCULAR; INTRAVENOUS; SUBCUTANEOUS
Status: DISCONTINUED | OUTPATIENT
Start: 2023-07-24 | End: 2023-07-24 | Stop reason: HOSPADM

## 2023-07-24 RX ORDER — SODIUM CHLORIDE, SODIUM LACTATE, POTASSIUM CHLORIDE, CALCIUM CHLORIDE 600; 310; 30; 20 MG/100ML; MG/100ML; MG/100ML; MG/100ML
INJECTION, SOLUTION INTRAVENOUS CONTINUOUS
Status: DISCONTINUED | OUTPATIENT
Start: 2023-07-24 | End: 2023-07-24 | Stop reason: HOSPADM

## 2023-07-24 RX ORDER — MIDAZOLAM HYDROCHLORIDE 1 MG/ML
.5-2 INJECTION INTRAMUSCULAR; INTRAVENOUS PRN
Status: DISCONTINUED | OUTPATIENT
Start: 2023-07-24 | End: 2023-07-24 | Stop reason: HOSPADM

## 2023-07-24 RX ORDER — OXYCODONE HCL 5 MG/5 ML
5 SOLUTION, ORAL ORAL
Status: DISCONTINUED | OUTPATIENT
Start: 2023-07-24 | End: 2023-07-24 | Stop reason: HOSPADM

## 2023-07-24 RX ADMIN — SODIUM CHLORIDE, POTASSIUM CHLORIDE, SODIUM LACTATE AND CALCIUM CHLORIDE: 600; 310; 30; 20 INJECTION, SOLUTION INTRAVENOUS at 10:45

## 2023-07-24 ASSESSMENT — PAIN DESCRIPTION - PAIN TYPE
TYPE: SURGICAL PAIN

## 2023-07-24 ASSESSMENT — FIBROSIS 4 INDEX: FIB4 SCORE: 6.51

## 2023-07-24 NOTE — PROCEDURES
Bone Marrow Biopsy/Aspiration    Date/Time: 7/24/2023 12:20 PM    Performed by: Kasia Rojas M.D.  Authorized by: Kasia Rojas M.D.    Consent:     Consent obtained:  Verbal    Consent given by:  Patient    Risks discussed:  Bleeding, infection, pain and repeat procedure    Alternatives discussed:  No treatment  Universal protocol:     Procedure explained and questions answered to patient or proxy's satisfaction: yes      Relevant documents present and verified: yes      Test results available and properly labeled: yes      Imaging studies available: yes      Required blood products, implants, devices, and special equipment available: yes      Immediately prior to procedure a time out was called: yes      Site/side marked: yes      Patient identity confirmed:  Verbally with patient  Pre-procedure details:     Procedure type:  Aspiration and biopsy    Requesting physician:  Dr Aaron    Indications:  Multiple Myeloma    Position:  Prone    Buttock laterality:  Left    Local anesthetic:  1% Lidocaine    Subcutaneous volume:  1 mL    Periosteum anesthetic volume:  4 mL    Preparation: Patient was prepped and draped in usual sterile fashion    Sedation:     Patient Sedated: Yes      Sedation type: moderate (conscious) sedation      Sedation:  Midazolam    Sedation Dosage:  2 mg    Analgesia:  Fentanyl    Analgesia Dosage:  50 mcg    Sedation Start Time:  12:13 PDT    Sedation Stop Time:  12:19 PDT    Moderate sedation charge selection based on time above is:  15 minutes      I was personally present and supervised the patient throughout the entirety of the moderate sedation time mentioned above.  Procedure details:     Aspirate obtained:  5 mL followed by 5 mL    Biopsy performed:  1 core    Number of attempts:  1  Post-procedure:     Puncture site:  Adhesive bandage applied and direct pressure applied    Patient tolerance of procedure:  Tolerated well, no immediate complications

## 2023-07-24 NOTE — DISCHARGE INSTRUCTIONS
BONE MARROW ASPIRATION & BIOPSY DISCHARGE INSTRUCTIONS    After the numbing medicine wears off, you may feel some discomfort.    Keep bandage clean and dry for 24 hours.  After this time, you can change the bandage.  You may now bathe or shower.    If bleeding occurs after your bone marrow aspiration or biopsy, apply pressure to the area and call your doctor immediately.    Call your doctor if pain persists for greater than 24 hours in the area where you had your aspiration or biopsy.    Call your doctor immediately if you notice redness or drainage in the area or if you have a fever.    Call your doctor if you have numbness or weakness in the area where the doctor took the bone marrow or down your leg.    Do not drive or drink alcohol for 24 hours if you have had sedation medication.  The medication will make you drowsy.    Resume your regular diet.    Follow up with your doctor.          I acknowledge receipt and understanding of these Home Care Instructions.

## 2023-07-24 NOTE — OR NURSING
1222 from OR to PACU 7. Connected to monitor. Report received from anesthesia & RN. VSS on room air. Breaths calm, even, unlabored.  Band Aid to left hip visualized; clean dry and intact.     1235 Pt denies pain or nausea, states no needs at this time. Updated spouse via phone call and reviewed discharge instructions. Verbalizes understanding and states she is 20 minutes away.     1255 Pt helped to edge of bed. Dressing independently. PIV removed with tip intact.    1305 Pt ambulated out of unit with all belongings. Discharged home to responsible adult.

## 2023-07-28 DIAGNOSIS — C90.00 LAMBDA LIGHT CHAIN MYELOMA (HCC): ICD-10-CM

## 2023-07-28 RX ORDER — LENALIDOMIDE 10 MG/1
CAPSULE ORAL
Qty: 21 CAPSULE | Refills: 0 | Status: SHIPPED | OUTPATIENT
Start: 2023-07-28 | End: 2023-08-29 | Stop reason: SDUPTHER

## 2023-07-28 NOTE — PROGRESS NOTES
Lenalidomide refill  Today's Date: Jul 28, 2023  Patient Name: JAMES GRANT  Authorization Number: [id number]  This Authorization is valid for: 30 days  Authorization Status: Patient Survey Required

## 2023-08-07 ENCOUNTER — HOSPITAL ENCOUNTER (OUTPATIENT)
Dept: LAB | Facility: MEDICAL CENTER | Age: 73
End: 2023-08-07
Attending: INTERNAL MEDICINE
Payer: MEDICARE

## 2023-08-07 DIAGNOSIS — Z79.899 ENCOUNTER FOR LONG-TERM (CURRENT) USE OF HIGH-RISK MEDICATION: ICD-10-CM

## 2023-08-07 DIAGNOSIS — Z94.84 HISTORY OF STEM CELL TRANSPLANT (HCC): ICD-10-CM

## 2023-08-07 LAB
BASOPHILS # BLD AUTO: 0.3 % (ref 0–1.8)
BASOPHILS # BLD: 0.01 K/UL (ref 0–0.12)
EOSINOPHIL # BLD AUTO: 0.04 K/UL (ref 0–0.51)
EOSINOPHIL NFR BLD: 1.3 % (ref 0–6.9)
ERYTHROCYTE [DISTWIDTH] IN BLOOD BY AUTOMATED COUNT: 69.6 FL (ref 35.9–50)
HCT VFR BLD AUTO: 29.4 % (ref 42–52)
HGB BLD-MCNC: 9.8 G/DL (ref 14–18)
IMM GRANULOCYTES # BLD AUTO: 0 K/UL (ref 0–0.11)
IMM GRANULOCYTES NFR BLD AUTO: 0 % (ref 0–0.9)
LYMPHOCYTES # BLD AUTO: 0.86 K/UL (ref 1–4.8)
LYMPHOCYTES NFR BLD: 27 % (ref 22–41)
MCH RBC QN AUTO: 37.8 PG (ref 27–33)
MCHC RBC AUTO-ENTMCNC: 33.3 G/DL (ref 32.3–36.5)
MCV RBC AUTO: 113.5 FL (ref 81.4–97.8)
MONOCYTES # BLD AUTO: 0.4 K/UL (ref 0–0.85)
MONOCYTES NFR BLD AUTO: 12.5 % (ref 0–13.4)
NEUTROPHILS # BLD AUTO: 1.88 K/UL (ref 1.82–7.42)
NEUTROPHILS NFR BLD: 58.9 % (ref 44–72)
NRBC # BLD AUTO: 0 K/UL
NRBC BLD-RTO: 0 /100 WBC (ref 0–0.2)
PLATELET # BLD AUTO: 101 K/UL (ref 164–446)
PMV BLD AUTO: 10 FL (ref 9–12.9)
RBC # BLD AUTO: 2.59 M/UL (ref 4.7–6.1)
WBC # BLD AUTO: 3.2 K/UL (ref 4.8–10.8)

## 2023-08-07 PROCEDURE — 36415 COLL VENOUS BLD VENIPUNCTURE: CPT

## 2023-08-07 PROCEDURE — 85025 COMPLETE CBC W/AUTO DIFF WBC: CPT

## 2023-08-09 NOTE — PROGRESS NOTES
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 He has had persistent low counts. BMX was done 7/24/23 because of persistent pancytopenia. It is basically negative with no evidence of recurrent myeloma but was hypocellular. He develoiped pancytopenia after one cycle of daratumumab.     ROS:    Constitutional: No weight loss  Skin: No rash or jaundice  HENT: No change in eyesight or hearing  Cardiovascular:No chest pain or arrythmia  Respiratory:No cough or SOB  GI:No nausea, vomiting, diarrhea, constipation  :No dysuria or frequency  Musculoskeletal:No bone or joint pain  Neuro:No sx's of neuropathy  Psych: No complaints    PMH:      Allergies   Allergen Reactions    Grass Pollen(K-O-R-T-Swt Rodrigo) Unspecified     sneezing    Pet Dander [Cat Hair Extract] Unspecified     sneezing    Sagebrush Unspecified     sneezing       Past Medical History:   Diagnosis Date    Breath shortness 07/20/2023    with exertion    Cancer (HCC) 07/20/2023    prostate    Cancer (HCC) 07/20/2023    myeloma at present    Cataract 07/20/2023    IOLOU 2023    Disorder of thyroid 07/20/2023    hypothyroid, medicated    Heart murmur 07/20/2023    High cholesterol 07/20/2023    medicated    Light chain myeloma (HCC) 2021    Prostate cancer (HCC)     Psychiatric problem 07/20/2023    depression, medicated    Seizure (HCC)     None in the past 30 years        Past Surgical History:   Procedure Laterality Date    UT DX BONE MARROW ASPIRATIONS Left 7/24/2023    Procedure: BONE MARROW CORE AND ASPIRATION - DR DURANT;  Surgeon: Kasia Rojas M.D.;  Location: SURGERY SAME DAY [facility];  Service: Orthopedics    UT DX BONE MARROW BIOPSIES Left 7/24/2023    Procedure: BIOPSY, BONE MARROW, USING NEEDLE OR TROCAR;  Surgeon: Kasia Rojas M.D.;  Location: SURGERY SAME DAY  [facility];  Service: Orthopedics    OTHER ORTHOPEDIC SURGERY Left 07/20/2023    shoulder repair    OTHER ABDOMINAL SURGERY  07/20/2023    appendectomy    OTHER Bilateral 07/20/2023 2023 IOLOU    LA DX BONE MARROW ASPIRATIONS Left 06/10/2022    Procedure: ASPIRATION, BONE MARROW;  Surgeon: Malachi Kay M.D.;  Location: SURGERY SAME DAY [facility];  Service: Orthopedics    LA DX BONE MARROW BIOPSIES Left 06/10/2022    Procedure: BIOPSY, BONE MARROW, USING NEEDLE OR TROCAR - DR. DURANT;  Surgeon: Malachi Kay M.D.;  Location: SURGERY SAME DAY [facility];  Service: Orthopedics    BRACHY THERAPY N/A 12/16/2021    Procedure: BRACHYTHERAPY - PROSTATE SEED AND HYDROGEL SPACER;  Surgeon: Kirk DELANEY M.D.;  Location: SURGERY SAME DAY [facility];  Service: Urology    LA DX BONE MARROW ASPIRATIONS Left 07/23/2021    Procedure: ASPIRATION, BONE MARROW - CARLEEN;  Surgeon: Hair Okeefe M.D.;  Location: ENDOSCOPY [facility];  Service: Orthopedics    LA DX BONE MARROW BIOPSIES Left 07/23/2021    Procedure: BIOPSY, BONE MARROW, USING NEEDLE OR TROCAR;  Surgeon: Hair Okeefe M.D.;  Location: ENDOSCOPY [facility];  Service: Orthopedics    WRIST FUSION Left         Medications:    Current Outpatient Medications on File Prior to Encounter   Medication Sig Dispense Refill    Lenalidomide 10 MG Cap Take 10mg by mouth daily on days 1-21 of each 28 day cycle. 21 Capsule 0    Zoster Vac Recomb Adjuvanted (SHINGRIX) 50 MCG/0.5ML Recon Susp Inject 0.5 mL into the shoulder, thigh, or buttocks. 0.5 mL 0    levothyroxine (SYNTHROID) 100 MCG Tab TAKE 1 TABLET BY MOUTH EVERY DAY IN THE MORNING ON AN EMPTY STOMACH 100 Tablet 3    simvastatin (ZOCOR) 20 MG Tab TAKE 1 TABLET BY MOUTH EVERY DAY IN THE EVENING 100 Tablet 3    COVID-19mRNA Bival Vac Moderna (MODERNA COVID-19 BIVALENT) 50 MCG/0.5ML Suspension injection Inject 0.5 ml  into the shoulder, thigh, or buttocks. 0.5 mL 0    tamsulosin (FLOMAX) 0.4 MG capsule TAKE 2 CAPSULES BY MOUTH  "EVERY DAY FOR 90 DAYS. TAKE 30 MINUTES AFTER DINNER      acyclovir (ZOVIRAX) 400 MG tablet Take 400 mg by mouth at bedtime.      QUEtiapine (SEROQUEL) 100 MG Tab Take 2 Tablets by mouth every day for 360 days. (Patient taking differently: Take 50 mg by mouth at bedtime.) 180 Tablet 3    escitalopram (LEXAPRO) 20 MG tablet Take 1 Tablet by mouth every day for 360 days. 90 Tablet 3    Magnesium Hydroxide (MILK OF MAGNESIA PO) Take  by mouth as needed.      VITAMIN D PO Take 8,000 Units by mouth.       No current facility-administered medications on file prior to encounter.       Social History     Tobacco Use    Smoking status: Former     Packs/day: 1.00     Years: 20.00     Pack years: 20.00     Types: Cigarettes     Quit date:      Years since quittin.6    Smokeless tobacco: Never   Substance Use Topics    Alcohol use: Not Currently        Family History   Problem Relation Age of Onset    Cancer Neg Hx         Objective    Vitals:    /70 (BP Location: Left arm, Patient Position: Sitting, BP Cuff Size: Adult)   Pulse 99   Temp 36.3 °C (97.3 °F) (Temporal)   Resp 14   Ht 1.727 m (5' 8\")   Wt 80.9 kg (178 lb 5.6 oz)   SpO2 96%   BMI 27.12 kg/m²     Physical Exam:    Appears well-developed and well-nourished. No distress.    Head -  Normocephalic .   Eyes - Pupils are equal. Conjunctivae normal. No scleral icterus.   Ears - normal hearing  Neurological -   Alert and oriented.  Skin - Skin is warm and dry. No rash noted. Not diaphoretic. No erythema. No pallor. No jaundice   Psychiatric -  Normal mood and affect.    Labs:     Latest Reference Range & Units 23 11:15 23 09:27 23 10:30 23 15:34   WBC 4.8 - 10.8 K/uL 1.7 (LL) 1.4 (LL) 1.9 (LL) 3.2 (L)   RBC 4.70 - 6.10 M/uL 2.99 (L) 2.66 (L) 2.76 (L) 2.59 (L)   Hemoglobin 14.0 - 18.0 g/dL 11.1 (L) 10.2 (L) 10.9 (L) 9.8 (L)   Hematocrit 42.0 - 52.0 % 32.3 (L) 29.5 (L) 31.2 (L) 29.4 (L)   MCV 81.4 - 97.8 fL 108.0 (H) 110.9 (H) " 113.0 (H) 113.5 (H)   MCH 27.0 - 33.0 pg 37.1 (H) 38.3 (H) 39.5 (H) 37.8 (H)   MCHC 32.3 - 36.5 g/dL 34.4 34.6 34.9 33.3   RDW 35.9 - 50.0 fL 55.3 (H) 55.6 (H) 68.0 (H) 69.6 (H)   Platelet Count 164 - 446 K/uL 98 (L) 53 (L) 115 (L) 101 (L)      Latest Reference Range & Units 03/01/23 10:16 04/03/23 09:54 07/07/23 09:27   Immunoglobulin A 68 - 408 mg/dL 24 (L) 24 (L) 66 (L)   Immunoglobulin G 768 - 1632 mg/dL 507 (L) 483 (L) 847   Immunoglobulin M 35 - 263 mg/dL 13 (L) 22 (L) 47      Latest Reference Range & Units 03/01/23 10:16 04/03/23 09:54 07/07/23 09:27   Free Kappa Light Chains 3.30 - 19.40 mg/L 13.55 13.28 22.13 (H)   Free Lambda Light Chains 5.71 - 26.30 mg/L 5.68 (L) 10.10 19.93   Kappa-Lambda Ratio 0.26 - 1.65  2.39 (H) 1.31 1.11     Assessment    Imp:    Visit Diagnosis:    1. Lambda light chain myeloma (HCC)  CBC WITH DIFFERENTIAL    Comp Metabolic Panel    Monoclonal Protein and FLC, Serum      2. History of stem cell transplant (HCC)  CBC WITH DIFFERENTIAL    Comp Metabolic Panel    Monoclonal Protein and FLC, Serum      3. Prostate cancer (HCC)        4. Encounter for long-term (current) use of high-risk medication        5. Chemotherapy-induced neutropenia (HCC)  CBC WITH DIFFERENTIAL    Comp Metabolic Panel    Monoclonal Protein and FLC, Serum      6. Pancytopenia (HCC)  CBC WITH DIFFERENTIAL    Comp Metabolic Panel    Monoclonal Protein and FLC, Serum          Plan:  4 months with lab    Gary Aaron M.D.

## 2023-08-14 ENCOUNTER — HOSPITAL ENCOUNTER (OUTPATIENT)
Dept: HEMATOLOGY ONCOLOGY | Facility: MEDICAL CENTER | Age: 73
End: 2023-08-14
Attending: INTERNAL MEDICINE
Payer: MEDICARE

## 2023-08-14 VITALS
HEIGHT: 68 IN | SYSTOLIC BLOOD PRESSURE: 126 MMHG | HEART RATE: 99 BPM | DIASTOLIC BLOOD PRESSURE: 70 MMHG | RESPIRATION RATE: 14 BRPM | OXYGEN SATURATION: 96 % | BODY MASS INDEX: 27.03 KG/M2 | TEMPERATURE: 97.3 F | WEIGHT: 178.35 LBS

## 2023-08-14 DIAGNOSIS — D61.818 PANCYTOPENIA (HCC): ICD-10-CM

## 2023-08-14 DIAGNOSIS — C61 PROSTATE CANCER (HCC): ICD-10-CM

## 2023-08-14 DIAGNOSIS — C90.00 LAMBDA LIGHT CHAIN MYELOMA (HCC): ICD-10-CM

## 2023-08-14 DIAGNOSIS — Z79.899 ENCOUNTER FOR LONG-TERM (CURRENT) USE OF HIGH-RISK MEDICATION: ICD-10-CM

## 2023-08-14 DIAGNOSIS — T45.1X5A CHEMOTHERAPY-INDUCED NEUTROPENIA (HCC): ICD-10-CM

## 2023-08-14 DIAGNOSIS — Z94.84 HISTORY OF STEM CELL TRANSPLANT (HCC): ICD-10-CM

## 2023-08-14 DIAGNOSIS — D70.1 CHEMOTHERAPY-INDUCED NEUTROPENIA (HCC): ICD-10-CM

## 2023-08-14 PROCEDURE — 99214 OFFICE O/P EST MOD 30 MIN: CPT | Performed by: INTERNAL MEDICINE

## 2023-08-14 PROCEDURE — 99212 OFFICE O/P EST SF 10 MIN: CPT | Performed by: INTERNAL MEDICINE

## 2023-08-14 ASSESSMENT — FIBROSIS 4 INDEX: FIB4 SCORE: 3.42

## 2023-08-14 ASSESSMENT — PAIN SCALES - GENERAL: PAINLEVEL: NO PAIN

## 2023-08-29 DIAGNOSIS — C90.00 LAMBDA LIGHT CHAIN MYELOMA (HCC): ICD-10-CM

## 2023-08-29 RX ORDER — LENALIDOMIDE 10 MG/1
CAPSULE ORAL
Qty: 21 CAPSULE | Refills: 0 | Status: SHIPPED | OUTPATIENT
Start: 2023-08-29 | End: 2023-09-29 | Stop reason: SDUPTHER

## 2023-08-29 NOTE — PROGRESS NOTES
Lenalidomide reorder to [facility] per Dr chandra  Today's Date: Aug 29, 2023  Patient Name: JAMES GRANT  Authorization Number: [id number]

## 2023-09-29 DIAGNOSIS — C90.00 LAMBDA LIGHT CHAIN MYELOMA (HCC): ICD-10-CM

## 2023-09-29 RX ORDER — LENALIDOMIDE 10 MG/1
CAPSULE ORAL
Qty: 21 CAPSULE | Refills: 0 | Status: SHIPPED | OUTPATIENT
Start: 2023-09-29 | End: 2023-11-07 | Stop reason: SDUPTHER

## 2023-09-29 NOTE — PROGRESS NOTES
Lenalidomide refill to [facility] by [facility]  Today's Date: Sep 29, 2023  Patient Name: JAMES GRANT  Authorization Number: [id number]  This Authorization is valid for: 30 days

## 2023-11-05 ENCOUNTER — PATIENT MESSAGE (OUTPATIENT)
Dept: MEDICAL GROUP | Facility: LAB | Age: 73
End: 2023-11-05
Payer: MEDICARE

## 2023-11-05 DIAGNOSIS — Z23 NEED FOR VACCINATION: ICD-10-CM

## 2023-11-06 NOTE — TELEPHONE ENCOUNTER
I have sent an order electronically. Please call pharmacy to make sure they have that ready. If needed we can fax it over.   -Elkin Rebolledo M.D.

## 2023-11-07 DIAGNOSIS — C90.00 LAMBDA LIGHT CHAIN MYELOMA (HCC): ICD-10-CM

## 2023-11-07 RX ORDER — LENALIDOMIDE 10 MG/1
CAPSULE ORAL
Qty: 21 CAPSULE | Refills: 0 | Status: SHIPPED | OUTPATIENT
Start: 2023-11-07 | End: 2023-12-08 | Stop reason: SDUPTHER

## 2023-11-07 NOTE — NON-PROVIDER
Lenalidomide refill to [facility] by [facility]  Today's Date: Nov 07, 2023  Patient Name: JAMES GRANT  Authorization Number: [id number]  This Authorization is valid for: 30 days

## 2023-11-14 ENCOUNTER — HOSPITAL ENCOUNTER (OUTPATIENT)
Dept: LAB | Facility: MEDICAL CENTER | Age: 73
End: 2023-11-14
Attending: INTERNAL MEDICINE
Payer: MEDICARE

## 2023-11-14 DIAGNOSIS — C90.00 LAMBDA LIGHT CHAIN MYELOMA (HCC): ICD-10-CM

## 2023-11-14 DIAGNOSIS — T45.1X5A CHEMOTHERAPY-INDUCED NEUTROPENIA (HCC): ICD-10-CM

## 2023-11-14 DIAGNOSIS — Z94.84 HISTORY OF STEM CELL TRANSPLANT (HCC): ICD-10-CM

## 2023-11-14 DIAGNOSIS — D70.1 CHEMOTHERAPY-INDUCED NEUTROPENIA (HCC): ICD-10-CM

## 2023-11-14 DIAGNOSIS — D61.818 PANCYTOPENIA (HCC): ICD-10-CM

## 2023-11-14 LAB
ALBUMIN SERPL BCP-MCNC: 4.7 G/DL (ref 3.2–4.9)
ALBUMIN/GLOB SERPL: 1.9 G/DL
ALP SERPL-CCNC: 100 U/L (ref 30–99)
ALT SERPL-CCNC: 31 U/L (ref 2–50)
ANION GAP SERPL CALC-SCNC: 11 MMOL/L (ref 7–16)
AST SERPL-CCNC: 28 U/L (ref 12–45)
BASOPHILS # BLD AUTO: 1 % (ref 0–1.8)
BASOPHILS # BLD: 0.03 K/UL (ref 0–0.12)
BILIRUB SERPL-MCNC: 0.2 MG/DL (ref 0.1–1.5)
BUN SERPL-MCNC: 27 MG/DL (ref 8–22)
CALCIUM ALBUM COR SERPL-MCNC: 8.7 MG/DL (ref 8.5–10.5)
CALCIUM SERPL-MCNC: 9.3 MG/DL (ref 8.5–10.5)
CHLORIDE SERPL-SCNC: 107 MMOL/L (ref 96–112)
CO2 SERPL-SCNC: 24 MMOL/L (ref 20–33)
CREAT SERPL-MCNC: 1.25 MG/DL (ref 0.5–1.4)
EOSINOPHIL # BLD AUTO: 0.03 K/UL (ref 0–0.51)
EOSINOPHIL NFR BLD: 1 % (ref 0–6.9)
ERYTHROCYTE [DISTWIDTH] IN BLOOD BY AUTOMATED COUNT: 51.1 FL (ref 35.9–50)
FASTING STATUS PATIENT QL REPORTED: NORMAL
GFR SERPLBLD CREATININE-BSD FMLA CKD-EPI: 61 ML/MIN/1.73 M 2
GLOBULIN SER CALC-MCNC: 2.5 G/DL (ref 1.9–3.5)
GLUCOSE SERPL-MCNC: 118 MG/DL (ref 65–99)
HCT VFR BLD AUTO: 39.7 % (ref 42–52)
HGB BLD-MCNC: 13.2 G/DL (ref 14–18)
IMM GRANULOCYTES # BLD AUTO: 0.01 K/UL (ref 0–0.11)
IMM GRANULOCYTES NFR BLD AUTO: 0.3 % (ref 0–0.9)
LYMPHOCYTES # BLD AUTO: 0.82 K/UL (ref 1–4.8)
LYMPHOCYTES NFR BLD: 26.7 % (ref 22–41)
MCH RBC QN AUTO: 38.2 PG (ref 27–33)
MCHC RBC AUTO-ENTMCNC: 33.2 G/DL (ref 32.3–36.5)
MCV RBC AUTO: 114.7 FL (ref 81.4–97.8)
MONOCYTES # BLD AUTO: 0.37 K/UL (ref 0–0.85)
MONOCYTES NFR BLD AUTO: 12.1 % (ref 0–13.4)
NEUTROPHILS # BLD AUTO: 1.81 K/UL (ref 1.82–7.42)
NEUTROPHILS NFR BLD: 58.9 % (ref 44–72)
NRBC # BLD AUTO: 0 K/UL
NRBC BLD-RTO: 0 /100 WBC (ref 0–0.2)
PLATELET # BLD AUTO: 169 K/UL (ref 164–446)
PMV BLD AUTO: 9.3 FL (ref 9–12.9)
POTASSIUM SERPL-SCNC: 4.8 MMOL/L (ref 3.6–5.5)
PROT SERPL-MCNC: 7.2 G/DL (ref 6–8.2)
RBC # BLD AUTO: 3.46 M/UL (ref 4.7–6.1)
SODIUM SERPL-SCNC: 142 MMOL/L (ref 135–145)
WBC # BLD AUTO: 3.1 K/UL (ref 4.8–10.8)

## 2023-11-14 PROCEDURE — 86334 IMMUNOFIX E-PHORESIS SERUM: CPT

## 2023-11-14 PROCEDURE — 84155 ASSAY OF PROTEIN SERUM: CPT | Mod: XU

## 2023-11-14 PROCEDURE — 85025 COMPLETE CBC W/AUTO DIFF WBC: CPT

## 2023-11-14 PROCEDURE — 84165 PROTEIN E-PHORESIS SERUM: CPT

## 2023-11-14 PROCEDURE — 82784 ASSAY IGA/IGD/IGG/IGM EACH: CPT

## 2023-11-14 PROCEDURE — 83521 IG LIGHT CHAINS FREE EACH: CPT | Mod: 91

## 2023-11-14 PROCEDURE — 80053 COMPREHEN METABOLIC PANEL: CPT

## 2023-11-14 PROCEDURE — 36415 COLL VENOUS BLD VENIPUNCTURE: CPT

## 2023-11-15 ENCOUNTER — PHARMACY VISIT (OUTPATIENT)
Dept: PHARMACY | Facility: MEDICAL CENTER | Age: 73
End: 2023-11-15
Payer: COMMERCIAL

## 2023-11-15 PROCEDURE — RXMED WILLOW AMBULATORY MEDICATION CHARGE: Performed by: INTERNAL MEDICINE

## 2023-11-15 RX ORDER — COVID-19 VACCINE, MRNA 0.04 MG/.418ML
INJECTION, SUSPENSION INTRAMUSCULAR
Qty: 0.3 ML | Refills: 0 | OUTPATIENT
Start: 2023-11-15

## 2023-11-17 LAB
ALBUMIN SERPL ELPH-MCNC: 4.32 G/DL (ref 3.75–5.01)
ALPHA1 GLOB SERPL ELPH-MCNC: 0.25 G/DL (ref 0.19–0.46)
ALPHA2 GLOB SERPL ELPH-MCNC: 0.81 G/DL (ref 0.48–1.05)
B-GLOBULIN SERPL ELPH-MCNC: 0.71 G/DL (ref 0.48–1.1)
EER MONOCLONAL PROTEIN AND FLC, SERUM Q5224: ABNORMAL
GAMMA GLOB SERPL ELPH-MCNC: 0.71 G/DL (ref 0.62–1.51)
IGA SERPL-MCNC: 34 MG/DL (ref 68–408)
IGG SERPL-MCNC: 817 MG/DL (ref 768–1632)
IGM SERPL-MCNC: 37 MG/DL (ref 35–263)
INTERPRETATION SERPL IFE-IMP: ABNORMAL
INTERPRETATION SERPL IFE-IMP: ABNORMAL
KAPPA LC FREE SER-MCNC: 11.43 MG/L (ref 3.3–19.4)
KAPPA LC FREE/LAMBDA FREE SER NEPH: 1.3 {RATIO} (ref 0.26–1.65)
LAMBDA LC FREE SERPL-MCNC: 8.76 MG/L (ref 5.71–26.3)
MONOCLONAL PROTEIN NL11656: ABNORMAL G/DL
PROT SERPL-MCNC: 6.8 G/DL (ref 6.3–8.2)

## 2023-11-27 ENCOUNTER — TELEPHONE (OUTPATIENT)
Dept: MEDICAL GROUP | Facility: LAB | Age: 73
End: 2023-11-27
Payer: MEDICARE

## 2023-12-07 ENCOUNTER — OFFICE VISIT (OUTPATIENT)
Dept: MEDICAL GROUP | Facility: LAB | Age: 73
End: 2023-12-07
Payer: MEDICARE

## 2023-12-07 VITALS
BODY MASS INDEX: 29.73 KG/M2 | DIASTOLIC BLOOD PRESSURE: 72 MMHG | TEMPERATURE: 97 F | HEART RATE: 76 BPM | WEIGHT: 185 LBS | HEIGHT: 66 IN | RESPIRATION RATE: 16 BRPM | OXYGEN SATURATION: 94 % | SYSTOLIC BLOOD PRESSURE: 116 MMHG

## 2023-12-07 DIAGNOSIS — C90.00 MULTIPLE MYELOMA, REMISSION STATUS UNSPECIFIED (HCC): ICD-10-CM

## 2023-12-07 DIAGNOSIS — N40.1 BENIGN LOCALIZED PROSTATIC HYPERPLASIA WITH LOWER URINARY TRACT SYMPTOMS (LUTS): ICD-10-CM

## 2023-12-07 DIAGNOSIS — Z23 NEED FOR VACCINATION: ICD-10-CM

## 2023-12-07 DIAGNOSIS — Z52.011 AUTOLOGOUS DONOR OF STEM CELLS: ICD-10-CM

## 2023-12-07 PROCEDURE — 3074F SYST BP LT 130 MM HG: CPT | Performed by: FAMILY MEDICINE

## 2023-12-07 PROCEDURE — 3078F DIAST BP <80 MM HG: CPT | Performed by: FAMILY MEDICINE

## 2023-12-07 PROCEDURE — 99214 OFFICE O/P EST MOD 30 MIN: CPT | Mod: 25 | Performed by: FAMILY MEDICINE

## 2023-12-07 PROCEDURE — G0008 ADMIN INFLUENZA VIRUS VAC: HCPCS | Performed by: FAMILY MEDICINE

## 2023-12-07 PROCEDURE — 90662 IIV NO PRSV INCREASED AG IM: CPT | Performed by: FAMILY MEDICINE

## 2023-12-07 RX ORDER — CLOSTRIDIUM TETANI TOXOID ANTIGEN (FORMALDEHYDE INACTIVATED), CORYNEBACTERIUM DIPHTHERIAE TOXOID ANTIGEN (FORMALDEHYDE INACTIVATED), BORDETELLA PERTUSSIS TOXOID ANTIGEN (GLUTARALDEHYDE INACTIVATED), BORDETELLA PERTUSSIS FILAMENTOUS HEMAGGLUTININ ANTIGEN (FORMALDEHYDE INACTIVATED), BORDETELLA PERTUSSIS PERTACTIN ANTIGEN, AND BORDETELLA PERTUSSIS FIMBRIAE 2/3 ANTIGEN 5; 2; 2.5; 5; 3; 5 [LF]/.5ML; [LF]/.5ML; UG/.5ML; UG/.5ML; UG/.5ML; UG/.5ML
0.5 INJECTION, SUSPENSION INTRAMUSCULAR ONCE
Qty: 0.5 ML | Refills: 0 | Status: SHIPPED | OUTPATIENT
Start: 2023-12-07 | End: 2023-12-15

## 2023-12-07 RX ORDER — NEISSERIA MENINGITIDIS GROUP A CAPSULAR POLYSACCHARIDE TETANUS TOXOID CONJUGATE ANTIGEN, NEISSERIA MENINGITIDIS GROUP C CAPSULAR POLYSACCHARIDE TETANUS TOXOID CONJUGATE ANTIGEN, NEISSERIA MENINGITIDIS GROUP Y CAPSULAR POLYSACCHARIDE TETANUS TOXOID CONJUGATE ANTIGEN, AND NEISSERIA MENINGITIDIS GROUP W-135 CAPSULAR POLYSACCHARIDE TETANUS TOXOID CONJUGATE ANTIGEN 10; 10; 10; 10 UG/.5ML; UG/.5ML; UG/.5ML; UG/.5ML
0.5 INJECTION, SOLUTION INTRAMUSCULAR ONCE
Qty: 0.5 ML | Refills: 0 | Status: SHIPPED
Start: 2023-12-07 | End: 2023-12-07

## 2023-12-07 RX ORDER — NEISSERIA MENINGITIDIS GROUP A CAPSULAR POLYSACCHARIDE TETANUS TOXOID CONJUGATE ANTIGEN, NEISSERIA MENINGITIDIS GROUP C CAPSULAR POLYSACCHARIDE TETANUS TOXOID CONJUGATE ANTIGEN, NEISSERIA MENINGITIDIS GROUP Y CAPSULAR POLYSACCHARIDE TETANUS TOXOID CONJUGATE ANTIGEN, AND NEISSERIA MENINGITIDIS GROUP W-135 CAPSULAR POLYSACCHARIDE TETANUS TOXOID CONJUGATE ANTIGEN 10; 10; 10; 10 UG/.5ML; UG/.5ML; UG/.5ML; UG/.5ML
0.5 INJECTION, SOLUTION INTRAMUSCULAR ONCE
Qty: 0.5 ML | Refills: 0 | Status: SHIPPED | OUTPATIENT
Start: 2023-12-07 | End: 2023-12-28

## 2023-12-07 RX ORDER — FINASTERIDE 5 MG/1
5 TABLET, FILM COATED ORAL DAILY
Qty: 90 TABLET | Refills: 3 | Status: SHIPPED | OUTPATIENT
Start: 2023-12-07

## 2023-12-07 ASSESSMENT — ENCOUNTER SYMPTOMS
ABDOMINAL PAIN: 0
NAUSEA: 0
PALPITATIONS: 0
VOMITING: 0
DIARRHEA: 0
SHORTNESS OF BREATH: 0
CHILLS: 0
FEVER: 0
WHEEZING: 0

## 2023-12-07 ASSESSMENT — FIBROSIS 4 INDEX: FIB4 SCORE: 2.11

## 2023-12-07 NOTE — PROGRESS NOTES
Subjective:   Gary Hedrick is a 73 y.o. male here today for   Chief Complaint   Patient presents with    Follow-Up     # BPH:  -Currently treating with Flomax 0.8 mg.  Is compliant medication, taking daily.  Patient states that he had significant improvement of symptoms for some time; however, the last month he has noticed return of difficulty urination, low urine flow, nocturia.  Denies any dysuria, hematuria, back pain, night sweats, weight loss.  Here to discuss augmenting therapy.    # Multiple myeloma:  -Patient is status post autologous bone marrow stem cell transplant.  He has been doing well; however, recently discontinued chemotherapy due to substantial neutropenia.  We have been working on catching up on immunizations given bone marrow stem cell transplant.  Here to discuss needed vaccines.    Allergies   Allergen Reactions    Grass Pollen(K-O-R-T-Swt Rodrigo) Unspecified     sneezing    Pet Dander [Cat Hair Extract] Unspecified     sneezing    Sagebrush Unspecified     sneezing         Current medicines (including changes today)  Current Outpatient Medications   Medication Sig Dispense Refill    COVID-19 mRNA Vac-Sweta,Pfizer, (COMIRNATY) 30 MCG/0.3ML Suspension Prefilled Syringe injection Inject  into the shoulder, thigh, or buttocks. 0.3 mL 0    Lenalidomide 10 MG Cap Take 10mg by mouth daily on days 1-21 of each 28 day cycle. 21 Capsule 0    escitalopram (LEXAPRO) 20 MG tablet TAKE 1 TABLET BY MOUTH EVERY DAY 90 Tablet 3    QUEtiapine (SEROQUEL) 100 MG Tab TAKE 2 TABLETS BY MOUTH EVERY  Tablet 3    Zoster Vac Recomb Adjuvanted (SHINGRIX) 50 MCG/0.5ML Recon Susp Inject 0.5 mL into the shoulder, thigh, or buttocks. 0.5 mL 0    levothyroxine (SYNTHROID) 100 MCG Tab TAKE 1 TABLET BY MOUTH EVERY DAY IN THE MORNING ON AN EMPTY STOMACH 100 Tablet 3    simvastatin (ZOCOR) 20 MG Tab TAKE 1 TABLET BY MOUTH EVERY DAY IN THE EVENING 100 Tablet 3    COVID-19mRNA Bival Vac Moderna (MODERNA COVID-19  "BIVALENT) 50 MCG/0.5ML Suspension injection Inject 0.5 ml  into the shoulder, thigh, or buttocks. 0.5 mL 0    tamsulosin (FLOMAX) 0.4 MG capsule TAKE 2 CAPSULES BY MOUTH EVERY DAY FOR 90 DAYS. TAKE 30 MINUTES AFTER DINNER      acyclovir (ZOVIRAX) 400 MG tablet Take 400 mg by mouth at bedtime.      Magnesium Hydroxide (MILK OF MAGNESIA PO) Take  by mouth as needed.      VITAMIN D PO Take 8,000 Units by mouth.       No current facility-administered medications for this visit.     He  has a past medical history of Breath shortness (07/20/2023), Cancer (HCC) (07/20/2023), Cancer (HCC) (07/20/2023), Cataract (07/20/2023), Disorder of thyroid (07/20/2023), Heart murmur (07/20/2023), High cholesterol (07/20/2023), Light chain myeloma (HCC) (2021), Prostate cancer ([facility]), Psychiatric problem (07/20/2023), and Seizure ([facility]).    He has no past medical history of Bowel habit changes or Hypertension.    ROS   Review of Systems   Constitutional:  Negative for chills and fever.   Respiratory:  Negative for shortness of breath and wheezing.    Cardiovascular:  Negative for chest pain and palpitations.   Gastrointestinal:  Negative for abdominal pain, diarrhea, nausea and vomiting.      Objective:     Physical Exam:  /72   Pulse 76   Temp 36.1 °C (97 °F) (Temporal)   Resp 16   Ht 1.676 m (5' 5.98\")   Wt 83.9 kg (185 lb)   SpO2 94%  Body mass index is 29.87 kg/m².   Constitutional: Alert, no distress, well-groomed.  Skin: No rashes in visible areas.  Eye: Round. Conjunctiva clear, lids normal. No icterus.   ENMT: Lips pink without lesions, good dentition, moist mucous membranes. Phonation normal.  Neck: No masses, no thyromegaly. Moves freely without pain.  Respiratory: Unlabored respiratory effort, no cough or audible wheeze  Psych: Alert and oriented x3, normal affect and mood.    Assessment and Plan:     1. Benign localized prostatic hyperplasia with lower urinary tract symptoms (LUTS)  -Chronic condition, unstable.  " Patient having return of symptoms.  Will augment therapy with finasteride.  Discussed appropriate medication and potential side effects.  Also place referral to urology for further discussion regarding potential surgical correction.  Patient will follow-up as needed.  - finasteride (PROSCAR) 5 MG Tab; Take 1 Tablet by mouth every day.  Dispense: 90 Tablet; Refill: 3  - Referral to Urology    2. Multiple myeloma, remission status unspecified (HCC)  -Will continue revaccination process after autologous bone marrow transplant.  Will complete vaccines at pharmacy as below.  - haemophilus b polysac conj vaccine (ACTHIB) Recon Soln; Inject 0.5 mL into the shoulder, thigh, or buttocks one time for 1 dose.  Dispense: 0.5 mL; Refill: 0  - hepatitis A-hepatitis B (TWINRIX) 720-20 ELU-MCG/ML injection; Inject 1 mL into the shoulder, thigh, or buttocks one time for 1 dose.  Dispense: 1 mL; Refill: 0  - meningococcal vaccine (MENQUADFI) Solution; Inject 0.5 mL into the shoulder, thigh, or buttocks one time for 1 dose.  Dispense: 0.5 mL; Refill: 0  - pneumococcal 13-Echo Conj Vacc (PREVNAR 13) syringe; Inject 0.5 mL into the shoulder, thigh, or buttocks one time for 1 dose.  Dispense: 0.5 mL; Refill: 0  - tetanus & diphtheria toxoids, adult, (TENIVAC) 5-2 LFU Injection; Inject 0.5 mL into the shoulder, thigh, or buttocks one time for 1 dose.  Dispense: 0.5 mL; Refill: 0    3. Autologous donor of stem cells  -See #2  - haemophilus b polysac conj vaccine (ACTHIB) Recon Soln; Inject 0.5 mL into the shoulder, thigh, or buttocks one time for 1 dose.  Dispense: 0.5 mL; Refill: 0  - hepatitis A-hepatitis B (TWINRIX) 720-20 ELU-MCG/ML injection; Inject 1 mL into the shoulder, thigh, or buttocks one time for 1 dose.  Dispense: 1 mL; Refill: 0  - meningococcal vaccine (MENQUADFI) Solution; Inject 0.5 mL into the shoulder, thigh, or buttocks one time for 1 dose.  Dispense: 0.5 mL; Refill: 0  - pneumococcal 13-Echo Conj Vacc (PREVNAR 13)  syringe; Inject 0.5 mL into the shoulder, thigh, or buttocks one time for 1 dose.  Dispense: 0.5 mL; Refill: 0  - tetanus & diphtheria toxoids, adult, (TENIVAC) 5-2 LFU Injection; Inject 0.5 mL into the shoulder, thigh, or buttocks one time for 1 dose.  Dispense: 0.5 mL; Refill: 0    4. Need for vaccination  -See #2.  Will also complete flu shot today.  - haemophilus b polysac conj vaccine (ACTHIB) Recon Soln; Inject 0.5 mL into the shoulder, thigh, or buttocks one time for 1 dose.  Dispense: 0.5 mL; Refill: 0  - hepatitis A-hepatitis B (TWINRIX) 720-20 ELU-MCG/ML injection; Inject 1 mL into the shoulder, thigh, or buttocks one time for 1 dose.  Dispense: 1 mL; Refill: 0  - meningococcal vaccine (MENQUADFI) Solution; Inject 0.5 mL into the shoulder, thigh, or buttocks one time for 1 dose.  Dispense: 0.5 mL; Refill: 0  - pneumococcal 13-Echo Conj Vacc (PREVNAR 13) syringe; Inject 0.5 mL into the shoulder, thigh, or buttocks one time for 1 dose.  Dispense: 0.5 mL; Refill: 0  - tetanus & diphtheria toxoids, adult, (TENIVAC) 5-2 LFU Injection; Inject 0.5 mL into the shoulder, thigh, or buttocks one time for 1 dose.  Dispense: 0.5 mL; Refill: 0  - Influenza Vaccine, High Dose (65+ Only)      Followup: No follow-ups on file.         PLEASE NOTE: This dictation was created using voice recognition software. I have made every reasonable attempt to correct obvious errors, but I expect that there are errors of grammar and possibly content that I did not discover before finalizing the note.

## 2023-12-07 NOTE — PROGRESS NOTES
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 Repeat BMX done 7/24/23 for persistent pancytopenia was negative for recurrent or persistent myeloma. He is maintained on Revlimid 10 mg, 3 weeks out of 4. Lab is finally improving.     ROS:    Constitutional: No weight loss  Skin: No rash or jaundice  HENT: No change in eyesight or hearing  Cardiovascular:No chest pain or arrythmia  Respiratory:No cough or SOB  GI:No nausea, vomiting, diarrhea, constipation  :No dysuria or frequency  Musculoskeletal:No bone or joint pain  Neuro:No sx's of neuropathy  Psych: No complaints    PMH:      Allergies   Allergen Reactions    Grass Pollen(K-O-R-T-Swt Rodrigo) Unspecified     sneezing    Pet Dander [Cat Hair Extract] Unspecified     sneezing    Sagebrush Unspecified     sneezing       Past Medical History:   Diagnosis Date    Breath shortness 07/20/2023    with exertion    Cancer (HCC) 07/20/2023    prostate    Cancer (HCC) 07/20/2023    myeloma at present    Cataract 07/20/2023    IOLOU 2023    Disorder of thyroid 07/20/2023    hypothyroid, medicated    Heart murmur 07/20/2023    High cholesterol 07/20/2023    medicated    Light chain myeloma (HCC) 2021    Prostate cancer (HCC)     Psychiatric problem 07/20/2023    depression, medicated    Seizure (HCC)     None in the past 30 years        Past Surgical History:   Procedure Laterality Date    MT DX BONE MARROW ASPIRATIONS Left 7/24/2023    Procedure: BONE MARROW CORE AND ASPIRATION - DR DURANT;  Surgeon: Kasia Rojas M.D.;  Location: SURGERY SAME DAY [facility];  Service: Orthopedics    MT DX BONE MARROW BIOPSIES Left 7/24/2023    Procedure: BIOPSY, BONE MARROW, USING NEEDLE OR TROCAR;  Surgeon: Kasia Rojas M.D.;  Location: SURGERY SAME DAY [facility];  Service: Orthopedics    OTHER ORTHOPEDIC SURGERY Left 07/20/2023    shoulder repair    OTHER  ABDOMINAL SURGERY  07/20/2023    appendectomy    OTHER Bilateral 07/20/2023 2023 IOLOU    IN DX BONE MARROW ASPIRATIONS Left 06/10/2022    Procedure: ASPIRATION, BONE MARROW;  Surgeon: Malachi Kay M.D.;  Location: SURGERY SAME DAY [facility];  Service: Orthopedics    IN DX BONE MARROW BIOPSIES Left 06/10/2022    Procedure: BIOPSY, BONE MARROW, USING NEEDLE OR TROCAR - DR. DURANT;  Surgeon: Malachi Kay M.D.;  Location: SURGERY SAME DAY [facility];  Service: Orthopedics    BRACHY THERAPY N/A 12/16/2021    Procedure: BRACHYTHERAPY - PROSTATE SEED AND HYDROGEL SPACER;  Surgeon: Kirk DELANEY M.D.;  Location: SURGERY SAME DAY [facility];  Service: Urology    IN DX BONE MARROW ASPIRATIONS Left 07/23/2021    Procedure: ASPIRATION, BONE MARROW - CARLEEN;  Surgeon: Hair Okeefe M.D.;  Location: ENDOSCOPY [facility];  Service: Orthopedics    IN DX BONE MARROW BIOPSIES Left 07/23/2021    Procedure: BIOPSY, BONE MARROW, USING NEEDLE OR TROCAR;  Surgeon: Hair Okeefe M.D.;  Location: ENDOSCOPY [facility];  Service: Orthopedics    WRIST FUSION Left         Medications:    Current Outpatient Medications on File Prior to Visit   Medication Sig Dispense Refill    COVID-19 mRNA Vac-Sweta,Pfizer, (COMIRNATY) 30 MCG/0.3ML Suspension Prefilled Syringe injection Inject  into the shoulder, thigh, or buttocks. 0.3 mL 0    Lenalidomide 10 MG Cap Take 10mg by mouth daily on days 1-21 of each 28 day cycle. 21 Capsule 0    escitalopram (LEXAPRO) 20 MG tablet TAKE 1 TABLET BY MOUTH EVERY DAY 90 Tablet 3    QUEtiapine (SEROQUEL) 100 MG Tab TAKE 2 TABLETS BY MOUTH EVERY  Tablet 3    Zoster Vac Recomb Adjuvanted (SHINGRIX) 50 MCG/0.5ML Recon Susp Inject 0.5 mL into the shoulder, thigh, or buttocks. 0.5 mL 0    levothyroxine (SYNTHROID) 100 MCG Tab TAKE 1 TABLET BY MOUTH EVERY DAY IN THE MORNING ON AN EMPTY STOMACH 100 Tablet 3    simvastatin (ZOCOR) 20 MG Tab TAKE 1 TABLET BY MOUTH EVERY DAY IN THE EVENING 100 Tablet 3     COVID-19mRNA Bival Vac Moderna (MODERNA COVID-19 BIVALENT) 50 MCG/0.5ML Suspension injection Inject 0.5 ml  into the shoulder, thigh, or buttocks. 0.5 mL 0    tamsulosin (FLOMAX) 0.4 MG capsule TAKE 2 CAPSULES BY MOUTH EVERY DAY FOR 90 DAYS. TAKE 30 MINUTES AFTER DINNER      acyclovir (ZOVIRAX) 400 MG tablet Take 400 mg by mouth at bedtime.      Magnesium Hydroxide (MILK OF MAGNESIA PO) Take  by mouth as needed.      VITAMIN D PO Take 8,000 Units by mouth.       No current facility-administered medications on file prior to visit.       Social History     Tobacco Use    Smoking status: Former     Current packs/day: 0.00     Average packs/day: 1 pack/day for 20.0 years (20.0 ttl pk-yrs)     Types: Cigarettes     Start date:      Quit date:      Years since quittin.9    Smokeless tobacco: Never   Substance Use Topics    Alcohol use: Not Currently        Family History   Problem Relation Age of Onset    Cancer Neg Hx         Objective    Vitals:    There were no vitals taken for this visit.    Physical Exam:    Appears well-developed and well-nourished. No distress.    Head -  Normocephalic .   Eyes - Pupils are equal. Conjunctivae normal. No scleral icterus.   Ears - normal hearing  Skin - Skin is warm and dry. No rash noted. Not diaphoretic. No erythema. No pallor. No jaundice   Psychiatric -  Normal mood and affect.    Labs:     Latest Reference Range & Units 23 09:27 23 10:30 23 15:34 23 06:44   WBC 4.8 - 10.8 K/uL 1.4 (LL) 1.9 (LL) 3.2 (L) 3.1 (L)   RBC 4.70 - 6.10 M/uL 2.66 (L) 2.76 (L) 2.59 (L) 3.46 (L)   Hemoglobin 14.0 - 18.0 g/dL 10.2 (L) 10.9 (L) 9.8 (L) 13.2 (L)   Hematocrit 42.0 - 52.0 % 29.5 (L) 31.2 (L) 29.4 (L) 39.7 (L)   MCV 81.4 - 97.8 fL 110.9 (H) 113.0 (H) 113.5 (H) 114.7 (H)   MCH 27.0 - 33.0 pg 38.3 (H) 39.5 (H) 37.8 (H) 38.2 (H)   MCHC 32.3 - 36.5 g/dL 34.6 34.9 33.3 33.2   RDW 35.9 - 50.0 fL 55.6 (H) 68.0 (H) 69.6 (H) 51.1 (H)   Platelet Count 164 - 446 K/uL  53 (L) 115 (L) 101 (L) 169   MPV 9.0 - 12.9 fL 12.1 9.3 10.0 9.3   Neutrophils-Polys 44.00 - 72.00 % 25.00 (L) 43.80 (L) 58.90 58.90   Neutrophils (Absolute) 1.82 - 7.42 K/uL 0.35 (LL) 0.81 (L) 1.88 1.81 (L)   Lymphocytes 22.00 - 41.00 % 45.70 (H) 38.40 27.00 26.70   Lymphs (Absolute) 1.00 - 4.80 K/uL 0.64 (L) 0.71 (L) 0.86 (L) 0.82 (L)   Monocytes 0.00 - 13.40 % 12.90 13.50 (H) 12.50 12.10      Latest Reference Range & Units 06/22/23 11:15 11/14/23 06:44   Sodium 135 - 145 mmol/L 139 142   Potassium 3.6 - 5.5 mmol/L 3.6 4.8   Chloride 96 - 112 mmol/L 105 107   Co2 20 - 33 mmol/L 21 24   Anion Gap 7.0 - 16.0  13.0 11.0   Glucose 65 - 99 mg/dL 109 (H) 118 (H)   Bun 8 - 22 mg/dL 16 27 (H)   Creatinine 0.50 - 1.40 mg/dL 1.16 1.25   GFR (CKD-EPI) >60 mL/min/1.73 m 2 67 61   Calcium 8.5 - 10.5 mg/dL 8.4 (L) 9.3   Correct Calcium 8.5 - 10.5 mg/dL 8.7 8.7   AST(SGOT) 12 - 45 U/L 37 28   ALT(SGPT) 2 - 50 U/L 58 (H) 31   Alkaline Phosphatase 30 - 99 U/L 100 (H) 100 (H)   Total Bilirubin 0.1 - 1.5 mg/dL 0.5 0.2   Albumin 3.2 - 4.9 g/dL 3.6 4.7   Total Protein 6.0 - 8.2 g/dL 6.3 7.2   Globulin 1.9 - 3.5 g/dL 2.7 2.5   A-G Ratio g/dL 1.3 1.9      Latest Reference Range & Units 04/03/23 09:54 07/07/23 09:27 11/14/23 06:44   Immunoglobulin A 68 - 408 mg/dL 24 (L) 66 (L) 34 (L)   Immunoglobulin G 768 - 1632 mg/dL 483 (L) 847 817   Immunoglobulin M 35 - 263 mg/dL 22 (L) 47 37      Latest Reference Range & Units 04/03/23 09:54 07/07/23 09:27 11/14/23 06:44   Free Kappa Light Chains 3.30 - 19.40 mg/L 13.28 22.13 (H) 11.43   Free Lambda Light Chains 5.71 - 26.30 mg/L 10.10 19.93 8.76   Kappa-Lambda Ratio 0.26 - 1.65  1.31 1.11 1.30     Assessment    Imp:    Visit Diagnosis:    1. Lambda light chain myeloma (HCC)        2. History of stem cell transplant (HCC)        3. Encounter for long-term (current) use of high-risk medication        4. Prostate cancer (HCC)        5. Pancytopenia (HCC)              Plan:      Gary Aaron,  M.D.

## 2023-12-08 DIAGNOSIS — C90.00 LAMBDA LIGHT CHAIN MYELOMA (HCC): ICD-10-CM

## 2023-12-08 RX ORDER — LENALIDOMIDE 10 MG/1
CAPSULE ORAL
Qty: 21 CAPSULE | Refills: 0 | Status: SHIPPED | OUTPATIENT
Start: 2023-12-08 | End: 2024-03-20

## 2023-12-08 NOTE — NON-PROVIDER
Lenalidomide refill to [facility]  Today's Date: Dec 08, 2023  Patient Name: JAMES GRANT  Authorization Number: [id number]  This Authorization is valid for: 30 days

## 2023-12-14 ENCOUNTER — PHARMACY VISIT (OUTPATIENT)
Dept: PHARMACY | Facility: MEDICAL CENTER | Age: 73
End: 2023-12-14
Payer: COMMERCIAL

## 2023-12-14 PROCEDURE — RXMED WILLOW AMBULATORY MEDICATION CHARGE: Performed by: FAMILY MEDICINE

## 2023-12-15 ENCOUNTER — APPOINTMENT (OUTPATIENT)
Dept: HEMATOLOGY ONCOLOGY | Facility: MEDICAL CENTER | Age: 73
End: 2023-12-15
Payer: MEDICARE

## 2023-12-18 ENCOUNTER — HOSPITAL ENCOUNTER (OUTPATIENT)
Dept: HEMATOLOGY ONCOLOGY | Facility: MEDICAL CENTER | Age: 73
End: 2023-12-18
Attending: INTERNAL MEDICINE
Payer: MEDICARE

## 2023-12-18 VITALS
HEART RATE: 85 BPM | BODY MASS INDEX: 31.22 KG/M2 | HEIGHT: 65 IN | WEIGHT: 187.39 LBS | RESPIRATION RATE: 15 BRPM | SYSTOLIC BLOOD PRESSURE: 140 MMHG | OXYGEN SATURATION: 94 % | TEMPERATURE: 97.6 F | DIASTOLIC BLOOD PRESSURE: 82 MMHG

## 2023-12-18 DIAGNOSIS — C90.00 MULTIPLE MYELOMA, REMISSION STATUS UNSPECIFIED (HCC): ICD-10-CM

## 2023-12-18 PROCEDURE — 99212 OFFICE O/P EST SF 10 MIN: CPT

## 2023-12-18 PROCEDURE — 99213 OFFICE O/P EST LOW 20 MIN: CPT | Performed by: INTERNAL MEDICINE

## 2023-12-18 ASSESSMENT — FIBROSIS 4 INDEX: FIB4 SCORE: 2.11

## 2023-12-18 ASSESSMENT — PAIN SCALES - GENERAL: PAINLEVEL: NO PAIN

## 2023-12-18 NOTE — PROGRESS NOTES
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 Repeat BMX done 7/24/23 for persistent pancytopenia was negative for recurrent or persistent myeloma. He was maintained on Revlimid 10 mg, 3 weeks out of 4. Has been off of it for several months. Lab is finally improving.     ROS:     Constitutional: No weight loss  Skin: No rash or jaundice  HENT: No change in eyesight or hearing  Cardiovascular:No chest pain or arrythmia  Respiratory:No cough or SOB  GI:No nausea, vomiting, diarrhea, constipation  :No dysuria or frequency  Musculoskeletal:No bone or joint pain  Neuro:No sx's of neuropathy  Psych: No complaints     PMH:          Allergies  AllergenReactions  Grass Pollen(K-O-R-T-Swt Rodrigo)Unspecified    sneezing  Pet Dander [Cat Hair Extract]Unspecified    sneezing  SagebrushUnspecified    sneezing          Past Medical History  Past Medical History:  DiagnosisDate  Breath lsqsreyjg77/20/2023   with exertion  Cancer (HCC)07/20/2023   prostate  Cancer (HCC)07/20/2023   myeloma at present  Oztnpfyt39/20/2023   IOLOU 2023  Disorder of fhhmgll4807/20/2023   hypothyroid, medicated  Heart isyehr7607/20/2023  High ycvbimywpdj61/20/2023   medicated  Light chain myeloma (HCC)2021  Prostate cancer (HCC)  Psychiatric xopyqrn9007/20/2023   depression, medicated  Seizure (HCC)   None in the past 30 years            Past Surgical History  Past Surgical History:  ProcedureLateralityDate  CO DX BONE MARROW ASPIRATIONSLeft7/24/2023   Procedure: BONE MARROW CORE AND ASPIRATION - DR DURANT;  Surgeon: Kasia Rojas M.D.;  Location: SURGERY SAME DAY [facility];  Service: Orthopedics  CO DX BONE MARROW BIOPSIESLeft7/24/2023   Procedure: BIOPSY, BONE MARROW, USING NEEDLE OR TROCAR;  Surgeon: Kasia Rojas M.D.;  Location: SURGERY SAME DAY [facility];  Service: Orthopedics  OTHER ORTHOPEDIC SURGERYLeft07/20/2023    shoulder repair  OTHER ABDOMINAL SURGERY 07/20/2023   appendectomy  OFYGQMweeiqftk2107/20/2023 2023 IOLOU  IN DX BONE MARROW EVGEWUAKKVMRpqw93/10/2022   Procedure: ASPIRATION, BONE MARROW;  Surgeon: Malachi Kay M.D.;  Location: SURGERY SAME DAY [facility];  Service: Orthopedics  IN DX BONE MARROW MSQNFQIISlvm00/10/2022   Procedure: BIOPSY, BONE MARROW, USING NEEDLE OR TROCAR - DR. DURANT;  Surgeon: Malachi Kay M.D.;  Location: SURGERY SAME DAY [facility];  Service: Orthopedics  BRACHY THERAPYN/A12/16/2021   Procedure: BRACHYTHERAPY - PROSTATE SEED AND HYDROGEL SPACER;  Surgeon: Kirk DELANEY M.D.;  Location: SURGERY SAME DAY [facility];  Service: Urology  IN DX BONE MARROW MPWENLHKIXYNjjk90/23/2021   Procedure: ASPIRATION, BONE MARROW - CARLEEN;  Surgeon: Hair Okeefe M.D.;  Location: ENDOSCOPY [facility];  Service: Orthopedics  IN DX BONE MARROW NXFNCGOUSjnt49/23/2021   Procedure: BIOPSY, BONE MARROW, USING NEEDLE OR TROCAR;  Surgeon: Hair Okeefe M.D.;  Location: ENDOSCOPY [facility];  Service: Orthopedics  WRIST FUSIONLeft          Medications:      Current Outpatient Medications on File Prior to Visit  MedicationSigDispenseRefill  COVID-19 mRNA Vac-Sweta,Pfizer, (COMIRNATY) 30 MCG/0.3ML Suspension Prefilled Syringe injectionInject  into the shoulder, thigh, or buttocks.0.3 mL0  Lenalidomide 10 MG CapTake 10mg by mouth daily on days 1-21 of each 28 day cycle.21 Capsule0  escitalopram (LEXAPRO) 20 MG tabletTAKE 1 TABLET BY MOUTH EVERY DAY90 Tablet3  QUEtiapine (SEROQUEL) 100 MG TabTAKE 2 TABLETS BY MOUTH EVERY [id number] Tablet3  Zoster Vac Recomb Adjuvanted (SHINGRIX) 50 MCG/0.5ML Recon SuspInject 0.5 mL into the shoulder, thigh, or buttocks.0.5 mL0  levothyroxine (SYNTHROID) 100 MCG TabTAKE 1 TABLET BY MOUTH EVERY DAY IN THE MORNING ON AN EMPTY [id number] Tablet3  simvastatin (ZOCOR) 20 MG TabTAKE 1 TABLET BY MOUTH EVERY DAY IN THE [id number] Tablet3  COVID-19mRNA Bival Vac Moderna (MODERNA COVID-19  BIVALENT) 50 MCG/0.5ML Suspension injectionInject 0.5 ml  into the shoulder, thigh, or buttocks.0.5 mL0  tamsulosin (FLOMAX) 0.4 MG capsuleTAKE 2 CAPSULES BY MOUTH EVERY DAY FOR 90 DAYS. TAKE 30 MINUTES AFTER DINNER   acyclovir (ZOVIRAX) 400 MG tabletTake 400 mg by mouth at bedtime.   Magnesium Hydroxide (MILK OF MAGNESIA PO)Take  by mouth as needed.   VITAMIN D POTake 8,000 Units by mouth.        No current facility-administered medications on file prior to visit.          Social History       Tobacco Use  Smoking status:Former    Current packs/day:0.00    Average packs/day:1 pack/day for 20.0 years (20.0 ttl pk-yrs)    Types:Cigarettes    Start date:    Quit date:    Years since quittin.9  Smokeless tobacco:Never  Substance Use Topics  Alcohol use:Not Currently          Family History  Family History  ProblemRelationAge of Onset  CancerNeg Hx          Objective     Vitals:    There were no vitals taken for this visit.     Physical Exam:     Appears well-developed and well-nourished. No distress.    Head -  Normocephalic .  Eyes - Pupils are equal. Conjunctivae normal. No scleral icterus.  Ears - normal hearing  Skin - Skin is warm and dry. No rash noted. Not diaphoretic. No erythema. No pallor. No jaundice  Psychiatric -  Normal mood and affect.     Labs:     Latest Reference Range & Units 23 10:30 23 15:34 23 06:44   WBC 4.8 - 10.8 K/uL 1.9 (LL) 3.2 (L) 3.1 (L)   RBC 4.70 - 6.10 M/uL 2.76 (L) 2.59 (L) 3.46 (L)   Hemoglobin 14.0 - 18.0 g/dL 10.9 (L) 9.8 (L) 13.2 (L)   Hematocrit 42.0 - 52.0 % 31.2 (L) 29.4 (L) 39.7 (L)   MCV 81.4 - 97.8 fL 113.0 (H) 113.5 (H) 114.7 (H)   MCH 27.0 - 33.0 pg 39.5 (H) 37.8 (H) 38.2 (H)   MCHC 32.3 - 36.5 g/dL 34.9 33.3 33.2   RDW 35.9 - 50.0 fL 68.0 (H) 69.6 (H) 51.1 (H)   Platelet Count 164 - 446 K/uL 115 (L) 101 (L) 169   MPV 9.0 - 12.9 fL 9.3 10.0 9.3   Neutrophils-Polys 44.00 - 72.00 % 43.80 (L) 58.90 58.90   Neutrophils (Absolute) 1.82 - 7.42 K/uL  0.81 (L) 1.88 1.81 (L)   Lymphocytes 22.00 - 41.00 % 38.40 27.00 26.70   Lymphs (Absolute) 1.00 - 4.80 K/uL 0.71 (L) 0.86 (L) 0.82 (L)   Monocytes 0.00 - 13.40 % 13.50 (H) 12.50 12.10      Latest Reference Range & Units 07/07/23 09:27 11/14/23 06:44   Immunoglobulin A 68 - 408 mg/dL 66 (L) 34 (L)   Immunoglobulin G 768 - 1632 mg/dL 847 817   Immunoglobulin M 35 - 263 mg/dL 47 37      Latest Reference Range & Units 07/07/23 09:27 11/14/23 06:44   Free Kappa Light Chains 3.30 - 19.40 mg/L 22.13 (H) 11.43   Free Lambda Light Chains 5.71 - 26.30 mg/L 19.93 8.76   Kappa-Lambda Ratio 0.26 - 1.65  1.11 1.30      Assessment   Everything improving  Imp:     Visit Diagnosis:      1.Lambda light chain myeloma (HCC)      2.History of stem cell transplant (HCC)      3.Encounter for long-term (current) use of high-risk medication      4.Prostate cancer (HCC)      5.Pancytopenia (HCC)           Plan:   Stay off the Revlimid for now  3 months with same lab     Gary Aaron M.D.

## 2023-12-27 ENCOUNTER — PHARMACY VISIT (OUTPATIENT)
Dept: PHARMACY | Facility: MEDICAL CENTER | Age: 73
End: 2023-12-27
Payer: COMMERCIAL

## 2023-12-27 PROCEDURE — RXMED WILLOW AMBULATORY MEDICATION CHARGE: Performed by: FAMILY MEDICINE

## 2023-12-28 ENCOUNTER — TELEPHONE (OUTPATIENT)
Dept: HEMATOLOGY ONCOLOGY | Facility: MEDICAL CENTER | Age: 73
End: 2023-12-28
Payer: MEDICARE

## 2023-12-28 NOTE — TELEPHONE ENCOUNTER
Left message r/t the Pt telling [facility] that he has enough Lenalidomide and to not refill yet.  Asked Pt to call back and let us know how many pills he has so we can get back on track for reorders.

## 2024-01-25 ENCOUNTER — TELEPHONE (OUTPATIENT)
Dept: HEMATOLOGY ONCOLOGY | Facility: MEDICAL CENTER | Age: 74
End: 2024-01-25
Payer: MEDICARE

## 2024-01-25 NOTE — TELEPHONE ENCOUNTER
Spoke with Pt. Pt still on hold with Revlimid.  Will check to see if labs are done prior to March 18th appt..  See if [facility] wants the pt to start again.  Has leftover pills.

## 2024-02-05 ENCOUNTER — HOSPITAL ENCOUNTER (OUTPATIENT)
Dept: LAB | Facility: MEDICAL CENTER | Age: 74
End: 2024-02-05
Payer: MEDICARE

## 2024-02-05 ENCOUNTER — OFFICE VISIT (OUTPATIENT)
Dept: UROLOGY | Facility: MEDICAL CENTER | Age: 74
End: 2024-02-05
Payer: MEDICARE

## 2024-02-05 VITALS
WEIGHT: 191.1 LBS | HEART RATE: 67 BPM | TEMPERATURE: 98.2 F | DIASTOLIC BLOOD PRESSURE: 85 MMHG | HEIGHT: 68 IN | SYSTOLIC BLOOD PRESSURE: 162 MMHG | BODY MASS INDEX: 28.96 KG/M2 | OXYGEN SATURATION: 94 %

## 2024-02-05 DIAGNOSIS — N40.1 BENIGN LOCALIZED PROSTATIC HYPERPLASIA WITH LOWER URINARY TRACT SYMPTOMS (LUTS): ICD-10-CM

## 2024-02-05 LAB
POC POST-VOID: 161 ML
POC PRE-VOID: 238 ML
PSA SERPL-MCNC: <0.02 NG/ML (ref 0–4)

## 2024-02-05 PROCEDURE — 36415 COLL VENOUS BLD VENIPUNCTURE: CPT

## 2024-02-05 PROCEDURE — 99204 OFFICE O/P NEW MOD 45 MIN: CPT

## 2024-02-05 PROCEDURE — 84153 ASSAY OF PSA TOTAL: CPT

## 2024-02-05 PROCEDURE — 3079F DIAST BP 80-89 MM HG: CPT

## 2024-02-05 PROCEDURE — 3077F SYST BP >= 140 MM HG: CPT

## 2024-02-05 PROCEDURE — 51798 US URINE CAPACITY MEASURE: CPT

## 2024-02-05 RX ORDER — OXYBUTYNIN CHLORIDE 10 MG/1
10 TABLET, EXTENDED RELEASE ORAL DAILY
Qty: 30 TABLET | Refills: 1 | Status: SHIPPED | OUTPATIENT
Start: 2024-02-05 | End: 2024-03-28

## 2024-02-05 ASSESSMENT — FIBROSIS 4 INDEX: FIB4 SCORE: 2.11

## 2024-02-05 NOTE — PROGRESS NOTES
Subjective  Gary Hedrick is a 73 y.o. male who presents today for evaluation of BPH with LUTS    Prostate CA last radiation treatment 1 year ago was being seen by [facility]rology [place] on 21 oscar 7 score was referred to Dr Singer for radiation. Patient was also on TRT treatment for hypogonadism and was DC with CaP.Patient reports that he did 28 treatments of radiation.     Stem cell transplant for multiple myeloma.    Currently taking flomax 0.8mg and started finasteride 1 month ago. Patients symptoms are nocturia voiding 3 times per night. Not feeling like he is emptying his bladder well. Voiding 6x/day. Endorses weak urinary stream and difficulty starting, along with urgency and leakage at times.Patient denies dribbling. Patient denies hematuria,  dysuria at times- not regularly and no incontinence reported. Patient reports regular bowel movements daily.  Patient reports sensation of incomplete emptying at times.    No urinary tract infection like symptoms such as fever, chills, and back pain.    Family History   Problem Relation Age of Onset    Cancer Neg Hx        Social History     Socioeconomic History    Marital status:      Spouse name: Not on file    Number of children: Not on file    Years of education: Not on file    Highest education level: Not on file   Occupational History    Not on file   Tobacco Use    Smoking status: Former     Current packs/day: 0.00     Average packs/day: 1 pack/day for 20.0 years (20.0 ttl pk-yrs)     Types: Cigarettes     Start date:      Quit date:      Years since quittin.1    Smokeless tobacco: Never   Vaping Use    Vaping Use: Never used   Substance and Sexual Activity    Alcohol use: Not Currently    Drug use: Not Currently     Types: Marijuana     Comment: quit     Sexual activity: Not on file   Other Topics Concern    Not on file   Social History Narrative    Lives in [place], [place] with wife. 2 children; 1 lives local. Works as a clinical  .      Social Determinants of Health     Financial Resource Strain: Not on file   Food Insecurity: Not on file   Transportation Needs: Not on file   Physical Activity: Not on file   Stress: Not on file   Social Connections: Not on file   Intimate Partner Violence: Not on file   Housing Stability: Not on file       Past Surgical History:   Procedure Laterality Date    MD DX BONE MARROW ASPIRATIONS Left 7/24/2023    Procedure: BONE MARROW CORE AND ASPIRATION - DR DURANT;  Surgeon: Kasia Rojas M.D.;  Location: SURGERY SAME DAY [facility];  Service: Orthopedics    MD DX BONE MARROW BIOPSIES Left 7/24/2023    Procedure: BIOPSY, BONE MARROW, USING NEEDLE OR TROCAR;  Surgeon: Kasia Rojas M.D.;  Location: SURGERY SAME DAY [facility];  Service: Orthopedics    OTHER ORTHOPEDIC SURGERY Left 07/20/2023    shoulder repair    OTHER ABDOMINAL SURGERY  07/20/2023    appendectomy    OTHER Bilateral 07/20/2023 2023 IOLOU    MD DX BONE MARROW ASPIRATIONS Left 06/10/2022    Procedure: ASPIRATION, BONE MARROW;  Surgeon: Malachi Kay M.D.;  Location: SURGERY SAME DAY [facility];  Service: Orthopedics    MD DX BONE MARROW BIOPSIES Left 06/10/2022    Procedure: BIOPSY, BONE MARROW, USING NEEDLE OR TROCAR - DR. DURANT;  Surgeon: Malachi Kay M.D.;  Location: SURGERY SAME DAY [facility];  Service: Orthopedics    BRACHY THERAPY N/A 12/16/2021    Procedure: BRACHYTHERAPY - PROSTATE SEED AND HYDROGEL SPACER;  Surgeon: Kirk DELANEY M.D.;  Location: SURGERY SAME DAY [facility];  Service: Urology    MD DX BONE MARROW ASPIRATIONS Left 07/23/2021    Procedure: ASPIRATION, BONE MARROW - CARLEEN;  Surgeon: Hair Okeefe M.D.;  Location: [facility];  Service: Orthopedics    MD DX BONE MARROW BIOPSIES Left 07/23/2021    Procedure: BIOPSY, BONE MARROW, USING NEEDLE OR TROCAR;  Surgeon: Hair Okeefe M.D.;  Location: ENDOSCOPY [facility];  Service: Orthopedics    WRIST FUSION Left        Past Medical History:   Diagnosis Date     Breath shortness 07/20/2023    with exertion    Cancer (HCC) 07/20/2023    prostate    Cancer (HCC) 07/20/2023    myeloma at present    Cataract 07/20/2023    IOLOU 2023    Disorder of thyroid 07/20/2023    hypothyroid, medicated    Heart murmur 07/20/2023    High cholesterol 07/20/2023    medicated    Light chain myeloma (HCC) 2021    Prostate cancer (HCC)     Psychiatric problem 07/20/2023    depression, medicated    Seizure (HCC)     None in the past 30 years       Current Outpatient Medications   Medication Sig Dispense Refill    finasteride (PROSCAR) 5 MG Tab Take 1 Tablet by mouth every day. 90 Tablet 3    COVID-19 mRNA Vac-Sweta,Pfizer, (COMIRNATY) 30 MCG/0.3ML Suspension Prefilled Syringe injection Inject  into the shoulder, thigh, or buttocks. 0.3 mL 0    escitalopram (LEXAPRO) 20 MG tablet TAKE 1 TABLET BY MOUTH EVERY DAY 90 Tablet 3    QUEtiapine (SEROQUEL) 100 MG Tab TAKE 2 TABLETS BY MOUTH EVERY  Tablet 3    Zoster Vac Recomb Adjuvanted (SHINGRIX) 50 MCG/0.5ML Recon Susp Inject 0.5 mL into the shoulder, thigh, or buttocks. 0.5 mL 0    levothyroxine (SYNTHROID) 100 MCG Tab TAKE 1 TABLET BY MOUTH EVERY DAY IN THE MORNING ON AN EMPTY STOMACH 100 Tablet 3    simvastatin (ZOCOR) 20 MG Tab TAKE 1 TABLET BY MOUTH EVERY DAY IN THE EVENING 100 Tablet 3    COVID-19mRNA Bival Vac Moderna (MODERNA COVID-19 BIVALENT) 50 MCG/0.5ML Suspension injection Inject 0.5 ml  into the shoulder, thigh, or buttocks. 0.5 mL 0    tamsulosin (FLOMAX) 0.4 MG capsule TAKE 2 CAPSULES BY MOUTH EVERY DAY FOR 90 DAYS. TAKE 30 MINUTES AFTER DINNER      acyclovir (ZOVIRAX) 400 MG tablet Take 400 mg by mouth at bedtime.      VITAMIN D PO Take 8,000 Units by mouth.      Lenalidomide 10 MG Cap Take 10mg by mouth daily on days 1-21 of each 28 day cycle. (Patient not taking: Reported on 2/5/2024) 21 Capsule 0     No current facility-administered medications for this visit.       Allergies   Allergen Reactions    Grass  "Pollen(K-O-R-T-Swt Rodrigo) Unspecified     sneezing    Pet Dander [Cat Hair Extract] Unspecified     sneezing    Sagebrush Unspecified     sneezing       Objective  Ht 1.727 m (5' 8\")   Wt 86.7 kg (191 lb 1.6 oz)   Physical Exam  Constitutional:       Appearance: Normal appearance.   Eyes:      Extraocular Movements: Extraocular movements intact.   Pulmonary:      Effort: Pulmonary effort is normal.   Genitourinary:     Comments: Pre 238 Post 161  Skin:     General: Skin is warm and dry.   Neurological:      Mental Status: He is alert.   Psychiatric:         Mood and Affect: Mood normal.         Labs:   PSA   Lab Results   Component Value Date/Time    PSATOTAL 0.04 06/02/2023 1223           Imaging:   None     Assessment    Patient's history of prostate cancer and radiation, I have discussed plan of care with Dr. Enriquez.  Patient last PSA was in June 2023 which was 0.04.  I have  ordered a repeat PSA to be completed today, and in 6 months.  I have discussed the use of anticholinergics with patient patient agreed to start oxybutynin 10 mg p.o. daily.  I have educated patient on side effects such as dry mouth, dry eyes, and constipation.  I have discussed surgical interventions, however medical management is best at this point as patient has had radiation in the past for prostate cancer.  Surgical inventions include a TURP.  But patient and I both have agreed that we will start with medical management first.    I have informed patient the importance of double voiding, as patient is retaining urine at 161 I am not concerned at this time.  However I like to ensure that patient's not retaining more urine.    Return to clinic in 4 weeks for PVR check, and new medication response  Plan    Problem List Items Addressed This Visit       Benign localized prostatic hyperplasia with lower urinary tract symptoms (LUTS)       "

## 2024-02-23 NOTE — TELEPHONE ENCOUNTER
Oral chemo compliance review for lenalidomide (Revlimid) 10mg daily.  Auth # [id number] obtained and new rx sent to [id number]   86

## 2024-03-04 NOTE — PROGRESS NOTES
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 The transplant was at [facility] in August 2022. Had been on maintenance Revlimid 10 mg 3 weeks on, one off but has been off almost a year because of leukopenia. He had issues with prolonged pancytopenia and we repeated a BMX in July 2023 which was negative.     ROS:    Constitutional: No weight loss  Skin: No rash or jaundice  HENT: No change in eyesight or hearing  Cardiovascular:No chest pain or arrythmia  Respiratory:No cough or SOB  GI:No nausea, vomiting, diarrhea, constipation  :No dysuria or frequency  Musculoskeletal:No bone or joint pain  Neuro:No sx's of neuropathy  Psych: No complaints    PMH:      Allergies   Allergen Reactions    Grass Pollen(K-O-R-T-Swt Rodrigo) Unspecified     sneezing    Pet Dander [Cat Hair Extract] Unspecified     sneezing    Sagebrush Unspecified     sneezing       Past Medical History:   Diagnosis Date    Breath shortness 07/20/2023    with exertion    Cancer (HCC) 07/20/2023    prostate    Cancer (HCC) 07/20/2023    myeloma at present    Cataract 07/20/2023    IOLOU 2023    Disorder of thyroid 07/20/2023    hypothyroid, medicated    Heart murmur 07/20/2023    High cholesterol 07/20/2023    medicated    Light chain myeloma (HCC) 2021    Prostate cancer (HCC)     Psychiatric problem 07/20/2023    depression, medicated    Seizure (HCC)     None in the past 30 years        Past Surgical History:   Procedure Laterality Date    NE DX BONE MARROW ASPIRATIONS Left 7/24/2023    Procedure: BONE MARROW CORE AND ASPIRATION - DR DURANT;  Surgeon: Kasia Rojas M.D.;  Location: SURGERY SAME DAY [facility];  Service: Orthopedics    NE DX BONE MARROW BIOPSIES Left 7/24/2023    Procedure: BIOPSY, BONE MARROW, USING NEEDLE OR TROCAR;  Surgeon: Kasia Rojas M.D.;  Location: SURGERY SAME DAY [facility];  Service: Orthopedics     OTHER ORTHOPEDIC SURGERY Left 07/20/2023    shoulder repair    OTHER ABDOMINAL SURGERY  07/20/2023    appendectomy    OTHER Bilateral 07/20/2023 2023 IOLOU    MT DX BONE MARROW ASPIRATIONS Left 06/10/2022    Procedure: ASPIRATION, BONE MARROW;  Surgeon: Malachi Kay M.D.;  [facility];  Service: Orthopedics    MT DX BONE MARROW BIOPSIES Left 06/10/2022    Procedure: BIOPSY, BONE MARROW, USING NEEDLE OR TROCAR - DR. DURANT;  Surgeon: Malachi Kay M.D.;  Location: SURGERY [facility];  Service: Orthopedics    BRACHY THERAPY N/A 12/16/2021    Procedure: BRACHYTHERAPY - PROSTATE SEED AND HYDROGEL SPACER;  Surgeon: Kirk DELANEY M.D.;  Location: SURGERY [facility] DAY [facility];  Service: Urology    MT DX BONE MARROW ASPIRATIONS Left 07/23/2021    Procedure: ASPIRATION, BONE MARROW - CARLEEN;  Surgeon: Hair Okeefe M.D.;  Location: [facility]OSCOPY [facility]  Service: Orthopedics    MT DX BONE MARROW BIOPSIES Left 07/23/2021    Procedure: BIOPSY, BONE MARROW, USING NEEDLE OR TROCAR;  Surgeon: Hair Okeefe M.D.;  Location: [facility]COPY [facility]  Service: Orthopedics    WRIST FUSION Left         Medications:    Current Outpatient Medications on File Prior to Encounter   Medication Sig Dispense Refill    tamsulosin (FLOMAX) 0.4 MG capsule TAKE 2 CAPSULES BY MOUTH EVERY DAY FOR 90 DAYS. TAKE 30 MINUTES AFTER DINNER 180 Capsule 3    oxybutynin SR (DITROPAN-XL) 10 MG CR tablet Take 1 Tablet by mouth every day. 30 Tablet 1    finasteride (PROSCAR) 5 MG Tab Take 1 Tablet by mouth every day. 90 Tablet 3    COVID-19 mRNA Vac-Sweta,Pfizer, (COMIRNATY) 30 MCG/0.3ML Suspension Prefilled Syringe injection Inject  into the shoulder, thigh, or buttocks. 0.3 mL 0    escitalopram (LEXAPRO) 20 MG tablet TAKE 1 TABLET BY MOUTH EVERY DAY 90 Tablet 3    QUEtiapine (SEROQUEL) 100 MG Tab TAKE 2 TABLETS BY MOUTH EVERY  Tablet 3    Zoster Vac Recomb Adjuvanted (SHINGRIX) 50 MCG/0.5ML Recon Susp Inject 0.5  "mL into the shoulder, thigh, or buttocks. 0.5 mL 0    levothyroxine (SYNTHROID) 100 MCG Tab TAKE 1 TABLET BY MOUTH EVERY DAY IN THE MORNING ON AN EMPTY STOMACH 100 Tablet 3    simvastatin (ZOCOR) 20 MG Tab TAKE 1 TABLET BY MOUTH EVERY DAY IN THE EVENING 100 Tablet 3    COVID-19mRNA Bival Vac Moderna (MODERNA COVID-19 BIVALENT) 50 MCG/0.5ML Suspension injection Inject 0.5 ml  into the shoulder, thigh, or buttocks. 0.5 mL 0    acyclovir (ZOVIRAX) 400 MG tablet Take 400 mg by mouth at bedtime.      VITAMIN D PO Take 8,000 Units by mouth.      Lenalidomide 10 MG Cap Take 10mg by mouth daily on days 1-21 of each 28 day cycle. (Patient not taking: Reported on 2024) 21 Capsule 0     No current facility-administered medications on file prior to encounter.       Social History     Tobacco Use    Smoking status: Former     Current packs/day: 0.00     Average packs/day: 1 pack/day for 20.0 years (20.0 ttl pk-yrs)     Types: Cigarettes     Start date:      Quit date:      Years since quittin.2    Smokeless tobacco: Never   Substance Use Topics    Alcohol use: Not Currently        Family History   Problem Relation Age of Onset    Cancer Neg Hx         Objective    Vitals:    /74 (BP Location: Right arm, Patient Position: Sitting, BP Cuff Size: Adult)   Pulse 81   Temp 36.6 °C (97.9 °F) (Temporal)   Ht 1.727 m (5' 8\")   Wt 86.2 kg (190 lb)   SpO2 95%   BMI 28.89 kg/m²     Physical Exam:    Appears well-developed and well-nourished. No distress.    Head -  Normocephalic .   Eyes - Pupils are equal. Conjunctivae normal. No scleral icterus.   Ears - normal hearing  Nodes - No submental, submandibular, preauricular, cervical, axillary or inguinal adenopathy.    Neurological -   Alert and oriented.  Skin - Skin is warm and dry. No rash noted. Not diaphoretic. No erythema. No pallor. No jaundice   Psychiatric -  Normal mood and affect.    Labs:     Latest Reference Range & Units 23 10:30 23 " 15:34 11/14/23 06:44 03/13/24 13:24   WBC 4.8 - 10.8 K/uL 1.9 (LL) 3.2 (L) 3.1 (L) 4.0 (L)   RBC 4.70 - 6.10 M/uL 2.76 (L) 2.59 (L) 3.46 (L) 3.58 (L)   Hemoglobin 14.0 - 18.0 g/dL 10.9 (L) 9.8 (L) 13.2 (L) 13.5 (L)   Hematocrit 42.0 - 52.0 % 31.2 (L) 29.4 (L) 39.7 (L) 38.3 (L)   MCV 81.4 - 97.8 fL 113.0 (H) 113.5 (H) 114.7 (H) 107.0 (H)   MCH 27.0 - 33.0 pg 39.5 (H) 37.8 (H) 38.2 (H) 37.7 (H)   MCHC 32.3 - 36.5 g/dL 34.9 33.3 33.2 35.2   RDW 35.9 - 50.0 fL 68.0 (H) 69.6 (H) 51.1 (H) 49.7   Platelet Count 164 - 446 K/uL 115 (L) 101 (L) 169 185   MPV 9.0 - 12.9 fL 9.3 10.0 9.3 9.2   Neutrophils-Polys 44.00 - 72.00 % 43.80 (L) 58.90 58.90 53.90   Neutrophils (Absolute) 1.82 - 7.42 K/uL 0.81 (L) 1.88 1.81 (L) 2.17   Lymphocytes 22.00 - 41.00 % 38.40 27.00 26.70 30.00   Lymphs (Absolute) 1.00 - 4.80 K/uL 0.71 (L) 0.86 (L) 0.82 (L) 1.21   Monocytes 0.00 - 13.40 % 13.50 (H) 12.50 12.10 13.90 (H)      Latest Reference Range & Units 03/13/24 13:24   Sodium 135 - 145 mmol/L 141   Potassium 3.6 - 5.5 mmol/L 4.4   Chloride 96 - 112 mmol/L 108   Co2 20 - 33 mmol/L 20   Anion Gap 7.0 - 16.0  13.0   Glucose 65 - 99 mg/dL 95   Bun 8 - 22 mg/dL 27 (H)   Creatinine 0.50 - 1.40 mg/dL 1.11   GFR (CKD-EPI) >60 mL/min/1.73 m 2 71   Calcium 8.5 - 10.5 mg/dL 8.6   Correct Calcium 8.5 - 10.5 mg/dL 8.3 (L)   AST(SGOT) 12 - 45 U/L 28   ALT(SGPT) 2 - 50 U/L 37   Alkaline Phosphatase 30 - 99 U/L 100 (H)   Total Bilirubin 0.1 - 1.5 mg/dL 0.3   Albumin 3.2 - 4.9 g/dL 4.4   Total Protein 6.0 - 8.2 g/dL 6.7   Globulin 1.9 - 3.5 g/dL 2.3   A-G Ratio g/dL 1.9      Latest Reference Range & Units 07/07/23 09:27 11/14/23 06:44 03/13/24 13:24   Immunoglobulin A 68 - 408 mg/dL 66 (L) 34 (L) 36 (L)   Immunoglobulin G 768 - 1632 mg/dL 847 817 703 (L)   Immunoglobulin M 35 - 263 mg/dL 47 37 36      Latest Reference Range & Units 07/07/23 09:27 11/14/23 06:44 03/13/24 13:24   Free Kappa Light Chains 3.30 - 19.40 mg/L 22.13 (H) 11.43 12.11   Free Lambda Light  Chains 5.71 - 26.30 mg/L 19.93 8.76 10.07   Kappa-Lambda Ratio 0.26 - 1.65  1.11 1.30 1.20     Assessment    Imp:    Visit Diagnosis:    1. Lambda light chain myeloma (HCC)        2. History of stem cell transplant (HCC)        3. Prostate cancer (HCC)        4. Encounter for long-term current use of high risk medication              Plan:  Lab and visit in 6 months    Gary Aaron M.D.

## 2024-03-11 DIAGNOSIS — C90.00 LAMBDA LIGHT CHAIN MYELOMA (HCC): ICD-10-CM

## 2024-03-11 DIAGNOSIS — C90.00 MULTIPLE MYELOMA, REMISSION STATUS UNSPECIFIED (HCC): ICD-10-CM

## 2024-03-13 ENCOUNTER — HOSPITAL ENCOUNTER (OUTPATIENT)
Dept: LAB | Facility: MEDICAL CENTER | Age: 74
End: 2024-03-13
Attending: INTERNAL MEDICINE
Payer: MEDICARE

## 2024-03-13 DIAGNOSIS — C90.00 MULTIPLE MYELOMA, REMISSION STATUS UNSPECIFIED (HCC): ICD-10-CM

## 2024-03-13 LAB
BASOPHILS # BLD AUTO: 0.5 % (ref 0–1.8)
BASOPHILS # BLD: 0.02 K/UL (ref 0–0.12)
EOSINOPHIL # BLD AUTO: 0.06 K/UL (ref 0–0.51)
EOSINOPHIL NFR BLD: 1.5 % (ref 0–6.9)
ERYTHROCYTE [DISTWIDTH] IN BLOOD BY AUTOMATED COUNT: 49.7 FL (ref 35.9–50)
HCT VFR BLD AUTO: 38.3 % (ref 42–52)
HGB BLD-MCNC: 13.5 G/DL (ref 14–18)
IMM GRANULOCYTES # BLD AUTO: 0.01 K/UL (ref 0–0.11)
IMM GRANULOCYTES NFR BLD AUTO: 0.2 % (ref 0–0.9)
LYMPHOCYTES # BLD AUTO: 1.21 K/UL (ref 1–4.8)
LYMPHOCYTES NFR BLD: 30 % (ref 22–41)
MCH RBC QN AUTO: 37.7 PG (ref 27–33)
MCHC RBC AUTO-ENTMCNC: 35.2 G/DL (ref 32.3–36.5)
MCV RBC AUTO: 107 FL (ref 81.4–97.8)
MONOCYTES # BLD AUTO: 0.56 K/UL (ref 0–0.85)
MONOCYTES NFR BLD AUTO: 13.9 % (ref 0–13.4)
NEUTROPHILS # BLD AUTO: 2.17 K/UL (ref 1.82–7.42)
NEUTROPHILS NFR BLD: 53.9 % (ref 44–72)
NRBC # BLD AUTO: 0 K/UL
NRBC BLD-RTO: 0 /100 WBC (ref 0–0.2)
PLATELET # BLD AUTO: 185 K/UL (ref 164–446)
PMV BLD AUTO: 9.2 FL (ref 9–12.9)
RBC # BLD AUTO: 3.58 M/UL (ref 4.7–6.1)
WBC # BLD AUTO: 4 K/UL (ref 4.8–10.8)

## 2024-03-13 PROCEDURE — 82784 ASSAY IGA/IGD/IGG/IGM EACH: CPT

## 2024-03-13 PROCEDURE — 86334 IMMUNOFIX E-PHORESIS SERUM: CPT

## 2024-03-13 PROCEDURE — 84165 PROTEIN E-PHORESIS SERUM: CPT

## 2024-03-13 PROCEDURE — 80053 COMPREHEN METABOLIC PANEL: CPT

## 2024-03-13 PROCEDURE — 85025 COMPLETE CBC W/AUTO DIFF WBC: CPT

## 2024-03-13 PROCEDURE — 84155 ASSAY OF PROTEIN SERUM: CPT

## 2024-03-13 PROCEDURE — 36415 COLL VENOUS BLD VENIPUNCTURE: CPT

## 2024-03-13 PROCEDURE — 83521 IG LIGHT CHAINS FREE EACH: CPT

## 2024-03-14 ENCOUNTER — PATIENT MESSAGE (OUTPATIENT)
Dept: MEDICAL GROUP | Facility: LAB | Age: 74
End: 2024-03-14
Payer: MEDICARE

## 2024-03-14 LAB
ALBUMIN SERPL BCP-MCNC: 4.4 G/DL (ref 3.2–4.9)
ALBUMIN/GLOB SERPL: 1.9 G/DL
ALP SERPL-CCNC: 100 U/L (ref 30–99)
ALT SERPL-CCNC: 37 U/L (ref 2–50)
ANION GAP SERPL CALC-SCNC: 13 MMOL/L (ref 7–16)
AST SERPL-CCNC: 28 U/L (ref 12–45)
BILIRUB SERPL-MCNC: 0.3 MG/DL (ref 0.1–1.5)
BUN SERPL-MCNC: 27 MG/DL (ref 8–22)
CALCIUM ALBUM COR SERPL-MCNC: 8.3 MG/DL (ref 8.5–10.5)
CALCIUM SERPL-MCNC: 8.6 MG/DL (ref 8.5–10.5)
CHLORIDE SERPL-SCNC: 108 MMOL/L (ref 96–112)
CO2 SERPL-SCNC: 20 MMOL/L (ref 20–33)
CREAT SERPL-MCNC: 1.11 MG/DL (ref 0.5–1.4)
GFR SERPLBLD CREATININE-BSD FMLA CKD-EPI: 71 ML/MIN/1.73 M 2
GLOBULIN SER CALC-MCNC: 2.3 G/DL (ref 1.9–3.5)
GLUCOSE SERPL-MCNC: 95 MG/DL (ref 65–99)
POTASSIUM SERPL-SCNC: 4.4 MMOL/L (ref 3.6–5.5)
PROT SERPL-MCNC: 6.7 G/DL (ref 6–8.2)
SODIUM SERPL-SCNC: 141 MMOL/L (ref 135–145)

## 2024-03-15 RX ORDER — TAMSULOSIN HYDROCHLORIDE 0.4 MG/1
CAPSULE ORAL
Qty: 180 CAPSULE | Refills: 3 | Status: SHIPPED | OUTPATIENT
Start: 2024-03-15 | End: 2025-04-19

## 2024-03-15 NOTE — PATIENT COMMUNICATION
Received request via: Patient    Was the patient seen in the last year in this department? Yes    Does the patient have an active prescription (recently filled or refills available) for medication(s) requested? No    Pharmacy Name: [facility]     Does the patient have [facility] Plus and need 100 day supply (blood pressure, diabetes and cholesterol meds only)? Medication is not for cholesterol, blood pressure or diabetes

## 2024-03-16 LAB
ALBUMIN SERPL ELPH-MCNC: 4 G/DL (ref 3.75–5.01)
ALPHA1 GLOB SERPL ELPH-MCNC: 0.25 G/DL (ref 0.19–0.46)
ALPHA2 GLOB SERPL ELPH-MCNC: 0.73 G/DL (ref 0.48–1.05)
B-GLOBULIN SERPL ELPH-MCNC: 0.75 G/DL (ref 0.48–1.1)
EER MONOCLONAL PROTEIN AND FLC, SERUM Q5224: ABNORMAL
GAMMA GLOB SERPL ELPH-MCNC: 0.76 G/DL (ref 0.62–1.51)
IGA SERPL-MCNC: 36 MG/DL (ref 68–408)
IGG SERPL-MCNC: 703 MG/DL (ref 768–1632)
IGM SERPL-MCNC: 36 MG/DL (ref 35–263)
INTERPRETATION SERPL IFE-IMP: ABNORMAL
INTERPRETATION SERPL IFE-IMP: ABNORMAL
KAPPA LC FREE SER-MCNC: 12.11 MG/L (ref 3.3–19.4)
KAPPA LC FREE/LAMBDA FREE SER NEPH: 1.2 {RATIO} (ref 0.26–1.65)
LAMBDA LC FREE SERPL-MCNC: 10.07 MG/L (ref 5.71–26.3)
MONOCLONAL PROTEIN NL11656: ABNORMAL G/DL
PROT SERPL-MCNC: 6.5 G/DL (ref 6.3–8.2)

## 2024-03-18 ENCOUNTER — HOSPITAL ENCOUNTER (OUTPATIENT)
Dept: HEMATOLOGY ONCOLOGY | Facility: MEDICAL CENTER | Age: 74
End: 2024-03-18
Attending: INTERNAL MEDICINE
Payer: MEDICARE

## 2024-03-18 VITALS
BODY MASS INDEX: 28.79 KG/M2 | HEART RATE: 81 BPM | HEIGHT: 68 IN | TEMPERATURE: 97.9 F | DIASTOLIC BLOOD PRESSURE: 74 MMHG | WEIGHT: 190 LBS | OXYGEN SATURATION: 95 % | SYSTOLIC BLOOD PRESSURE: 122 MMHG

## 2024-03-18 DIAGNOSIS — Z79.899 ENCOUNTER FOR LONG-TERM CURRENT USE OF HIGH RISK MEDICATION: ICD-10-CM

## 2024-03-18 DIAGNOSIS — C90.00 LAMBDA LIGHT CHAIN MYELOMA (HCC): ICD-10-CM

## 2024-03-18 DIAGNOSIS — Z94.84 HISTORY OF STEM CELL TRANSPLANT (HCC): ICD-10-CM

## 2024-03-18 DIAGNOSIS — C61 PROSTATE CANCER (HCC): ICD-10-CM

## 2024-03-18 PROCEDURE — 99212 OFFICE O/P EST SF 10 MIN: CPT | Performed by: INTERNAL MEDICINE

## 2024-03-18 PROCEDURE — 99213 OFFICE O/P EST LOW 20 MIN: CPT | Performed by: INTERNAL MEDICINE

## 2024-03-18 ASSESSMENT — PAIN SCALES - GENERAL: PAINLEVEL: NO PAIN

## 2024-03-18 ASSESSMENT — FIBROSIS 4 INDEX: FIB4 SCORE: 1.77

## 2024-03-20 NOTE — NON-PROVIDER
Per Dr Aaron, the Pt has decided to stop taking Revlimid.  Notified the [facility] call center for [employer] to deactivate.  Verified.

## 2024-03-28 ENCOUNTER — OFFICE VISIT (OUTPATIENT)
Dept: UROLOGY | Facility: MEDICAL CENTER | Age: 74
End: 2024-03-28
Payer: MEDICARE

## 2024-03-28 VITALS
HEART RATE: 73 BPM | HEIGHT: 68 IN | RESPIRATION RATE: 18 BRPM | WEIGHT: 186.2 LBS | BODY MASS INDEX: 28.22 KG/M2 | TEMPERATURE: 98.8 F | OXYGEN SATURATION: 95 %

## 2024-03-28 DIAGNOSIS — N40.1 BENIGN LOCALIZED PROSTATIC HYPERPLASIA WITH LOWER URINARY TRACT SYMPTOMS (LUTS): ICD-10-CM

## 2024-03-28 DIAGNOSIS — R35.1 NOCTURIA: ICD-10-CM

## 2024-03-28 LAB
POC POST-VOID: 170 ML
POC PRE-VOID: 269 ML

## 2024-03-28 RX ORDER — TOLTERODINE 4 MG/1
4 CAPSULE, EXTENDED RELEASE ORAL DAILY
Qty: 30 CAPSULE | Refills: 3 | Status: SHIPPED | OUTPATIENT
Start: 2024-03-28

## 2024-03-28 ASSESSMENT — FIBROSIS 4 INDEX: FIB4 SCORE: 1.77

## 2024-03-28 NOTE — PROGRESS NOTES
Subjective  JAMES GRANT is a 73 y.o. male who presents today for follow up of BPH with LUTS    He was started on oxybutynin 10mg PO ER and didn't have much improvement in nocturia. He is still voiding 3 times per night. The medication has made a difference in frequency during the day. Stream also improved for patient. It also improved his urgency.    Patient is unsure if he snores at night. Has not seen a pulmonologist    He has been on finasteride for 6 months- Dr Rebolledo (PCP) Radiation 1 year ago for prostate cancer. He was also on Flomax at that time. He reports that the finasteride did improves his symptoms    His stream is better at night vs during the day.      Family History   Problem Relation Age of Onset    Cancer Neg Hx        Social History     Socioeconomic History    Marital status:      Spouse name: Not on file    Number of children: Not on file    Years of education: Not on file    Highest education level: Not on file   Occupational History    Not on file   Tobacco Use    Smoking status: Former     Current packs/day: 0.00     Average packs/day: 1 pack/day for 20.0 years (20.0 ttl pk-yrs)     Types: Cigarettes     Start date:      Quit date:      Years since quittin.2    Smokeless tobacco: Never   Vaping Use    Vaping Use: Never used   Substance and Sexual Activity    Alcohol use: Not Currently    Drug use: Not Currently     Types: Marijuana     Comment: quit     Sexual activity: Not on file   Other Topics Concern    Not on file   Social History Narrative    Lives in [place], [place] with wife. 2 children; 1 lives local. Works as a clinical .      Social Determinants of Health     Financial Resource Strain: Not on file   Food Insecurity: Not on file   Transportation Needs: Not on file   Physical Activity: Not on file   Stress: Not on file   Social Connections: Not on file   Intimate Partner Violence: Not on file   Housing Stability: Not on file       Past  Surgical History:   Procedure Laterality Date    OK DX BONE MARROW ASPIRATIONS Left 7/24/2023    Procedure: BONE MARROW CORE AND ASPIRATION - DR DURANT;  Surgeon: Kasia Rojas M.D.;  Location: SURGERY SAME DAY [facility];  Service: Orthopedics    OK DX BONE MARROW BIOPSIES Left 7/24/2023    Procedure: BIOPSY, BONE MARROW, USING NEEDLE OR TROCAR;  Surgeon: Kasia Rojas M.D.;  Location: SURGERY SAME DAY [facility];  Service: Orthopedics    OTHER ORTHOPEDIC SURGERY Left 07/20/2023    shoulder repair    OTHER ABDOMINAL SURGERY  07/20/2023    appendectomy    OTHER Bilateral 07/20/2023 2023 IOLOU    OK DX BONE MARROW ASPIRATIONS Left 06/10/2022    Procedure: ASPIRATION, BONE MARROW;  Surgeon: Malachi Kay M.D.;  Location: SURGERY SAME DAY [facility];  Service: Orthopedics    OK DX BONE MARROW BIOPSIES Left 06/10/2022    Procedure: BIOPSY, BONE MARROW, USING NEEDLE OR TROCAR - DR. DURANT;  Surgeon: Malachi Kay M.D.;  Location: SURGERY SAME DAY [facility];  Service: Orthopedics    BRACHY THERAPY N/A 12/16/2021    Procedure: BRACHYTHERAPY - PROSTATE SEED AND HYDROGEL SPACER;  Surgeon: Kirk DELANEY M.D.;  Location: SURGERY SAME DAY [facility];  Service: Urology    OK DX BONE MARROW ASPIRATIONS Left 07/23/2021    Procedure: ASPIRATION, BONE MARROW - CARLEEN;  Surgeon: Hair Okeefe M.D.;  Location: [facility];  Service: Orthopedics    OK DX BONE MARROW BIOPSIES Left 07/23/2021    Procedure: BIOPSY, BONE MARROW, USING NEEDLE OR TROCAR;  Surgeon: Hair Okeefe M.D.;  Location: [facility];  Service: Orthopedics    WRIST FUSION Left        Past Medical History:   Diagnosis Date    Breath shortness 07/20/2023    with exertion    Cancer (HCC) 07/20/2023    prostate    Cancer (HCC) 07/20/2023    myeloma at present    Cataract 07/20/2023    IOLOU 2023    Disorder of thyroid 07/20/2023    hypothyroid, medicated    Heart murmur 07/20/2023    High cholesterol 07/20/2023    medicated    Light chain myeloma (HCC)  "2021    Prostate cancer (HCC)     Psychiatric problem 07/20/2023    depression, medicated    Seizure (HCC)     None in the past 30 years       Current Outpatient Medications   Medication Sig Dispense Refill    tamsulosin (FLOMAX) 0.4 MG capsule TAKE 2 CAPSULES BY MOUTH EVERY DAY FOR 90 DAYS. TAKE 30 MINUTES AFTER DINNER 180 Capsule 3    oxybutynin SR (DITROPAN-XL) 10 MG CR tablet Take 1 Tablet by mouth every day. 30 Tablet 1    finasteride (PROSCAR) 5 MG Tab Take 1 Tablet by mouth every day. 90 Tablet 3    COVID-19 mRNA Vac-Sweta,Pfizer, (COMIRNATY) 30 MCG/0.3ML Suspension Prefilled Syringe injection Inject  into the shoulder, thigh, or buttocks. 0.3 mL 0    escitalopram (LEXAPRO) 20 MG tablet TAKE 1 TABLET BY MOUTH EVERY DAY 90 Tablet 3    QUEtiapine (SEROQUEL) 100 MG Tab TAKE 2 TABLETS BY MOUTH EVERY  Tablet 3    Zoster Vac Recomb Adjuvanted (SHINGRIX) 50 MCG/0.5ML Recon Susp Inject 0.5 mL into the shoulder, thigh, or buttocks. 0.5 mL 0    levothyroxine (SYNTHROID) 100 MCG Tab TAKE 1 TABLET BY MOUTH EVERY DAY IN THE MORNING ON AN EMPTY STOMACH 100 Tablet 3    simvastatin (ZOCOR) 20 MG Tab TAKE 1 TABLET BY MOUTH EVERY DAY IN THE EVENING 100 Tablet 3    COVID-19mRNA Bival Vac Moderna (MODERNA COVID-19 BIVALENT) 50 MCG/0.5ML Suspension injection Inject 0.5 ml  into the shoulder, thigh, or buttocks. 0.5 mL 0    acyclovir (ZOVIRAX) 400 MG tablet Take 400 mg by mouth at bedtime.      VITAMIN D PO Take 8,000 Units by mouth.       No current facility-administered medications for this visit.       Allergies   Allergen Reactions    Grass Pollen(K-O-R-T-Swt Rodrigo) Unspecified     sneezing    Pet Dander [Cat Hair Extract] Unspecified     sneezing    Sagebrush Unspecified     sneezing       Objective  BP (!) 0/0 (BP Location: Left arm, Patient Position: Sitting, BP Cuff Size: Adult)   Pulse 73   Temp 37.1 °C (98.8 °F)   Resp 18   Ht 1.727 m (5' 8\")   Wt 84.5 kg (186 lb 3.2 oz)   SpO2 95%   Physical " Exam  Constitutional:       Appearance: Normal appearance.   Eyes:      Extraocular Movements: Extraocular movements intact.   Pulmonary:      Effort: Pulmonary effort is normal.      Breath sounds: Normal breath sounds.   Genitourinary:     Comments: Pre 269 Post 170  Skin:     General: Skin is warm and dry.   Neurological:      Mental Status: He is alert.   Psychiatric:         Mood and Affect: Mood normal.         Behavior: Behavior normal.         Labs:     None    Imaging:     None        Assessment    At this time, we will be targeting patient's nocturia.  Patient likely has this nocturia, and frequency because of radiation.  I have switched patient from oxybutynin to tolterodine.  I have discussed with patient that this medication may cause dry mouth dry eye and constipation.     We also discussed potential need for referral to pulmonary.    At next visit, we will discuss potential need for sacral neuromodulation, considering patient is on multiple medications for urinary symptoms.  At next visit, we may discuss discontinuing finasteride, as PSA was less than 0.02 and likely the finasteride is not doing much to improve the symptoms.  We may also need to add a beta 3 agonist such as Myrbetriq with the anticholinergic.  This may improve symptoms.    We can discuss this at this visit return to clinic in 6 weeks    Plan    1. Benign localized prostatic hyperplasia with lower urinary tract symptoms (LUTS)  - POCT Bladder Scan

## 2024-04-17 ENCOUNTER — TELEPHONE (OUTPATIENT)
Dept: HEALTH INFORMATION MANAGEMENT | Facility: OTHER | Age: 74
End: 2024-04-17

## 2024-06-06 ENCOUNTER — HOSPITAL ENCOUNTER (OUTPATIENT)
Dept: LAB | Facility: MEDICAL CENTER | Age: 74
End: 2024-06-06
Attending: FAMILY MEDICINE
Payer: MEDICARE

## 2024-06-06 ENCOUNTER — OFFICE VISIT (OUTPATIENT)
Dept: MEDICAL GROUP | Facility: LAB | Age: 74
End: 2024-06-06
Payer: MEDICARE

## 2024-06-06 VITALS
HEART RATE: 78 BPM | TEMPERATURE: 97.5 F | HEIGHT: 66 IN | RESPIRATION RATE: 16 BRPM | BODY MASS INDEX: 29.73 KG/M2 | SYSTOLIC BLOOD PRESSURE: 118 MMHG | DIASTOLIC BLOOD PRESSURE: 64 MMHG | OXYGEN SATURATION: 94 % | WEIGHT: 185 LBS

## 2024-06-06 DIAGNOSIS — F33.42 RECURRENT MAJOR DEPRESSIVE DISORDER, IN FULL REMISSION (HCC): ICD-10-CM

## 2024-06-06 DIAGNOSIS — R97.20 ELEVATED PSA: ICD-10-CM

## 2024-06-06 DIAGNOSIS — F51.01 PRIMARY INSOMNIA: ICD-10-CM

## 2024-06-06 DIAGNOSIS — C90.00 MULTIPLE MYELOMA, REMISSION STATUS UNSPECIFIED (HCC): ICD-10-CM

## 2024-06-06 DIAGNOSIS — C61 PROSTATE CANCER (HCC): ICD-10-CM

## 2024-06-06 DIAGNOSIS — E03.9 HYPOTHYROIDISM, UNSPECIFIED TYPE: ICD-10-CM

## 2024-06-06 DIAGNOSIS — Z00.00 ANNUAL PHYSICAL EXAM: ICD-10-CM

## 2024-06-06 PROBLEM — R35.1 NOCTURIA: Status: RESOLVED | Noted: 2024-03-28 | Resolved: 2024-06-06

## 2024-06-06 PROBLEM — Z59.89 ON ECONOMIC ASSISTANCE PROGRAM: Status: RESOLVED | Noted: 2023-06-27 | Resolved: 2024-06-06

## 2024-06-06 LAB
PSA SERPL-MCNC: <0.02 NG/ML (ref 0–4)
TSH SERPL DL<=0.005 MIU/L-ACNC: 0.83 UIU/ML (ref 0.38–5.33)

## 2024-06-06 PROCEDURE — 3074F SYST BP LT 130 MM HG: CPT | Performed by: FAMILY MEDICINE

## 2024-06-06 PROCEDURE — 3078F DIAST BP <80 MM HG: CPT | Performed by: FAMILY MEDICINE

## 2024-06-06 PROCEDURE — 84443 ASSAY THYROID STIM HORMONE: CPT

## 2024-06-06 PROCEDURE — 99397 PER PM REEVAL EST PAT 65+ YR: CPT | Performed by: FAMILY MEDICINE

## 2024-06-06 PROCEDURE — 36415 COLL VENOUS BLD VENIPUNCTURE: CPT

## 2024-06-06 PROCEDURE — 84153 ASSAY OF PSA TOTAL: CPT

## 2024-06-06 RX ORDER — TRAZODONE HYDROCHLORIDE 50 MG/1
TABLET ORAL
Qty: 180 TABLET | Refills: 3 | Status: SHIPPED | OUTPATIENT
Start: 2024-06-06

## 2024-06-06 ASSESSMENT — FIBROSIS 4 INDEX: FIB4 SCORE: 1.79

## 2024-06-06 ASSESSMENT — PATIENT HEALTH QUESTIONNAIRE - PHQ9: CLINICAL INTERPRETATION OF PHQ2 SCORE: 0

## 2024-06-06 NOTE — PROGRESS NOTES
"Pharmacy Chemotherapy Verification  Patient Name: Gary Hedrick  Dx: MM  Protocol: VRd  Cycle 5 Day 1  Previous treatment: 11/24/21    Regimen:  Bortezomib 1.3 mg/m2 subcutaneous on    -MD dosing on Days 1, 8, 15 of a 21 day cycle  Lenalidomide 25 mg PO daily on Days 1-14  Dexamethasone 20 mg PO daily on Days 1-2, 4-5, 8-9, 11-12 OR 40 mg PO on Days 1, 8, and 15   21 day cycle for 3-4 cycles OR until disease progression or unacceptable toxicity  *Dosing Reference*  NCCN Guidelines for Multiple Myeloma.V.1.2022  Amadeo P, et al. Blood. 2010;116(5):679-86  Alfaro S, et al/ Blood. 2012;119(19):4375-82  John M, et al. J Clin Oncol. 2014;32(25):2712-7    Allergies:Grass pollen(k-o-r-t-swt jose), Pet dander [cat hair extract], Lander meal, Milk [dairy food allergy], Sagebrush, and Lander oil  /55   Pulse 76   Temp 36.4 °C (97.5 °F) (Temporal)   Resp 18   Ht 1.7 m (5' 6.93\")   Wt 78.1 kg (172 lb 2.9 oz)   SpO2 100%   BMI 27.02 kg/m²   Body surface area is 1.92 meters squared.     Labs 11/26/21  Meet treatment parameters    Bortezomib (Velcade) 1.3 mg/m2 x 1.92 m2 = 2.496 mg   OK to treat with final dose = 2.5 mg subcutaneous      " [facility]

## 2024-06-06 NOTE — PROGRESS NOTES
Verbal consent was acquired by the patient to use [employer] ambient listening note generation during this visit Yes    Subjective:   JAMES GRANT is a 74 y.o. male here today for   Chief Complaint   Patient presents with    Follow-Up     History of Present Illness  The patient is a 74-year-old male with multiple comorbidities including prostate carcinoma, history of multiple myeloma, BPH, hypothyroidism. He is on chemo, here today for annual physical.    The patient is currently not undergoing chemotherapy. He is scheduled to consult with his oncologist in 09/2024.  Patient is feeling well grateful for his health.  He will continue to follow-up with oncology and regular basis.    He expresses dissatisfaction with his urologist, expressing dissatisfactory experience. His urinary function is satisfactory, however, he experiences nocturia, waking up 2 to 3 times per night.  Patient was switched from oxybutynin to Detrol by urologist; however, patient never started new medication. His current medication regimen includes Flomax and finasteride, which he reports as effective. He has increased his water intake since the summer, which has improved his nocturia.    The patient expresses a desire to discontinue Seroquel due to improved sleep quality. However, he has experienced weight gain as a side effect of Seroquel. He is considering hydroxyzine as an alternative to Seroquel. He is currently on Lexapro, which he reports as sedating. He has a history of insomnia and has attempted various sleep aids. He suspects a sleep disorder. His mood is stable, with no concerns for depression or anxiety.    The patient expresses a desire to have PSA checked regularly. He consumes one meal per day due to the Seroquel. He maintains an active lifestyle, walking a mile daily, and going to the gym with light weights. He denies experiencing chest pain, shortness of breath, or difficulty breathing. He experiences lightheadedness when  bending over. He denies chronic abdominal pain, diarrhea, or constipation. His bowel movements are regular, and he consumes salads daily. He denies abdominal pain. He visits a chiropractor for back issues. His hearing is satisfactory, but not as good as it used to be. His vision is normal. He denies swollen lymph nodes.   He does not smoke or drink alcohol. He does not do any drugs.      Allergies   Allergen Reactions    Grass Pollen(K-O-R-T-Swt Rodrigo) Unspecified     sneezing    Pet Dander [Cat Hair Extract] Unspecified     sneezing    Sagebrush Unspecified     sneezing         Current medicines (including changes today)  Current Outpatient Medications   Medication Sig Dispense Refill    tolterodine ER (DETROL-LA) 4 MG CAPSULE SR 24 HR Take 1 Capsule by mouth every day. 30 Capsule 3    tamsulosin (FLOMAX) 0.4 MG capsule TAKE 2 CAPSULES BY MOUTH EVERY DAY FOR 90 DAYS. TAKE 30 MINUTES AFTER DINNER 180 Capsule 3    finasteride (PROSCAR) 5 MG Tab Take 1 Tablet by mouth every day. 90 Tablet 3    COVID-19 mRNA Vac-Sweta,Pfizer, (COMIRNATY) 30 MCG/0.3ML Suspension Prefilled Syringe injection Inject  into the shoulder, thigh, or buttocks. 0.3 mL 0    escitalopram (LEXAPRO) 20 MG tablet TAKE 1 TABLET BY MOUTH EVERY DAY 90 Tablet 3    QUEtiapine (SEROQUEL) 100 MG Tab TAKE 2 TABLETS BY MOUTH EVERY  Tablet 3    Zoster Vac Recomb Adjuvanted (SHINGRIX) 50 MCG/0.5ML Recon Susp Inject 0.5 mL into the shoulder, thigh, or buttocks. 0.5 mL 0    levothyroxine (SYNTHROID) 100 MCG Tab TAKE 1 TABLET BY MOUTH EVERY DAY IN THE MORNING ON AN EMPTY STOMACH 100 Tablet 3    simvastatin (ZOCOR) 20 MG Tab TAKE 1 TABLET BY MOUTH EVERY DAY IN THE EVENING 100 Tablet 3    COVID-19mRNA Bival Vac Moderna (MODERNA COVID-19 BIVALENT) 50 MCG/0.5ML Suspension injection Inject 0.5 ml  into the shoulder, thigh, or buttocks. 0.5 mL 0    acyclovir (ZOVIRAX) 400 MG tablet Take 400 mg by mouth at bedtime.      VITAMIN D PO Take 8,000 Units by mouth.    "    No current facility-administered medications for this visit.     He  has a past medical history of Breath shortness (07/20/2023), Cancer (HCC) (07/20/2023), Cancer (HCC) (07/20/2023), Cataract (07/20/2023), Disorder of thyroid (07/20/2023), Heart murmur (07/20/2023), High cholesterol (07/20/2023), Light chain myeloma (HCC) (2021), Prostate cancer (HCC), Psychiatric problem (07/20/2023), and Seizure (HCC).    He has no past medical history of Bowel habit changes.    ROS   ROS  -See HPI     Objective:     Physical Exam:  /64   Pulse 78   Temp 36.4 °C (97.5 °F) (Temporal)   Resp 16   Ht 1.676 m (5' 5.98\")   Wt 83.9 kg (185 lb)   SpO2 94%  Body mass index is 29.87 kg/m².   Constitutional: Alert, no distress.  Skin: Warm, dry, good turgor, no rashes in visible areas.  Eye: Equal, round and reactive, conjunctiva clear, lids normal.  ENMT: TM's clear bilaterally, lips without lesions, good dentition, oropharynx clear.  Neck: Trachea midline, no masses, no thyromegaly. No cervical or supraclavicular lymphadenopathy.  Respiratory: Unlabored respiratory effort, lungs clear to auscultation, no wheezes, no rhonchi.  Cardiovascular: Normal S1, S2, no murmur, no edema.  Abdomen: Soft, non-tender, no masses, no hepatosplenomegaly.  Psych: Alert and oriented x3, normal affect and mood.    Results      Assessment and Plan:     Assessment & Plan  1. Annual physical exam.  The patient's condition is stable with no concerns. Laboratory tests, screenings, and vaccines were reviewed. Screening for PSA and TSH will be conducted at this time. The appropriate Mediterranean diet and hydration were discussed.    2. Depression.  This is a chronic condition that is stable. The patient will continue with Lexapro 20 mg daily. Return precautions were provided.    3. Insomnia.  This is a stable condition. A medication change will be considered due to concerns about side effects such as weight gain from Seroquel. Seroquel will be " discontinued at this time and trazodone will be initiated at 50 mg and titrated up to 100 mg if necessary. The appropriate use and potential side effects were discussed. The patient will follow up with any questions or concerns.    4. Hypothyroidism.  This is a chronic condition that is stable. The patient will continue with Synthroid 100 mcg daily. TSH will be rechecked and medication will be adjusted as necessary.    5. Prostate cancer and elevated PSA.  The patient is progressing well and is status post radiation therapy. Screening with PSA will be continued every 6 to 12 months. The patient also has a history of BPH secondary to prostate cancer and will continue with tamsulosin and finasteride.    6. Multiple myeloma.  The patient is doing extremely well and is stable. Currently, he is not on chemotherapy but will continue regular checkups with oncology.    Follow-up  The patient is scheduled for a semi-annual check in 6 months.    Orders:  1. Annual physical exam    2. Recurrent major depressive disorder, in full remission (HCC)    3. Primary insomnia  - traZODone (DESYREL) 50 MG Tab; Take 1-2 tab po at bedtime for sleep.  Dispense: 180 Tablet; Refill: 3    4. Hypothyroidism, unspecified type  - TSH WITH REFLEX TO FT4; Future    5. Elevated PSA  - PROSTATE SPECIFIC AG DIAGNOSTIC; Future    6. Prostate cancer (HCC)    7. Multiple myeloma, remission status unspecified ([facility])        Followup: No follow-ups on file.         PLEASE NOTE: This dictation was created using voice recognition and [employer] ambient listening software. I have made every reasonable attempt to correct obvious errors, but I expect that there are errors of grammar and possibly content that I did not discover before finalizing the note.

## 2024-07-09 DIAGNOSIS — F41.1 GAD (GENERALIZED ANXIETY DISORDER): ICD-10-CM

## 2024-07-09 RX ORDER — ESCITALOPRAM OXALATE 20 MG/1
20 TABLET ORAL
Qty: 90 TABLET | Refills: 3 | Status: SHIPPED | OUTPATIENT
Start: 2024-07-09

## 2024-08-31 DIAGNOSIS — N40.1 BENIGN LOCALIZED PROSTATIC HYPERPLASIA WITH LOWER URINARY TRACT SYMPTOMS (LUTS): ICD-10-CM

## 2024-09-03 NOTE — TELEPHONE ENCOUNTER
Received request via: Pharmacy    Was the patient seen in the last year in this department? Yes    Does the patient have an active prescription (recently filled or refills available) for medication(s) requested? No    [facility]    Does the patient have care home Plus and need 100-day supply? (This applies to ALL medications)

## 2024-09-05 RX ORDER — FINASTERIDE 5 MG/1
5 TABLET, FILM COATED ORAL DAILY
Qty: 90 TABLET | Refills: 3 | Status: SHIPPED | OUTPATIENT
Start: 2024-09-05

## 2024-09-12 ENCOUNTER — HOSPITAL ENCOUNTER (OUTPATIENT)
Dept: LAB | Facility: MEDICAL CENTER | Age: 74
End: 2024-09-12
Attending: INTERNAL MEDICINE
Payer: MEDICARE

## 2024-09-12 DIAGNOSIS — C90.00 LAMBDA LIGHT CHAIN MYELOMA (HCC): ICD-10-CM

## 2024-09-12 LAB
ALBUMIN SERPL BCP-MCNC: 4.3 G/DL (ref 3.2–4.9)
ALBUMIN/GLOB SERPL: 1.9 G/DL
ALP SERPL-CCNC: 71 U/L (ref 30–99)
ALT SERPL-CCNC: 29 U/L (ref 2–50)
ANION GAP SERPL CALC-SCNC: 12 MMOL/L (ref 7–16)
AST SERPL-CCNC: 23 U/L (ref 12–45)
BASOPHILS # BLD AUTO: 0.6 % (ref 0–1.8)
BASOPHILS # BLD: 0.02 K/UL (ref 0–0.12)
BILIRUB SERPL-MCNC: 0.3 MG/DL (ref 0.1–1.5)
BUN SERPL-MCNC: 28 MG/DL (ref 8–22)
CALCIUM ALBUM COR SERPL-MCNC: 8.9 MG/DL (ref 8.5–10.5)
CALCIUM SERPL-MCNC: 9.1 MG/DL (ref 8.5–10.5)
CHLORIDE SERPL-SCNC: 106 MMOL/L (ref 96–112)
CO2 SERPL-SCNC: 22 MMOL/L (ref 20–33)
CREAT SERPL-MCNC: 1.34 MG/DL (ref 0.5–1.4)
EOSINOPHIL # BLD AUTO: 0.03 K/UL (ref 0–0.51)
EOSINOPHIL NFR BLD: 0.9 % (ref 0–6.9)
ERYTHROCYTE [DISTWIDTH] IN BLOOD BY AUTOMATED COUNT: 51.4 FL (ref 35.9–50)
GFR SERPLBLD CREATININE-BSD FMLA CKD-EPI: 56 ML/MIN/1.73 M 2
GLOBULIN SER CALC-MCNC: 2.3 G/DL (ref 1.9–3.5)
GLUCOSE SERPL-MCNC: 117 MG/DL (ref 65–99)
HCT VFR BLD AUTO: 39.7 % (ref 42–52)
HGB BLD-MCNC: 14.1 G/DL (ref 14–18)
IMM GRANULOCYTES # BLD AUTO: 0 K/UL (ref 0–0.11)
IMM GRANULOCYTES NFR BLD AUTO: 0 % (ref 0–0.9)
LYMPHOCYTES # BLD AUTO: 1.11 K/UL (ref 1–4.8)
LYMPHOCYTES NFR BLD: 34.7 % (ref 22–41)
MCH RBC QN AUTO: 39 PG (ref 27–33)
MCHC RBC AUTO-ENTMCNC: 35.5 G/DL (ref 32.3–36.5)
MCV RBC AUTO: 109.7 FL (ref 81.4–97.8)
MONOCYTES # BLD AUTO: 0.4 K/UL (ref 0–0.85)
MONOCYTES NFR BLD AUTO: 12.5 % (ref 0–13.4)
NEUTROPHILS # BLD AUTO: 1.64 K/UL (ref 1.82–7.42)
NEUTROPHILS NFR BLD: 51.3 % (ref 44–72)
NRBC # BLD AUTO: 0 K/UL
NRBC BLD-RTO: 0 /100 WBC (ref 0–0.2)
PLATELET # BLD AUTO: 198 K/UL (ref 164–446)
PMV BLD AUTO: 9.2 FL (ref 9–12.9)
POTASSIUM SERPL-SCNC: 4.4 MMOL/L (ref 3.6–5.5)
PROT SERPL-MCNC: 6.6 G/DL (ref 6–8.2)
RBC # BLD AUTO: 3.62 M/UL (ref 4.7–6.1)
SODIUM SERPL-SCNC: 140 MMOL/L (ref 135–145)
WBC # BLD AUTO: 3.2 K/UL (ref 4.8–10.8)

## 2024-09-12 PROCEDURE — 84165 PROTEIN E-PHORESIS SERUM: CPT

## 2024-09-12 PROCEDURE — 82784 ASSAY IGA/IGD/IGG/IGM EACH: CPT

## 2024-09-12 PROCEDURE — 80053 COMPREHEN METABOLIC PANEL: CPT

## 2024-09-12 PROCEDURE — 85025 COMPLETE CBC W/AUTO DIFF WBC: CPT

## 2024-09-12 PROCEDURE — 83521 IG LIGHT CHAINS FREE EACH: CPT | Mod: 91

## 2024-09-12 PROCEDURE — 84155 ASSAY OF PROTEIN SERUM: CPT | Mod: XU

## 2024-09-12 PROCEDURE — 36415 COLL VENOUS BLD VENIPUNCTURE: CPT

## 2024-09-12 PROCEDURE — 86334 IMMUNOFIX E-PHORESIS SERUM: CPT

## 2024-09-14 DIAGNOSIS — E78.5 DYSLIPIDEMIA: ICD-10-CM

## 2024-09-15 LAB
ALBUMIN SERPL ELPH-MCNC: 4.37 G/DL (ref 3.75–5.01)
ALPHA1 GLOB SERPL ELPH-MCNC: 0.2 G/DL (ref 0.19–0.46)
ALPHA2 GLOB SERPL ELPH-MCNC: 0.62 G/DL (ref 0.48–1.05)
B-GLOBULIN SERPL ELPH-MCNC: 0.68 G/DL (ref 0.48–1.1)
EER MONOCLONAL PROTEIN AND FLC, SERUM Q5224: ABNORMAL
GAMMA GLOB SERPL ELPH-MCNC: 0.62 G/DL (ref 0.62–1.51)
IGA SERPL-MCNC: 43 MG/DL (ref 68–408)
IGG SERPL-MCNC: 656 MG/DL (ref 768–1632)
IGM SERPL-MCNC: 43 MG/DL (ref 35–263)
INTERPRETATION SERPL IFE-IMP: ABNORMAL
INTERPRETATION SERPL IFE-IMP: ABNORMAL
KAPPA LC FREE SER-MCNC: 6.38 MG/L (ref 3.3–19.4)
KAPPA LC FREE/LAMBDA FREE SER NEPH: 0.53 {RATIO} (ref 0.26–1.65)
LAMBDA LC FREE SERPL-MCNC: 11.98 MG/L (ref 5.71–26.3)
MONOCLONAL PROTEIN NL11656: ABNORMAL G/DL
PROT SERPL-MCNC: 6.5 G/DL (ref 6.3–8.2)

## 2024-09-16 RX ORDER — SIMVASTATIN 20 MG
20 TABLET ORAL EVERY EVENING
Qty: 100 TABLET | Refills: 2 | Status: SHIPPED | OUTPATIENT
Start: 2024-09-16 | End: 2025-10-21

## 2024-09-16 NOTE — TELEPHONE ENCOUNTER
Received request via: Pharmacy    Was the patient seen in the last year in this department? Yes    Does the patient have an active prescription (recently filled or refills available) for medication(s) requested? No    [facility]     Does the patient have [facility] Plus and need 100-day supply? (This applies to ALL medications) Patient does not have [facility]

## 2024-09-24 PROBLEM — Z94.84 HISTORY OF STEM CELL TRANSPLANT (HCC): Status: ACTIVE | Noted: 2024-09-24

## 2024-09-25 ENCOUNTER — HOSPITAL ENCOUNTER (OUTPATIENT)
Dept: LAB | Facility: MEDICAL CENTER | Age: 74
End: 2024-09-25
Attending: NURSE PRACTITIONER
Payer: MEDICARE

## 2024-09-25 ENCOUNTER — HOSPITAL ENCOUNTER (OUTPATIENT)
Dept: HEMATOLOGY ONCOLOGY | Facility: MEDICAL CENTER | Age: 74
End: 2024-09-25
Attending: NURSE PRACTITIONER
Payer: MEDICARE

## 2024-09-25 ENCOUNTER — PHARMACY VISIT (OUTPATIENT)
Dept: PHARMACY | Facility: MEDICAL CENTER | Age: 74
End: 2024-09-25
Payer: COMMERCIAL

## 2024-09-25 VITALS
HEIGHT: 68 IN | OXYGEN SATURATION: 94 % | HEART RATE: 89 BPM | WEIGHT: 180.8 LBS | SYSTOLIC BLOOD PRESSURE: 130 MMHG | TEMPERATURE: 98.2 F | BODY MASS INDEX: 27.4 KG/M2 | DIASTOLIC BLOOD PRESSURE: 60 MMHG

## 2024-09-25 DIAGNOSIS — C61 PROSTATE CANCER (HCC): ICD-10-CM

## 2024-09-25 DIAGNOSIS — Z09 ENCOUNTER FOR HEMATOLOGY FOLLOW-UP: ICD-10-CM

## 2024-09-25 DIAGNOSIS — C90.00 LAMBDA LIGHT CHAIN MYELOMA (HCC): ICD-10-CM

## 2024-09-25 PROCEDURE — 84153 ASSAY OF PSA TOTAL: CPT

## 2024-09-25 PROCEDURE — RXMED WILLOW AMBULATORY MEDICATION CHARGE: Performed by: INTERNAL MEDICINE

## 2024-09-25 PROCEDURE — 99214 OFFICE O/P EST MOD 30 MIN: CPT | Performed by: NURSE PRACTITIONER

## 2024-09-25 PROCEDURE — 99212 OFFICE O/P EST SF 10 MIN: CPT | Performed by: NURSE PRACTITIONER

## 2024-09-25 PROCEDURE — 36415 COLL VENOUS BLD VENIPUNCTURE: CPT

## 2024-09-25 RX ORDER — PNEUMOCOCCAL 20-VALENT CONJUGATE VACCINE 2.2; 2.2; 2.2; 2.2; 2.2; 2.2; 2.2; 2.2; 2.2; 2.2; 2.2; 2.2; 2.2; 2.2; 2.2; 2.2; 4.4; 2.2; 2.2; 2.2 UG/.5ML; UG/.5ML; UG/.5ML; UG/.5ML; UG/.5ML; UG/.5ML; UG/.5ML; UG/.5ML; UG/.5ML; UG/.5ML; UG/.5ML; UG/.5ML; UG/.5ML; UG/.5ML; UG/.5ML; UG/.5ML; UG/.5ML; UG/.5ML; UG/.5ML; UG/.5ML
INJECTION, SUSPENSION INTRAMUSCULAR
Qty: 0.5 ML | Refills: 0 | OUTPATIENT
Start: 2024-09-25

## 2024-09-25 RX ORDER — RESPIRATORY SYNCYTIAL VIRUS VACCINE 120MCG/0.5
KIT INTRAMUSCULAR
Qty: 0.5 ML | Refills: 0 | OUTPATIENT
Start: 2024-09-25

## 2024-09-25 RX ORDER — INFLUENZA A VIRUS A/VICTORIA/4897/2022 IVR-238 (H1N1) ANTIGEN (FORMALDEHYDE INACTIVATED), INFLUENZA A VIRUS A/CALIFORNIA/122/2022 SAN-022 (H3N2) ANTIGEN (FORMALDEHYDE INACTIVATED), AND INFLUENZA B VIRUS B/MICHIGAN/01/2021 ANTIGEN (FORMALDEHYDE INACTIVATED) 60; 60; 60 UG/.5ML; UG/.5ML; UG/.5ML
INJECTION, SUSPENSION INTRAMUSCULAR
Qty: 0.5 ML | Refills: 0 | OUTPATIENT
Start: 2024-09-25

## 2024-09-25 RX ORDER — COVID-19 VACCINE, MRNA 0.04 MG/.418ML
INJECTION, SUSPENSION INTRAMUSCULAR
Qty: 0.3 ML | Refills: 0 | OUTPATIENT
Start: 2024-09-25

## 2024-09-25 ASSESSMENT — ENCOUNTER SYMPTOMS
PALPITATIONS: 0
NAUSEA: 0
WEIGHT LOSS: 1
COUGH: 0
HEADACHES: 0
CONSTIPATION: 0
INSOMNIA: 0
BLOOD IN STOOL: 0
DIZZINESS: 1
TINGLING: 0
DIARRHEA: 0
VOMITING: 0
MYALGIAS: 0
CHILLS: 0
FEVER: 0
SHORTNESS OF BREATH: 0
WHEEZING: 0

## 2024-09-25 ASSESSMENT — FIBROSIS 4 INDEX: FIB4 SCORE: 1.55

## 2024-09-25 NOTE — PROGRESS NOTES
"Subjective     Miguel Ángel Junior Hedrick is a 74 y.o. male who presents with Multiple Myeloma (Aaron/ SCP/ 6 month FV)            HPI      MM  Unacc      Review of Systems   Constitutional:  Positive for weight loss (10# since March is accurate). Negative for chills, fever and malaise/fatigue (Usual activity - walking 1 mile per day and gym work; feeling very well).   Respiratory:  Negative for cough, shortness of breath and wheezing.         Recovering from URI   Cardiovascular:  Negative for chest pain, palpitations and leg swelling.   Gastrointestinal:  Negative for blood in stool, constipation (Last BM this am), diarrhea, melena, nausea and vomiting.   Genitourinary:  Positive for frequency (will consider returning to urology as needed). Negative for dysuria.   Musculoskeletal:  Negative for joint pain and myalgias.   Neurological:  Positive for dizziness (correlates with hydration). Negative for tingling and headaches.   Psychiatric/Behavioral:  The patient does not have insomnia (weaned from Seroquel, now taking Trazadone).               Objective     /60 (BP Location: Right arm, Patient Position: Sitting, BP Cuff Size: Adult)   Pulse 89   Temp 36.8 °C (98.2 °F) (Temporal)   Ht 1.727 m (5' 8\")   Wt 82 kg (180 lb 12.8 oz)   SpO2 94%   BMI 27.49 kg/m²      Physical Exam               No visits with results within 1 Day(s) from this visit.   Latest known visit with results is:   Hospital Outpatient Visit on 09/12/2024   Component Date Value Ref Range Status    Albumin 09/12/2024 4.37  3.75 - 5.01 g/dL Final    Alpha-1 Globulin 09/12/2024 0.20  0.19 - 0.46 g/dL Final    Alpha-2 Globulin 09/12/2024 0.62  0.48 - 1.05 g/dL Final    Beta Globulin 09/12/2024 0.68  0.48 - 1.10 g/dL Final    Gamma Globulin 09/12/2024 0.62  0.62 - 1.51 g/dL Final    Immunofixation 09/12/2024 SARINA Done   Final    Immunoglobulin G 09/12/2024 656 (L)  768 - 1632 mg/dL Final    Immunoglobulin A 09/12/2024 43 (L)  68 - 408 mg/dL Final    " Immunoglobulin M 09/12/2024 43  35 - 263 mg/dL Final    Monoclonal Protein 09/12/2024 Not Applicable  <=0.00 g/dL Final    Total Protein, Serum 09/12/2024 6.5  6.3 - 8.2 g/dL Final    Free Kappa Light Chains 09/12/2024 6.38  3.30 - 19.40 mg/L Final    Comment: INTERPRETIVE INFORMATION: Kappa Qnt Free Light Chains  Undetected antigen excess is a rare event but cannot be excluded.  Free light chain results should always be interpreted in  conjunction with other clinical and laboratory findings.      Free Lambda Light Chains 09/12/2024 11.98  5.71 - 26.30 mg/L Final    Comment: INTERPRETIVE INFORMATION: Lambda Qnt Free Light Chains  Undetected antigen excess is a rare event but cannot be excluded.  Free light chain results should always be interpreted in  conjunction with other clinical and laboratory findings.      [place]-Lambda Ratio 09/12/2024 0.53  0.26 - 1.65 Final    Interpretation 09/12/2024 See Note   Final    Comment: Normal SPEP pattern. SARINA gel shows a normal pattern; no monoclonal  proteins seen.      EER Monoclonal Protein and FLC, Se* 09/12/2024 See Note   Final    Comment: Authorized individuals can access the [employer]  Enhanced Report using the following link:    [email or web address]  Performed By: [email or web address]  [address] [place] [id number]  : Og Bunn MD, PhD  CLIA Number: [id number]      Sodium 09/12/2024 140  135 - 145 mmol/L Final    Potassium 09/12/2024 4.4  3.6 - 5.5 mmol/L Final    Chloride 09/12/2024 106  96 - 112 mmol/L Final    Co2 09/12/2024 22  20 - 33 mmol/L Final    Anion Gap 09/12/2024 12.0  7.0 - 16.0 Final    Glucose 09/12/2024 117 (H)  65 - 99 mg/dL Final    Bun 09/12/2024 28 (H)  8 - 22 mg/dL Final    Creatinine 09/12/2024 1.34  0.50 - 1.40 mg/dL Final    Calcium 09/12/2024 9.1  8.5 - 10.5 mg/dL Final    Correct Calcium 09/12/2024 8.9  8.5 - 10.5 mg/dL Final    AST(SGOT) 09/12/2024 23  12 - 45 U/L Final     ALT(SGPT) 09/12/2024 29  2 - 50 U/L Final    Alkaline Phosphatase 09/12/2024 71  30 - 99 U/L Final    Total Bilirubin 09/12/2024 0.3  0.1 - 1.5 mg/dL Final    Albumin 09/12/2024 4.3  3.2 - 4.9 g/dL Final    Total Protein 09/12/2024 6.6  6.0 - 8.2 g/dL Final    Globulin 09/12/2024 2.3  1.9 - 3.5 g/dL Final    A-G Ratio 09/12/2024 1.9  g/dL Final    WBC 09/12/2024 3.2 (L)  4.8 - 10.8 K/uL Final    RBC 09/12/2024 3.62 (L)  4.70 - 6.10 M/uL Final    Hemoglobin 09/12/2024 14.1  14.0 - 18.0 g/dL Final    Hematocrit 09/12/2024 39.7 (L)  42.0 - 52.0 % Final    MCV 09/12/2024 109.7 (H)  81.4 - 97.8 fL Final    MCH 09/12/2024 39.0 (H)  27.0 - 33.0 pg Final    MCHC 09/12/2024 35.5  32.3 - 36.5 g/dL Final    RDW 09/12/2024 51.4 (H)  35.9 - 50.0 fL Final    Platelet Count 09/12/2024 198  164 - 446 K/uL Final    MPV 09/12/2024 9.2  9.0 - 12.9 fL Final    Neutrophils-Polys 09/12/2024 51.30  44.00 - 72.00 % Final    Lymphocytes 09/12/2024 34.70  22.00 - 41.00 % Final    Monocytes 09/12/2024 12.50  0.00 - 13.40 % Final    Eosinophils 09/12/2024 0.90  0.00 - 6.90 % Final    Basophils 09/12/2024 0.60  0.00 - 1.80 % Final    Immature Granulocytes 09/12/2024 0.00  0.00 - 0.90 % Final    Nucleated RBC 09/12/2024 0.00  0.00 - 0.20 /100 WBC Final    Neutrophils (Absolute) 09/12/2024 1.64 (L)  1.82 - 7.42 K/uL Final    Includes immature neutrophils, if present.    Lymphs (Absolute) 09/12/2024 1.11  1.00 - 4.80 K/uL Final    Monos (Absolute) 09/12/2024 0.40  0.00 - 0.85 K/uL Final    Eos (Absolute) 09/12/2024 0.03  0.00 - 0.51 K/uL Final    Baso (Absolute) 09/12/2024 0.02  0.00 - 0.12 K/uL Final    Immature Granulocytes (abs) 09/12/2024 0.00  0.00 - 0.11 K/uL Final    NRBC (Absolute) 09/12/2024 0.00  K/uL Final    GFR (CKD-EPI) 09/12/2024 56 (A)  >60 mL/min/1.73 m 2 Final    Comment: Estimated Glomerular Filtration Rate is calculated using  race neutral CKD-EPI 2021 equation per NKF-ASN recommendations.     ]         Assessment & Plan     No  diagnosis found.                 99 mg/dL Final    Bun 09/12/2024 28 (H)  8 - 22 mg/dL Final    Creatinine 09/12/2024 1.34  0.50 - 1.40 mg/dL Final    Calcium 09/12/2024 9.1  8.5 - 10.5 mg/dL Final    Correct Calcium 09/12/2024 8.9  8.5 - 10.5 mg/dL Final    AST(SGOT) 09/12/2024 23  12 - 45 U/L Final    ALT(SGPT) 09/12/2024 29  2 - 50 U/L Final    Alkaline Phosphatase 09/12/2024 71  30 - 99 U/L Final    Total Bilirubin 09/12/2024 0.3  0.1 - 1.5 mg/dL Final    Albumin 09/12/2024 4.3  3.2 - 4.9 g/dL Final    Total Protein 09/12/2024 6.6  6.0 - 8.2 g/dL Final    Globulin 09/12/2024 2.3  1.9 - 3.5 g/dL Final    A-G Ratio 09/12/2024 1.9  g/dL Final    WBC 09/12/2024 3.2 (L)  4.8 - 10.8 K/uL Final    RBC 09/12/2024 3.62 (L)  4.70 - 6.10 M/uL Final    Hemoglobin 09/12/2024 14.1  14.0 - 18.0 g/dL Final    Hematocrit 09/12/2024 39.7 (L)  42.0 - 52.0 % Final    MCV 09/12/2024 109.7 (H)  81.4 - 97.8 fL Final    MCH 09/12/2024 39.0 (H)  27.0 - 33.0 pg Final    MCHC 09/12/2024 35.5  32.3 - 36.5 g/dL Final    RDW 09/12/2024 51.4 (H)  35.9 - 50.0 fL Final    Platelet Count 09/12/2024 198  164 - 446 K/uL Final    MPV 09/12/2024 9.2  9.0 - 12.9 fL Final    Neutrophils-Polys 09/12/2024 51.30  44.00 - 72.00 % Final    Lymphocytes 09/12/2024 34.70  22.00 - 41.00 % Final    Monocytes 09/12/2024 12.50  0.00 - 13.40 % Final    Eosinophils 09/12/2024 0.90  0.00 - 6.90 % Final    Basophils 09/12/2024 0.60  0.00 - 1.80 % Final    Immature Granulocytes 09/12/2024 0.00  0.00 - 0.90 % Final    Nucleated RBC 09/12/2024 0.00  0.00 - 0.20 /100 WBC Final    Neutrophils (Absolute) 09/12/2024 1.64 (L)  1.82 - 7.42 K/uL Final    Includes immature neutrophils, if present.    Lymphs (Absolute) 09/12/2024 1.11  1.00 - 4.80 K/uL Final    Monos (Absolute) 09/12/2024 0.40  0.00 - 0.85 K/uL Final    Eos (Absolute) 09/12/2024 0.03  0.00 - 0.51 K/uL Final    Baso (Absolute) 09/12/2024 0.02  0.00 - 0.12 K/uL Final    Immature Granulocytes (abs) 09/12/2024 0.00  0.00 - 0.11 K/uL Final    NRBC  (Absolute) 09/12/2024 0.00  K/uL Final    GFR (CKD-EPI) 09/12/2024 56 (A)  >60 mL/min/1.73 m 2 Final    Comment: Estimated Glomerular Filtration Rate is calculated using  race neutral CKD-EPI 2021 equation per NKF-ASN recommendations.          Latest Reference Range & Units 03/01/23 10:16 04/03/23 09:54 07/07/23 09:27 11/14/23 06:44 03/13/24 13:24 09/12/24 09:35   Immunoglobulin A 68 - 408 mg/dL 24 (L) 24 (L) 66 (L) 34 (L) 36 (L) 43 (L)   Immunoglobulin G 768 - 1632 mg/dL 507 (L) 483 (L) 847 817 703 (L) 656 (L)   Immunoglobulin M 35 - 263 mg/dL 13 (L) 22 (L) 47 37 36 43   Interpretation  See Note See Note See Note See Note See Note See Note   Albumin 3.75 - 5.01 g/dL 4.06 4.05 3.24 (L) 4.32 4.00 4.37   Gamma Globulin 0.62 - 1.51 g/dL 0.50 (L) 0.50 (L) 0.81 0.71 0.76 0.62   Alpha-1 Globulin 0.19 - 0.46 g/dL 0.25 0.27 0.32 0.25 0.25 0.20   Alpha-2 Globulin 0.48 - 1.05 g/dL 0.69 0.69 0.68 0.81 0.73 0.62   Beta Globulin 0.48 - 1.10 g/dL 0.69 0.69 0.65 0.71 0.75 0.68   Free Kappa Light Chains 3.30 - 19.40 mg/L 13.55 13.28 22.13 (H) 11.43 12.11 6.38   Free Lambda Light Chains 5.71 - 26.30 mg/L 5.68 (L) 10.10 19.93 8.76 10.07 11.98   Kappa-Lambda Ratio 0.26 - 1.65  2.39 (H) 1.31 1.11 1.30 1.20 0.53   Immunofixation  SARINA Done SARINA Done SARINA Done SARINA Done SARINA Done SARINA Done   Monoclonal Protein <=0.00 g/dL Not Applicable Not Applicable Not Applicable Not Applicable Not Applicable Not Applicable   EER Monoclonal Protein and FLC, Serum  See Note See Note See Note See Note See Note See Note   (L): Data is abnormally low  (H): Data is abnormally high               Assessment & Plan     1. Lambda light chain myeloma (HCC)  CBC WITH DIFFERENTIAL    Monoclonal Protein and FLC, Serum    Comp Metabolic Panel      2. Encounter for hematology follow-up        3. Prostate cancer (HCC)  PROSTATE SPECIFIC AG DIAGNOSTIC    PROSTATE SPECIFIC AG DIAGNOSTIC           1. Prostate cancer: Diagnosed 7/2021; s/p brachytherapy 12/16/2021; s/p XRT  completed 2022. Surveillance s/b per Urology & Rad Onc as scheduled; PSA is ordered since we see him more often for surveillance.     3.  Multiple myeloma: Diagnosed 7/23/21 initiated RVD 9/2/21; s/p 1 cycle daratumumab plus Pomalyst 4/21/22; s/p auto SCT 8/3/22; s/p maint Rev until 3/2023 (d/c r/t counts)    CBC, CMP, myeloma labs have been evaluated and found to be within acceptable criteria.  He denies B symptoms, CRAB criteria are within acceptable limits.  He will continue with his usual activity, monitor symptoms, repeat labs in 6 months, and return for reevaluation, sooner as needed.       The patient verbalized agreement and understanding of current plan. All questions and concerns were addressed at time of visit.    Please note that this dictation was created using voice recognition software. I have made every reasonable attempt to correct obvious errors, but I expect that there are errors of grammar and possibly content that I did not discover before finalizing the note.

## 2024-09-26 LAB — PSA SERPL-MCNC: <0.02 NG/ML (ref 0–4)

## 2024-10-03 DIAGNOSIS — E03.9 HYPOTHYROIDISM, UNSPECIFIED TYPE: ICD-10-CM

## 2024-10-03 RX ORDER — LEVOTHYROXINE SODIUM 100 UG/1
TABLET ORAL
Qty: 100 TABLET | Refills: 3 | Status: SHIPPED | OUTPATIENT
Start: 2024-10-03

## 2025-03-12 ENCOUNTER — HOSPITAL ENCOUNTER (OUTPATIENT)
Dept: LAB | Facility: MEDICAL CENTER | Age: 75
End: 2025-03-12
Attending: NURSE PRACTITIONER
Payer: MEDICARE

## 2025-03-12 DIAGNOSIS — C61 PROSTATE CANCER (HCC): ICD-10-CM

## 2025-03-12 DIAGNOSIS — C90.00 LAMBDA LIGHT CHAIN MYELOMA (HCC): ICD-10-CM

## 2025-03-12 LAB
BASOPHILS # BLD AUTO: 0.8 % (ref 0–1.8)
BASOPHILS # BLD: 0.03 K/UL (ref 0–0.12)
EOSINOPHIL # BLD AUTO: 0.04 K/UL (ref 0–0.51)
EOSINOPHIL NFR BLD: 1.1 % (ref 0–6.9)
ERYTHROCYTE [DISTWIDTH] IN BLOOD BY AUTOMATED COUNT: 49.9 FL (ref 35.9–50)
HCT VFR BLD AUTO: 39.5 % (ref 42–52)
HGB BLD-MCNC: 13.8 G/DL (ref 14–18)
IMM GRANULOCYTES # BLD AUTO: 0 K/UL (ref 0–0.11)
IMM GRANULOCYTES NFR BLD AUTO: 0 % (ref 0–0.9)
LYMPHOCYTES # BLD AUTO: 1.2 K/UL (ref 1–4.8)
LYMPHOCYTES NFR BLD: 33.3 % (ref 22–41)
MCH RBC QN AUTO: 36.7 PG (ref 27–33)
MCHC RBC AUTO-ENTMCNC: 34.9 G/DL (ref 32.3–36.5)
MCV RBC AUTO: 105.1 FL (ref 81.4–97.8)
MONOCYTES # BLD AUTO: 0.41 K/UL (ref 0–0.85)
MONOCYTES NFR BLD AUTO: 11.4 % (ref 0–13.4)
NEUTROPHILS # BLD AUTO: 1.92 K/UL (ref 1.82–7.42)
NEUTROPHILS NFR BLD: 53.4 % (ref 44–72)
NRBC # BLD AUTO: 0 K/UL
NRBC BLD-RTO: 0 /100 WBC (ref 0–0.2)
PLATELET # BLD AUTO: 218 K/UL (ref 164–446)
PMV BLD AUTO: 9 FL (ref 9–12.9)
RBC # BLD AUTO: 3.76 M/UL (ref 4.7–6.1)
WBC # BLD AUTO: 3.6 K/UL (ref 4.8–10.8)

## 2025-03-12 PROCEDURE — 85025 COMPLETE CBC W/AUTO DIFF WBC: CPT

## 2025-03-12 PROCEDURE — 36415 COLL VENOUS BLD VENIPUNCTURE: CPT

## 2025-03-12 PROCEDURE — 80053 COMPREHEN METABOLIC PANEL: CPT

## 2025-03-12 PROCEDURE — 83521 IG LIGHT CHAINS FREE EACH: CPT

## 2025-03-12 PROCEDURE — 84155 ASSAY OF PROTEIN SERUM: CPT

## 2025-03-12 PROCEDURE — 84153 ASSAY OF PSA TOTAL: CPT

## 2025-03-12 PROCEDURE — 82784 ASSAY IGA/IGD/IGG/IGM EACH: CPT

## 2025-03-12 PROCEDURE — 86334 IMMUNOFIX E-PHORESIS SERUM: CPT

## 2025-03-12 PROCEDURE — 84165 PROTEIN E-PHORESIS SERUM: CPT

## 2025-03-13 LAB
ALBUMIN SERPL BCP-MCNC: 4.3 G/DL (ref 3.2–4.9)
ALBUMIN/GLOB SERPL: 1.7 G/DL
ALP SERPL-CCNC: 70 U/L (ref 30–99)
ALT SERPL-CCNC: 34 U/L (ref 2–50)
ANION GAP SERPL CALC-SCNC: 13 MMOL/L (ref 7–16)
AST SERPL-CCNC: 26 U/L (ref 12–45)
BILIRUB SERPL-MCNC: 0.5 MG/DL (ref 0.1–1.5)
BUN SERPL-MCNC: 24 MG/DL (ref 8–22)
CALCIUM ALBUM COR SERPL-MCNC: 9.3 MG/DL (ref 8.5–10.5)
CALCIUM SERPL-MCNC: 9.5 MG/DL (ref 8.5–10.5)
CHLORIDE SERPL-SCNC: 102 MMOL/L (ref 96–112)
CO2 SERPL-SCNC: 23 MMOL/L (ref 20–33)
CREAT SERPL-MCNC: 1.32 MG/DL (ref 0.5–1.4)
GFR SERPLBLD CREATININE-BSD FMLA CKD-EPI: 57 ML/MIN/1.73 M 2
GLOBULIN SER CALC-MCNC: 2.6 G/DL (ref 1.9–3.5)
GLUCOSE SERPL-MCNC: 94 MG/DL (ref 65–99)
POTASSIUM SERPL-SCNC: 4.3 MMOL/L (ref 3.6–5.5)
PROT SERPL-MCNC: 6.9 G/DL (ref 6–8.2)
PSA SERPL DL<=0.01 NG/ML-MCNC: <0.02 NG/ML (ref 0–4)
SODIUM SERPL-SCNC: 138 MMOL/L (ref 135–145)

## 2025-03-15 LAB
ALBUMIN SERPL ELPH-MCNC: 4.28 G/DL (ref 3.75–5.01)
ALPHA1 GLOB SERPL ELPH-MCNC: 0.2 G/DL (ref 0.19–0.46)
ALPHA2 GLOB SERPL ELPH-MCNC: 0.69 G/DL (ref 0.48–1.05)
B-GLOBULIN SERPL ELPH-MCNC: 0.77 G/DL (ref 0.48–1.1)
EER MONOCLONAL PROTEIN AND FLC, SERUM Q5224: ABNORMAL
GAMMA GLOB SERPL ELPH-MCNC: 0.66 G/DL (ref 0.62–1.51)
IGA SERPL-MCNC: 57 MG/DL (ref 68–408)
IGG SERPL-MCNC: 742 MG/DL (ref 768–1632)
IGM SERPL-MCNC: 53 MG/DL (ref 35–263)
INTERPRETATION SERPL IFE-IMP: ABNORMAL
INTERPRETATION SERPL IFE-IMP: ABNORMAL
KAPPA LC FREE SER-MCNC: 8.2 MG/L (ref 3.3–19.4)
KAPPA LC FREE/LAMBDA FREE SER NEPH: 1.03 {RATIO} (ref 0.26–1.65)
LAMBDA LC FREE SERPL-MCNC: 7.97 MG/L (ref 5.71–26.3)
MONOCLONAL PROTEIN NL11656: ABNORMAL G/DL
PROT SERPL-MCNC: 6.6 G/DL (ref 6.3–8.2)

## 2025-03-26 ENCOUNTER — HOSPITAL ENCOUNTER (OUTPATIENT)
Dept: HEMATOLOGY ONCOLOGY | Facility: MEDICAL CENTER | Age: 75
End: 2025-03-26
Attending: NURSE PRACTITIONER
Payer: MEDICARE

## 2025-03-26 VITALS
WEIGHT: 181.66 LBS | BODY MASS INDEX: 27.53 KG/M2 | SYSTOLIC BLOOD PRESSURE: 124 MMHG | TEMPERATURE: 97.7 F | OXYGEN SATURATION: 93 % | HEIGHT: 68 IN | DIASTOLIC BLOOD PRESSURE: 66 MMHG | RESPIRATION RATE: 15 BRPM | HEART RATE: 64 BPM

## 2025-03-26 DIAGNOSIS — C90.01 MULTIPLE MYELOMA IN REMISSION (HCC): ICD-10-CM

## 2025-03-26 DIAGNOSIS — Z09 ENCOUNTER FOR HEMATOLOGY FOLLOW-UP: ICD-10-CM

## 2025-03-26 DIAGNOSIS — C61 PROSTATE CANCER (HCC): ICD-10-CM

## 2025-03-26 PROCEDURE — 99212 OFFICE O/P EST SF 10 MIN: CPT | Performed by: NURSE PRACTITIONER

## 2025-03-26 PROCEDURE — 99213 OFFICE O/P EST LOW 20 MIN: CPT | Performed by: NURSE PRACTITIONER

## 2025-03-26 RX ORDER — TAMSULOSIN HYDROCHLORIDE 0.4 MG/1
CAPSULE ORAL
Qty: 180 CAPSULE | Refills: 0 | Status: SHIPPED | OUTPATIENT
Start: 2025-03-26

## 2025-03-26 ASSESSMENT — ENCOUNTER SYMPTOMS
COUGH: 0
DIARRHEA: 0
NAUSEA: 0
PALPITATIONS: 0
WHEEZING: 0
MEMORY LOSS: 1
INSOMNIA: 0
CHILLS: 0
BLOOD IN STOOL: 0
MYALGIAS: 0
TINGLING: 1
VOMITING: 0
HEADACHES: 0
CONSTIPATION: 1
SHORTNESS OF BREATH: 1
FEVER: 0
WEIGHT LOSS: 0
DIZZINESS: 0

## 2025-03-26 ASSESSMENT — PAIN SCALES - GENERAL: PAINLEVEL_OUTOF10: NO PAIN

## 2025-03-26 ASSESSMENT — FIBROSIS 4 INDEX: FIB4 SCORE: 1.47

## 2025-03-26 NOTE — TELEPHONE ENCOUNTER
Received request via: Pharmacy    Was the patient seen in the last year in this department? Yes    Does the patient have an active prescription (recently filled or refills available) for medication(s) requested? No    Pharmacy Name: [facility]/pharmacy #[id number] - [place], [place] - [address]     Does the patient have shelter Plus and need 100-day supply? (This applies to ALL medications) Yes, quantity updated to 100 days

## 2025-03-26 NOTE — PROGRESS NOTES
Subjective     Miguel Ángel Hedrick is a 74 y.o. male who presents with Multiple Myeloma            HPI  Mr Floridalma presents for evaluation of lambda light chain multiple myeloma. He is unaccompanied for today's visit.     Patient initially referred per PCP in 5/2021 for further evaluation macrocytosis without anemia. 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 BM Bx completed 7/23/21 showed 60-70% improvement from plasma cells, cytogenetics were negative, he was diagnosed with lambda light chain myeloma. PET completed 8/18/21 was negative for FDG accumulation. He was referred to Dr. Stone at [facility] for further evaluation of bone marrow transplant and in the interim initiated RVD [Revlimid 25 mg daily days 1-14, 7 days off; Velcade 1.3 mg/m2 SQ weekly dexamethasone 40 mg PO on days of Velcade; 21 days] on 9/2/21. He completed 9 cycles RVD and transitioned to daratumumab plus Pomalyst in anticipation of autologous stem cell transplant, initiating treatment on 4/21/22, completing days 1, 8; days 15 & 22 held r/t ANC, further treatment suspended. He underwent autologous SCT w/ melphalan prep 8/3/22 at [facility]; he subsequently initiated maintenance lenalidomide but experienced prolonged pancytopenia, med discontinued in approx. 3/2023. Bone marrow biopsy completed 7/24/23 was hypocellular but showed no evidence of recurrent myeloma. Patient continues with routine surveillance.     Patient diagnosed with concurrent prostate cancer via bx on 7/15/21 per Urology, Dr. Lazar: pathology showed [place] 3+4 = 7, 2/12 cores, 3+3 = 6, 4/12 cores; PSA 4.10 (4/2021). Patient with h/o testosterone replacement therapy, discontinued at time of diagnosis.He is established with Dr. Isaac and completed brachytherapy 12/16/21; 28 fractions XRT to pelvis completed 3/17/22. He continues with surveillance per Urology, PSA per our office per patient's request for ease of completion.     Patient  is engaging in his usual activity, continues to workout.  Finasteride has helped with better urinary output.  He is noted larger abdominal girth with finasteride, but weight has not really changed.  He denies B symptoms and is otherwise asymptomatic in regard to multiple myeloma.      Review of Systems   Constitutional:  Negative for chills, fever, malaise/fatigue (still working out) and weight loss (stable).   Respiratory:  Positive for shortness of breath (a bit when bends over). Negative for cough and wheezing.    Cardiovascular:  Negative for chest pain, palpitations and leg swelling.   Gastrointestinal:  Positive for constipation (consistent with baseline). Negative for blood in stool, diarrhea, melena, nausea and vomiting.   Genitourinary:  Negative for dysuria and hematuria.        Finasteride per PCP with better output, developed bigger belly (redistributed)   Musculoskeletal:  Negative for joint pain and myalgias.   Neurological:  Positive for tingling (bilateral feet from previous treatment). Negative for dizziness and headaches.   Psychiatric/Behavioral:  Positive for memory loss (needs some tricks to help with fuzziness). The patient does not have insomnia (sleeps best with trazadone at night).      Allergies   Allergen Reactions    Grass Pollen(K-O-R-T-Swt Rodrigo) Unspecified     sneezing    Pet Dander [Cat Hair Extract] Unspecified     sneezing    Sagebrush Unspecified     sneezing         Current Outpatient Medications on File Prior to Encounter   Medication Sig Dispense Refill    tamsulosin (FLOMAX) 0.4 MG capsule TAKE 2 CAPSULES BY MOUTH EVERY DAY FOR 90 DAYS. TAKE 30 MINUTES AFTER DINNER 180 Capsule 0    levothyroxine (SYNTHROID) 100 MCG Tab TAKE 1 TABLET BY MOUTH EVERY DAY IN THE MORNING ON AN EMPTY STOMACH 100 Tablet 3    simvastatin (ZOCOR) 20 MG Tab Take 1 Tablet by mouth every evening. 100 Tablet 2    finasteride (PROSCAR) 5 MG Tab TAKE 1 TABLET BY MOUTH EVERY DAY 90 Tablet 3    escitalopram  "(LEXAPRO) 20 MG tablet TAKE 1 TABLET BY MOUTH EVERY DAY 90 Tablet 3    traZODone (DESYREL) 50 MG Tab Take 1-2 tab po at bedtime for sleep. 180 Tablet 3    acyclovir (ZOVIRAX) 400 MG tablet Take 400 mg by mouth at bedtime.      VITAMIN D PO Take 8,000 Units by mouth.       No current facility-administered medications on file prior to encounter.              Objective     /66 (BP Location: Left arm, Patient Position: Sitting, BP Cuff Size: Adult)   Pulse 64   Temp 36.5 °C (97.7 °F) (Temporal)   Resp 15   Ht 1.727 m (5' 8\")   Wt 82.4 kg (181 lb 10.5 oz)   SpO2 93%   BMI 27.62 kg/m²      Physical Exam  Vitals reviewed.   Constitutional:       General: He is not in acute distress.     Appearance: He is well-developed. He is not diaphoretic.   HENT:      Head: Normocephalic and atraumatic.   Eyes:      General: No scleral icterus.        Right eye: No discharge.         Left eye: No discharge.   Cardiovascular:      Rate and Rhythm: Normal rate and regular rhythm.      Heart sounds: Normal heart sounds. No murmur heard.     No friction rub. No gallop.   Pulmonary:      Effort: Pulmonary effort is normal. No respiratory distress.      Breath sounds: Normal breath sounds. No wheezing.   Abdominal:      General: There is no distension.      Palpations: Abdomen is soft.      Tenderness: There is no abdominal tenderness.   Musculoskeletal:         General: Normal range of motion.      Cervical back: Normal range of motion.   Skin:     General: Skin is warm and dry.      Coloration: Skin is not pale.      Findings: No erythema or rash.   Neurological:      Mental Status: He is alert and oriented to person, place, and time.   Psychiatric:         Behavior: Behavior normal.         No visits with results within 1 Day(s) from this visit.   Latest known visit with results is:   Hospital Outpatient Visit on 03/12/2025   Component Date Value Ref Range Status    Prostatic Specific Antigen Tot 03/12/2025 <0.02  0.00 - 4.00 " ng/mL Final    Sodium 03/12/2025 138  135 - 145 mmol/L Final    Potassium 03/12/2025 4.3  3.6 - 5.5 mmol/L Final    Chloride 03/12/2025 102  96 - 112 mmol/L Final    Co2 03/12/2025 23  20 - 33 mmol/L Final    Anion Gap 03/12/2025 13.0  7.0 - 16.0 Final    Glucose 03/12/2025 94  65 - 99 mg/dL Final    Bun 03/12/2025 24 (H)  8 - 22 mg/dL Final    Creatinine 03/12/2025 1.32  0.50 - 1.40 mg/dL Final    Calcium 03/12/2025 9.5  8.5 - 10.5 mg/dL Final    Correct Calcium 03/12/2025 9.3  8.5 - 10.5 mg/dL Final    AST(SGOT) 03/12/2025 26  12 - 45 U/L Final    ALT(SGPT) 03/12/2025 34  2 - 50 U/L Final    Alkaline Phosphatase 03/12/2025 70  30 - 99 U/L Final    Total Bilirubin 03/12/2025 0.5  0.1 - 1.5 mg/dL Final    Albumin 03/12/2025 4.3  3.2 - 4.9 g/dL Final    Total Protein 03/12/2025 6.9  6.0 - 8.2 g/dL Final    Globulin 03/12/2025 2.6  1.9 - 3.5 g/dL Final    A-G Ratio 03/12/2025 1.7  g/dL Final    Albumin 03/12/2025 4.28  3.75 - 5.01 g/dL Final    Alpha-1 Globulin 03/12/2025 0.20  0.19 - 0.46 g/dL Final    Alpha-2 Globulin 03/12/2025 0.69  0.48 - 1.05 g/dL Final    Beta Globulin 03/12/2025 0.77  0.48 - 1.10 g/dL Final    Gamma Globulin 03/12/2025 0.66  0.62 - 1.51 g/dL Final    Immunofixation 03/12/2025 SARINA Done   Final    Immunoglobulin G 03/12/2025 742 (L)  768 - 1632 mg/dL Final    Immunoglobulin A 03/12/2025 57 (L)  68 - 408 mg/dL Final    Immunoglobulin M 03/12/2025 53  35 - 263 mg/dL Final    Monoclonal Protein 03/12/2025 Not Applicable  <=0.00 g/dL Final    Total Protein, Serum 03/12/2025 6.6  6.3 - 8.2 g/dL Final    Free Kappa Light Chains 03/12/2025 8.20  3.30 - 19.40 mg/L Final    Comment: INTERPRETIVE INFORMATION: Kappa Qnt Free Light Chains  Undetected antigen excess is a rare event but cannot be excluded.  Free light chain results should always be interpreted in  conjunction with other clinical and laboratory findings.      Free Lambda Light Chains 03/12/2025 7.97  5.71 - 26.30 mg/L Final    Comment:  INTERPRETIVE INFORMATION: Lambda Qnt Free Light Chains  Undetected antigen excess is a rare event but cannot be excluded.  Free light chain results should always be interpreted in  conjunction with other clinical and laboratory findings.      [place]-Lambda Ratio 03/12/2025 1.03  0.26 - 1.65 Final    Interpretation 03/12/2025 See Note   Final    Comment: Normal SPEP pattern. SARINA gel shows a normal pattern; no monoclonal  proteins seen.      EER Monoclonal Protein and FLC, Se* 03/12/2025 See Note   Final    Comment: Authorized individuals can access the [employer] Enhanced Report  with an [employer] Connect account using the following link.    Your local lab can assist you in obtaining the patient  report if you don't have a Connect account.    [email or web address]  Performed By: [facility]  [address] [place] [id number]  : Og Bunn MD, PhD  CLIA Number: [id number]      WBC 03/12/2025 3.6 (L)  4.8 - 10.8 K/uL Final    RBC 03/12/2025 3.76 (L)  4.70 - 6.10 M/uL Final    Hemoglobin 03/12/2025 13.8 (L)  14.0 - 18.0 g/dL Final    Hematocrit 03/12/2025 39.5 (L)  42.0 - 52.0 % Final    MCV 03/12/2025 105.1 (H)  81.4 - 97.8 fL Final    MCH 03/12/2025 36.7 (H)  27.0 - 33.0 pg Final    MCHC 03/12/2025 34.9  32.3 - 36.5 g/dL Final    RDW 03/12/2025 49.9  35.9 - 50.0 fL Final    Platelet Count 03/12/2025 218  164 - 446 K/uL Final    MPV 03/12/2025 9.0  9.0 - 12.9 fL Final    Neutrophils-Polys 03/12/2025 53.40  44.00 - 72.00 % Final    Lymphocytes 03/12/2025 33.30  22.00 - 41.00 % Final    Monocytes 03/12/2025 11.40  0.00 - 13.40 % Final    Eosinophils 03/12/2025 1.10  0.00 - 6.90 % Final    Basophils 03/12/2025 0.80  0.00 - 1.80 % Final    Immature Granulocytes 03/12/2025 0.00  0.00 - 0.90 % Final    Nucleated RBC 03/12/2025 0.00  0.00 - 0.20 /100 WBC Final    Neutrophils (Absolute) 03/12/2025 1.92  1.82 - 7.42 K/uL Final    Includes immature neutrophils, if present.     Lymphs (Absolute) 03/12/2025 1.20  1.00 - 4.80 K/uL Final    Monos (Absolute) 03/12/2025 0.41  0.00 - 0.85 K/uL Final    Eos (Absolute) 03/12/2025 0.04  0.00 - 0.51 K/uL Final    Baso (Absolute) 03/12/2025 0.03  0.00 - 0.12 K/uL Final    Immature Granulocytes (abs) 03/12/2025 0.00  0.00 - 0.11 K/uL Final    NRBC (Absolute) 03/12/2025 0.00  K/uL Final    GFR (CKD-EPI) 03/12/2025 57 (A)  >60 mL/min/1.73 m 2 Final    Comment: Estimated Glomerular Filtration Rate is calculated using  race neutral CKD-EPI 2021 equation per NKF-ASN recommendations.                  Assessment & Plan     1. Multiple myeloma in remission (HCC)  CBC WITH DIFFERENTIAL    Comp Metabolic Panel    Monoclonal Protein and FLC, Serum      2. Encounter for hematology follow-up        3. Prostate cancer (HCC)  PROSTATE SPECIFIC AG DIAGNOSTIC        1. Prostate cancer: Diagnosed 7/2021; s/p brachytherapy 12/16/2021; s/p XRT completed 2022. Surveillance s/b per Urology & Rad Onc as scheduled; on Finasteride; pt requests PSA per our office since we see him more often for surveillance.     3.  Multiple myeloma: Diagnosed 7/23/21 initiated RVD 9/2/21; s/p 1 cycle daratumumab plus Pomalyst 4/21/22; s/p auto SCT 8/3/22; s/p maint Rev until 3/2023 (d/c r/t counts)     CBC, CMP, myeloma labs have been evaluated and found to be within acceptable criteria. He denies B symptoms, CRAB criteria are within acceptable limits. He will continue to monitor symptoms, repeat labs, and return for re-evaluation in 6 months, sooner as needed.       The patient verbalized agreement and understanding of current plan. All questions and concerns were addressed at time of visit.    Please note that this dictation was created using voice recognition software. I have made every reasonable attempt to correct obvious errors, but I expect that there are errors of grammar and possibly content that I did not discover before finalizing the note.

## 2025-03-30 PROBLEM — C90.01 MULTIPLE MYELOMA IN REMISSION (HCC): Status: ACTIVE | Noted: 2025-03-30

## 2025-04-16 ENCOUNTER — OFFICE VISIT (OUTPATIENT)
Dept: MEDICAL GROUP | Facility: LAB | Age: 75
End: 2025-04-16
Payer: MEDICARE

## 2025-04-16 VITALS
WEIGHT: 188 LBS | HEIGHT: 68 IN | SYSTOLIC BLOOD PRESSURE: 110 MMHG | DIASTOLIC BLOOD PRESSURE: 58 MMHG | HEART RATE: 78 BPM | TEMPERATURE: 97.3 F | BODY MASS INDEX: 28.49 KG/M2 | OXYGEN SATURATION: 93 % | RESPIRATION RATE: 16 BRPM

## 2025-04-16 DIAGNOSIS — E78.5 DYSLIPIDEMIA: ICD-10-CM

## 2025-04-16 DIAGNOSIS — N18.31 CHRONIC KIDNEY DISEASE, STAGE 3A: ICD-10-CM

## 2025-04-16 DIAGNOSIS — E03.9 HYPOTHYROIDISM, UNSPECIFIED TYPE: ICD-10-CM

## 2025-04-16 DIAGNOSIS — F33.42 RECURRENT MAJOR DEPRESSIVE DISORDER, IN FULL REMISSION (HCC): ICD-10-CM

## 2025-04-16 DIAGNOSIS — N40.1 BENIGN LOCALIZED PROSTATIC HYPERPLASIA WITH LOWER URINARY TRACT SYMPTOMS (LUTS): ICD-10-CM

## 2025-04-16 PROBLEM — D50.0 BLOOD LOSS ANEMIA: Status: RESOLVED | Noted: 2021-08-09 | Resolved: 2025-04-16

## 2025-04-16 PROBLEM — R97.20 ELEVATED PSA: Status: RESOLVED | Noted: 2021-05-14 | Resolved: 2025-04-16

## 2025-04-16 PROBLEM — T78.40XA ALLERGIES: Status: RESOLVED | Noted: 2021-04-01 | Resolved: 2025-04-16

## 2025-04-16 PROBLEM — J32.4 CHRONIC PANSINUSITIS: Status: RESOLVED | Noted: 2021-05-14 | Resolved: 2025-04-16

## 2025-04-16 PROCEDURE — 99214 OFFICE O/P EST MOD 30 MIN: CPT | Performed by: FAMILY MEDICINE

## 2025-04-16 PROCEDURE — 3074F SYST BP LT 130 MM HG: CPT | Performed by: FAMILY MEDICINE

## 2025-04-16 PROCEDURE — 3078F DIAST BP <80 MM HG: CPT | Performed by: FAMILY MEDICINE

## 2025-04-16 ASSESSMENT — FIBROSIS 4 INDEX: FIB4 SCORE: 1.47

## 2025-04-16 NOTE — PROGRESS NOTES
Verbal consent was acquired by the patient to use [employer] ambient listening note generation during this visit Yes    Subjective:   Gary Hedrick is a 74 y.o. male here today for   Chief Complaint   Patient presents with    Weight Gain     History of Present Illness  The patient is a 74-year-old male who presents today with concerns regarding recent weight gain and for follow-up on chronic conditions. He has a history of hypothyroidism, currently managed with Synthroid 100 mcg daily, hyperlipidemia, managed with simvastatin 20 mg daily, benign prostatic hyperplasia (BPH), and prostate cancer, managed with tamsulosin 0.4 mg daily. Additionally, trazodone 50 mg is taken for sleep. Oncology follow-up continues for treatment of multiple myeloma, which is currently in remission.    Overall good health is reported, but an increase in abdominal girth has been observed. A balanced diet and regular exercise regimen are maintained. No gastrointestinal symptoms such as abdominal pain, nausea, vomiting, or diarrhea are experienced.    A history of depression is present, managed with Lexapro 20 mg daily. Compliance with this medication is reported, with a stable mood and no significant side effects. There are no increased feelings of depression or anxiety, and no suicidal or homicidal ideations are endorsed.    Hypothyroidism is managed with Synthroid 100 mcg daily, with compliance and no concerns reported.    Hyperlipidemia is managed with simvastatin 20 mg daily, with no concerns reported.    BPH is managed with Flomax 0.4 mg daily and finasteride 5 mg daily. A history of prostate carcinoma is noted, with a recent PSA level being undetectable. No urinary symptoms are reported at this time.      Allergies   Allergen Reactions    Grass Pollen(K-O-[id number]-Swt Rodrigo) Unspecified     sneezing    Pet Dander [Cat Hair Extract] Unspecified     sneezing    Sagebrush Unspecified     sneezing         Current medicines (including  "changes today)  Current Outpatient Medications   Medication Sig Dispense Refill    tamsulosin (FLOMAX) 0.4 MG capsule TAKE 2 CAPSULES BY MOUTH EVERY DAY FOR 90 DAYS. TAKE 30 MINUTES AFTER DINNER 180 Capsule 0    levothyroxine (SYNTHROID) 100 MCG Tab TAKE 1 TABLET BY MOUTH EVERY DAY IN THE MORNING ON AN EMPTY STOMACH 100 Tablet 3    simvastatin (ZOCOR) 20 MG Tab Take 1 Tablet by mouth every evening. 100 Tablet 2    finasteride (PROSCAR) 5 MG Tab TAKE 1 TABLET BY MOUTH EVERY DAY 90 Tablet 3    escitalopram (LEXAPRO) 20 MG tablet TAKE 1 TABLET BY MOUTH EVERY DAY 90 Tablet 3    traZODone (DESYREL) 50 MG Tab Take 1-2 tab po at bedtime for sleep. 180 Tablet 3    acyclovir (ZOVIRAX) 400 MG tablet Take 400 mg by mouth at bedtime.      VITAMIN D PO Take 8,000 Units by mouth.       No current facility-administered medications for this visit.     He  has a past medical history of Breath shortness (07/20/2023), Cancer (HCC) (07/20/2023), Cancer (HCC) (07/20/2023), Cataract (07/20/2023), Disorder of thyroid (07/20/2023), Heart murmur (07/20/2023), High cholesterol (07/20/2023), Light chain myeloma (HCC) (2021), Prostate cancer (HCC), Psychiatric problem (07/20/2023), and Seizure ([facility]).  He has no past medical history of Bowel habit changes.    ROS   ROS  -See HPI     Objective:     Physical Exam:  /58 (BP Location: Right arm, Patient Position: Sitting, BP Cuff Size: Large adult)   Pulse 78   Temp 36.3 °C (97.3 °F) (Temporal)   Resp 16   Ht 1.727 m (5' 8\")   Wt 85.3 kg (188 lb)   SpO2 93%  Body mass index is 28.59 kg/m².   Constitutional: Alert, no distress.  Skin: Warm, dry, good turgor, no rashes in visible areas.  Eye: Equal, round and reactive, conjunctiva clear, lids normal.  Respiratory: Unlabored respiratory effort, lungs clear to auscultation, no wheezes, no rhonchi.  Abdomen: Soft, non-tender, no masses, no hepatosplenomegaly. Bowel sounds normal   Psych: Alert and oriented x3, normal affect and " mood.    Results  - Laboratory Studies:    - PSA: Undetectable    Assessment and Plan:     Assessment & Plan  1. Weight gain.  Increased weight gain around the abdomen noted.  Treatment plan: Continue with appropriate diet and exercise regimen; monitor weight and follow up if needed.  Clinical decision making: Discussion regarding the potential side effect of Lexapro contributing to weight gain.    2. Depression.  Mood is great with no concerns; denies significant side effects, increased depression, anxiety, or suicidal/homicidal ideation.  Treatment plan: Continue Lexapro 20 mg daily.  Clinical decision making: Chronic and stable condition; compliant with Lexapro 20 mg daily.    3. Hypothyroidism.  No concerns reported; compliant with Synthroid 100 mcg daily.  Treatment plan: Continue Synthroid 100 mcg daily; recheck TSH and titrate medication if needed.  Clinical decision making: Chronic and stable condition.    4. Dyslipidemia.  No concerns reported; compliant with simvastatin 20 mg daily.  Treatment plan: Continue simvastatin 20 mg daily; maintain appropriate diet and exercise regimen; recheck lipid profile and titrate medication if needed.  Clinical decision making: Chronic and stable condition.    5. Benign prostatic hyperplasia (BPH).  No urinary symptoms reported; recent PSA undetectable.  Treatment plan: Continue with tamsulosin 0.4 mg daily and finasteride 5 mg daily.  Clinical decision making: Chronic and stable condition.    6.  CKD  Diagnosis based on recent labs showing GFR below 60.  Patient is working on appropriate hydration.  Will recheck labs again in 6 months.    Follow-up: Monitor weight and follow up if needed; recheck TSH and lipid profile and titrate medication if needed.    Orders:  1. Recurrent major depressive disorder, in full remission (HCC)    2. Hypothyroidism, unspecified type  - TSH WITH REFLEX TO FT4; Future    3. Dyslipidemia  - Lipid Profile; Future    4. Benign localized  prostatic hyperplasia with lower urinary tract symptoms (LUTS)    5. Chronic kidney disease, stage 3a        Followup: No follow-ups on file.         PLEASE NOTE: This dictation was created using voice recognition and [employer] ambient listening software. I have made every reasonable attempt to correct obvious errors, but I expect that there are errors of grammar and possibly content that I did not discover before finalizing the note.

## 2025-04-17 ENCOUNTER — HOSPITAL ENCOUNTER (OUTPATIENT)
Dept: LAB | Facility: MEDICAL CENTER | Age: 75
End: 2025-04-17
Attending: FAMILY MEDICINE
Payer: MEDICARE

## 2025-04-17 DIAGNOSIS — E78.5 DYSLIPIDEMIA: ICD-10-CM

## 2025-04-17 DIAGNOSIS — E03.9 HYPOTHYROIDISM, UNSPECIFIED TYPE: ICD-10-CM

## 2025-04-17 LAB
CHOLEST SERPL-MCNC: 166 MG/DL (ref 100–199)
HDLC SERPL-MCNC: 37 MG/DL
LDLC SERPL CALC-MCNC: 88 MG/DL
TRIGL SERPL-MCNC: 204 MG/DL (ref 0–149)
TSH SERPL DL<=0.005 MIU/L-ACNC: 0.83 UIU/ML (ref 0.38–5.33)

## 2025-04-17 PROCEDURE — 84443 ASSAY THYROID STIM HORMONE: CPT

## 2025-04-17 PROCEDURE — 80061 LIPID PANEL: CPT

## 2025-04-17 PROCEDURE — 36415 COLL VENOUS BLD VENIPUNCTURE: CPT

## 2025-05-22 ENCOUNTER — PATIENT MESSAGE (OUTPATIENT)
Dept: MEDICAL GROUP | Facility: LAB | Age: 75
End: 2025-05-22
Payer: MEDICARE

## 2025-05-22 RX ORDER — ACYCLOVIR 400 MG/1
400 TABLET ORAL
Qty: 90 TABLET | Refills: 0 | Status: SHIPPED | OUTPATIENT
Start: 2025-05-22

## 2025-05-29 DIAGNOSIS — F41.1 GAD (GENERALIZED ANXIETY DISORDER): ICD-10-CM

## 2025-05-29 DIAGNOSIS — F51.01 PRIMARY INSOMNIA: ICD-10-CM

## 2025-05-29 RX ORDER — TRAZODONE HYDROCHLORIDE 50 MG/1
TABLET ORAL
Qty: 180 TABLET | Refills: 3 | Status: SHIPPED | OUTPATIENT
Start: 2025-05-29

## 2025-05-29 RX ORDER — ESCITALOPRAM OXALATE 20 MG/1
20 TABLET ORAL
Qty: 90 TABLET | Refills: 3 | Status: SHIPPED | OUTPATIENT
Start: 2025-05-29

## 2025-06-20 RX ORDER — TAMSULOSIN HYDROCHLORIDE 0.4 MG/1
CAPSULE ORAL
Qty: 180 CAPSULE | Refills: 0 | Status: SHIPPED | OUTPATIENT
Start: 2025-06-20

## 2025-07-09 DIAGNOSIS — E78.5 DYSLIPIDEMIA: ICD-10-CM

## 2025-07-09 RX ORDER — SIMVASTATIN 20 MG
20 TABLET ORAL EVERY EVENING
Qty: 100 TABLET | Refills: 3 | Status: SHIPPED | OUTPATIENT
Start: 2025-07-09

## 2025-07-09 NOTE — TELEPHONE ENCOUNTER
Received request via: Pharmacy    Was the patient seen in the last year in this department? Yes    Does the patient have an active prescription (recently filled or refills available) for medication(s) requested? No    Pharm[facility] Name: [facility]    Does the patient have USP Plus and need 100-day supply? (This applies to ALL medications) Yes, quantity updated to 100 days

## 2025-08-21 RX ORDER — ACYCLOVIR 400 MG/1
400 TABLET ORAL
Qty: 90 TABLET | Refills: 3 | Status: SHIPPED | OUTPATIENT
Start: 2025-08-21

## 2025-08-24 DIAGNOSIS — N40.1 BENIGN LOCALIZED PROSTATIC HYPERPLASIA WITH LOWER URINARY TRACT SYMPTOMS (LUTS): ICD-10-CM

## 2025-08-25 RX ORDER — FINASTERIDE 5 MG/1
5 TABLET, FILM COATED ORAL DAILY
Qty: 90 TABLET | Refills: 3 | Status: SHIPPED | OUTPATIENT
Start: 2025-08-25

## (undated) DEVICE — CUSHION EAR E-Z WRAP NASAL CANNULA - (25/CA)

## (undated) DEVICE — KIT  I.V. START (100EA/CA)

## (undated) DEVICE — PROTECTOR ULNA NERVE - (36PR/CA)

## (undated) DEVICE — SET LEADWIRE 5 LEAD BEDSIDE DISPOSABLE ECG (1SET OF 5/EA)

## (undated) DEVICE — KIT ANESTHESIA W/CIRCUIT & 3/LT BAG W/FILTER (20EA/CA)

## (undated) DEVICE — SOD. CHL 10CC SYRINGE PREFILL - W/10 CC (30/BX)

## (undated) DEVICE — TOWEL STOP TIMEOUT SAFETY FLAG (40EA/CA)

## (undated) DEVICE — ELECTRODE 850 FOAM ADHESIVE - HYDROGEL RADIOTRNSPRNT (50/PK)

## (undated) DEVICE — BANDAID SHEER STRIP 3/4 IN (100EA/BX 12BX/CA)

## (undated) DEVICE — ELECTRODE DUAL RETURN W/ CORD - (50/PK)

## (undated) DEVICE — CANNULA O2 COMFORT SOFT EAR ADULT 7 FT TUBING (50/CA)

## (undated) DEVICE — SYRINGE NON SAFETY 5 CC 20 GA X 1-1/2 IN (100/BX 4BX/CA)

## (undated) DEVICE — SYRINGE SAFETY 10 ML 18 GA X 1 1/2 BLUNT LL (100/BX 4BX/CA)

## (undated) DEVICE — SLEEVE VASO CALF MED - (10PR/CA)

## (undated) DEVICE — WATER IRRIG. STER 3000 ML - (4/CA)

## (undated) DEVICE — SYRINGE 30 ML LL (56/BX)

## (undated) DEVICE — SENSOR OXIMETER ADULT SPO2 RD SET (20EA/BX)

## (undated) DEVICE — SPONGE GAUZESTER 4 X 4 4PLY - (128PK/CA)

## (undated) DEVICE — Device

## (undated) DEVICE — SYRINGE 3 CC 22 GA X 1-1/2 - NDL SAFETY (50/BX 8BX/CA)

## (undated) DEVICE — CANISTER SUCTION RIGID RED 1500CC (40EA/CA)

## (undated) DEVICE — SODIUM CHL IRRIGATION 0.9% 1000ML (12EA/CA)

## (undated) DEVICE — TUBE CONNECTING SUCTION - CLEAR PLASTIC STERILE 72 IN (50EA/CA)

## (undated) DEVICE — DRAPE STERILE BRACHYTHERAPY (20EA/CA)

## (undated) DEVICE — GRID DISPOSABLE 17 GA TEMPLATE (5EA/BX)

## (undated) DEVICE — TRAY SRGPRP PVP IOD WT PRP - (20/CA)

## (undated) DEVICE — PEROXIDE HYDROGEN 3% 32 OZ. (12EA/BX)

## (undated) DEVICE — SUCTION INSTRUMENT YANKAUER BULBOUS TIP W/O VENT (50EA/CA)

## (undated) DEVICE — CATHETER COUDE FOLEY 5CC - 16FR (12EA/CA)

## (undated) DEVICE — SYRINGE 50ML CATHETER TIP (40EA/BX 4BX/CA)

## (undated) DEVICE — TRAY FOLEY CATHETER STATLOCK 16FR SURESTEP  (10EA/CA)

## (undated) DEVICE — NEEDLE APPLICATOR IMPLANT SEED FOR USE W/ MICK 200 (50EA/BX)

## (undated) DEVICE — JELLY SURGILUBE STERILE FOIL 5 GM (150EA/BX)

## (undated) DEVICE — GLOVE BIOGEL SZ 7.5 SURGICAL PF LTX - (50PR/BX 4BX/CA)

## (undated) DEVICE — SENSOR SPO2 NEO LNCS ADHESIVE (20/BX) SEE USER NOTES

## (undated) DEVICE — PAD SANITARY 11IN MAXI IND WRAPPED  (12EA/PK 24PK/CA)

## (undated) DEVICE — SOD. CHL. INJ. 0.9% 1000 ML - (14EA/CA 60CA/PF)

## (undated) DEVICE — MASK ANESTHESIA ADULT  - (100/CA)

## (undated) DEVICE — CANISTER SUCTION 3000ML MECHANICAL FILTER AUTO SHUTOFF MEDI-VAC NONSTERILE LF DISP  (40EA/CA)

## (undated) DEVICE — SYRINGE 6 CC 20 GA X 1 1/2 - NDL SAFETY  (50/BX)

## (undated) DEVICE — LACTATED RINGERS INJ 1000 ML - (14EA/CA 60CA/PF)

## (undated) DEVICE — GOWN WARMING STANDARD FLEX - (30/CA)

## (undated) DEVICE — TUBING CLEARLINK DUO-VENT - C-FLO (48EA/CA)

## (undated) DEVICE — CATHETER IV 20 GA X 1-1/4 ---SURG.& SDS ONLY--- (50EA/BX)

## (undated) DEVICE — WATER IRRIGATION STERILE 1000ML (12EA/CA)